# Patient Record
Sex: MALE | Race: WHITE | Employment: OTHER | URBAN - METROPOLITAN AREA
[De-identification: names, ages, dates, MRNs, and addresses within clinical notes are randomized per-mention and may not be internally consistent; named-entity substitution may affect disease eponyms.]

---

## 2017-02-20 DIAGNOSIS — I10 HTN (HYPERTENSION): ICD-10-CM

## 2017-02-20 RX ORDER — METOPROLOL SUCCINATE 25 MG/1
12.5 TABLET, EXTENDED RELEASE ORAL DAILY
Qty: 45 TABLET | Refills: 1 | Status: SHIPPED | OUTPATIENT
Start: 2017-02-20 | End: 2017-08-01

## 2017-02-28 DIAGNOSIS — R07.89 CHEST DISCOMFORT: ICD-10-CM

## 2017-02-28 DIAGNOSIS — E78.5 HYPERLIPIDEMIA LDL GOAL <130: ICD-10-CM

## 2017-02-28 RX ORDER — SIMVASTATIN 20 MG
20 TABLET ORAL DAILY
Qty: 90 TABLET | Refills: 1 | Status: SHIPPED | OUTPATIENT
Start: 2017-02-28 | End: 2017-10-20

## 2017-02-28 RX ORDER — ISOSORBIDE MONONITRATE 60 MG/1
60 TABLET, EXTENDED RELEASE ORAL DAILY
Qty: 90 TABLET | Refills: 1 | Status: SHIPPED | OUTPATIENT
Start: 2017-02-28 | End: 2017-08-15

## 2017-03-20 ENCOUNTER — OFFICE VISIT (OUTPATIENT)
Dept: FAMILY MEDICINE | Facility: CLINIC | Age: 82
End: 2017-03-20
Payer: COMMERCIAL

## 2017-03-20 VITALS
DIASTOLIC BLOOD PRESSURE: 80 MMHG | TEMPERATURE: 96.4 F | HEIGHT: 67 IN | BODY MASS INDEX: 25.91 KG/M2 | WEIGHT: 165.1 LBS | SYSTOLIC BLOOD PRESSURE: 160 MMHG | HEART RATE: 59 BPM

## 2017-03-20 DIAGNOSIS — I10 ESSENTIAL HYPERTENSION WITH GOAL BLOOD PRESSURE LESS THAN 140/90: ICD-10-CM

## 2017-03-20 DIAGNOSIS — E11.21 TYPE 2 DIABETES MELLITUS WITH DIABETIC NEPHROPATHY, WITHOUT LONG-TERM CURRENT USE OF INSULIN (H): Primary | ICD-10-CM

## 2017-03-20 LAB — HBA1C MFR BLD: 8.3 % (ref 4.3–6)

## 2017-03-20 PROCEDURE — 80048 BASIC METABOLIC PNL TOTAL CA: CPT | Performed by: FAMILY MEDICINE

## 2017-03-20 PROCEDURE — 36415 COLL VENOUS BLD VENIPUNCTURE: CPT | Performed by: FAMILY MEDICINE

## 2017-03-20 PROCEDURE — 83036 HEMOGLOBIN GLYCOSYLATED A1C: CPT | Performed by: FAMILY MEDICINE

## 2017-03-20 PROCEDURE — 99214 OFFICE O/P EST MOD 30 MIN: CPT | Performed by: FAMILY MEDICINE

## 2017-03-20 NOTE — PATIENT INSTRUCTIONS
-Please return in 3 months for a recheck and we will complete your physical then.  -I will let you know when I get the results back from lab.

## 2017-03-20 NOTE — MR AVS SNAPSHOT
"              After Visit Summary   3/20/2017    Kiran Carcamo    MRN: [id number]           Patient Information     Date Of Birth          4/13/1921        Visit Information        Provider Department      3/20/2017 10:30 AM Buddy Poe MD [facility] [place]        Today's Diagnoses     Type 2 diabetes mellitus with diabetic nephropathy, without long-term current use of insulin (H)    -  1    Essential hypertension with goal blood pressure less than 140/90          Care Instructions    -Please return in 3 months for a recheck and we will complete your physical then.  -I will let you know when I get the results back from lab.        Follow-ups after your visit        Who to contact     If you have questions or need follow up information about today's clinic visit or your schedule please contact [facility] [place] [facility] directly at [phone].  Normal or non-critical lab and imaging results will be communicated to you by MyChart, letter or phone within 4 business days after the clinic has received the results. If you do not hear from us within 7 days, please contact the clinic through MyChart or phone. If you have a critical or abnormal lab result, we will notify you by phone as soon as possible.  Submit refill requests through [employer] or call your pharmacy and they will forward the refill request to us. Please allow 3 business days for your refill to be completed.          Additional Information About Your Visit        MyChart Information     [employer] lets you send messages to your doctor, view your test results, renew your prescriptions, schedule appointments and more. To sign up, go to [email or web address] . Click on \"Log in\" on the left side of the screen, which will take you to the Welcome page. Then click on \"Sign up Now\" on the right side of the page.     You will be asked to enter the access code listed below, as well as some personal information. Please follow the directions to " "create your username and password.     Your access code is: [email or web address]  Expires: 2017 10:51 AM     Your access code will  in 90 days. If you need help or a new code, please call your [facility] or [phone].        Care EveryWhere ID     This is your Care EveryWhere ID. This could be used by other organizations to access your [place] medical records  [id number]        Your Vitals Were     Pulse Temperature Height BMI (Body Mass Index)          59 96.4  F (35.8  C) (Oral) 5' 7\" (1.702 m) 25.86 kg/m2         Blood Pressure from Last 3 Encounters:   17 160/80   16 164/75   08/10/16 170/84    Weight from Last 3 Encounters:   17 165 lb 1.6 oz (74.9 kg)   16 167 lb 3.2 oz (75.8 kg)   08/10/16 165 lb 1.6 oz (74.9 kg)              We Performed the Following     Basic metabolic panel     Hemoglobin A1c        Primary Care Provider Office Phone # Fax #    Buddy Poe [phone]       [facility] [address] [address]        Thank you!     Thank you for choosing [facility]  for your [place]. Our goal is always to provide you with excellent care. Hearing back from our patients is one way we can continue to improve our services. Please take a few minutes to complete the written survey that you may receive in the mail after your visit with us. Thank you!             Your Updated Medication List - Protect others around you: Learn how to safely use, store and throw away your medicines at www.disposemymeds.org.          This list is accurate as of: 3/20/17 10:51 AM.  Always use your most recent med list.                   Brand Name Dispense Instructions for use    aspirin 81 MG EC tablet      Take 81 mg by mouth At Bedtime.       blood glucose monitoring test strip    no brand specified    3 Box    1 box by strip route two times daily       cephALEXin 500 MG capsule    KEFLEX     Reported on 3/20/2017       " isosorbide mononitrate 60 MG 24 hr tablet    IMDUR    90 tablet    Take 1 tablet (60 mg) by mouth daily       lisinopril 20 MG tablet    PRINIVIL/ZESTRIL    90 tablet    Take 1 tablet (20 mg) by mouth daily       metoprolol 25 MG 24 hr tablet    TOPROL-XL    45 tablet    Take 0.5 tablets (12.5 mg) by mouth daily       MV-ONE PO      Take 1 tablet by mouth.       nitroglycerin 0.4 MG sublingual tablet    NITROSTAT    25 tablet    Place 1 tablet (0.4 mg) under the tongue every 5 minutes as needed for chest pain If symptoms after 3 doses (15 minutes) call 911.       simvastatin 20 MG tablet    ZOCOR    90 tablet    Take 1 tablet (20 mg) by mouth daily

## 2017-03-20 NOTE — PROGRESS NOTES
SUBJECTIVE:                                                    Kiran Carcamo is a 89 year old male who presents to clinic today for the following health issues:    Hypertension Follow-up      Outpatient blood pressures are being checked at home.  Results are 130-160.    Low Salt Diet: low salt       Amount of exercise or physical activity: 6-7 days/week for an average of 15-30 minutes    Problems taking medications regularly: No    Medication side effects: none    Diet: regular (no restrictions)    Patient reports that he has been checking his blood pressures at home and has been getting systolic pressures that range between 130 and 160. He says that he has been eating a low salt diet and has been going for daily walks for 15-30 minutes, and thinks that his blood pressure is well controlled. He has been taking the lisinopril and metoprolol as prescribed with no problems. Blood pressures are approximately 85% at goal.    Diabetes Follow-up    Patient is checking blood sugars: once daily.  Results are as follows: High 100's low 200's    Diabetic concerns: None     Symptoms of hypoglycemia (low blood sugar): none     Paresthesias (numbness or burning in feet) or sores: No     Patient says that he has been checking his blood sugars daily and has been getting good results around the high 100's to low 200's, and he is approximately 80% at goal. He says that his sugars are typically high when he has late night snacks, and that normally his high sugars can be explained by a late snack or no exercising. He feels as though his diabetes is under good control and has no other concerns.    Patient says that sometimes when he lays on his left side, he experiences a slight discomfort in his lower left chest. He says that he often will have to adjust position to avoid discomfort, and that by the next day the discomfort dissipates.     Patient says that his ring finger has begun to swell and is wondering if there are any good  options for getting his wedding ring removed should it ever become a problem.    Problem list and histories reviewed & adjusted, as indicated.  Additional history: as documented    Patient Active Problem List   Diagnosis     Health Care Home     AK (actinic keratosis)     SK (seborrheic keratosis)     Hyperplasia of prostate     Hyperlipidemia LDL goal <70     History of MI (myocardial infarction)     Forgetfulness     Syncope     CKD (chronic kidney disease) stage 2, GFR 60-89 ml/min     Atypical chest pain     Essential hypertension with goal blood pressure less than 140/90     Eczema, unspecified type     Type 2 diabetes mellitus with diabetic nephropathy, without long-term current use of insulin (H)     Past Surgical History   Procedure Laterality Date     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     Mandible surgery       Cholecystectomy         Social History   Substance Use Topics     Smoking status: Never Smoker     Smokeless tobacco: Never Used     Alcohol use No     Family History   Problem Relation Age of Onset     HEART DISEASE Mother      Eye Disorder Father      Respiratory Brother      HEART DISEASE Sister      CANCER Daughter      CANCER Brother          Current Outpatient Prescriptions   Medication Sig Dispense Refill     isosorbide mononitrate (IMDUR) 60 MG 24 hr tablet Take 1 tablet (60 mg) by mouth daily 90 tablet 1     simvastatin (ZOCOR) 20 MG tablet Take 1 tablet (20 mg) by mouth daily 90 tablet 1     metoprolol (TOPROL-XL) 25 MG 24 hr tablet Take 0.5 tablets (12.5 mg) by mouth daily 45 tablet 1     blood glucose monitoring (NO BRAND SPECIFIED) test strip 1 box by strip route two times daily 3 Box 3     lisinopril (PRINIVIL,ZESTRIL) 20 MG tablet Take 1 tablet (20 mg) by mouth daily 90 tablet 3     nitroglycerin (NITROSTAT) 0.4 MG SL tablet Place 1 tablet (0.4 mg) under the tongue every 5 minutes as needed for chest pain If symptoms after 3 doses (15 minutes) call [phone]. [phone] tablet 1     aspirin  81 MG EC tablet Take 81 mg by mouth At Bedtime.       Multiple Vitamin (MV-ONE PO) Take 1 tablet by mouth.       cephALEXin (KEFLEX) 500 MG capsule Reported on 3/20/2017       No Known Allergies  Recent Labs   Lab Test  11/16/16   1047  08/10/16   1050  12/16/15   1111   11/21/14   1441  04/16/14   1030   A1C  7.6*  7.4*  6.9*   < >  6.8*  6.9*   LDL   --   64   --    --   40  55   HDL   --   41   --    --   47  36*   TRIG   --   97   --    --   110  140   ALT   --    --   34   --   34  31   CR   --    --   1.02   --   0.94  1.03   GFRESTIMATED   --    --   68   --   75  67   GFRESTBLACK   --    --   82   --   >90   GFR Calc    82   POTASSIUM   --    --   4.3   --   4.7  4.5   TSH  2.57   --    --    --   1.97   --     < > = values in this interval not displayed.      BP Readings from Last 3 Encounters:   03/20/17 160/80   11/16/16 164/75   08/10/16 170/84    Wt Readings from Last 3 Encounters:   03/20/17 74.9 kg (165 lb 1.6 oz)   11/16/16 75.8 kg (167 lb 3.2 oz)   08/10/16 74.9 kg (165 lb 1.6 oz)        Reviewed and updated as needed this visit by clinical staff  Tobacco  Allergies  Meds  Med Hx  Surg Hx  Fam Hx  Soc Hx      Reviewed and updated as needed this visit by Provider         ROS:  Positive for infrequent lower left chest discomfort.    Denies headache, insomnia, shortness of breath, cough, heartburn, bowel issues, bladder issues, neck pain, back pain, hip pain, knee pain, ankle pain, or foot pain. Remainder of ROS is negative unless otherwise noted above or in HPI.    This document serves as a record of the services and decisions personally performed and made by Buddy Poe MD. It was created on his behalf by Jef Osuna, a trained medical scribe. The creation of this document is based the provider's statements to the medical scribe.  Jef Osuna 10:34 AM March 20, 2017    OBJECTIVE:                                                    /80  Pulse 59  Temp 96.4  " F (35.8  C) (Oral)  Ht 1.702 m (5' 7\")  Wt 74.9 kg (165 lb 1.6 oz)  BMI 25.86 kg/m2  Body mass index is 25.86 kg/(m^2).  GENERAL: healthy, alert and no distress  RESP: lungs clear to auscultation - no rales, rhonchi or wheezes  CV: regular rate and rhythm, normal S1 S2, no S3 or S4, no murmur, click or rub, no peripheral edema and peripheral pulses strong  MS: no gross musculoskeletal defects noted, no edema. Calves nontender.  NEURO: Normal strength and tone, mentation intact and speech normal  PSYCH: mentation appears normal, affect normal/bright    Diagnostic Test Results:  No results found for this or any previous visit (from the past 24 hour(s)).     ASSESSMENT/PLAN:                                                      (E11.21) Type 2 diabetes mellitus with diabetic nephropathy, without long-term current use of insulin (H)  (primary encounter diagnosis)  Comment: Stable. Labs completed today. Not currently on any medication.  Plan: Hemoglobin A1c, Basic metabolic panel        Follow up with results from lab. Follow up in 3 months for recheck and physical.    (I10) Essential hypertension with goal blood pressure less than 140/90  Comment: Not at goal this morning; thought he shouldn't take medications before blood draw and he's fasting. Controlled on metoprolol and lisinopril. Patient takes blood pressure daily and pressures are at goal nearly 80% of the time.  Plan: Continue on current medication. Follow up in 3 months for recheck and physical.    Patient Instructions   -Please return in 3 months for a recheck and we will complete your physical then.  -I will let you know when I get the results back from lab.    The information in this document, created by the medical scribe for me, accurately reflects the services I personally performed and the decisions made by me. I have reviewed and approved this document for accuracy prior to leaving the patient care area.   Buddy Poe MD 10:35 AM March 20, " 2017  [facility] [place]

## 2017-03-21 LAB
ANION GAP SERPL CALCULATED.3IONS-SCNC: 7 MMOL/L (ref 3–14)
BUN SERPL-MCNC: 30 MG/DL (ref 7–30)
CALCIUM SERPL-MCNC: 9.2 MG/DL (ref 8.5–10.1)
CHLORIDE SERPL-SCNC: 105 MMOL/L (ref 94–109)
CO2 SERPL-SCNC: 29 MMOL/L (ref 20–32)
CREAT SERPL-MCNC: 1.08 MG/DL (ref 0.66–1.25)
GFR SERPL CREATININE-BSD FRML MDRD: 63 ML/MIN/1.7M2
GLUCOSE SERPL-MCNC: 154 MG/DL (ref 70–99)
POTASSIUM SERPL-SCNC: 4.4 MMOL/L (ref 3.4–5.3)
SODIUM SERPL-SCNC: 141 MMOL/L (ref 133–144)

## 2017-05-08 ENCOUNTER — HOSPITAL ENCOUNTER (OUTPATIENT)
Facility: CLINIC | Age: 82
Setting detail: OBSERVATION
Discharge: HOME | End: 2017-05-09
Attending: FAMILY MEDICINE | Admitting: FAMILY MEDICINE
Payer: MEDICARE

## 2017-05-08 ENCOUNTER — APPOINTMENT (OUTPATIENT)
Dept: GENERAL RADIOLOGY | Facility: CLINIC | Age: 82
End: 2017-05-08
Attending: FAMILY MEDICINE
Payer: MEDICARE

## 2017-05-08 ENCOUNTER — TELEPHONE (OUTPATIENT)
Dept: FAMILY MEDICINE | Facility: CLINIC | Age: 82
End: 2017-05-08

## 2017-05-08 DIAGNOSIS — N39.0 URINARY TRACT INFECTION WITH HEMATURIA, SITE UNSPECIFIED: ICD-10-CM

## 2017-05-08 DIAGNOSIS — J06.9 UPPER RESPIRATORY TRACT INFECTION, UNSPECIFIED TYPE: ICD-10-CM

## 2017-05-08 DIAGNOSIS — J18.9 PNEUMONIA DUE TO INFECTIOUS ORGANISM, UNSPECIFIED LATERALITY, UNSPECIFIED PART OF LUNG: ICD-10-CM

## 2017-05-08 DIAGNOSIS — J18.9 PNEUMONIA: ICD-10-CM

## 2017-05-08 DIAGNOSIS — K59.00 CONSTIPATION, UNSPECIFIED CONSTIPATION TYPE: ICD-10-CM

## 2017-05-08 DIAGNOSIS — I10 ESSENTIAL HYPERTENSION, MALIGNANT: ICD-10-CM

## 2017-05-08 DIAGNOSIS — R53.1 WEAKNESS: ICD-10-CM

## 2017-05-08 DIAGNOSIS — R31.9 URINARY TRACT INFECTION WITH HEMATURIA, SITE UNSPECIFIED: ICD-10-CM

## 2017-05-08 DIAGNOSIS — J15.69 GRAM-NEGATIVE PNEUMONIA (H): ICD-10-CM
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
## 2017-05-08 DIAGNOSIS — E11.9 TYPE 2 DIABETES MELLITUS WITHOUT COMPLICATION, WITHOUT LONG-TERM CURRENT USE OF INSULIN (H): ICD-10-CM

## 2017-05-08 DIAGNOSIS — J10.1 INFLUENZA A: Primary | ICD-10-CM

## 2017-05-08 DIAGNOSIS — R31.9 HEMATURIA SYNDROME: ICD-10-CM

## 2017-05-08 LAB
ALBUMIN SERPL-MCNC: 2.8 G/DL (ref 3.4–5)
ALBUMIN UR-MCNC: 30 MG/DL
ALP SERPL-CCNC: 94 U/L (ref 40–150)
ALT SERPL W P-5'-P-CCNC: 83 U/L (ref 0–70)
ANION GAP SERPL CALCULATED.3IONS-SCNC: 9 MMOL/L (ref 3–14)
APPEARANCE UR: CLEAR
APTT PPP: 29 SEC (ref 22–37)
AST SERPL W P-5'-P-CCNC: 45 U/L (ref 0–45)
BASOPHILS # BLD AUTO: 0 10E9/L (ref 0–0.2)
BASOPHILS NFR BLD AUTO: 0.1 %
BILIRUB SERPL-MCNC: 0.6 MG/DL (ref 0.2–1.3)
BILIRUB UR QL STRIP: NEGATIVE
BUN SERPL-MCNC: 24 MG/DL (ref 7–30)
CALCIUM SERPL-MCNC: 8.6 MG/DL (ref 8.5–10.1)
CHLORIDE SERPL-SCNC: 102 MMOL/L (ref 94–109)
CO2 SERPL-SCNC: 27 MMOL/L (ref 20–32)
COLOR UR AUTO: YELLOW
CREAT SERPL-MCNC: 1.01 MG/DL (ref 0.66–1.25)
DIFFERENTIAL METHOD BLD: ABNORMAL
EOSINOPHIL # BLD AUTO: 0 10E9/L (ref 0–0.7)
EOSINOPHIL NFR BLD AUTO: 0.1 %
ERYTHROCYTE [DISTWIDTH] IN BLOOD BY AUTOMATED COUNT: 13.1 % (ref 10–15)
FLUAV+FLUBV AG SPEC QL: ABNORMAL
FLUAV+FLUBV AG SPEC QL: ABNORMAL
FLUAV+FLUBV RNA SPEC QL NAA+PROBE: ABNORMAL
FLUAV+FLUBV RNA SPEC QL NAA+PROBE: ABNORMAL
GFR SERPL CREATININE-BSD FRML MDRD: 68 ML/MIN/1.7M2
GLUCOSE SERPL-MCNC: 172 MG/DL (ref 70–99)
GLUCOSE UR STRIP-MCNC: NEGATIVE MG/DL
HCT VFR BLD AUTO: 37.7 % (ref 40–53)
HGB BLD-MCNC: 12.5 G/DL (ref 13.3–17.7)
HGB UR QL STRIP: ABNORMAL
IMM GRANULOCYTES # BLD: 0 10E9/L (ref 0–0.4)
IMM GRANULOCYTES NFR BLD: 0.4 %
INR PPP: 1.13 (ref 0.86–1.14)
KETONES UR STRIP-MCNC: 10 MG/DL
LACTATE BLD-SCNC: 1.2 MMOL/L (ref 0.7–2.1)
LEUKOCYTE ESTERASE UR QL STRIP: NEGATIVE
LYMPHOCYTES # BLD AUTO: 0.4 10E9/L (ref 0.8–5.3)
LYMPHOCYTES NFR BLD AUTO: 5.5 %
MCH RBC QN AUTO: 32.2 PG (ref 26.5–33)
MCHC RBC AUTO-ENTMCNC: 33.2 G/DL (ref 31.5–36.5)
MCV RBC AUTO: 97 FL (ref 78–100)
MONOCYTES # BLD AUTO: 1 10E9/L (ref 0–1.3)
MONOCYTES NFR BLD AUTO: 14 %
MUCOUS THREADS #/AREA URNS LPF: PRESENT /LPF
NEUTROPHILS # BLD AUTO: 5.5 10E9/L (ref 1.6–8.3)
NEUTROPHILS NFR BLD AUTO: 79.9 %
NITRATE UR QL: NEGATIVE
NRBC # BLD AUTO: 0 10*3/UL
NRBC BLD AUTO-RTO: 0 /100
PH UR STRIP: 5.5 PH (ref 5–7)
PLATELET # BLD AUTO: 232 10E9/L (ref 150–450)
POTASSIUM SERPL-SCNC: 4.3 MMOL/L (ref 3.4–5.3)
PROT SERPL-MCNC: 7.4 G/DL (ref 6.8–8.8)
RBC # BLD AUTO: 3.88 10E12/L (ref 4.4–5.9)
RBC #/AREA URNS AUTO: 13 /HPF (ref 0–2)
RSV AG SPEC QL: NORMAL
RSV RNA SPEC NAA+PROBE: ABNORMAL
SODIUM SERPL-SCNC: 138 MMOL/L (ref 133–144)
SP GR UR STRIP: 1.02 (ref 1–1.03)
SPECIMEN SOURCE: ABNORMAL
SPECIMEN SOURCE: ABNORMAL
SPECIMEN SOURCE: NORMAL
TROPONIN I SERPL-MCNC: 0.08 UG/L (ref 0–0.04)
URN SPEC COLLECT METH UR: ABNORMAL
UROBILINOGEN UR STRIP-MCNC: NORMAL MG/DL (ref 0–2)
WBC # BLD AUTO: 6.9 10E9/L (ref 4–11)
WBC #/AREA URNS AUTO: 1 /HPF (ref 0–2)

## 2017-05-08 PROCEDURE — 80053 COMPREHEN METABOLIC PANEL: CPT | Performed by: FAMILY MEDICINE

## 2017-05-08 PROCEDURE — 96361 HYDRATE IV INFUSION ADD-ON: CPT | Performed by: FAMILY MEDICINE

## 2017-05-08 PROCEDURE — 99207 ZZC APP CREDIT; MD BILLING SHARED VISIT: CPT | Mod: 25 | Performed by: FAMILY MEDICINE

## 2017-05-08 PROCEDURE — 25000128 H RX IP 250 OP 636: Performed by: FAMILY MEDICINE

## 2017-05-08 PROCEDURE — 83605 ASSAY OF LACTIC ACID: CPT | Performed by: NURSE PRACTITIONER

## 2017-05-08 PROCEDURE — 36415 COLL VENOUS BLD VENIPUNCTURE: CPT | Performed by: FAMILY MEDICINE

## 2017-05-08 PROCEDURE — 99214 OFFICE O/P EST MOD 30 MIN: CPT | Mod: GC | Performed by: INTERNAL MEDICINE

## 2017-05-08 PROCEDURE — 87804 INFLUENZA ASSAY W/OPTIC: CPT | Performed by: FAMILY MEDICINE

## 2017-05-08 PROCEDURE — 99285 EMERGENCY DEPT VISIT HI MDM: CPT | Mod: 25 | Performed by: FAMILY MEDICINE

## 2017-05-08 PROCEDURE — 85730 THROMBOPLASTIN TIME PARTIAL: CPT | Performed by: FAMILY MEDICINE

## 2017-05-08 PROCEDURE — 93005 ELECTROCARDIOGRAM TRACING: CPT | Performed by: FAMILY MEDICINE

## 2017-05-08 PROCEDURE — 25000132 ZZH RX MED GY IP 250 OP 250 PS 637: Mod: GY | Performed by: NURSE PRACTITIONER

## 2017-05-08 PROCEDURE — 93010 ELECTROCARDIOGRAM REPORT: CPT | Mod: Z6 | Performed by: FAMILY MEDICINE

## 2017-05-08 PROCEDURE — 85025 COMPLETE CBC W/AUTO DIFF WBC: CPT | Performed by: FAMILY MEDICINE

## 2017-05-08 PROCEDURE — 87807 RSV ASSAY W/OPTIC: CPT | Performed by: FAMILY MEDICINE

## 2017-05-08 PROCEDURE — 99220 ZZC INITIAL OBSERVATION CARE,LEVL III: CPT | Mod: Z6 | Performed by: NURSE PRACTITIONER

## 2017-05-08 PROCEDURE — 71010 XR CHEST PORT 1 VW: CPT

## 2017-05-08 PROCEDURE — 85610 PROTHROMBIN TIME: CPT | Performed by: FAMILY MEDICINE

## 2017-05-08 PROCEDURE — 87040 BLOOD CULTURE FOR BACTERIA: CPT | Performed by: FAMILY MEDICINE

## 2017-05-08 PROCEDURE — 93010 ELECTROCARDIOGRAM REPORT: CPT | Performed by: INTERNAL MEDICINE

## 2017-05-08 PROCEDURE — 96361 HYDRATE IV INFUSION ADD-ON: CPT

## 2017-05-08 PROCEDURE — 96365 THER/PROPH/DIAG IV INF INIT: CPT | Performed by: FAMILY MEDICINE

## 2017-05-08 PROCEDURE — 84484 ASSAY OF TROPONIN QUANT: CPT | Performed by: FAMILY MEDICINE

## 2017-05-08 PROCEDURE — G0378 HOSPITAL OBSERVATION PER HR: HCPCS

## 2017-05-08 PROCEDURE — 25000128 H RX IP 250 OP 636: Performed by: NURSE PRACTITIONER

## 2017-05-08 PROCEDURE — A9270 NON-COVERED ITEM OR SERVICE: HCPCS | Mod: GY | Performed by: NURSE PRACTITIONER

## 2017-05-08 PROCEDURE — 81001 URINALYSIS AUTO W/SCOPE: CPT | Performed by: FAMILY MEDICINE

## 2017-05-08 RX ORDER — ACETAMINOPHEN 325 MG/1
650 TABLET ORAL EVERY 4 HOURS PRN
Status: DISCONTINUED | OUTPATIENT
Start: 2017-05-08 | End: 2017-05-08

## 2017-05-08 RX ORDER — LIDOCAINE 40 MG/G
CREAM TOPICAL
Status: DISCONTINUED | OUTPATIENT
Start: 2017-05-08 | End: 2017-05-09 | Stop reason: HOSPADM

## 2017-05-08 RX ORDER — IPRATROPIUM BROMIDE AND ALBUTEROL SULFATE 2.5; .5 MG/3ML; MG/3ML
3 SOLUTION RESPIRATORY (INHALATION) EVERY 4 HOURS PRN
Status: DISCONTINUED | OUTPATIENT
Start: 2017-05-08 | End: 2017-05-09 | Stop reason: HOSPADM

## 2017-05-08 RX ORDER — SODIUM CHLORIDE 9 MG/ML
1000 INJECTION, SOLUTION INTRAVENOUS CONTINUOUS
Status: DISCONTINUED | OUTPATIENT
Start: 2017-05-08 | End: 2017-05-08

## 2017-05-08 RX ORDER — ACETAMINOPHEN 325 MG/1
975 TABLET ORAL EVERY 8 HOURS PRN
Status: DISCONTINUED | OUTPATIENT
Start: 2017-05-08 | End: 2017-05-09 | Stop reason: HOSPADM

## 2017-05-08 RX ORDER — ISOSORBIDE MONONITRATE 60 MG/1
60 TABLET, EXTENDED RELEASE ORAL DAILY
Status: DISCONTINUED | OUTPATIENT
Start: 2017-05-09 | End: 2017-05-09 | Stop reason: HOSPADM

## 2017-05-08 RX ORDER — ASPIRIN 81 MG/1
81 TABLET ORAL AT BEDTIME
Status: DISCONTINUED | OUTPATIENT
Start: 2017-05-08 | End: 2017-05-09 | Stop reason: HOSPADM

## 2017-05-08 RX ORDER — ACETAMINOPHEN 325 MG/1
975 TABLET ORAL EVERY 4 HOURS PRN
Status: DISCONTINUED | OUTPATIENT
Start: 2017-05-08 | End: 2017-05-08

## 2017-05-08 RX ORDER — SIMVASTATIN 20 MG
20 TABLET ORAL DAILY
Status: DISCONTINUED | OUTPATIENT
Start: 2017-05-08 | End: 2017-05-09 | Stop reason: HOSPADM

## 2017-05-08 RX ORDER — OSELTAMIVIR PHOSPHATE 30 MG/1
30 CAPSULE ORAL 2 TIMES DAILY
Status: DISCONTINUED | OUTPATIENT
Start: 2017-05-08 | End: 2017-05-09 | Stop reason: HOSPADM

## 2017-05-08 RX ORDER — SODIUM CHLORIDE 9 MG/ML
INJECTION, SOLUTION INTRAVENOUS CONTINUOUS
Status: DISCONTINUED | OUTPATIENT
Start: 2017-05-08 | End: 2017-05-09 | Stop reason: HOSPADM

## 2017-05-08 RX ORDER — SIMVASTATIN 20 MG
20 TABLET ORAL DAILY
Status: DISCONTINUED | OUTPATIENT
Start: 2017-05-09 | End: 2017-05-08

## 2017-05-08 RX ORDER — MULTIPLE VITAMINS W/ MINERALS TAB 9MG-400MCG
1 TAB ORAL DAILY
Status: DISCONTINUED | OUTPATIENT
Start: 2017-05-09 | End: 2017-05-09 | Stop reason: HOSPADM

## 2017-05-08 RX ORDER — LEVOFLOXACIN 5 MG/ML
500 INJECTION, SOLUTION INTRAVENOUS ONCE
Status: COMPLETED | OUTPATIENT
Start: 2017-05-08 | End: 2017-05-08

## 2017-05-08 RX ORDER — LIDOCAINE 40 MG/G
CREAM TOPICAL
Status: DISCONTINUED | OUTPATIENT
Start: 2017-05-08 | End: 2017-05-09

## 2017-05-08 RX ORDER — LISINOPRIL 20 MG/1
20 TABLET ORAL DAILY
Status: DISCONTINUED | OUTPATIENT
Start: 2017-05-09 | End: 2017-05-09 | Stop reason: HOSPADM

## 2017-05-08 RX ADMIN — LEVOFLOXACIN 500 MG: 5 INJECTION, SOLUTION INTRAVENOUS at 18:05

## 2017-05-08 RX ADMIN — ASPIRIN 81 MG: 81 TABLET, COATED ORAL at 21:48

## 2017-05-08 RX ADMIN — ACETAMINOPHEN 975 MG: 325 TABLET, FILM COATED ORAL at 21:48

## 2017-05-08 RX ADMIN — OSELTAMIVIR PHOSPHATE 30 MG: 30 CAPSULE ORAL at 21:48

## 2017-05-08 RX ADMIN — SIMVASTATIN 20 MG: 20 TABLET, FILM COATED ORAL at 21:48

## 2017-05-08 RX ADMIN — SODIUM CHLORIDE: 9 INJECTION, SOLUTION INTRAVENOUS at 21:01

## 2017-05-08 RX ADMIN — SODIUM CHLORIDE 1000 ML: 900 INJECTION, SOLUTION INTRAVENOUS at 15:34

## 2017-05-08 ASSESSMENT — ENCOUNTER SYMPTOMS
COUGH: 1
NECK PAIN: 0
DYSPHORIC MOOD: 1
APPETITE CHANGE: 1
BRUISES/BLEEDS EASILY: 0
FLANK PAIN: 0
VOICE CHANGE: 1
NAUSEA: 0
ACTIVITY CHANGE: 1
CHILLS: 1
RHINORRHEA: 1
DECREASED CONCENTRATION: 1
VOMITING: 0
ABDOMINAL PAIN: 0
DIARRHEA: 0
FATIGUE: 1
WEAKNESS: 1
SHORTNESS OF BREATH: 0
ARTHRALGIAS: 1
SLEEP DISTURBANCE: 1
TROUBLE SWALLOWING: 0
FEVER: 0

## 2017-05-08 NOTE — ED PROVIDER NOTES
History     Chief Complaint   Patient presents with     Generalized Weakness     HPI  Kiran Carcamo is a 89 year old male with a history of DM type II, CKD stage II, HTN, MI s/p 6-vessel bypass and syncope who presents to the Emergency Department via EMS for evaluation after a fall. This afternoon, the patient let his daughter into his home and went up to get into bed when he fell to the ground. He did not hit his head or lose consciousness. He denies nausea, vomiting diarrhea or any other concerns or complaints at this time. He did receive the influenza vaccination this year.    Per the patient's daughter, the patient has been experiencing a head cold recently including a cough, congestion, chills and has not been sleeping well. She checked on him today and while getting into bed, he slid off the bed onto the ground. She could not get him off the floor, prompting her to call EMS. He did not hit his head or experience seizure like activity. The patient lives alone in a single family home with 2 levels.  As noted the patient had visited a friend in the nursing home this last week.    Past Medical History:   Diagnosis Date     CKD (chronic kidney disease) stage 2, GFR 60-89 ml/min 7/6/2015     Hypertension goal BP (blood pressure) < 140/90      Syncope 12/12/2011     Type 2 diabetes, HbA1C goal < 8% (H) 2/23/2015       Past Surgical History:   Procedure Laterality Date     CHOLECYSTECTOMY       CORONARY ARTERY BYPASS  '05    6-vessel     MANDIBLE SURGERY         Family History   Problem Relation Age of Onset     HEART DISEASE Mother      Eye Disorder Father      Respiratory Brother      HEART DISEASE Sister      CANCER Daughter      CANCER Brother        Social History   Substance Use Topics     Smoking status: Never Smoker     Smokeless tobacco: Never Used     Alcohol use No       Current Facility-Administered Medications   Medication     lidocaine 1 % 1 mL     lidocaine (LMX4) kit     sodium chloride (PF) 0.9%  PF flush 3 mL     sodium chloride (PF) 0.9% PF flush 3 mL     0.9% sodium chloride infusion     levofloxacin (LEVAQUIN) infusion 500 mg     Current Outpatient Prescriptions   Medication     isosorbide mononitrate (IMDUR) 60 MG 24 hr tablet     simvastatin (ZOCOR) 20 MG tablet     metoprolol (TOPROL-XL) 25 MG 24 hr tablet     blood glucose monitoring (NO BRAND SPECIFIED) test strip     lisinopril (PRINIVIL,ZESTRIL) 20 MG tablet     cephALEXin (KEFLEX) 500 MG capsule     nitroglycerin (NITROSTAT) 0.4 MG SL tablet     aspirin 81 MG EC tablet     Multiple Vitamin (MV-ONE PO)      No Known Allergies     I have reviewed the Medications, Allergies, Past Medical and Surgical History, and Social History in the Epic system.    Review of Systems   Constitutional: Positive for activity change (patient with fall unable to get up), appetite change (slightly decreased), chills and fatigue. Negative for fever.   HENT: Positive for congestion, hearing loss (chronic hard of hearing), postnasal drip, rhinorrhea and voice change. Negative for nosebleeds and trouble swallowing.    Eyes: Negative for visual disturbance.   Respiratory: Positive for cough. Negative for shortness of breath.    Cardiovascular: Negative for chest pain and leg swelling.   Gastrointestinal: Negative for abdominal pain, diarrhea, nausea and vomiting.   Genitourinary: Negative for flank pain.   Musculoskeletal: Positive for arthralgias (patient's right shoulder pain a few days ago gone). Negative for gait problem (patient ambulates with stick or independently) and neck pain.   Skin: Negative for rash.   Allergic/Immunologic: Negative for immunocompromised state.   Neurological: Positive for weakness (generalized). Negative for syncope.        Negative for loss of consciousness.   Hematological: Does not bruise/bleed easily.   Psychiatric/Behavioral: Positive for decreased concentration, dysphoric mood and sleep disturbance (patient not sleep well last night).  "  All other systems reviewed and are negative.      Physical Exam   BP: 130/53  Pulse: 56  Temp: 97.7  F (36.5  C)  Resp: 18  Height: 175.3 cm (5' 9\")  SpO2: 95 %  Physical Exam   Constitutional: He is oriented to person, place, and time. He appears well-developed and well-nourished. He appears distressed.   Patient with some upperairway congestion no distress   HENT:   Head: Normocephalic and atraumatic.   Eyes: Conjunctivae and EOM are normal. Pupils are equal, round, and reactive to light. No scleral icterus.   Neck: Normal range of motion. Neck supple.   Cardiovascular: Normal rate and regular rhythm.    Pulmonary/Chest: No stridor. He is in respiratory distress. He has rales (right base crackle). He exhibits no tenderness.   Abdominal: He exhibits no mass. There is no tenderness. There is no rebound and no guarding.   Musculoskeletal: He exhibits no edema, tenderness or deformity.   Negative Homans   Neurological: He is alert and oriented to person, place, and time. He has normal reflexes. No cranial nerve deficit. Coordination normal.   Skin: Skin is warm and dry. No rash noted. He is not diaphoretic. No erythema. No pallor.   Psychiatric:   appropriate   Nursing note and vitals reviewed.      ED Course     2:02 PM  The patient was seen and examined by Dr. Euceda in Room 9.     ED Course     Procedures      patient evaluated in the ER discussed with daughter who is present.  Epic reviewed with echo 2011 showing inf hypokinesia with EF 55%  No chest pain occasional cough chest x-ray  Findings right lower lobe infiltrate   cBC normal  Glucose 172 creatinine 1.01  Troponin 0.082  Influenza RSV pending  Patient given liter IV fluids  Discussed findings with cardiology who discussed with family who all agree no aggressive cardiac eval. Appears more infection with weakness.  Levaquin IV given with IV fluids.  Admit ED obs for hydration and levaquin with recheck PT/OT in am.    Patient resting feeling fine. " VSS  Discussed with [facility] obs unit.           EKG Interpretation:      Interpreted by Buddy Euceda  Time reviewed: 1449  Symptoms at time of EKG: right shoulder pain fall today   Rhythm: normal sinus   Rate: normal  Axis: normal  Ectopy: none  Conduction: normal  ST Segments/ T Waves: No hyperacute ST-T wave changes  Q Waves: none  Comparison to prior: Unchanged    Clinical Impression:sinus ariadna at 57 with nonspecific st changes.          Critical Care time:  none               Labs Ordered and Resulted from Time of ED Arrival Up to the Time of Departure from the ED   CBC WITH PLATELETS DIFFERENTIAL - Abnormal; Notable for the following:        Result Value    RBC Count 3.88 (*)     Hemoglobin 12.5 (*)     Hematocrit 37.7 (*)     Absolute Lymphocytes 0.4 (*)     All other components within normal limits   COMPREHENSIVE METABOLIC PANEL - Abnormal; Notable for the following:     Glucose 172 (*)     Albumin 2.8 (*)     ALT 83 (*)     All other components within normal limits   TROPONIN I - Abnormal; Notable for the following:     Troponin I ES 0.082 (*)     All other components within normal limits   UA MACROSCOPIC WITH REFLEX TO MICRO AND CULTURE - Abnormal; Notable for the following:     Ketones Urine 10 (*)     Blood Urine Moderate (*)     Protein Albumin Urine 30 (*)     RBC Urine 13 (*)     Mucous Urine Present (*)     All other components within normal limits   INFLUENZA A AND B AND RSV PCR - Abnormal; Notable for the following:     Influenza A PCR   (*)     Value: Canceled, Test credited  PER KOKO GARCIA RN @ 1615 5/8/17 CS      Influenza B PCR   (*)     Value: Canceled, Test credited  PER KOKO GARCIA RN @ 1615 5/8/17 CS      Resp Syncytial Virus   (*)     Value: Canceled, Test credited  PER KOKO GARCIA RN @ 1615 5/8/17 CS      All other components within normal limits   INR   PARTIAL THROMBOPLASTIN TIME   ORTHOSTATIC BLOOD PRESSURE AND PULSE   CARDIAC CONTINUOUS MONITORING   PULSE OXIMETRY  NURSING   PERIPHERAL IV CATHETER   BLOOD CULTURE   INFLUENZA A/B ANTIGEN   RSV RAPID ANTIGEN     Results for orders placed or performed during the hospital encounter of 05/08/17   XR Chest Port 1 View    Narrative    Exam: XR CHEST PORT 1 VW, 5/8/2017 2:44 PM    Indication: cough with near syncope    Comparison:     The chest 11/9/2011 and 9/23/2005.    Findings:   Pulmonary opacity in the right lung base blunting the right  cardiophrenic angle. The cardiac silhouette is mildly enlarged.  Trachea is midline and there is no evidence of pneumothorax.  Sternotomy wires are intact. The upper abdomen is unremarkable.      Impression    Impression: Right lower lobe pulmonary opacity, could represent  infection, aspiration, or atelectasis.    I have personally reviewed the examination and initial interpretation  and I agree with the findings.    CASSANDRA TOVAR MD   CBC with platelets differential   Result Value Ref Range    WBC 6.9 4.0 - 11.0 10e9/L    RBC Count 3.88 (L) 4.4 - 5.9 10e12/L    Hemoglobin 12.5 (L) 13.3 - 17.7 g/dL    Hematocrit 37.7 (L) 40.0 - 53.0 %    MCV 97 78 - 100 fl    MCH 32.2 26.5 - 33.0 pg    MCHC 33.2 31.5 - 36.5 g/dL    RDW 13.1 10.0 - 15.0 %    Platelet Count 232 150 - 450 10e9/L    Diff Method Automated Method     % Neutrophils 79.9 %    % Lymphocytes 5.5 %    % Monocytes 14.0 %    % Eosinophils 0.1 %    % Basophils 0.1 %    % Immature Granulocytes 0.4 %    Nucleated RBCs 0 0 /100    Absolute Neutrophil 5.5 1.6 - 8.3 10e9/L    Absolute Lymphocytes 0.4 (L) 0.8 - 5.3 10e9/L    Absolute Monocytes 1.0 0.0 - 1.3 10e9/L    Absolute Eosinophils 0.0 0.0 - 0.7 10e9/L    Absolute Basophils 0.0 0.0 - 0.2 10e9/L    Abs Immature Granulocytes 0.0 0 - 0.4 10e9/L    Absolute Nucleated RBC 0.0    INR   Result Value Ref Range    INR 1.13 0.86 - 1.14   Partial thromboplastin time   Result Value Ref Range    PTT 29 22 - 37 sec   Comprehensive metabolic panel   Result Value Ref Range    Sodium 138 133 - 144 mmol/L     Potassium 4.3 3.4 - 5.3 mmol/L    Chloride 102 94 - 109 mmol/L    Carbon Dioxide 27 20 - 32 mmol/L    Anion Gap 9 3 - 14 mmol/L    Glucose 172 (H) 70 - 99 mg/dL    Urea Nitrogen 24 7 - 30 mg/dL    Creatinine 1.01 0.66 - 1.25 mg/dL    GFR Estimate 68 >60 mL/min/1.7m2    GFR Estimate If Black 83 >60 mL/min/1.7m2    Calcium 8.6 8.5 - 10.1 mg/dL    Bilirubin Total 0.6 0.2 - 1.3 mg/dL    Albumin 2.8 (L) 3.4 - 5.0 g/dL    Protein Total 7.4 6.8 - 8.8 g/dL    Alkaline Phosphatase 94 40 - 150 U/L    ALT 83 (H) 0 - 70 U/L    AST 45 0 - 45 U/L   Troponin I   Result Value Ref Range    Troponin I ES 0.082 (H) 0.000 - 0.045 ug/L   UA reflex to Microscopic and Culture   Result Value Ref Range    Color Urine Yellow     Appearance Urine Clear     Glucose Urine Negative NEG mg/dL    Bilirubin Urine Negative NEG    Ketones Urine 10 (A) NEG mg/dL    Specific Gravity Urine 1.019 1.003 - 1.035    Blood Urine Moderate (A) NEG    pH Urine 5.5 5.0 - 7.0 pH    Protein Albumin Urine 30 (A) NEG mg/dL    Urobilinogen mg/dL Normal 0.0 - 2.0 mg/dL    Nitrite Urine Negative NEG    Leukocyte Esterase Urine Negative NEG    Source Midstream Urine     RBC Urine 13 (H) 0 - 2 /HPF    WBC Urine 1 0 - 2 /HPF    Mucous Urine Present (A) NEG /LPF   EKG 12-lead, tracing only   Result Value Ref Range    Interpretation ECG Click View Image link to view waveform and result    Influenza A and B and RSV PCR   Result Value Ref Range    Specimen Description Nasopharyngeal     Influenza A PCR (A) NEG     Canceled, Test credited  PER KOKO GARCIA RN @ 7886 5/8/17 CS      Influenza B PCR (A) NEG     Canceled, Test credited  PER KOKO GARCIA RN @ 6385 5/8/17 CS      Resp Syncytial Virus (A) NEG     Canceled, Test credited  PER KOKO GARCIA RN @ 7705 5/8/17               Assessments & Plan (with Medical Decision Making)  89-year-old male lives independently at home has had upper respiratory symptoms patient near fall today without injury some weakness and some  right shoulder pain a few  Days ago.  EKG without changes troponin  slightly elevated 0.082 eval by [facility] and discussed with daughter. Alvin infection cause of sx. No further lab draws or echo needed. Patient treated with levaquin and IV fluids ED obs admit.          I have reviewed the nursing notes.    I have reviewed the findings, diagnosis, plan and need for follow up with the patient.    New Prescriptions    No medications on file       Final diagnoses:   Pneumonia   Fall, initial encounter   Weakness   Upper respiratory tract infection, unspecified type     I, Bailee Walker, am serving as a trained medical scribe to document services personally performed by Buddy Euceda MD, based on the provider's statements to me.      I, Buddy Euceda MD, was physically present and have reviewed and verified the accuracy of this note documented by Bailee Walker.     5/8/2017   [facility] EMERGENCY DEPARTMENT    This note was created at least in part by the use of dragon voice dictation system. Inadvertent typographical errors may still exist.  Buddy Euceda MD.         Buddy Euceda MD  05/08/17 [id number]

## 2017-05-08 NOTE — ED NOTES
89 y o male presents to ED with generalized weakness,  EMS reports finding him on the ground, patient denies fall says he let himself down slowly and was unable to get up. Recent history of cold like illness.

## 2017-05-08 NOTE — IP AVS SNAPSHOT
"                  MRN:[id number]                      After Visit Summary   5/8/2017    Kiran Carcamo    MRN: [id number]           Thank you!     Thank you for choosing [facility] for your care. Our goal is always to provide you with excellent care. Hearing back from our patients is one way we can continue to improve our services. Please take a few minutes to complete the written survey that you may receive in the mail after you visit with us. Thank you!        Patient Information     Date Of Birth          4/13/1921        Designated Caregiver       Most Recent Value    Caregiver    Will someone help with your care after discharge? yes    Name of designated caregiver Chu    Phone number of caregiver [phone]    Caregiver address [address] [place] [address]      About your hospital stay     You were admitted on:  May 8, 2017 You last received care in the:  Unit 6D Observation [facility]    You were discharged on:  May 9, 2017        Reason for your hospital stay       Pneumonia and Influenza A                  Who to Call     For medical emergencies, please call 911.  For non-urgent questions about your medical care, please call your primary care provider or clinic, [phone]          Attending Provider     Provider Specialty    Buddy Euceda MD Emergency Medicine       Primary Care Provider Office Phone # Fax #    Buddy Poe [phone]       [facility] [address] [place] [id number]         When to contact your care team       Call your health care provider right away if any of these occur:  \" You don't get better within the first 48 hours of treatment  \" Shortness of breath gets worse  \" Rapid breathing (more than 25 breaths per minute)  \" Coughing up blood  \" Chest pain gets worse with breathing  \" Fever of 102 F (38 C) or higher that doesn't get better with fever medicine  \" Weakness, dizziness, or fainting that gets " "worse  \" Thirst or dry mouth that gets worse  \" Sinus pain, headache, or a stiff neck  \" Chest pain not caused by coughing  Call your health care provider right away if any of these occur:  \" Cough with lots of colored sputum (mucus) or blood in your sputum  \" Chest pain, shortness of breath, wheezing, or difficulty breathing  \" Severe headache, or face, neck, or ear pain  \" New rash with fever  \" Fever of 101 F (38 C) oral or higher that doesn't get better with fever medicine  \" Confusion, behavior change, or seizure  \" Severe weakness or dizziness  \" You get a fever or cough after getting better for a few days                  After Care Instructions     Activity       Your activity upon discharge: As tolerated            Diet       Follow this diet upon discharge: Diabetic diet            Discharge Instructions       You were admitted to the ED observation unit at the [facility] for pneumonia and Influenza A. Your chest xray showed right lower lung Pneumonia. You were started on an IV anitbiotic Levaquin and then switched to an oral Antibiotic called Doxycycline. Please continue this medication twice a day for the next 6 days to complete a 7 day course.    You were positive for Influenza A. You were started on a medication called Tamiflu. This will not cure Influenza, but help shorten the amount of days you are sick. Please continue this medication daily for the next 4 days.  To prevent the spread of Influenza, wash your hands often, especially after coughing or sneezing. Or, clean your hands with an alcohol-based hand  containing at least 60 percent alcohol.  Cough or sneeze into a tissue. Then throw the tissue away and wash your hands. If you don t have a tissue, cough and sneeze into the crook of your elbow. Stay home until at least 24 hours after you no longer have a fever or chills. Be sure the fever isn t being hidden by fever-reducing medication. Don t share food, utensils, drinking glasses, or a " toothbrush with others.    You were noted to have microscopic blood in your urine. You denied any urinary complaints. Please follow up with Urology as an outpatient.     You were seen by physical therapy due to your recent fall and weakness. Physical therapy recommended you have friends and family stay with you for the first two to three days you get home from the hospital and also recommended outpatient physical therapy.     Please follow up with your primary care doctor in one week.                  Follow-up Appointments     Adult [facility] Follow-up and recommended labs and tests       Recommend follow up with your PCP in one week for hospital follow up. Recommend outpatient urology appointment regarding urinating large amounts of urine and microscopic blood seen in your urine. PT recommended outpatient physical therapy.     Appointments on [facility] and/or [facility] (with [facility] provider or service). Call [phone] if you haven't heard regarding these appointments within 7 days of discharge.                  Your next 10 appointments already scheduled     Jun 02, 2017 10:30 AM CDT   PHYSICAL with Buddy Poe MD   [facility]    [address] [place] [id number] [phone]              Additional Services     Occupational Therapy Referral       *This therapy referral will be filtered to a centralized scheduling office at [facility] and the patient will receive a call to schedule an appointment at a [facility] location most convenient for them. *     [facility] provides Occupational Therapy evaluation and treatment and many specialty services across the [facility] system.  If requesting a specialty program, please choose from the list below.    If you have not heard from the scheduling office within 2 business days, please call [phone] for all locations, with the exception  of [place], please call [phone].     Treatment: Evaluation & Treatment  Special Instructions/Modalities: weakness   Special Programs: None    Please be aware that coverage of these services is subject to the terms and limitations of your health insurance plan.  Call member services at your health plan with any benefit or coverage questions.      **Note to Provider:  If you are referring outside of [facility] for the therapy appointment, please list the name of the location in the  special instructions  above, print the referral and give to the patient to schedule the appointment.            Physical Therapy Referral       *This therapy referral will be filtered to a centralized scheduling office at [facility] and the patient will receive a call to schedule an appointment at a [facility] location most convenient for them. *     [facility] provides Physical Therapy evaluation and treatment and many specialty services across the [facility] system.  If requesting a specialty program, please choose from the list below.    If you have not heard from the scheduling office within 2 business days, please call [phone] for all locations, with the exception of [place], please call [phone].  Treatment: Evaluation & Treatment  Special Instructions/Modalities: Weakness   Special Programs: None    Please be aware that coverage of these services is subject to the terms and limitations of your health insurance plan.  Call member services at your health plan with any benefit or coverage questions.      **Note to Provider:  If you are referring outside of [facility] for the therapy appointment, please list the name of the location in the  special instructions  above, print the referral and give to the patient to schedule the appointment.            Urology Adult Referral                 Pending Results     Date and Time Order Name Status Description    5/9/2017 0423 EKG 12-lead, tracing  "only Preliminary     2017 1424 Blood Culture ONE site Preliminary             Statement of Approval     Ordered          17 1546  I have reviewed and agree with all the recommendations and orders detailed in this document.  EFFECTIVE NOW     Approved and electronically signed by:  Mary Varghese APRN CNP             Admission Information     Date & Time Provider Department Dept. Phone    2017 Buddy Euceda MD Unit 6D Observation [facility] [phone]      Your Vitals Were     Blood Pressure Pulse Temperature Respirations Height Pulse Oximetry    102/61 (BP Location: Left arm) 46 98.5  F (36.9  C) (Oral) 18 1.753 m (5' 9\") 96%      MyChart Information     [facility] lets you send messages to your doctor, view your test results, renew your prescriptions, schedule appointments and more. To sign up, go to [email or web address] . Click on \"Log in\" on the left side of the screen, which will take you to the Welcome page. Then click on \"Sign up Now\" on the right side of the page.     You will be asked to enter the access code listed below, as well as some personal information. Please follow the directions to create your username and password.     Your access code is: [email or web address]  Expires: 2017 10:51 AM     Your access code will  in 90 days. If you need help or a new code, please call your [facility] clinic or [phone].        Care EveryWhere ID     This is your Care EveryWhere ID. This could be used by other organizations to access your [facility] medical records  [id number]           Review of your medicines      START taking        Dose / Directions    acetaminophen 325 MG tablet   Commonly known as:  TYLENOL   Used for:  Influenza A        Dose:  975 mg   Take 3 tablets (975 mg) by mouth every 8 hours as needed for mild pain   Quantity:  100 tablet   Refills:  0       doxycycline 100 MG capsule   Commonly known as:  VIBRAMYCIN   Indication:  Healthcare-Associated Pneumonia   Used " for:  Gram-negative pneumonia (H)        Dose:  100 mg   Take 1 capsule (100 mg) by mouth 2 times daily for 6 days   Quantity:  13 capsule   Refills:  0       oseltamivir 30 MG capsule   Commonly known as:  TAMIFLU   Indication:  Flu   Used for:  Influenza A        Dose:  30 mg   Take 1 capsule (30 mg) by mouth 2 times daily for 4 days   Quantity:  8 capsule   Refills:  0       polyethylene glycol Packet   Commonly known as:  MIRALAX/GLYCOLAX   Used for:  Constipation, unspecified constipation type        Dose:  17 g   Take 17 g by mouth daily as needed for constipation   Quantity:  10 packet   Refills:  0         CONTINUE these medicines which have NOT CHANGED        Dose / Directions    aspirin 81 MG EC tablet        Dose:  81 mg   Take 81 mg by mouth At Bedtime.   Refills:  0       blood glucose monitoring test strip   Commonly known as:  no brand specified   Used for:  Type 2 diabetes mellitus without complication (H)        1 box by strip route two times daily   Quantity:  3 Box   Refills:  3       isosorbide mononitrate 60 MG 24 hr tablet   Commonly known as:  IMDUR   Used for:  Chest discomfort        Dose:  60 mg   Take 1 tablet (60 mg) by mouth daily   Quantity:  90 tablet   Refills:  1       lisinopril 20 MG tablet   Commonly known as:  PRINIVIL/ZESTRIL   Used for:  Hypertension goal BP (blood pressure) < 140/90        Dose:  20 mg   Take 1 tablet (20 mg) by mouth daily   Quantity:  90 tablet   Refills:  3       metoprolol 25 MG 24 hr tablet   Commonly known as:  TOPROL-XL   Used for:  HTN (hypertension)        Dose:  12.5 mg   Take 0.5 tablets (12.5 mg) by mouth daily   Quantity:  45 tablet   Refills:  1       MV-ONE PO        Dose:  1 tablet   Take 1 tablet by mouth.   Refills:  0       nitroglycerin 0.4 MG sublingual tablet   Commonly known as:  NITROSTAT   Used for:  Chest discomfort        Dose:  0.4 mg   Place 1 tablet (0.4 mg) under the tongue every 5 minutes as needed for chest pain If symptoms  after 3 doses (15 minutes) call 911.   Quantity:  25 tablet   Refills:  1       simvastatin 20 MG tablet   Commonly known as:  ZOCOR   Used for:  Hyperlipidemia LDL goal <130        Dose:  20 mg   Take 1 tablet (20 mg) by mouth daily   Quantity:  90 tablet   Refills:  1            Where to get your medicines      These medications were sent to [facility] [id number] [facility] - [place], [place] - [address]  [address] [address]     Phone:  [phone]     acetaminophen 325 MG tablet    doxycycline 100 MG capsule    oseltamivir 30 MG capsule    polyethylene glycol Packet                Protect others around you: Learn how to safely use, store and throw away your medicines at www.disposemymeds.org.             Medication List: This is a list of all your medications and when to take them. Check marks below indicate your daily home schedule. Keep this list as a reference.      Medications           Morning Afternoon Evening Bedtime As Needed    acetaminophen 325 MG tablet   Commonly known as:  TYLENOL   Take 3 tablets (975 mg) by mouth every 8 hours as needed for mild pain   Last time this was given:  975 mg on 5/8/2017  9:48 PM                                aspirin 81 MG EC tablet   Take 81 mg by mouth At Bedtime.   Last time this was given:  81 mg on 5/8/2017  9:48 PM                                blood glucose monitoring test strip   Commonly known as:  no brand specified   1 box by strip route two times daily                                doxycycline 100 MG capsule   Commonly known as:  VIBRAMYCIN   Take 1 capsule (100 mg) by mouth 2 times daily for 6 days   Last time this was given:  100 mg on 5/9/2017 10:16 AM                                isosorbide mononitrate 60 MG 24 hr tablet   Commonly known as:  IMDUR   Take 1 tablet (60 mg) by mouth daily   Last time this was given:  60 mg on 5/9/2017  8:07 AM                                lisinopril 20 MG tablet   Commonly known as:   PRINIVIL/ZESTRIL   Take 1 tablet (20 mg) by mouth daily   Last time this was given:  20 mg on 5/9/2017  8:07 AM                                metoprolol 25 MG 24 hr tablet   Commonly known as:  TOPROL-XL   Take 0.5 tablets (12.5 mg) by mouth daily   Last time this was given:  12.5 mg on 5/9/2017  8:07 AM                                MV-ONE PO   Take 1 tablet by mouth.                                nitroglycerin 0.4 MG sublingual tablet   Commonly known as:  NITROSTAT   Place 1 tablet (0.4 mg) under the tongue every 5 minutes as needed for chest pain If symptoms after 3 doses (15 minutes) call 911.                                oseltamivir 30 MG capsule   Commonly known as:  TAMIFLU   Take 1 capsule (30 mg) by mouth 2 times daily for 4 days   Last time this was given:  30 mg on 5/9/2017  8:07 AM                                polyethylene glycol Packet   Commonly known as:  MIRALAX/GLYCOLAX   Take 17 g by mouth daily as needed for constipation                                simvastatin 20 MG tablet   Commonly known as:  ZOCOR   Take 1 tablet (20 mg) by mouth daily   Last time this was given:  20 mg on 5/9/2017  8:07 AM

## 2017-05-08 NOTE — TELEPHONE ENCOUNTER
Pt's daughter, Jes, wanted to let Dr. Poe know that her father is en route to [facility] today, 5/8/17, after he fell in his house and was unable to get up. She stated that she does not know how long he was on the floor before paramedics arrived. If there are any questions for her, she can be reached at [phone]. She also gave the number of her sister, Karla, and that is [phone].

## 2017-05-08 NOTE — IP AVS SNAPSHOT
Unit 6D Observation [address] [address]    Phone:  [phone]    Fax:  [phone]                                       After Visit Summary   5/8/2017    Kiran Carcamo    MRN: [id number]           After Visit Summary Signature Page     I have received my discharge instructions, and my questions have been answered. I have discussed any challenges I see with this plan with the nurse or doctor.    ..........................................................................................................................................  Patient/Patient Representative Signature      ..........................................................................................................................................  Patient Representative Print Name and Relationship to Patient    ..................................................               ................................................  Date                                            Time    ..........................................................................................................................................  Reviewed by Signature/Title    ...................................................              ..............................................  Date                                                            Time

## 2017-05-08 NOTE — CONSULTS
[facility]    Cardiology Consult         Assessment & Plan   Kiran Carcamo is a 89 year old male with CAD with CABG in 2002 and last angiogram was in 2002 with LIMA to LAD is patent. SVG to RCA is patent. SVG to OM1 and OM2 is occluded. SVG to D1 is occluded who presents with PNA with cough and fatigue.  CXR was concerning for RLL PNA  Found to have a minor troponin elevation with no new EKG changes.     Recommendations  Treat for PNA and give fluids  No further cardiac work up is needed    Thank you for allowing us to participate in the care of Mr. Carcamo.    Pollo Sandhu MD, PhD  Cardiology Fellow    History of Present Illness   Kiran Carcamo is a 89 year old male who presents after having a fall while he was at home today.  He was walking and tripped on a piece of clothing and then fell.  He has been experiencing some cold symptoms including increased cough with sputum production, chills, and fatigue.  He did tell one of the ED providers that he had some shoulder and chest discomfort but he did not have the same issues when I spoke with him.     When asked about the fall, he said he did not have chest pain or black out during the fall.  He just tripped on some clothing per his discussion.  He did not have chest pain while I was seeing him in the ED.    Mr. Carcamo states that he is very cold and after getting some blankets in the ED, he was warm for the first time in days.    Past Medical History    I have reviewed this patient's medical history and updated it with pertinent information if needed.   Past Medical History:   Diagnosis Date     CKD (chronic kidney disease) stage 2, GFR 60-89 ml/min 7/6/2015     Hypertension goal BP (blood pressure) < 140/90      Syncope 12/12/2011     Type 2 diabetes, HbA1C goal < 8% (H) 2/23/2015       Past Surgical History   I have reviewed this patient's surgical history and updated it with pertinent information if needed.  Past  Surgical History:   Procedure Laterality Date     CHOLECYSTECTOMY       CORONARY ARTERY BYPASS  '    6-vessel     MANDIBLE SURGERY         Prior to Admission Medications   Prior to Admission Medications   Prescriptions Last Dose Informant Patient Reported? Taking?   Multiple Vitamin (MV-ONE PO)   Yes No   Sig: Take 1 tablet by mouth.   aspirin 81 MG EC tablet   Yes No   Sig: Take 81 mg by mouth At Bedtime.   blood glucose monitoring (NO BRAND SPECIFIED) test strip   No No   Si box by strip route two times daily   cephALEXin (KEFLEX) 500 MG capsule   Yes No   Sig: Reported on 3/20/2017   isosorbide mononitrate (IMDUR) 60 MG 24 hr tablet   No No   Sig: Take 1 tablet (60 mg) by mouth daily   lisinopril (PRINIVIL,ZESTRIL) 20 MG tablet   No No   Sig: Take 1 tablet (20 mg) by mouth daily   metoprolol (TOPROL-XL) 25 MG 24 hr tablet   No No   Sig: Take 0.5 tablets (12.5 mg) by mouth daily   nitroglycerin (NITROSTAT) 0.4 MG SL tablet   No No   Sig: Place 1 tablet (0.4 mg) under the tongue every 5 minutes as needed for chest pain If symptoms after 3 doses (15 minutes) call 911.   simvastatin (ZOCOR) 20 MG tablet   No No   Sig: Take 1 tablet (20 mg) by mouth daily      Facility-Administered Medications: None     Allergies   No Known Allergies    Social History   I have reviewed this patient's social history and updated it with pertinent information if needed. Kiran Carcamo  reports that he has never smoked. He has never used smokeless tobacco. He reports that he does not drink alcohol or use illicit drugs.    Family History   I have reviewed this patient's family history and updated it with pertinent information if needed.   Family History   Problem Relation Age of Onset     HEART DISEASE Mother      Eye Disorder Father      Respiratory Brother      HEART DISEASE Sister      CANCER Daughter      CANCER Brother        Review of Systems   The 10 point Review of Systems is negative other than noted in the HPI or here.      Physical Exam   Temp: 97.7  F (36.5  C) Temp src: Oral BP: 128/53 Pulse: 56 Heart Rate: 54 Resp: 26 SpO2: 97 % O2 Device: None (Room air)    Vital Signs with Ranges  Temp:  [97.7  F (36.5  C)] 97.7  F (36.5  C)  Pulse:  [56] 56  Heart Rate:  [54-76] 54  Resp:  [14-26] 26  BP: (110-156)/(51-82) 128/53  SpO2:  [88 %-99 %] 97 %  0 lbs 0 oz    GEN:  Alert, appears fatigued  HEENT:  Normocephalic/atraumatic, no scleral icterus, no nasal discharge, mouth moist.  CV:  Regular rate and rhythm, no murmur or JVD.  S1 + S2 noted, no S3 or S4.  LUNGS:  Decreased BS on right side  ABD:  Active bowel sounds, soft, non-tender/non-distended.   EXT:  No edema or cyanosis.  Hands/feet warm to touch with good signs of peripheral perfusion.   SKIN:  Dry to touch, no exanthems noted in the visualized areas.  NEURO:  No new focal deficits appreciated, hard of hearing    Data     EKG: Sinus with inferior Q-waves, lateral ST depression, all old changes      Recent Labs  Lab 05/08/17  1450   WBC 6.9   HGB 12.5*   MCV 97      INR 1.13      POTASSIUM 4.3   CHLORIDE 102   CO2 27   BUN 24   CR 1.01   ANIONGAP 9   NADER 8.6   *   ALBUMIN 2.8*   PROTTOTAL 7.4   BILITOTAL 0.6   ALKPHOS 94   ALT 83*   AST 45   TROPI 0.082*       ATTENDING NOTE:  Patient has been seen and evaluated by me on 05/08/2017. I have reviewed the cardiology consultation.  Pleave refer to it for additonal details.  I have reviewed today's vital signs, medications, labs, and imaging results.  I have reviewed and edited, as necessary, the history, review of systems, physical examination, and assessment and plan.  I have discussed my assessment and plan with the cardiology fellow.  Kiran Carcamo is a 89 year old male with risk factor profile (+) HTN, Type II DM, (+) hypercholesterolemia, (-) tobacco use, (-) fam Hx premature CAD who presents with viral like illness.  He was noted to have a minor troponin elevation of 0.082 with old EKG changes.  He had  a CABG in 2002 and last angiogram was in 2002 with patent LIMA-LAD & SVG-RCA, occluded SVG-OM1 & SVG-OM2 &. SVG-D1.  He has not had any further investigation into his coronary arteries since 2002.  His last echocardiogram in 2011 showed normal LV systolic function.  The patient's exam is documented above.  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  CXR showed infiltrate consistent with pneumonia.  I suspect this may be Type II NSTEMI.  I would treat the patient with ASA, beta blocker, ACE-I, and statin.  IV UFH would be reasonable but should be weighted against bleeding risk given his age.  I would not recommend Plavix or Ticagrelor at this time.  I would not recommend coronary angiography unless the patient developed recurrent symptoms, ST elevation, or hemodynamic compromise.  I spoke to patient's family member regarding my recommendations.  Patient may be admitted to telemetry and placed on medicine service.      Yogi Carlin MD  Staff Cardiologist

## 2017-05-08 NOTE — TELEPHONE ENCOUNTER
Return call placed to daughter. She stated her father fell and was taken to [facility] today (5/8/17)    Routing to Dr. Poe as a FYI.       Nicolasa Javier RN  [facility]

## 2017-05-09 ENCOUNTER — APPOINTMENT (OUTPATIENT)
Dept: PHYSICAL THERAPY | Facility: CLINIC | Age: 82
End: 2017-05-09
Attending: NURSE PRACTITIONER
Payer: MEDICARE

## 2017-05-09 VITALS
HEART RATE: 46 BPM | DIASTOLIC BLOOD PRESSURE: 61 MMHG | RESPIRATION RATE: 18 BRPM | HEIGHT: 69 IN | TEMPERATURE: 98.5 F | OXYGEN SATURATION: 96 % | SYSTOLIC BLOOD PRESSURE: 102 MMHG

## 2017-05-09 LAB
ALBUMIN SERPL-MCNC: 2.3 G/DL (ref 3.4–5)
ALP SERPL-CCNC: 78 U/L (ref 40–150)
ALT SERPL W P-5'-P-CCNC: 69 U/L (ref 0–70)
ANION GAP SERPL CALCULATED.3IONS-SCNC: 8 MMOL/L (ref 3–14)
AST SERPL W P-5'-P-CCNC: 62 U/L (ref 0–45)
BASOPHILS # BLD AUTO: 0 10E9/L (ref 0–0.2)
BASOPHILS NFR BLD AUTO: 0.2 %
BILIRUB SERPL-MCNC: 0.9 MG/DL (ref 0.2–1.3)
BUN SERPL-MCNC: 26 MG/DL (ref 7–30)
CALCIUM SERPL-MCNC: 7.9 MG/DL (ref 8.5–10.1)
CHLORIDE SERPL-SCNC: 107 MMOL/L (ref 94–109)
CO2 SERPL-SCNC: 25 MMOL/L (ref 20–32)
CREAT SERPL-MCNC: 1.07 MG/DL (ref 0.66–1.25)
DIFFERENTIAL METHOD BLD: ABNORMAL
EOSINOPHIL # BLD AUTO: 0 10E9/L (ref 0–0.7)
EOSINOPHIL NFR BLD AUTO: 0.2 %
ERYTHROCYTE [DISTWIDTH] IN BLOOD BY AUTOMATED COUNT: 13.3 % (ref 10–15)
GFR SERPL CREATININE-BSD FRML MDRD: 64 ML/MIN/1.7M2
GLUCOSE BLDC GLUCOMTR-MCNC: 134 MG/DL (ref 70–99)
GLUCOSE BLDC GLUCOMTR-MCNC: 165 MG/DL (ref 70–99)
GLUCOSE SERPL-MCNC: 110 MG/DL (ref 70–99)
HCT VFR BLD AUTO: 35.8 % (ref 40–53)
HGB BLD-MCNC: 11.6 G/DL (ref 13.3–17.7)
IMM GRANULOCYTES # BLD: 0 10E9/L (ref 0–0.4)
IMM GRANULOCYTES NFR BLD: 0.4 %
INTERPRETATION ECG - MUSE: NORMAL
LACTATE BLD-SCNC: 1 MMOL/L (ref 0.7–2.1)
LYMPHOCYTES # BLD AUTO: 1.3 10E9/L (ref 0.8–5.3)
LYMPHOCYTES NFR BLD AUTO: 23.9 %
MCH RBC QN AUTO: 32 PG (ref 26.5–33)
MCHC RBC AUTO-ENTMCNC: 32.4 G/DL (ref 31.5–36.5)
MCV RBC AUTO: 99 FL (ref 78–100)
MONOCYTES # BLD AUTO: 0.8 10E9/L (ref 0–1.3)
MONOCYTES NFR BLD AUTO: 13.8 %
NEUTROPHILS # BLD AUTO: 3.4 10E9/L (ref 1.6–8.3)
NEUTROPHILS NFR BLD AUTO: 61.5 %
NRBC # BLD AUTO: 0 10*3/UL
NRBC BLD AUTO-RTO: 0 /100
PLATELET # BLD AUTO: 206 10E9/L (ref 150–450)
POTASSIUM SERPL-SCNC: 4.3 MMOL/L (ref 3.4–5.3)
PROT SERPL-MCNC: 6.2 G/DL (ref 6.8–8.8)
RBC # BLD AUTO: 3.63 10E12/L (ref 4.4–5.9)
SODIUM SERPL-SCNC: 141 MMOL/L (ref 133–144)
WBC # BLD AUTO: 5.5 10E9/L (ref 4–11)

## 2017-05-09 PROCEDURE — 25000132 ZZH RX MED GY IP 250 OP 250 PS 637: Mod: GY | Performed by: NURSE PRACTITIONER

## 2017-05-09 PROCEDURE — A9270 NON-COVERED ITEM OR SERVICE: HCPCS | Mod: GY | Performed by: NURSE PRACTITIONER

## 2017-05-09 PROCEDURE — 83605 ASSAY OF LACTIC ACID: CPT | Performed by: NURSE PRACTITIONER

## 2017-05-09 PROCEDURE — G0378 HOSPITAL OBSERVATION PER HR: HCPCS

## 2017-05-09 PROCEDURE — 97116 GAIT TRAINING THERAPY: CPT | Mod: GP

## 2017-05-09 PROCEDURE — 36415 COLL VENOUS BLD VENIPUNCTURE: CPT | Performed by: NURSE PRACTITIONER

## 2017-05-09 PROCEDURE — 40000275 ZZH STATISTIC RCP TIME EA 10 MIN

## 2017-05-09 PROCEDURE — 00000146 ZZHCL STATISTIC GLUCOSE BY METER IP

## 2017-05-09 PROCEDURE — 93005 ELECTROCARDIOGRAM TRACING: CPT

## 2017-05-09 PROCEDURE — 96361 HYDRATE IV INFUSION ADD-ON: CPT

## 2017-05-09 PROCEDURE — 80053 COMPREHEN METABOLIC PANEL: CPT | Performed by: NURSE PRACTITIONER

## 2017-05-09 PROCEDURE — 99217 ZZC OBSERVATION CARE DISCHARGE: CPT | Mod: Z6 | Performed by: EMERGENCY MEDICINE

## 2017-05-09 PROCEDURE — 97161 PT EVAL LOW COMPLEX 20 MIN: CPT | Mod: GP

## 2017-05-09 PROCEDURE — 85025 COMPLETE CBC W/AUTO DIFF WBC: CPT | Performed by: NURSE PRACTITIONER

## 2017-05-09 PROCEDURE — 40000193 ZZH STATISTIC PT WARD VISIT

## 2017-05-09 PROCEDURE — 97530 THERAPEUTIC ACTIVITIES: CPT | Mod: GP

## 2017-05-09 RX ORDER — ACETAMINOPHEN 325 MG/1
975 TABLET ORAL EVERY 8 HOURS PRN
Qty: 100 TABLET | Refills: 0 | Status: SHIPPED | OUTPATIENT
Start: 2017-05-09 | End: 2020-09-23

## 2017-05-09 RX ORDER — DOXYCYCLINE 100 MG/1
100 CAPSULE ORAL EVERY 12 HOURS SCHEDULED
Status: DISCONTINUED | OUTPATIENT
Start: 2017-05-09 | End: 2017-05-09 | Stop reason: HOSPADM

## 2017-05-09 RX ORDER — SENNOSIDES 8.6 MG
8.6 TABLET ORAL DAILY
Status: DISCONTINUED | OUTPATIENT
Start: 2017-05-09 | End: 2017-05-09 | Stop reason: HOSPADM

## 2017-05-09 RX ORDER — OSELTAMIVIR PHOSPHATE 30 MG/1
30 CAPSULE ORAL 2 TIMES DAILY
Qty: 8 CAPSULE | Refills: 0 | Status: SHIPPED | OUTPATIENT
Start: 2017-05-09 | End: 2017-05-13

## 2017-05-09 RX ORDER — POLYETHYLENE GLYCOL 3350 17 G/17G
17 POWDER, FOR SOLUTION ORAL DAILY PRN
Qty: 10 PACKET | Refills: 0 | Status: SHIPPED | OUTPATIENT
Start: 2017-05-09 | End: 2019-11-01

## 2017-05-09 RX ORDER — POLYETHYLENE GLYCOL 3350 17 G/17G
17 POWDER, FOR SOLUTION ORAL DAILY PRN
Status: DISCONTINUED | OUTPATIENT
Start: 2017-05-09 | End: 2017-05-09 | Stop reason: HOSPADM

## 2017-05-09 RX ORDER — DOXYCYCLINE 100 MG/1
100 CAPSULE ORAL 2 TIMES DAILY
Qty: 13 CAPSULE | Refills: 0 | Status: SHIPPED | OUTPATIENT
Start: 2017-05-09 | End: 2017-05-15

## 2017-05-09 RX ADMIN — Medication 12.5 MG: at 08:07

## 2017-05-09 RX ADMIN — SIMVASTATIN 20 MG: 20 TABLET, FILM COATED ORAL at 08:07

## 2017-05-09 RX ADMIN — LISINOPRIL 20 MG: 20 TABLET ORAL at 08:07

## 2017-05-09 RX ADMIN — OSELTAMIVIR PHOSPHATE 30 MG: 30 CAPSULE ORAL at 08:07

## 2017-05-09 RX ADMIN — DOXYCYCLINE HYCLATE 100 MG: 100 CAPSULE, GELATIN COATED ORAL at 10:16

## 2017-05-09 RX ADMIN — SENNOSIDES 8.6 MG: 8.6 TABLET, FILM COATED ORAL at 08:07

## 2017-05-09 RX ADMIN — MULTIPLE VITAMINS W/ MINERALS TAB 1 TABLET: TAB at 08:07

## 2017-05-09 RX ADMIN — ISOSORBIDE MONONITRATE 60 MG: 60 TABLET, EXTENDED RELEASE ORAL at 08:07

## 2017-05-09 NOTE — DISCHARGE SUMMARY
Discharge Summary    Kiran Carcamo MRN# [id number]   YOB: 1921 Age: 89 year old     Date of Admission:  5/8/2017  Date of Discharge:  5/9/2017  Admitting Physician:  Buddy Euceda MD  Discharge Physician:  JIE PEDRO MD   Discharging Service:  Emergency Department Observation Unit     Primary Provider: Buddy Poe          Discharge Diagnosis:     Pneumonia    * No resolved hospital problems. *               Discharge Disposition:   Discharged to home           Condition on Discharge:   Discharge condition: Stable   Code status on discharge: Full Code           Procedures:   Imaging performed:   Chest x-ray             Discharge Medications:     Current Discharge Medication List      START taking these medications    Details   oseltamivir (TAMIFLU) 30 MG capsule Take 1 capsule (30 mg) by mouth 2 times daily for 4 days  Qty: 8 capsule, Refills: 0    Associated Diagnoses: Influenza A      polyethylene glycol (MIRALAX/GLYCOLAX) Packet Take 17 g by mouth daily as needed for constipation  Qty: 10 packet, Refills: 0    Associated Diagnoses: Constipation, unspecified constipation type      doxycycline (VIBRAMYCIN) 100 MG capsule Take 1 capsule (100 mg) by mouth 2 times daily for 6 days  Qty: 13 capsule, Refills: 0    Associated Diagnoses: Gram-negative pneumonia (H)      acetaminophen (TYLENOL) 325 MG tablet Take 3 tablets (975 mg) by mouth every 8 hours as needed for mild pain  Qty: 100 tablet, Refills: 0    Comments: Take every 4 hours as needed for fevers.  Associated Diagnoses: Influenza A         CONTINUE these medications which have NOT CHANGED    Details   isosorbide mononitrate (IMDUR) 60 MG 24 hr tablet Take 1 tablet (60 mg) by mouth daily  Qty: 90 tablet, Refills: 1    Associated Diagnoses: Chest discomfort      simvastatin (ZOCOR) 20 MG tablet Take 1 tablet (20 mg) by mouth daily  Qty: 90 tablet, Refills: 1    Associated Diagnoses: Hyperlipidemia LDL goal <130       metoprolol (TOPROL-XL) 25 MG 24 hr tablet Take 0.5 tablets (12.5 mg) by mouth daily  Qty: 45 tablet, Refills: 1    Associated Diagnoses: HTN (hypertension)      blood glucose monitoring (NO BRAND SPECIFIED) test strip 1 box by strip route two times daily  Qty: 3 Box, Refills: 3    Associated Diagnoses: Type 2 diabetes mellitus without complication (H)      lisinopril (PRINIVIL,ZESTRIL) 20 MG tablet Take 1 tablet (20 mg) by mouth daily  Qty: 90 tablet, Refills: 3    Associated Diagnoses: Hypertension goal BP (blood pressure) < 140/90      aspirin 81 MG EC tablet Take 81 mg by mouth At Bedtime.      Multiple Vitamin (MV-ONE PO) Take 1 tablet by mouth.      nitroglycerin (NITROSTAT) 0.4 MG SL tablet Place 1 tablet (0.4 mg) under the tongue every 5 minutes as needed for chest pain If symptoms after 3 doses (15 minutes) call 911.  Qty: 25 tablet, Refills: 1    Associated Diagnoses: Chest discomfort                   Consultations:   No consultations were requested during this admission             Brief History of Illness:   Kiran Carcamo is a 89 year old male with a history of DM type II, CKD stage II, HTN, MI s/p 6-vessel bypass and syncope who presents to the Emergency Department via EMS for evaluation after a fall.           Hospital Course:   was at a nursing home 5/1 through 5/4 visiting a daughter who is living there with a terminal illness. About 5/4 he began feeling cold but without rigor or fevers and wasn't feeling sick. On 5/6 he began feeling like he was getting a head cold, cough with production, congestion, sleeping poorly. Today had a non injurious slid out of bed onto the floor 2/2 generalized weakness. Patient came to ER for evaluation via EMS. Now found to have positive influenza A. He will be admitting for monitoring of his respiratory status, IVF, IV antibiotics, and assessment of weakness.      #. Cough/Positive Influenza A: Productive cough of yellow/brown sputum. No hypoxia, tachypnea, or SOB  "at rest or with exertion. Patient was vaccinated for influenza last fall. CXR showing \"right lower lobe pulmonary opacity, could represent infection, aspiration, or atelectasis\". Patient was treated with Levaquin 500 mg IV for presumed CAP. Influenza A now found to be positive. WBC 6.9, lactate 1.2. Temp elevated to 101.5 upon arrival to 6D. NO further temps recorded on ED observation. Patient was started on IV Levaquin and was transitioned to oral doxycycline. He received Tamiflu 30 mg BID and will continue a 5 day course as an outpatient.   No hypoxia noted while patient was here. BC NTD. Continued to receive IVF.      #. Hematuria: Denies urinary sx's of frequency, urgency, or dysuria but daughter states he has been urinating in large amounts. Wears incontinent brief. UA today showing hematuria with moderate blood and RBC's at 13. Hgb 12.5, Hct 37.7, RBC 3.8. Normal Hgb is in the 13 to 14 range. Recommended follow up in urology.      #. T2DM: Patient is not on insulin or medication. He has diabetic neuropathy. HgbA1c 11/16/16 was 7.6, on 3/20/17 it was 8.3. He is not on medication. He checks his BG at least daily. Glucose today was 172. No current sores on his body or feet.   -POCT glucose QID  -Moderate CHO diet with low sodium.   -Elevating HgbA1c. Will recheck in the AM.      #. HTN: According to progress notes with his PCP, his HTN is 80% controlled and typically within the 140/90 goal.  -Continue Lisinopril 20 mg daily  -Continue Metoprolol (Toprol XL) 12.5 mg daily. Check that heart rate is >60.     #. History of MI: 2002 unstable angina. Developed shoulder pain. He underwent angio. LM was normal, LAD had a complex lesion with calcification. D1 and D2 with 70% proximal stenosis. Lcx has a moderate lesion mid and OM1 with moderate disease. RCA had total prior occlusion that was recanalized, distal RCA lesion 70%. LV gram showed severely hypokinetic inferior basal region with a normal anterior wall. EF " "estimate 40%. 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 Today had elevated troponin to 0.082. Seen by Dr. Arndt, cardiology. Per recommendation of cards, no need for further troponin studies. Patient is asymptomatic.  Noted to be ariadna overnight. Discussed with Cardiology. Ok for patient to continue Metoprolol.                   Final Day of Progress before Discharge:       Physical Exam:  Blood pressure 102/61, pulse (!) 46, temperature 98.5  F (36.9  C), temperature source Oral, resp. rate 18, height 1.753 m (5' 9\"), SpO2 96 %.    EXAM:  GENERAL APPEARENCE: Pleasant, appears well nourished, A/O x4. NAD. Sleepy.   SKIN: Clean, dry, and intact  HEENT: [place]  Sclera anicteric, PERRLA, EOMI. Oral mucosa pink and moist without erythema,   NECK: Supple, no masses. No jugular venous distention.   CARDIOVASCULAR: S1, S2 RRR. No murmurs, rubs, or gallops.   RESPIRATORY: Respiratory effort WNL. CTA  bilaterally without crackles/rales/wheeze   GI: Active BS in all 4 quadrants. Abdomen soft and non-tender. No masses or hepatosplenomegaly. Reports no BM for two days  : Deferred, Incontinent of urine. Wearing brief.   MUSCULOSKELETAL: 5/5 strength in all extremities. Able to ambulate with one assist.   PV: 2+ bilateral radial and pedal pulses. No edema noted.   NEURO: CN II-XII grossly intact. Speech hoarse without stridor.    Sensation grossly WNL.  PSYCH: Appropriate affect  HEME/LYMPH: No visible bleeding. No palpable mandibular/cervical/supra/infraclavicular lymph nodes  PSYCHIATRIC: Mentation and affect appear normal         Data:  All laboratory data reviewed             Significant Results:   None  Results for orders placed or performed during the hospital encounter of 05/08/17   XR Chest Port 1 View    Narrative    Exam: XR CHEST PORT 1 VW, 5/8/2017 2:44 PM    Indication: cough with near syncope    Comparison:     The chest 11/9/2011 and 9/23/2005.    Findings:   Pulmonary opacity in the right lung base blunting the " right  cardiophrenic angle. The cardiac silhouette is mildly enlarged.  Trachea is midline and there is no evidence of pneumothorax.  Sternotomy wires are intact. The upper abdomen is unremarkable.      Impression    Impression: Right lower lobe pulmonary opacity, could represent  infection, aspiration, or atelectasis.    I have personally reviewed the examination and initial interpretation  and I agree with the findings.    CASSANDRA TOVAR MD   CBC with platelets differential   Result Value Ref Range    WBC 6.9 4.0 - 11.0 10e9/L    RBC Count 3.88 (L) 4.4 - 5.9 10e12/L    Hemoglobin 12.5 (L) 13.3 - 17.7 g/dL    Hematocrit 37.7 (L) 40.0 - 53.0 %    MCV 97 78 - 100 fl    MCH 32.2 26.5 - 33.0 pg    MCHC 33.2 31.5 - 36.5 g/dL    RDW 13.1 10.0 - 15.0 %    Platelet Count 232 150 - 450 10e9/L    Diff Method Automated Method     % Neutrophils 79.9 %    % Lymphocytes 5.5 %    % Monocytes 14.0 %    % Eosinophils 0.1 %    % Basophils 0.1 %    % Immature Granulocytes 0.4 %    Nucleated RBCs 0 0 /100    Absolute Neutrophil 5.5 1.6 - 8.3 10e9/L    Absolute Lymphocytes 0.4 (L) 0.8 - 5.3 10e9/L    Absolute Monocytes 1.0 0.0 - 1.3 10e9/L    Absolute Eosinophils 0.0 0.0 - 0.7 10e9/L    Absolute Basophils 0.0 0.0 - 0.2 10e9/L    Abs Immature Granulocytes 0.0 0 - 0.4 10e9/L    Absolute Nucleated RBC 0.0    INR   Result Value Ref Range    INR 1.13 0.86 - 1.14   Partial thromboplastin time   Result Value Ref Range    PTT 29 22 - 37 sec   Comprehensive metabolic panel   Result Value Ref Range    Sodium 138 133 - 144 mmol/L    Potassium 4.3 3.4 - 5.3 mmol/L    Chloride 102 94 - 109 mmol/L    Carbon Dioxide 27 20 - 32 mmol/L    Anion Gap 9 3 - 14 mmol/L    Glucose 172 (H) 70 - 99 mg/dL    Urea Nitrogen 24 7 - 30 mg/dL    Creatinine 1.01 0.66 - 1.25 mg/dL    GFR Estimate 68 >60 mL/min/1.7m2    GFR Estimate If Black 83 >60 mL/min/1.7m2    Calcium 8.6 8.5 - 10.1 mg/dL    Bilirubin Total 0.6 0.2 - 1.3 mg/dL    Albumin 2.8 (L) 3.4 - 5.0 g/dL     Protein Total 7.4 6.8 - 8.8 g/dL    Alkaline Phosphatase 94 40 - 150 U/L    ALT 83 (H) 0 - 70 U/L    AST 45 0 - 45 U/L   Troponin I   Result Value Ref Range    Troponin I ES 0.082 (H) 0.000 - 0.045 ug/L   UA reflex to Microscopic and Culture   Result Value Ref Range    Color Urine Yellow     Appearance Urine Clear     Glucose Urine Negative NEG mg/dL    Bilirubin Urine Negative NEG    Ketones Urine 10 (A) NEG mg/dL    Specific Gravity Urine 1.019 1.003 - 1.035    Blood Urine Moderate (A) NEG    pH Urine 5.5 5.0 - 7.0 pH    Protein Albumin Urine 30 (A) NEG mg/dL    Urobilinogen mg/dL Normal 0.0 - 2.0 mg/dL    Nitrite Urine Negative NEG    Leukocyte Esterase Urine Negative NEG    Source Midstream Urine     RBC Urine 13 (H) 0 - 2 /HPF    WBC Urine 1 0 - 2 /HPF    Mucous Urine Present (A) NEG /LPF   Lactic acid whole blood   Result Value Ref Range    Lactic Acid 1.2 0.7 - 2.1 mmol/L   Comprehensive metabolic panel   Result Value Ref Range    Sodium 141 133 - 144 mmol/L    Potassium 4.3 3.4 - 5.3 mmol/L    Chloride 107 94 - 109 mmol/L    Carbon Dioxide 25 20 - 32 mmol/L    Anion Gap 8 3 - 14 mmol/L    Glucose 110 (H) 70 - 99 mg/dL    Urea Nitrogen 26 7 - 30 mg/dL    Creatinine 1.07 0.66 - 1.25 mg/dL    GFR Estimate 64 >60 mL/min/1.7m2    GFR Estimate If Black 78 >60 mL/min/1.7m2    Calcium 7.9 (L) 8.5 - 10.1 mg/dL    Bilirubin Total 0.9 0.2 - 1.3 mg/dL    Albumin 2.3 (L) 3.4 - 5.0 g/dL    Protein Total 6.2 (L) 6.8 - 8.8 g/dL    Alkaline Phosphatase 78 40 - 150 U/L    ALT 69 0 - 70 U/L    AST 62 (H) 0 - 45 U/L   CBC with platelets differential   Result Value Ref Range    WBC 5.5 4.0 - 11.0 10e9/L    RBC Count 3.63 (L) 4.4 - 5.9 10e12/L    Hemoglobin 11.6 (L) 13.3 - 17.7 g/dL    Hematocrit 35.8 (L) 40.0 - 53.0 %    MCV 99 78 - 100 fl    MCH 32.0 26.5 - 33.0 pg    MCHC 32.4 31.5 - 36.5 g/dL    RDW 13.3 10.0 - 15.0 %    Platelet Count 206 150 - 450 10e9/L    Diff Method Automated Method     % Neutrophils 61.5 %    %  Lymphocytes 23.9 %    % Monocytes 13.8 %    % Eosinophils 0.2 %    % Basophils 0.2 %    % Immature Granulocytes 0.4 %    Nucleated RBCs 0 0 /100    Absolute Neutrophil 3.4 1.6 - 8.3 10e9/L    Absolute Lymphocytes 1.3 0.8 - 5.3 10e9/L    Absolute Monocytes 0.8 0.0 - 1.3 10e9/L    Absolute Eosinophils 0.0 0.0 - 0.7 10e9/L    Absolute Basophils 0.0 0.0 - 0.2 10e9/L    Abs Immature Granulocytes 0.0 0 - 0.4 10e9/L    Absolute Nucleated RBC 0.0    Lactic acid whole blood   Result Value Ref Range    Lactic Acid 1.0 0.7 - 2.1 mmol/L   Glucose by meter   Result Value Ref Range    Glucose 134 (H) 70 - 99 mg/dL   Glucose by meter   Result Value Ref Range    Glucose 165 (H) 70 - 99 mg/dL   EKG 12-lead, tracing only   Result Value Ref Range    Interpretation ECG Click View Image link to view waveform and result    EKG 12-lead, tracing only   Result Value Ref Range    Interpretation ECG Click View Image link to view waveform and result    Influenza A and B and RSV PCR   Result Value Ref Range    Specimen Description Nasopharyngeal     Influenza A PCR (A) NEG     Canceled, Test credited  PER KOKO GARCIA RN @ 1615 5/8/17       Influenza B PCR (A) NEG     Canceled, Test credited  PER KOKO GARCIA RN @ 1615 5/8/17       Resp Syncytial Virus (A) NEG     Canceled, Test credited  PER KOKO GARCIA RN @ 1615 5/8/17      Blood Culture ONE site   Result Value Ref Range    Specimen Description Blood Right Arm     Culture Micro No growth after 22 hours     Micro Report Status Pending    Influenza A/B antigen   Result Value Ref Range    Influenza A/B Agn Specimen Nasopharyngeal     Influenza A (A) NEG     Positive   Critical Value/Significant Value called to and read back by  FAY SHIPMAN RN @ 4048 5/8/17       Influenza B  NEG     Negative   Test results must be correlated with clinical data. If necessary, results   should be confirmed by a molecular assay or viral culture.     RSV rapid antigen   Result Value Ref Range    RSV  Rapid Antigen Spec Type Nasopharyngeal     RSV Rapid Antigen Result  NEG     Negative   Test results must be correlated with clinical data. If necessary, results   should be confirmed by a molecular assay or viral culture.        Recent Results (from the past 48 hour(s))   XR Chest Port 1 View    Narrative    Exam: XR CHEST PORT 1 VW, 5/8/2017 2:44 PM    Indication: cough with near syncope    Comparison:     The chest 11/9/2011 and 9/23/2005.    Findings:   Pulmonary opacity in the right lung base blunting the right  cardiophrenic angle. The cardiac silhouette is mildly enlarged.  Trachea is midline and there is no evidence of pneumothorax.  Sternotomy wires are intact. The upper abdomen is unremarkable.      Impression    Impression: Right lower lobe pulmonary opacity, could represent  infection, aspiration, or atelectasis.    I have personally reviewed the examination and initial interpretation  and I agree with the findings.    CASSANDRA TOVAR MD                Pending Results:   Unresulted Labs Ordered in the Past 30 Days of this Admission     Date and Time Order Name Status Description    5/8/2017 1424 Blood Culture ONE site Preliminary                   Discharge Instructions and Follow-Up:     Discharge Procedure Orders  Physical Therapy Referral   Referral Type: Rehab Therapy Physical Therapy     Occupational Therapy Referral   Referral Type: Occupational Therapy     Urology Adult Referral     Reason for your hospital stay   Order Comments: Pneumonia and Influenza A     Adult [facility] Follow-up and recommended labs and tests   Order Comments: Recommend follow up with your PCP in one week for hospital follow up. Recommend outpatient urology appointment regarding urinating large amounts of urine and microscopic blood seen in your urine. PT recommended outpatient physical therapy.     Appointments on [place] and/or [facility] (with [facility] or [facility] provider or service). Call [phone] if you haven't heard  "regarding these appointments within 7 days of discharge.     Activity   Order Comments: Your activity upon discharge: As tolerated   Order Specific Question Answer Comments   Is discharge order? Yes      When to contact your care team   Order Comments: Call your health care provider right away if any of these occur:  \" You don't get better within the first 48 hours of treatment  \" Shortness of breath gets worse  \" Rapid breathing (more than 25 breaths per minute)  \" Coughing up blood  \" Chest pain gets worse with breathing  \" Fever of 102 F (38 C) or higher that doesn't get better with fever medicine  \" Weakness, dizziness, or fainting that gets worse  \" Thirst or dry mouth that gets worse  \" Sinus pain, headache, or a stiff neck  \" Chest pain not caused by coughing  Call your health care provider right away if any of these occur:  \" Cough with lots of colored sputum (mucus) or blood in your sputum  \" Chest pain, shortness of breath, wheezing, or difficulty breathing  \" Severe headache, or face, neck, or ear pain  \" New rash with fever  \" Fever of 101 F (38 C) oral or higher that doesn't get better with fever medicine  \" Confusion, behavior change, or seizure  \" Severe weakness or dizziness  \" You get a fever or cough after getting better for a few days     Discharge Instructions   Order Comments: You were admitted to the ED observation unit at the [facility] for pneumonia and Influenza A. Your chest xray showed right lower lung Pneumonia. You were started on an IV anitbiotic Levaquin and then switched to an oral Antibiotic called Doxycycline. Please continue this medication twice a day for the next 6 days to complete a 7 day course.    You were positive for Influenza A. You were started on a medication called Tamiflu. This will not cure Influenza, but help shorten the amount of days you are sick. Please continue this medication daily for the next 4 days.  To prevent the spread of Influenza, wash your hands often, " especially after coughing or sneezing. Or, clean your hands with an alcohol-based hand  containing at least 60 percent alcohol.  Cough or sneeze into a tissue. Then throw the tissue away and wash your hands. If you don t have a tissue, cough and sneeze into the crook of your elbow. Stay home until at least 24 hours after you no longer have a fever or chills. Be sure the fever isn t being hidden by fever-reducing medication. Don t share food, utensils, drinking glasses, or a toothbrush with others.    You were noted to have microscopic blood in your urine. You denied any urinary complaints. Please follow up with Urology as an outpatient.     You were seen by physical therapy due to your recent fall and weakness. Physical therapy recommended you have friends and family stay with you for the first two to three days you get home from the hospital and also recommended outpatient physical therapy.     Please follow up with your primary care doctor in one week.     Diet   Order Comments: Follow this diet upon discharge: Diabetic diet   Order Specific Question Answer Comments   Is discharge order? Yes             Attestation:  Mary Varghese.

## 2017-05-09 NOTE — PLAN OF CARE
Problem: Discharge Planning  Goal: Discharge Planning (Adult, OB, Behavioral, Peds)  Outcome: No Change  Goals to be met before discharge home:   - No dyspnea and oxygen saturations greater than 94% on room air or prior home oxygen levels: yes  - Tolerates oral antibiotics or antivirals or has plans for home infusion set up: On IV meds   - Vital signs normal or at patient baseline: Yes   - Infection is improving: Unknown   - Return to baseline functional status: No  - Safe disposition plan has been identified: No

## 2017-05-09 NOTE — PROGRESS NOTES
Problem: Discharge Planning  Goal: Discharge Planning (Adult, OB, Behavioral, Peds)  Outcome: No Change  Goals to be met before discharge home:   - No dyspnea and oxygen saturations greater than 94% on room air or prior home oxygen levels: yes. Sats 96% on room air.  - Tolerates oral antibiotics or antivirals or has plans for home infusion set up: On IV meds   - Vital signs normal or at patient baseline: No. HR dropping into mid to upper 40s.    - Infection is improving: Unknown   - Return to baseline functional status: No  - Safe disposition plan has been identified: No      Pt admitted from ED with Influenza A and possibly pneumonia. Pt is hard of hearing. Has hearing aids that were left at home. He has an occasional cough with sputum. Lethargic. Up with heavy assist of 2. Intermittently incontinent. Provider notified of decreasing HR overnight (see previous note). Daughter present at bedside and helpful with cares.

## 2017-05-09 NOTE — PLAN OF CARE
Problem: Goal Outcome Summary  Goal: Goal Outcome Summary  PT 6D: PT orders received.  Eval and treat completed.  Ambulates 100ft and 300ft with FWW and SBAx1.  Attempts to amb without AD with 1 LOB and mod Ax1 to correct.  Recommended FWW in order to increase IND mobility.  Ascends and descends 5 staris with single railing and SBAx1.  STS IND with FWW and IND bed mobility.  PT recommends d/c home with initial 24 hr supervision from family to ensure consistency with mobility.  OP PT recommended in order to increase balance, strength, and decrease use of FWW when appropriate.

## 2017-05-09 NOTE — PLAN OF CARE
Problem: Discharge Planning  Goal: Discharge Planning (Adult, OB, Behavioral, Peds)  Goals to be met before discharge home:   - No dyspnea and oxygen saturations greater than 94% on room air or prior home oxygen levels: yes. Sats 97% on room air.  - Tolerates oral antibiotics or antivirals or has plans for home infusion set up: On IV ABX  - Vital signs normal or at patient baseline: YES   - Infection is improving: Unknown   - Return to baseline functional status: No  - Safe disposition plan has been identified: No

## 2017-05-09 NOTE — PROGRESS NOTES
All goals met for discharge. Discharge instructions given to patient and all questions answered. Patient and his daughter were able to verbalize understanding of instructions. PIV discontinued. All belongings with patient. Patient discharged to home with daughter to provide care.

## 2017-05-09 NOTE — PROGRESS NOTES
Problem: Discharge Planning  Goal: Discharge Planning (Adult, OB, Behavioral, Peds)  Outcome: No Change  Goals to be met before discharge home:   - No dyspnea and oxygen saturations greater than 94% on room air or prior home oxygen levels: yes. Sats 96% on room air.  - Tolerates oral antibiotics or antivirals or has plans for home infusion set up: On IV meds   - Vital signs normal or at patient baseline: Yes   - Infection is improving: Unknown   - Return to baseline functional status: No  - Safe disposition plan has been identified: No      Pt admitted from ED with Influenza A and possibly pneumonia. Pt is hard of hearing. Has hearing aids that were left at home. He has an occasional cough with sputum. Very tired this evening. Family said he did not sleep much last night. Up with assist of 2.

## 2017-05-09 NOTE — PROGRESS NOTES
Care Coordinator Progress Note     Admission Date/Time:  5/8/2017  Attending MD:  Buddy Euceda MD     Data  Chart reviewed, discussed with interdisciplinary team.   Patient was admitted for:    Pneumonia  Fall, initial encounter  Weakness  Upper respiratory tract infection, unspecified type.    Concerns with insurance coverage for discharge needs: None.  Current Living Situation: Patient lives alone.  Support System: Supportive and Involved  Services Involved: none  Transportation: Family or Friend will provide  Barriers to Discharge: lives alone    Coordination of Care and Referrals: Provided patient/family with options for discussion of home living situation.        Assessment   At bedside to talk with pt and pt daughterJes about pt current living situation. Pt very [place]; hearing aids at home. Jes states pt is very independent and active;  just bought a new used car,  he drives to the grocery store, cooks for himself, and is currently replacing windows in a rental property. Daughter states herself and her sister, Karla, check in on pt daily.  The only concern today is that pt has felt weak since he has been sick; he may need a walker. PT consult scheduled for today. Will follow up after consult.      Plan  Anticipated Discharge Date:  05/10/17  Anticipated Discharge Plan:  DC with provider recommendations.     Ana Rees RN CC  [facility]/6D Obs  [phone]

## 2017-05-09 NOTE — PLAN OF CARE
Problem: Goal Outcome Summary  Goal: Goal Outcome Summary  OT/6C:  Defer - OT orders received and acknowledged.  Per discussion with interdisciplinary team, pt only requiring one discipline to evaluate at this time to determine DC recommendations given pt's observation status.  Per PT evaluation, pt is IND with functional mobility using FWW and has no acute OT needs at this time.  Please see PT note for specific DC recommendations, OT to complete orders.

## 2017-05-09 NOTE — PROGRESS NOTES
05/09/17 1400   Quick Adds   Type of Visit Initial PT Evaluation   Living Environment   Lives With alone   Living Arrangements house   Home Accessibility stairs to enter home;stairs within home   Number of Stairs to Enter Home 5   Number of Stairs Within Home 12   Stair Railings at Home inside, present on left side;outside, present on right side   Transportation Available family or friend will provide   Living Environment Comment Pt lives alone in 2 story home.  Very mobile and IND PLOF for age.  Does drive and receives no assistance for mobility or ADL's.  Pt and daugher both report pt is very active.    Self-Care   Usual Activity Tolerance good   Current Activity Tolerance moderate   Regular Exercise no   Equipment Currently Used at Home cane, straight  (occasional SEC)   Activity/Exercise/Self-Care Comment IND with all ADL's and mobility   Functional Level Prior   Ambulation 0-->independent   Transferring 0-->independent   Toileting 0-->independent   Communication 0-->understands/communicates without difficulty   Cognition 0 - no cognition issues reported   Fall history within last six months yes   Number of times patient has fallen within last six months 1   Which of the above functional risks had a recent onset or change? ambulation;transferring;fall history   Prior Functional Level Comment IND with all mobility   General Information   Onset of Illness/Injury or Date of Surgery - Date 05/08/17   Referring Physician Elaine Quigley APRN    Patient/Family Goals Statement Return home at PLOF   Pertinent History of Current Problem (include personal factors and/or comorbidities that impact the POC) 89 year old male with a history of DM type II, CKD stage II, HTN, MI s/p 6-vessel bypass and syncope who presents to the Emergency Department via EMS for evaluation after a fall. This afternoon, the patient let his daughter into his home and went up to get into bed when he fell to the ground. He did not hit  his head or lose consciousness. He denies nausea, vomiting diarrhea or any other concerns or complaints at this time. He did receive the influenza vaccination this year.   Precautions/Limitations fall precautions   Heart Disease Risk Factors Age;Gender;Medical history   General Observations Very pleasant. [facility]   Cognitive Status Examination   Orientation orientation to person, place and time   Level of Consciousness alert   Follows Commands and Answers Questions 100% of the time   Personal Safety and Judgment intact   Memory intact   Posture    Posture Kyphosis;Protracted shoulders;Forward head position   Range of Motion (ROM)   ROM Comment WFL   Strength   Strength Comments WFL; although reports weakness   Bed Mobility   Bed Mobility Comments IND   Transfer Skills   Transfer Comments STS with FWW with SBAx1   Gait   Gait Comments Ambulates 100ft and 300ft with FWW and SBA-supervisionx1   Balance   Balance Comments 1 LOB with mod A to correct without AD.    General Therapy Interventions   Planned Therapy Interventions gait training;transfer training;risk factor education;home program guidelines;progressive activity/exercise   Clinical Impression   Criteria for Skilled Therapeutic Intervention yes, treatment indicated   PT Diagnosis impaired functional mobiltiy   Influenced by the following impairments weakness, prolonged bed rest   Functional limitations due to impairments IND mobility   Clinical Presentation Stable/Uncomplicated   Clinical Presentation Rationale diagnosed with influenza, otherwise medically stable   Clinical Decision Making (Complexity) Low complexity   Therapy Frequency` other (see comments)  (1 time only)   Predicted Duration of Therapy Intervention (days/wks) 5/9/17   Anticipated Equipment Needs at Discharge front wheeled walker   Anticipated Discharge Disposition Home with Assist;Home with Outpatient Therapy   Risk & Benefits of therapy have been explained Yes   Patient, Family & other staff in  "agreement with plan of care Yes   [facility] TM \"6 Clicks\"   2016, Trustees of [facility], under license to [employer].  All rights reserved.   6 Clicks Short Forms Basic Mobility Inpatient Short Form   [facility]  \"6 Clicks\" V.2 Basic Mobility Inpatient Short Form   1. Turning from your back to your side while in a flat bed without using bedrails? 4 - None   2. Moving from lying on your back to sitting on the side of a flat bed without using bedrails? 4 - None   3. Moving to and from a bed to a chair (including a wheelchair)? 3 - A Little   4. Standing up from a chair using your arms (e.g., wheelchair, or bedside chair)? 3 - A Little   5. To walk in hospital room? 3 - A Little   6. Climbing 3-5 steps with a railing? 3 - A Little   Basic Mobility Raw Score (Score out of 24.Lower scores equate to lower levels of function) 20   Total Evaluation Time   Total Evaluation Time (Minutes) 10     "

## 2017-05-09 NOTE — H&P
"  Admission Date: 05/08/17  Attending Physician: Dr. Buddy Euceda  Primary Care Physician: Buddy Poe  NP/PA: MICHAEL Riddle, CNP    REASON FOR ADMISSION:     Chief Complaint   Patient presents with     Generalized Weakness     History of Present Illness, per ER MD:   \"Kiran Carcamo is a 89 year old male with a history of DM type II, CKD stage II, HTN, MI s/p 6-vessel bypass and syncope who presents to the Emergency Department via EMS for evaluation after a fall. This afternoon, the patient let his daughter into his home and went up to get into bed when he fell to the ground. He did not hit his head or lose consciousness. He denies nausea, vomiting diarrhea or any other concerns or complaints at this time. He did receive the influenza vaccination this year.     Per the patient's daughter, the patient has been experiencing a head cold recently including a cough, congestion, chills and has not been sleeping well. She checked on him today and while getting into bed, he slid off the bed onto the ground. She could not get him off the floor, prompting her to call EMS. He did not hit his head or experience seizure like activity. The patient lives alone in a single family home with 2 levels.As noted the patient had visited a friend in the nursing home this last week\".    ED Course: IV: Received one liter of IVF/NS in the ER. LABS: CMP normal except GFR 68, creat 1.01-normal, albumin 2.8. Glucose 172. CBC-WBC 6.9, Hgb 12.5, Hct 37.7, RBC 3.8. Troponin 0.082 (seen by cardiology and no need for further evaluation). UA showing hematuria. IMAGING:  CXR: Right lower lobe pulmonary opacity, could represent infection, aspiration, or atelectasis. MEDS: Levaquin 500 mg IV.   Observation Unit Arrival: Patient arrived to  via cart with two daughters present. One daughter will be staying with patient. We have learned that patient is positive for influenza A. The daughter staying has been vaccinated, but is " provided a mask at her discretion. Patient is placed on droplet precautions.   REVIEW OF SYSTEMS:  CONSTITUTIONAL: Generally felt well until 5/6. He denies fever but has felt cold, denies sweats. Has felt fatigued, weak, no weight loss, or appetite changes.  SKIN: Denies rash, itching, bruising, new lumps or bumps, ecchymosis, hair changes or nail changes.  EYES: Denies visual changes, blurred vision, double vision, or eye pain  EARS/NOSE/THROAT: has hearing loss-very [place], no tinnitus, has head cold and nasal congestion. Denies epistaxis, sore throat/mouth pain, change in taste, ear pain, bleeding gums. Has hoarseness.  RESPIRATORY: Denies dyspnea at rest or with activity. Has productive cough with yellow brown sputum but without hemoptysis.  CARDIOVASCULAR: Denies palpitations, chest pain/pressure, orthopnea, edema or open areas on extremities.  GASTROINTESTINAL: Good appetite and PO intake. Denies dysphagia, heartburn, nausea, vomiting, abdominal pain. recent constipation-last bm two days ago. No diarrhea.  GENITOURINARY: Denies dysuria, frequency, urgency, hesitancy, hematuria. He has urinary incontinence and wears a pad. Urinating in large amounts per daughter report  MUSCULOSKELTAL: Denies muscle/joint pain, but has had generalized weakness  NEUROLOGIC: Denies headaches, dizziness, numbness or tingling of hands and feet, confusion, memory changes, lightheadedness/dizziness. Clinic chart notes difficulties with balance.  PSYCHIATRIC: Denies anxiety, depression, mental status changes, or change in mood.  HEME/LYMPH: Denies active bleeding, swollen nodes  VASCULAR ACCESS: Denies pain, redness, or discharge.  PAST MEDICAL HISTORY:  Past Medical History:   Diagnosis Date     CKD (chronic kidney disease) stage 2, GFR 60-89 ml/min 7/6/2015     Hypertension goal BP (blood pressure) < 140/90      Syncope 12/12/2011     Type 2 diabetes, HbA1C goal < 8% (H) 2/23/2015       PAST SURGICAL HISTORY:  Past Surgical History:    Procedure Laterality Date     CHOLECYSTECTOMY       CORONARY ARTERY BYPASS  '05    6-vessel     MANDIBLE SURGERY       SOCIAL HISTORY:  Social History   Substance Use Topics     Smoking status: Never Smoker     Smokeless tobacco: Never Used     Alcohol use No       FAMILY HISTORY:  HEART DISEASE Mother        Eye Disorder Father       Respiratory Brother       HEART DISEASE Sister       CANCER Daughter       CANCER Brother             ALLERGIES:   No Known Allergies    MEDICATIONS:    No current facility-administered medications on file prior to encounter.   Current Outpatient Prescriptions on File Prior to Encounter:  isosorbide mononitrate (IMDUR) 60 MG 24 hr tablet Take 1 tablet (60 mg) by mouth daily   simvastatin (ZOCOR) 20 MG tablet Take 1 tablet (20 mg) by mouth daily   metoprolol (TOPROL-XL) 25 MG 24 hr tablet Take 0.5 tablets (12.5 mg) by mouth daily   blood glucose monitoring (NO BRAND SPECIFIED) test strip 1 box by strip route two times daily   lisinopril (PRINIVIL,ZESTRIL) 20 MG tablet Take 1 tablet (20 mg) by mouth daily   aspirin 81 MG EC tablet Take 81 mg by mouth At Bedtime.   Multiple Vitamin (MV-ONE PO) Take 1 tablet by mouth.   nitroglycerin (NITROSTAT) 0.4 MG SL tablet Place 1 tablet (0.4 mg) under the tongue every 5 minutes as needed for chest pain If symptoms after 3 doses (15 minutes) call 911.       PHYSICAL EXAM:   115/43, T: 98.3, P: 56, R: 16    All vital signs were reviewed.  GENERAL APPEARENCE: Pleasant, appears well nourished, A/O x4. NAD. Sleepy.   SKIN: Clean, dry, and intact without visible lesions, rash, jaundice, cyanosis, erythema, ecchymoses to exposed areas. Flushed face-febrile to 101.5  HEENT: NCAT w/out masses, lesions, or abnormalities. Sclera anicteric, PERRLA, EOMI.  Oral mucosa pink and moist without erythema, exudate, lesions, ulcerations, or thrush. Teeth and gums normal.    NECK: Supple, no masses. No jugular venous distention.   CARDIOVASCULAR: S1, S2 RRR. No  murmurs, rubs, or gallops.   RESPIRATORY: Respiratory effort WNL. CTA  bilaterally without crackles/rales/wheeze   GI: Active BS in all 4 quadrants. Abdomen soft and non-tender. No masses or hepatosplenomegaly. Reports no BM for two days  : Deferred, Incontinent of urine. Wearing brief.   MUSCULOSKELETAL: Gait is off balance. Ambulated to BR with assist of 2. Strength 5/5 in major muscle groups of bilateral UE and LE.  Extremities normal, no gross deformities noted, non-tender to palpation.   PV: 2+ bilateral radial and pedal pulses. No edema noted.   NEURO: CN II-XII grossly intact. Speech hoarse without stridor. Appropriate throughout interview.   Sensation grossly WNL.  PSYCH: Appropriate affect  HEME/LYMPH: No visible bleeding. No palpable mandibular/cervical/supra/infraclavicular lymph nodes  PSYCHIATRIC: Mentation and affect appear normal  VASCULAR ACCESS: CDI without erythema or discharge. Non-tender.      DATA :  Results for orders placed or performed during the hospital encounter of 05/08/17 (from the past 24 hour(s))   XR Chest Port 1 View    Narrative    Exam: XR CHEST PORT 1 VW, 5/8/2017 2:44 PM    Indication: cough with near syncope    Comparison:     The chest 11/9/2011 and 9/23/2005.    Findings:   Pulmonary opacity in the right lung base blunting the right  cardiophrenic angle. The cardiac silhouette is mildly enlarged.  Trachea is midline and there is no evidence of pneumothorax.  Sternotomy wires are intact. The upper abdomen is unremarkable.      Impression    Impression: Right lower lobe pulmonary opacity, could represent  infection, aspiration, or atelectasis.    I have personally reviewed the examination and initial interpretation  and I agree with the findings.    CASSANDRA TOVAR MD   EKG 12-lead, tracing only   Result Value Ref Range    Interpretation ECG Click View Image link to view waveform and result    CBC with platelets differential   Result Value Ref Range    WBC 6.9 4.0 - 11.0 10e9/L     RBC Count 3.88 (L) 4.4 - 5.9 10e12/L    Hemoglobin 12.5 (L) 13.3 - 17.7 g/dL    Hematocrit 37.7 (L) 40.0 - 53.0 %    MCV 97 78 - 100 fl    MCH 32.2 26.5 - 33.0 pg    MCHC 33.2 31.5 - 36.5 g/dL    RDW 13.1 10.0 - 15.0 %    Platelet Count 232 150 - 450 10e9/L    Diff Method Automated Method     % Neutrophils 79.9 %    % Lymphocytes 5.5 %    % Monocytes 14.0 %    % Eosinophils 0.1 %    % Basophils 0.1 %    % Immature Granulocytes 0.4 %    Nucleated RBCs 0 0 /100    Absolute Neutrophil 5.5 1.6 - 8.3 10e9/L    Absolute Lymphocytes 0.4 (L) 0.8 - 5.3 10e9/L    Absolute Monocytes 1.0 0.0 - 1.3 10e9/L    Absolute Eosinophils 0.0 0.0 - 0.7 10e9/L    Absolute Basophils 0.0 0.0 - 0.2 10e9/L    Abs Immature Granulocytes 0.0 0 - 0.4 10e9/L    Absolute Nucleated RBC 0.0    INR   Result Value Ref Range    INR 1.13 0.86 - 1.14   Partial thromboplastin time   Result Value Ref Range    PTT 29 22 - 37 sec   Comprehensive metabolic panel   Result Value Ref Range    Sodium 138 133 - 144 mmol/L    Potassium 4.3 3.4 - 5.3 mmol/L    Chloride 102 94 - 109 mmol/L    Carbon Dioxide 27 20 - 32 mmol/L    Anion Gap 9 3 - 14 mmol/L    Glucose 172 (H) 70 - 99 mg/dL    Urea Nitrogen 24 7 - 30 mg/dL    Creatinine 1.01 0.66 - 1.25 mg/dL    GFR Estimate 68 >60 mL/min/1.7m2    GFR Estimate If Black 83 >60 mL/min/1.7m2    Calcium 8.6 8.5 - 10.1 mg/dL    Bilirubin Total 0.6 0.2 - 1.3 mg/dL    Albumin 2.8 (L) 3.4 - 5.0 g/dL    Protein Total 7.4 6.8 - 8.8 g/dL    Alkaline Phosphatase 94 40 - 150 U/L    ALT 83 (H) 0 - 70 U/L    AST 45 0 - 45 U/L   Troponin I   Result Value Ref Range    Troponin I ES 0.082 (H) 0.000 - 0.045 ug/L   Blood Culture ONE site   Result Value Ref Range    Specimen Description Blood Right Arm     Culture Micro No growth after 2 hours     Micro Report Status Pending    UA reflex to Microscopic and Culture   Result Value Ref Range    Color Urine Yellow     Appearance Urine Clear     Glucose Urine Negative NEG mg/dL    Bilirubin Urine  Negative NEG    Ketones Urine 10 (A) NEG mg/dL    Specific Gravity Urine 1.019 1.003 - 1.035    Blood Urine Moderate (A) NEG    pH Urine 5.5 5.0 - 7.0 pH    Protein Albumin Urine 30 (A) NEG mg/dL    Urobilinogen mg/dL Normal 0.0 - 2.0 mg/dL    Nitrite Urine Negative NEG    Leukocyte Esterase Urine Negative NEG    Source Midstream Urine     RBC Urine 13 (H) 0 - 2 /HPF    WBC Urine 1 0 - 2 /HPF    Mucous Urine Present (A) NEG /LPF   Influenza A and B and RSV PCR   Result Value Ref Range    Specimen Description Nasopharyngeal     Influenza A PCR (A) NEG     Canceled, Test credited  PER KOKO GARCIA RN @ 1615 5/8/17 CS      Influenza B PCR (A) NEG     Canceled, Test credited  PER KOKO POSS RN @ 1615 5/8/17 CS      Resp Syncytial Virus (A) NEG     Canceled, Test credited  PER KOKO GARCIA RN @ 1615 5/8/17      Influenza A/B antigen   Result Value Ref Range    Influenza A/B Agn Specimen Nasopharyngeal     Influenza A (A) NEG     Positive   Critical Value/Significant Value called to and read back by  FAY SHIPMAN RN @ 6383 5/8/17 CS      Influenza B  NEG     Negative   Test results must be correlated with clinical data. If necessary, results   should be confirmed by a molecular assay or viral culture.     RSV rapid antigen   Result Value Ref Range    RSV Rapid Antigen Spec Type Nasopharyngeal     RSV Rapid Antigen Result  NEG     Negative   Test results must be correlated with clinical data. If necessary, results   should be confirmed by a molecular assay or viral culture.         ASSESSMENT/PLAN: Patient was at a nursing home 5/1 through 5/4 visiting a daughter who is living there with a terminal illness. About 5/4 he began feeling cold but without rigor or fevers and wasn't feeling sick. On 5/6 he began feeling like he was getting a head cold, cough with production, congestion, sleeping poorly. Today had a non injurious slid out of bed onto the floor 2/2 generalized weakness. Patient came to ER for evaluation via  "EMS. Now found to have positive influenza A. He will be admitting for monitoring of his respiratory status, IVF, IV antibiotics, and assessment of weakness.     #. Cough/Positive Influenza A: Productive cough of yellow/brown sputum. No hypoxia, tachypnea, or SOB at rest or with exertion. Patient was vaccinated for influenza last fall.  CXR showing \"right lower lobe pulmonary opacity, could represent infection, aspiration, or atelectasis\". Patient was treated with Levaquin 500 mg IV for presumed CAP. Influenza A now found to be positive. WBC 6.9, lactate 1.2. Temp elevated to 101.5 upon arrival to .   -Plan to continue the Levaquin, can decide on oral or IV for next dose tomorrow  -Add Tamiflu 30 mg BID (reduced dose r/t creat clearance).   -Continuous pulse oximetry to monitor for hypoxia  -Duo-neb PRN for wheezing  -IVF of NS at 100 cc/hr as PO intake not adequate hydration currently  -Blood cultures pending, initial growth negative after two hours.  Follow  -Tylenol 975 mg every 8 hours PRN for fever or pain.     #. Hematuria: Denies urinary sx's of frequency, urgency, or dysuria but daughter states he has been urinating in large amounts. Wears incontinent brief. UA today showing hematuria with moderate blood and RBC's at 13. Hgb 12.5, Hct 37.7, RBC 3.8. Normal Hgb is in the 13 to 14 range.   -Asymptomatic hematuria  -Recheck CBC in the AM  -Observe urine for sergio blood.   -Could consider urology consult as outpatient or follow with PCP    #. T2DM: Patient is not on insulin or medication. He has diabetic neuropathy. HgbA1c 11/16/16 was 7.6, on 3/20/17 it was 8.3. He is not on medication. He checks his BG at least daily. Glucose today was 172. No current sores on his body or feet.   -POCT glucose QID  -Moderate CHO diet with low sodium.   -Elevating HgbA1c. Will recheck in the AM.     #. HTN: According to progress notes with his PCP, his HTN is 80% controlled and typically within the 140/90 goal.   -Continue " Lisinopril 20 mg daily  -Continue Metoprolol (Toprol XL) 12.5 mg daily. Check that heart rate is >60.    #. History of MI: 2002 unstable angina. Developed shoulder pain. He underwent angio. LM was normal, LAD had a complex lesion with calcification. D1 and D2 with 70% proximal stenosis. Lcx has a moderate lesion mid and OM1 with moderate disease. RCA had total prior occlusion that was recanalized, distal RCA lesion 70%. LV gram showed severely hypokinetic inferior basal region with a normal anterior wall. EF estimate 40%. 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 Today had elevated troponin to 0.082. Seen by Dr. Arndt, cardiology. Per recommendation of cards, no need for further troponin studies. Patient is asymptomatic.    -Continue daily 81 mg coated ASA  -Continue Imdur 60 mg 24 hour tablet  -Control of HLD, remain on Simvastatin 20 mg daily  -Telemetry monitoring.     FEN:  -Moderate CHO with low salt diet as tolerated.  -Monitor BMP and replace electrolytes per protocol    Prophy:  -No VTE prophy as patient is up ad joaquina and anticipate short observation stay   -Encourage ambulation as tolerated   -for GI prophy  -PRN Senna and Miralax    Consults: Cardiology in the ER, see note. Troponin was elevated but no need to follow up unless develops cardiac sx's.     CODE STATUS:  FULL CODE  (no orders found addressing this previously).     DISPOSITION: Has been living at home and would like to continue to do so.     2011 temp oral 101.5   2045 Daughters here assisting with admit. Patient up with assist of two, ambulating to the BR, urinated large amount. Called Dr. Euceda and discussed the Tamiflu. Will order 30 mg BID and keep the Levaquin as well. IVF at 100 cc's per hour and treat fever with tylenol.   0130 checked in on patient. He is asleep with stable VS and temp down to 100.9 on last check. Discussed plan of care with daughter. Patient is incontinent of urine, wearing brief. Nurses will provide skin care with  next check. Pad was changed earlier this shift.   0340 notified by nursing that patient had a dip down into the 40's HR. Patient has been on tele and showing NSR to SB in the 50 to 60 range. Will hold metoprolol if HR remains bradycardic in the AM.   0430 patient is bradycardic in the upper 40's with a sinus dysrhythmia. Will obtain 12 lead EKG. He has been asymptomatic.    0600 patient awakened for cares. A&OX4, denies SOB, CP, shoulder pain. Moving well in bed. Re-positioned. Two low erroneous readings followed by two normal BP readings. AM labs have been drawn. Lactate 1.0, Calcium 7.9, albumin 2.3, Protein 6.2, AST 62. Glucose now 110    MICHAEL Riddle, CNP  Emergency Department Observation Unit

## 2017-05-10 ENCOUNTER — TELEPHONE (OUTPATIENT)
Dept: FAMILY MEDICINE | Facility: CLINIC | Age: 82
End: 2017-05-10

## 2017-05-10 LAB — INTERPRETATION ECG - MUSE: NORMAL

## 2017-05-10 NOTE — TELEPHONE ENCOUNTER
Route to Dr. Deepika ARCE  Doing much better, up to bathroom 3x last night, he was dx with pneumonia and flu strain a, he had slid to the floor not a true fall, denies pain, taking antibiotic, homecare advise given adequate fluids, rest, watch for GI issues with antibiotic. Call clinic if any questions/concerns    Marilynlaeksandra Johnston RN

## 2017-05-11 NOTE — TELEPHONE ENCOUNTER
"Hospital/TCU/ED for chronic condition Discharge Protocol    \"Hi, my name is Ashley PADMINI Shine, a registered nurse, and I am calling from [facility].  I am calling to follow up and see how things are going for you after your recent emergency visit/hospital/TCU stay.\"    Tell me how you are doing now that you are home?\"  2 daughters are taking great care of him. Taking his meds, getting a lot of sleep.       Discharge Instructions    \"Let's review your discharge instructions.  What is/are the follow-up recommendations?  Pt. Response: Has been doing everything on the list. Has not made an appt with urologist yet, but will do this.     \"Has an appointment with your primary care provider been scheduled?\"   Yes. (confirm)    \"When you see the provider, I would recommend that you bring your medications with you.\"    Medications    \"Tell me what changed about your medicines when you discharged?\"    Changes to chronic meds?    0-1    \"What questions do you have about your medications?\"    None     New diagnoses of heart failure, COPD, diabetes, or MI?    No              Medication reconciliation completed? Yes  Was MTM referral placed (*Make sure to put transitions as reason for referral)?   No    Call Summary    \"What questions or concerns do you have about your recent visit and your follow-up care?\"     none  Daughters are caring for pt in shifts. Daughter I spoke with has a cold and wondering if she can be around pt. No fever, but has been taking acetaminophen. Advised that she should use good hand washing and cover her cough and sneezing.    \"If you have questions or things don't continue to improve, we encourage you contact us through the main clinic number (give number).  Even if the clinic is not open, triage nurses are available 24/7 to help you.     We would like you to know that our clinic has extended hours (provide information).  We also have urgent care (provide details on closest location and hours/contact " "info)\"      \"Thank you for your time and take care!\"           "

## 2017-05-11 NOTE — TELEPHONE ENCOUNTER
ED / Discharge Outreach Protocol    Patient Contact    Attempt # 1    Was call answered?  No.  Left message on voicemail with information to call me back.    Ashley Shine RN  [facility] [place]

## 2017-05-14 LAB
BACTERIA SPEC CULT: NO GROWTH
MICRO REPORT STATUS: NORMAL
SPECIMEN SOURCE: NORMAL

## 2017-05-17 ENCOUNTER — OFFICE VISIT (OUTPATIENT)
Dept: FAMILY MEDICINE | Facility: CLINIC | Age: 82
End: 2017-05-17
Payer: COMMERCIAL

## 2017-05-17 VITALS
TEMPERATURE: 97.6 F | DIASTOLIC BLOOD PRESSURE: 58 MMHG | HEIGHT: 69 IN | HEART RATE: 61 BPM | OXYGEN SATURATION: 98 % | BODY MASS INDEX: 22.66 KG/M2 | WEIGHT: 153 LBS | SYSTOLIC BLOOD PRESSURE: 120 MMHG

## 2017-05-17 DIAGNOSIS — I10 HYPERTENSION GOAL BP (BLOOD PRESSURE) < 140/90: ICD-10-CM

## 2017-05-17 DIAGNOSIS — J11.00 PNEUMONIA OF RIGHT LOWER LOBE DUE TO INFLUENZA A VIRUS: Primary | ICD-10-CM

## 2017-05-17 LAB
ALBUMIN UR-MCNC: ABNORMAL MG/DL
APPEARANCE UR: CLEAR
BILIRUB UR QL STRIP: NEGATIVE
COLOR UR AUTO: YELLOW
GLUCOSE UR STRIP-MCNC: NEGATIVE MG/DL
HGB UR QL STRIP: NEGATIVE
KETONES UR STRIP-MCNC: NEGATIVE MG/DL
LEUKOCYTE ESTERASE UR QL STRIP: NEGATIVE
MUCOUS THREADS #/AREA URNS LPF: PRESENT /LPF
NITRATE UR QL: NEGATIVE
NON-SQ EPI CELLS #/AREA URNS LPF: ABNORMAL /LPF
PH UR STRIP: 5.5 PH (ref 5–7)
RBC #/AREA URNS AUTO: ABNORMAL /HPF (ref 0–2)
SP GR UR STRIP: 1.02 (ref 1–1.03)
URN SPEC COLLECT METH UR: ABNORMAL
UROBILINOGEN UR STRIP-ACNC: 0.2 EU/DL (ref 0.2–1)
WBC #/AREA URNS AUTO: ABNORMAL /HPF (ref 0–2)

## 2017-05-17 PROCEDURE — 99214 OFFICE O/P EST MOD 30 MIN: CPT | Performed by: FAMILY MEDICINE

## 2017-05-17 PROCEDURE — 81001 URINALYSIS AUTO W/SCOPE: CPT | Performed by: FAMILY MEDICINE

## 2017-05-17 RX ORDER — LISINOPRIL 10 MG/1
10 TABLET ORAL DAILY
Qty: 30 TABLET | Refills: 1 | Status: SHIPPED | OUTPATIENT
Start: 2017-05-17 | End: 2017-06-02

## 2017-05-17 NOTE — NURSING NOTE
"Chief Complaint   Patient presents with     Hospital F/U       Initial /58  Pulse 61  Temp 97.6  F (36.4  C) (Oral)  Ht 5' 9\" (1.753 m)  Wt 153 lb (69.4 kg)  SpO2 98%  BMI 22.59 kg/m2 Estimated body mass index is 22.59 kg/(m^2) as calculated from the following:    Height as of this encounter: 5' 9\" (1.753 m).    Weight as of this encounter: 153 lb (69.4 kg).  Medication Reconciliation: complete   Cynthia Mason MA      "

## 2017-05-17 NOTE — PROGRESS NOTES
SUBJECTIVE:                                                    Kiran Carcamo is a 89 year old male who presents to clinic today for the following health issues:    Hospital Follow-up Visit:    Hospital/Nursing Home/IP Rehab Facility: [facility]  Date of Admission: 5/08/2017  Date of Discharge: 05/09/2017  Reason(s) for Admission: Pneumonia             Problems taking medications regularly:  None       Medication changes since discharge: None       Problems adhering to non-medication therapy:  None    Summary of hospitalization:  [facility] discharge summary reviewed  Diagnostic Tests/Treatments reviewed.  Follow up needed: none  Other Healthcare Providers Involved in Patient s Care:         None  Update since discharge: improved.     Post Discharge Medication Reconciliation: discharge medications reconciled and changed, per note/orders (see AVS).  Plan of care communicated with patient and family     Coding guidelines for this visit:  Type of Medical   Decision Making Face-to-Face Visit       within 7 Days of discharge Face-to-Face Visit        within 14 days of discharge   Moderate Complexity [id number]   High Complexity [id number]          Patient says that on 5/8/17 he walked downstairs to let his daughter into his house and then went back up stairs, and he felt very weak and tired and collapsed. His daughter called an ambulance and he went to the hospital, and was diagnosed with pneumonia. He says that he was discharged 5/9/17 and was feeling much stronger and has been progressively improving since. His daughter says that he had recently been visiting one of his other daughters in the nursing home and think that he may have contracted the pneumonia from someone there. Patient has been monitoring his blood pressures at home and says that they have been well controlled around his goal reading, but his daughter has been worried about how low his pressures have been and how slow  "his pulse has been. His daughter also reports that before he got sick, he was \"profoundly cold\" and needed to wear many layers, hats, and gloves, and he has not had problems since going to the hospital with cold feeling. Daughter says that he has been sleeping frequently since the illness and has been having a productive cough, and he does not have a history of problems with cough. Brief history of smoking. 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    Blood in Urine:  Patient has recently had a urinalysis showing blood in his urine, and his urologist has recently suggested that he go in to see them. He says that he has not seen any gross blood in his urine, and he has not had major urinary changes. His daughter reports that she has recently seen a small amount of blood in the toilet, and she is not sure whether the blood had been from his urine.    Problem list and histories reviewed & adjusted, as indicated.  Additional history: as documented    Patient Active Problem List   Diagnosis     Health Care Home     AK (actinic keratosis)     SK (seborrheic keratosis)     Hyperplasia of prostate     Hyperlipidemia LDL goal <70     History of MI (myocardial infarction)     Forgetfulness     Syncope     CKD (chronic kidney disease) stage 2, GFR 60-89 ml/min     Atypical chest pain     Essential hypertension with goal blood pressure less than 140/90     Eczema, unspecified type     Type 2 diabetes mellitus with diabetic nephropathy, without long-term current use of insulin (H)     Pneumonia     Past Surgical History:   Procedure Laterality Date     CHOLECYSTECTOMY       CORONARY ARTERY BYPASS  '05    6-vessel     MANDIBLE SURGERY         Social History   Substance Use Topics     Smoking status: Never Smoker     Smokeless tobacco: Never Used     Alcohol use No     Family History   Problem Relation Age of Onset     HEART DISEASE Mother      Eye Disorder Father      " Respiratory Brother      HEART DISEASE Sister      CANCER Daughter      CANCER Brother          Current Outpatient Prescriptions   Medication Sig Dispense Refill     polyethylene glycol (MIRALAX/GLYCOLAX) Packet Take 17 g by mouth daily as needed for constipation 10 packet 0     acetaminophen (TYLENOL) 325 MG tablet Take 3 tablets (975 mg) by mouth every 8 hours as needed for mild pain 100 tablet 0     isosorbide mononitrate (IMDUR) 60 MG 24 hr tablet Take 1 tablet (60 mg) by mouth daily 90 tablet 1     simvastatin (ZOCOR) 20 MG tablet Take 1 tablet (20 mg) by mouth daily 90 tablet 1     metoprolol (TOPROL-XL) 25 MG 24 hr tablet Take 0.5 tablets (12.5 mg) by mouth daily 45 tablet 1     blood glucose monitoring (NO BRAND SPECIFIED) test strip 1 box by strip route two times daily 3 Box 3     lisinopril (PRINIVIL,ZESTRIL) 20 MG tablet Take 1 tablet (20 mg) by mouth daily 90 tablet 3     aspirin 81 MG EC tablet Take 81 mg by mouth At Bedtime.       Multiple Vitamin (MV-ONE PO) Take 1 tablet by mouth.       nitroglycerin (NITROSTAT) 0.4 MG SL tablet Place 1 tablet (0.4 mg) under the tongue every 5 minutes as needed for chest pain If symptoms after 3 doses (15 minutes) call 911. (Patient not taking: Reported on 5/17/2017) 25 tablet 1     No Known Allergies  Recent Labs   Lab Test  05/09/17   0502  05/08/17   1450  03/20/17   1110  11/16/16   1047  08/10/16   1050  12/16/15   1111   11/21/14   1441  04/16/14   1030   A1C   --    --   8.3*  7.6*  7.4*  6.9*   < >  6.8*  6.9*   LDL   --    --    --    --   64   --    --   40  55   HDL   --    --    --    --   41   --    --   47  36*   TRIG   --    --    --    --   97   --    --   110  140   ALT  69  83*   --    --    --   34   --   34  31   CR  1.07  1.01  1.08   --    --   1.02   --   0.94  1.03   GFRESTIMATED  64  68  63   --    --   68   --   75  67   GFRESTBLACK  78  83  77   --    --   82   --   >90   GFR Calc    82   POTASSIUM  4.3  4.3  4.4   --    --   " 4.3   --   4.7  4.5   TSH   --    --    --   2.57   --    --    --   1.97   --     < > = values in this interval not displayed.      BP Readings from Last 3 Encounters:   05/17/17 120/58   05/09/17 102/61   03/20/17 160/80    Wt Readings from Last 3 Encounters:   05/17/17 69.4 kg (153 lb)   03/20/17 74.9 kg (165 lb 1.6 oz)   11/16/16 75.8 kg (167 lb 3.2 oz)        Reviewed and updated as needed this visit by clinical staff  Tobacco  Allergies  Med Hx  Surg Hx  Fam Hx  Soc Hx      Reviewed and updated as needed this visit by Provider         ROS:  Positive for productive cough and weakness. Positive for blood in urine.    Denies headache, insomnia, chest pain, shortness of breath, heartburn, bowel issues, neck pain, back pain, hip pain, knee pain, ankle pain, or foot pain. Remainder of ROS is negative unless otherwise noted above or in HPI.    This document serves as a record of the services and decisions personally performed and made by Buddy Poe MD. It was created on his behalf by Jef Osuna, a trained medical scribe. The creation of this document is based the provider's statements to the medical scribe.  Jef Osuna 10:57 AM May 17, 2017    OBJECTIVE:                                                    /58  Pulse 61  Temp 97.6  F (36.4  C) (Oral)  Ht 1.753 m (5' 9\")  Wt 69.4 kg (153 lb)  SpO2 98%  BMI 22.59 kg/m2  Body mass index is 22.59 kg/(m^2).  GENERAL: healthy, alert and no distress  RESP: slightly decreased breath sounds at right base with rhonchi, no rales or wheeze  CV: regular rate and rhythm, normal S1 S2, no S3 or S4, no murmur, click or rub, no peripheral edema and peripheral pulses strong  MS: no gross musculoskeletal defects noted, no edema  SKIN: no suspicious lesions or rashes  NEURO: Normal strength and tone, mentation intact and speech normal  PSYCH: mentation appears normal, affect normal/bright    Diagnostic Test Results:  No results found for this or any " previous visit (from the past 24 hour(s)).     ASSESSMENT/PLAN:                                                      (J11.00) Pneumonia of right lower lobe due to influenza A virus  (primary encounter diagnosis)  Comment: Improved since hospital visit. Labs completed today.  Plan: Urine Microscopic        Follow up with results from lab. Recheck in 1 month at physical.    (I10) Hypertension goal BP (blood pressure) < 140/90  Comment: At goal. Decreased lisinopril dosage to 10 mg daily. Labs completed today.  Plan: UA reflex to Microscopic, lisinopril         (PRINIVIL/ZESTRIL) 10 MG tablet        Start on lisinopril 10 mg daily. Follow up with results from lab.    Patient Instructions   -Try to stay active when possible and avoid prolonged periods of sitting, but avoid overexerting yourself.  -Increase your fluid intake in order to stay well hydrated.  -I have decreased your lisinopril dosage, and please start taking 10 mg daily. Let me know if you have any problems with these changes.  -I will let you know when I get the results back from your labs.    The information in this document, created by the medical scribe for me, accurately reflects the services I personally performed and the decisions made by me. I have reviewed and approved this document for accuracy prior to leaving the patient care area.   Buddy Poe MD 10:57 AM May 17, 2017  [facility] [place]

## 2017-05-17 NOTE — MR AVS SNAPSHOT
After Visit Summary   5/17/2017    Kiran Carcamo    MRN: [id number]           Patient Information     Date Of Birth          4/13/1921        Visit Information        Provider Department      5/17/2017 10:30 AM Buddy Poe MD [facility]        Today's Diagnoses     Pneumonia of right lower lobe due to influenza A virus    -  1    Hypertension goal BP (blood pressure) < 140/90          Care Instructions    -Try to stay active when possible and avoid prolonged periods of sitting, but avoid overexerting yourself.  -Increase your fluid intake in order to stay well hydrated.  -I have decreased your lisinopril dosage, and please start taking 10 mg daily. Let me know if you have any problems with these changes.  -I will let you know when I get the results back from your labs.        Follow-ups after your visit        Your next 10 appointments already scheduled     May 18, 2017  3:00 PM CDT   Evaluation with Elke Ovalles, PT   [facility] [place], Physical Therapy - Outpatient ([facility])    [address] [facility], [email or web address] [address]  [place] [address]   [phone]            May 22, 2017  3:00 PM CDT   Evaluation with Haim Aguilar, OT   [facility], Occupational Therapy - Outpatient ([facility])    [address] [facility], [address]  [place] [address]   [phone]            Jun 02, 2017 10:30 AM CDT   PHYSICAL with Buddy Poe MD   [facility] [place]    [address] [place] [address] [address]  [facility] [id number] [phone]              Who to contact     If you have questions or need follow up information about today's clinic visit or your schedule please contact [facility] directly at [phone].  Normal or non-critical lab and imaging results will be communicated to you by MyChart, letter or phone within 4 business days after  "the clinic has received the results. If you do not hear from us within 7 days, please contact the clinic through [employer] or phone. If you have a critical or abnormal lab result, we will notify you by phone as soon as possible.  Submit refill requests through [employer] or call your pharmacy and they will forward the refill request to us. Please allow 3 business days for your refill to be completed.          Additional Information About Your Visit        [employer] Information     [employer] gives you secure access to your electronic health record. If you see a primary care provider, you can also send messages to your care team and make appointments. If you have questions, please call your primary care clinic.  If you do not have a primary care provider, please call [phone] and they will assist you.        Care EveryWhere ID     This is your Care EveryWhere ID. This could be used by other organizations to access your [facility] medical records  [id number]        Your Vitals Were     Pulse Temperature Height Pulse Oximetry BMI (Body Mass Index)       61 97.6  F (36.4  C) (Oral) 5' 9\" (1.753 m) 98% 22.59 kg/m2        Blood Pressure from Last 3 Encounters:   05/17/17 120/58   05/09/17 102/61   03/20/17 160/80    Weight from Last 3 Encounters:   05/17/17 153 lb (69.4 kg)   03/20/17 165 lb 1.6 oz (74.9 kg)   11/16/16 167 lb 3.2 oz (75.8 kg)              We Performed the Following     UA reflex to Microscopic     Urine Microscopic          Today's Medication Changes          These changes are accurate as of: 5/17/17 11:59 PM.  If you have any questions, ask your nurse or doctor.               These medicines have changed or have updated prescriptions.        Dose/Directions    * lisinopril 20 MG tablet   Commonly known as:  PRINIVIL/ZESTRIL   This may have changed:  Another medication with the same name was added. Make sure you understand how and when to take each.   Used for:  Hypertension goal BP (blood pressure) < 140/90 "   Changed by:  Carmencita Bailey MD        Dose:  20 mg   Take 1 tablet (20 mg) by mouth daily   Quantity:  90 tablet   Refills:  3       * lisinopril 10 MG tablet   Commonly known as:  PRINIVIL/ZESTRIL   This may have changed:  You were already taking a medication with the same name, and this prescription was added. Make sure you understand how and when to take each.   Used for:  Hypertension goal BP (blood pressure) < 140/90   Changed by:  Buddy Poe MD        Dose:  10 mg   Take 1 tablet (10 mg) by mouth daily   Quantity:  30 tablet   Refills:  1       * Notice:  This list has 2 medication(s) that are the same as other medications prescribed for you. Read the directions carefully, and ask your doctor or other care provider to review them with you.         Where to get your medicines      These medications were sent to [id number] - [place], [place] - [address] [facility]  [address] [address]     Phone:  [phone]     lisinopril 10 MG tablet                Primary Care Provider Office Phone # Fax #    Buddy Poe [phone]       [facility] [address]  [facility] [address]        Thank you!     Thank you for [place] [facility] [place]. Our goal is always to provide you with excellent care. Hearing back from our patients is one way we can continue to improve our services. Please take a few minutes to complete the written survey that you may receive in the mail after your visit with us. Thank you!             Your Updated Medication List - Protect others around you: Learn how to safely use, store and throw away your medicines at www.disposemymeds.org.          This list is accurate as of: 5/17/17 11:59 PM.  Always use your most recent med list.                   Brand Name Dispense Instructions for use    acetaminophen 325 MG tablet    TYLENOL    100 tablet    Take 3 tablets (975 mg) by  mouth every 8 hours as needed for mild pain       aspirin 81 MG EC tablet      Take 81 mg by mouth At Bedtime.       blood glucose monitoring test strip    no brand specified    3 Box    1 box by strip route two times daily       isosorbide mononitrate 60 MG 24 hr tablet    IMDUR    90 tablet    Take 1 tablet (60 mg) by mouth daily       * lisinopril 20 MG tablet    PRINIVIL/ZESTRIL    90 tablet    Take 1 tablet (20 mg) by mouth daily       * lisinopril 10 MG tablet    PRINIVIL/ZESTRIL    30 tablet    Take 1 tablet (10 mg) by mouth daily       metoprolol 25 MG 24 hr tablet    TOPROL-XL    45 tablet    Take 0.5 tablets (12.5 mg) by mouth daily       MV-ONE PO      Take 1 tablet by mouth.       nitroglycerin 0.4 MG sublingual tablet    NITROSTAT    25 tablet    Place 1 tablet (0.4 mg) under the tongue every 5 minutes as needed for chest pain If symptoms after 3 doses (15 minutes) call 911.       polyethylene glycol Packet    MIRALAX/GLYCOLAX    10 packet    Take 17 g by mouth daily as needed for constipation       simvastatin 20 MG tablet    ZOCOR    90 tablet    Take 1 tablet (20 mg) by mouth daily       * Notice:  This list has 2 medication(s) that are the same as other medications prescribed for you. Read the directions carefully, and ask your doctor or other care provider to review them with you.

## 2017-05-17 NOTE — PATIENT INSTRUCTIONS
-Try to stay active when possible and avoid prolonged periods of sitting, but avoid overexerting yourself.  -Increase your fluid intake in order to stay well hydrated.  -I have decreased your lisinopril dosage, and please start taking 10 mg daily. Let me know if you have any problems with these changes.  -I will let you know when I get the results back from your labs.

## 2017-05-17 NOTE — PROGRESS NOTES
Praveen Darling, your recent results are back and are all normal. Please contact if any questions. Nice to see you...and daughters! See you for your physical.  Buddy

## 2017-05-18 ENCOUNTER — HOSPITAL ENCOUNTER (OUTPATIENT)
Dept: PHYSICAL THERAPY | Facility: CLINIC | Age: 82
Setting detail: THERAPIES SERIES
End: 2017-05-18
Payer: MEDICARE

## 2017-05-18 PROCEDURE — G8978 MOBILITY CURRENT STATUS: HCPCS | Mod: GP,CJ | Performed by: PHYSICAL THERAPIST

## 2017-05-18 PROCEDURE — G8979 MOBILITY GOAL STATUS: HCPCS | Mod: GP,CH | Performed by: PHYSICAL THERAPIST

## 2017-05-18 PROCEDURE — 97161 PT EVAL LOW COMPLEX 20 MIN: CPT | Mod: GP | Performed by: PHYSICAL THERAPIST

## 2017-05-18 PROCEDURE — 97110 THERAPEUTIC EXERCISES: CPT | Mod: GP | Performed by: PHYSICAL THERAPIST

## 2017-05-18 PROCEDURE — 40000719 ZZHC STATISTIC PT DEPARTMENT NEURO VISIT: Performed by: PHYSICAL THERAPIST

## 2017-05-18 NOTE — PROGRESS NOTES
05/18/17 1600   Quick Adds   Type of Visit Initial OP PT Evaluation   General Information   Start of Care Date 05/18/17   Referring Physician Mary Varghese, APRN CNP   Orders Evaluate and Treat as Indicated   Order Date 05/09/17   Medical Diagnosis Fall, initial encounter. Weakness.   Onset of illness/injury or Date of Surgery 05/08/17   Special Instructions Weakness   Surgical/Medical history reviewed Yes   Pertinent history of current problem (include personal factors and/or comorbidities that impact the POC) Pt was visiting a family member at a nursing home for several days in early May. He felt very cold while he was there. On 5/8/17 he experienced a non-injurious slide out of bed due to weakness. He was taken to the ER and diagnosed with flu. Complexity: possible impact of HTN, diabetic nephropathy, however condition is stable at this time.    Prior level of function comment Independent for all ADLs and mobility. Used to walk 1 mile to Religious but discontinued about a year ago.  Pt and his daughters report that pt has always been very independent and active.   Current Community Support Family/friend caregiver   Patient role/Employment history Retired   Living environment House/[facility]   Home/Community Accessibility Comments 2 story house with 2 RONEY in front and 4 RONEY in back, flight of stairs down to basement and up to 3rd floor. All stairs have rails, except one step up through the back door.   Current Assistive Devices Walker;Standard Cane   Assistive Devices Comments Pt owns 3 walkers and a cane, but reports only rarely using the cane. Walks independently the majority of the time.   Patient/Family Goals Statement To regain strength and maintain my activity level   General Information Comments Pt's daughters Karla and Jes were present for PT eval today.   Fall Risk Screen   Fall screen completed by PT   Per patient - Fall 2 or more times in past year? Yes   Per patient - Fall with injury in past year? No    Timed Up and Go score (seconds) 14.9   Is patient a fall risk? Yes;Department fall risk interventions implemented   Fall screen comments Pt educated on relationship of weakness to balance and benefits of PT to address   System Outcome Measures   Outcome Measures AM-PAC   AM-PAC  Basic Mobility Score Level  (Lower scores equate to lower levels of function) 62.83   AM-PAC  Daily Activity Score Level  (Lower scores equate to lower levels of function) 63.64   AM-PAC  Applied Cognitive Score Level  (Lower scores equate to lower levels of function) 43.48   Pain   Patient currently in pain No   Vital Signs   Vital Signs BP;Pulse   Pulse 56   /79   Cognitive Status Examination   Orientation orientation to person, place and time   Level of Consciousness alert   Follows Commands and Answers Questions 100% of the time   Personal Safety and Judgment intact   Memory intact   Integumentary   Integumentary No deficits were identified   Posture   Posture Forward head position;Protracted shoulders   Range of Motion (ROM)   ROM Comment Restricted hip extension, L>R. Otherwise WFL.   Strength   Strength Comments Hip flex 4/5 bilat, Hip ext 3/5 bilat, Hip ABd 3-/5 on L, 4/5 on R. Quads and DF WNL.   Bed Mobility   Bed Mobility Comments Independent with all, completes slowly.    Transfer Skills   Transfer Comments Able to stand without use of UE's, transfers independently.   Gait   Gait Gait Analysis;Stairs   Gait Analysis   Gait Deviations Noted increased time in double stance;decreased stride length;other (see comments);decreased toe-to-floor clearance  (Some shuffling, forward flexed posture, RLE varus.)   Impairments Contributing to Gait Deviations decreased flexibility;decreased strength;decreased ROM   Stairs   Self Performance Modified independent   Physical/Nonphysical Assist: Stairs No set-up   Rails 1 rail   Indicate number of stairs 12   Gait Special Tests   Gait Special Tests 25 FOOT TIMED WALK;FUNCTIONAL GAIT  ASSESSMENT   Gait Special Tests 25 Foot Timed Walk   Seconds 8.91   Comments No AD   Gait Special Tests Functional Gait Assessment Score out of 30   Score out of 30 24   Comments Greatest difficulty with tandem forward walking, mild impairment with stepping over obstacles, gait with eyes closed, and stairs (needs handrail)   Sensory Examination   Sensory Perception Comments Light touch screened briefly; grossly intact bilat UE/LE. Pt denies having sensory changes.   Planned Therapy Interventions   Planned Therapy Interventions balance training;gait training;motor coordination training;neuromuscular re-education;ROM;strengthening;stretching   Clinical Impression   Criteria for Skilled Therapeutic Interventions Met yes, treatment indicated   PT Diagnosis Weakness, deconditioning, increased risk for falls, impaired balance   Influenced by the following impairments Weakness, deconditioning, decreased ROM   Functional limitations due to impairments Restricted ambulation in community, decreased safety.   Clinical Presentation Stable/Uncomplicated   Clinical Presentation Rationale Pt recovering well since recent hospitalization   Clinical Decision Making (Complexity) Low complexity   Therapy Frequency 1 time/week   Predicted Duration of Therapy Intervention (days/wks) 8 weeks   Risk & Benefits of therapy have been explained Yes   Patient, Family & other staff in agreement with plan of care Yes   Clinical Impression Comments Pt has excellent potential to improve based on his prior level of function, interest and motivation to regain strength and function, social support through his daughters, and cognitive ability to follow instructions and adhere to HEP.   1x Eval Only-Outpatient/Observation Medicare G-Code   G-code Mobility: Walking & Moving Around   Mobility: Walking & Moving Around   Mobility: Current Status , Goal , Discharge -Vmcp Only- Modifier the same for all G-codes CJ: 20-39% impairment   Education  Assessment   Preferred Learning Style Pictures/video;Listening;Demonstration   Barriers to Learning No barriers   GOALS   PT Eval Goals 1;2;3   Goal 1   Goal Identifier TUG   Goal Description Pt will decrease his risk of falling as measured by a TUG time of no more than 13 seconds in order to improve safety at home and in the community.   Target Date 08/15/17   Goal 2   Goal Identifier Gait velocity   Goal Description Pt will increase walking speed as measured by improving to no more than 6.3 seconds on the 25 foot timed walk, to improve his home and community ambulation skills.    Target Date 08/15/17   Goal 3   Goal Identifier HEP   Goal Description Pt will demonstrate independent and correct performance of his comprehensive home exercise program, in order to be able to self-manage his condition and prevent functional decline.   Target Date 08/15/17   Total Evaluation Time   Total Evaluation Time (Minutes) 50   Therapy Certification   Certification date from 05/18/17   Certification date to 08/15/17   Medical Diagnosis Fall, Weakness

## 2017-05-18 NOTE — PROGRESS NOTES
[facility]        OUTPATIENT PHYSICAL THERAPY FUNCTIONAL EVALUATION  PLAN OF TREATMENT FOR OUTPATIENT REHABILITATION  (COMPLETE FOR INITIAL CLAIMS ONLY)  Patient's Last Name, First Name, M.I.  YOB: 1921  Kiran Carcamo     Provider's Name   [facility]   Medical Record No.  [id number]     Start of Care Date:  05/18/17   Onset Date:  05/08/17   Type:     _X__PT   ____OT  ____SLP Medical Diagnosis:  Fall, Weakness     PT Diagnosis:  Weakness, deconditioning, increased risk for falls, impaired balance Visits from SOC:  1                              __________________________________________________________________________________  Plan of Treatment/Functional Goals:  balance training, gait training, motor coordination training, neuromuscular re-education, ROM, strengthening, stretching      GOALS  TUG  Pt will decrease his risk of falling as measured by a TUG time of no more than 13 seconds in order to improve safety at home and in the community.  08/15/17    Gait velocity  Pt will increase walking speed as measured by improving to no more than 6.3 seconds on the 25 foot timed walk, to improve his home and community ambulation skills.   08/15/17    HEP  Pt will demonstrate independent and correct performance of his comprehensive home exercise program, in order to be able to self-manage his condition and prevent functional decline.  08/15/17     Therapy Frequency:  1 time/week   Predicted Duration of Therapy Intervention:  90 days    Elke Ovalles, PT/Fior Rehman, PT, DPT, NCS, ATP                                    I CERTIFY THE NEED FOR THESE SERVICES FURNISHED UNDER        THIS PLAN OF TREATMENT AND WHILE UNDER MY CARE     (Physician co-signature of this document indicates review and certification of the therapy plan).                Certification Date From:   05/18/17   Certification Date To:  08/15/17    Referring Provider:  Mary Varghese APRN CNP. Cosigned by PCP Dr. Buddy Poe    Initial Assessment  See Epic Evaluation- Start of Care Date: 05/18/17

## 2017-05-20 ENCOUNTER — TELEPHONE (OUTPATIENT)
Dept: NURSING | Facility: CLINIC | Age: 82
End: 2017-05-20

## 2017-05-20 NOTE — TELEPHONE ENCOUNTER
"Call Type: Triage Call    Presenting Problem: Daughter Hortencia calling, \"He is confused and tells  me he is confused.  He is very worried.  He has a headache, is  fatigued, and doesn't have the energy to do his exercises.  He was  hospitilized 10 days ago for pneumonia and the flu.\"  Confusion  guideline used, advised Hortencia to call 911 and have her dad assessed  by the EMT's.  She states they are minutes from the hospital and she  may drive him there.  Triage Note:  Guideline Title: Confusion, Disorientation, Agitation  Recommended Disposition: Activate   Original Inclination: Wanted to speak with a nurse  Override Disposition:  Intended Action: Call 911  Physician Contacted: No  New or worsening signs and symptoms that may indicate shock ?  YES  Physician Instructions:  Care Advice:  "

## 2017-05-22 ENCOUNTER — HOSPITAL ENCOUNTER (OUTPATIENT)
Dept: OCCUPATIONAL THERAPY | Facility: CLINIC | Age: 82
Setting detail: THERAPIES SERIES
End: 2017-05-22
Payer: MEDICARE

## 2017-05-22 PROCEDURE — 97165 OT EVAL LOW COMPLEX 30 MIN: CPT | Mod: GO | Performed by: OCCUPATIONAL THERAPIST

## 2017-05-22 PROCEDURE — G8988 SELF CARE GOAL STATUS: HCPCS | Mod: GO,CJ | Performed by: OCCUPATIONAL THERAPIST

## 2017-05-22 PROCEDURE — G8987 SELF CARE CURRENT STATUS: HCPCS | Mod: GO,CK | Performed by: OCCUPATIONAL THERAPIST

## 2017-05-22 PROCEDURE — 40000125 ZZHC STATISTIC OT OUTPT VISIT: Performed by: OCCUPATIONAL THERAPIST

## 2017-05-22 PROCEDURE — 97535 SELF CARE MNGMENT TRAINING: CPT | Mod: GO | Performed by: OCCUPATIONAL THERAPIST

## 2017-05-25 NOTE — ADDENDUM NOTE
Encounter addended by: Haim Aguilar OT on: 5/25/2017  9:01 AM<BR>     Actions taken: Sign clinical note, Flowsheet accepted

## 2017-05-25 NOTE — ADDENDUM NOTE
Encounter addended by: Haim Aguilar OT on: 5/25/2017  9:02 AM<BR>     Actions taken: Charge Capture section accepted

## 2017-05-25 NOTE — PROGRESS NOTES
" 05/22/17 1400   Quick Adds   Type of Visit Initial Outpatient Occupational Therapy Evaluation   General Information   Start Of Care Date 05/22/17   Referring Physician Mary Varghese MD   Orders Evaluate and treat as indicated  (\"weakness\")   Orders Date 05/09/17   Medical Diagnosis Recent PNA, hospitalization for influenza   Onset of Illness/Injury or Date of Surgery 05/09/17   Precautions/Limitations Fall precautions   Surgical/Medical History Reviewed Yes   Additional Occupational Profile Info/Pertinent History of Current Problem Pt attends appointment today with two daughter who are also helping to care for him now by bringing food, driving. Had visited dtr in nursing home and realized he felt sick; had a fall (slid out of bed). 3 days later was hospitalized with influenza A.   Comments/Observations Pt [place] with HAs bilaterally.    Role/Living Environment   Current Community Support Family/friend caregiver;Emergency call system   Patient role/Employment history Retired;Other/comments  (Cares for rental property. )   Community/Avocational Activities [employer], planting, yardwork, mowing, shoveling, repairing house and rental property, kayak and outdoors    Current Living Environment House   Number of Stairs to Enter Home 2 front entrance, 4 in back   Number of Stairs Within Home full flight up to second floor; full flight to basement; handrails present   Primary Bathroom Location/Comments Bathroom on same level as bedroom   Primary Bathroom Set Up/Equipment Tub/Shower combo   Prior Level - Transfers Independent   Prior Level - Ambulation Independent   Prior Level - ADLS Independent   Prior Responsibilities - IADL Driving;Finances;Meal Preparation;Laundry;Housekeeping;Medication management;Shopping   Prior Level Comments Pt IND with all I/ADLS prior to hospitalization.    Current Assistive Devices - Mobility Standard cane;Front wheeled walker  (Reports that he is not using AD, but walks with cane today.) " "  Role/Living Environment Comments Patient lives alone; since hospitalization, daughters Karla and Jes have been more involved and over often.    Patient/family Goals Statement To return to PLOF, increase strength and endurance.   Fall Risk Screen   Fall screen completed by PT;Refer to Documentation;Department Fall Risk Screen Intervention Implemented   Have you fallen 2 or more times in the last year? No   Have you fallen and had an injury in the past year? No   Timed Up and Go Score (seconds) NT   Is the patient a fall risk? Yes;Department Fall Risk Interventions Implemented   Comments Recent \"fall\" was more of a slide out of bed 2/2 weakness.    System Outcome Measures   -PAC  Basic Mobility Score Level  (Lower scores equate to lower levels of function) 62.83  (5/18/2017)   -PAC  Daily Activity Score Level  (Lower scores equate to lower levels of function) 63.64  (5/18/2017)   -PAC  Applied Cognitive Score Level  (Lower scores equate to lower levels of function) 43.48  (5/18/2017)   Cognitive Status Examination   Orientation Person;Place  (Reported correct date, wrong year (1917))   Level of Consciousness Alert   Follows Commands and Answers Questions 75% of the time  (Fails to follow some directions on MoCA)   Memory Impaired   Memory Comments Delayed recall impaired per MoCA   Attention Distractible during evaluation  (Attention deficits per MoCA)   Organization/Problem Solving Problem solving impaired  (Per MoCA)   Executive Function Planning ability impaired;Working memory impaired, decreased storage of information for performing tasks  (Per MoCA)   Cognitive Comment Pt scores 16/30 on MoCA (norm is 26 or above) with deficits in executive function, attention, language, abstraction, delayed recall, and orientation.   Visual Perception   Visual Perception Wears glasses   Visual Perception Comments Eye exam in December and no changes noted.   Sensation   Sensation Comments Pt denies changes or concerns " with sensation.   Posture   Posture Protracted shoulders   Range of Motion (ROM)   ROM Comments UE: WFL   Strength   Strength Comments UE: WFL 4+/5 bilaterally shoulder, elbow,   Hand Strength   Hand Dominance Ambidextrous;Right   Left Hand  (pounds) 62 pounds   Right Hand  (pounds) 68 pounds   Left Lateral Pinch (pounds) 9 pounds   Right Lateral Pinch (pounds) 19 pounds   Left Three Point Pinch (pounds) 11 pounds   Right Three Point Pinch (pounds) 13 pounds   Hand Strength Comments Bilateral  WNL; R key pinch WNL and L key pinch below norms (with L thumb arthritic and difficult to engage key pinch); 3 pt pinch below norms bilaterally but within 2 standard deviations of norm   Coordination   Fine Motor Coordination 54   Left Hand, Nine Hole Peg Test (seconds) 42   Coordination Comments Coordination impaired bilaterally; below norms for 9-hole peg bilaterally. Pt and dtrs reports family hx of benign tremor, but this is not apparent during session.    Functional Mobility   Ambulation Using cane to ambulate today- Ind   Transfer Skill   Transfer Skill Comments STS IND   Bathing   Level of [place] - Bathing independent   Upper Body Dressing   Level of [place]: Dress Upper Body independent   Lower Body Dressing   Level of [place]: Dress Lower Body independent   Toileting   Level of [place]: Toilet independent   Grooming   Level of [place]: Grooming independent   Eating/Self-Feeding   Level of [place]: Eating independent   Activity Tolerance   Activity Tolerance Pt and daughters report that activity tolerance has decreased and that pt is experiencing fatigue daily that is impacting his daily function. Pt notes that he is napping daily now and hadn't prior.   Planned Therapy Interventions   Planned Therapy Interventions Therapeutic activities;Strengthening;Self care/Home management;Cognitive performance testing;Cognitive skills;IADL training;ADL training   Adult OT Eval Goals   OT  Eval Goals (Adult) 2;1   OT Goal 1   Goal Identifier Cognition   Goal Description Patient and family will verbalize understanding of cognitive evaluation results and identify methods to enhance independence and safety in the home setting.   Target Date 08/21/17   OT Goal 2   Goal Identifier HEP   Goal Description Pt will demonstrate independence with U/E strengthening & coordination home exercise program (HEP) for optimal ADL participation & independence.   Target Date 08/21/17   OT Goal 3   Goal Identifier Fall prevention    Goal Description Patient and family will verbalize understanding of 2-3 pieces of AE or environmental modifications to reduce fall risk in the home.    Target Date 08/21/17   Clinical Impression   Criteria for Skilled Therapeutic Interventions Met Yes, treatment indicated   OT Diagnosis Decreased safety and IND with I/ADLs.   Influenced by the following impairments Weakness, fatigue, impaired cognition.   Assessment of Occupational Performance 3-5 Performance Deficits   Identified Performance Deficits Meal prep, driving, leisure activities, home mgmt, functional mobility   Clinical Decision Making (Complexity) Low complexity   Therapy Frequency 1x/week or less   Predicted Duration of Therapy Intervention (days/wks) 8 visits in 90 days   Risks and Benefits of Treatment have been explained. Yes   Patient, Family & other staff in agreement with plan of care Yes   Clinical Impression Comments Pt would benefit from skilled OT services to address current impairments in order to improve occupational performance and return to prior level of function.    Education Assessment   Barriers To Learning Hearing;Cognitive   Therapy Certification   Certification date from 05/23/17   Certification date to 08/21/17   Total Evaluation Time   Total Evaluation Time 50

## 2017-05-25 NOTE — PROGRESS NOTES
[facility]          OUTPATIENT OCCUPATIONAL THERAPY  EVALUATION  PLAN OF TREATMENT FOR OUTPATIENT REHABILITATION  (COMPLETE FOR INITIAL CLAIMS ONLY)  Patient's Last Name, First Name, M.I.  YOB: 1921  Kiran Carcamo                        Provider's Name  [facility] Medical Record No.  [id number]                               Onset Date:     05/09/17   Start of Care Date:     05/22/17   Type:     ___PT   _X_OT   ___SLP Medical Diagnosis:     Recent PNA, hospitalization for influenza                          OT Diagnosis:     Decreased safety and IND with I/ADLs. Visits from SOC:  1   _________________________________________________________________________________  Plan of Treatment/Functional Goals:  Therapeutic activities, Strengthening, Self care/Home management, Cognitive performance testing, Cognitive skills, IADL training, ADL training      Goals  Goal Identifier: Cognition  Goal Description: Patient and family will verbalize understanding of cognitive evaluation results and identify methods to enhance independence and safety in the home setting.  Target Date: 08/21/17     Goal Identifier: HEP  Goal Description: Pt will demonstrate independence with U/E strengthening & coordination home exercise program (HEP) for optimal ADL participation & independence.  Target Date: 08/21/17     Goal Identifier: Fall prevention   Goal Description: Patient and family will verbalize understanding of 2-3 pieces of AE or environmental modifications to reduce fall risk in the home.   Target Date: 08/21/17        Therapy Frequency: 1x/week or less     Predicted Duration of Therapy Intervention (days/wks): 8 visits in 90 days  DELL Padgett/LEONARD, MILLA         I CERTIFY THE NEED FOR THESE SERVICES FURNISHED UNDER        THIS PLAN OF TREATMENT AND WHILE UNDER MY CARE     (Physician  co-signature of this document indicates review and certification of the therapy plan).                 ,    Certification date from: 05/22/17, Certification date to: 08/21/17               Referring Physician: Mary Varghese CNP     Initial Assessment        See Epic Evaluation      Start Of Care Date: 05/22/17

## 2017-05-25 NOTE — ADDENDUM NOTE
Encounter addended by: Chani Painting on: 5/25/2017  9:13 AM<BR>     Actions taken: Flowsheet accepted

## 2017-05-25 NOTE — ADDENDUM NOTE
Encounter addended by: Haim Aguilar OT on: 5/25/2017  9:01 AM<BR>     Actions taken: Flowsheet accepted, Sign clinical note, Document created, Charge Capture section accepted

## 2017-06-02 ENCOUNTER — OFFICE VISIT (OUTPATIENT)
Dept: FAMILY MEDICINE | Facility: CLINIC | Age: 82
End: 2017-06-02
Payer: COMMERCIAL

## 2017-06-02 VITALS
HEART RATE: 86 BPM | WEIGHT: 156.8 LBS | SYSTOLIC BLOOD PRESSURE: 111 MMHG | TEMPERATURE: 97.6 F | DIASTOLIC BLOOD PRESSURE: 69 MMHG | BODY MASS INDEX: 23.22 KG/M2 | OXYGEN SATURATION: 99 % | HEIGHT: 69 IN

## 2017-06-02 DIAGNOSIS — I25.2 HISTORY OF MI (MYOCARDIAL INFARCTION): ICD-10-CM

## 2017-06-02 DIAGNOSIS — Z00.00 ENCOUNTER FOR ROUTINE ADULT HEALTH EXAMINATION WITHOUT ABNORMAL FINDINGS: Primary | ICD-10-CM

## 2017-06-02 DIAGNOSIS — I10 HYPERTENSION GOAL BP (BLOOD PRESSURE) < 140/90: ICD-10-CM

## 2017-06-02 DIAGNOSIS — E11.21 TYPE 2 DIABETES MELLITUS WITH DIABETIC NEPHROPATHY, WITHOUT LONG-TERM CURRENT USE OF INSULIN (H): ICD-10-CM

## 2017-06-02 PROCEDURE — 99397 PER PM REEVAL EST PAT 65+ YR: CPT | Performed by: FAMILY MEDICINE

## 2017-06-02 NOTE — MR AVS SNAPSHOT
After Visit Summary   6/2/2017    Kiran Carcamo    MRN: [id number]           Patient Information     Date Of Birth          4/13/1921        Visit Information        Provider Department      6/2/2017 10:30 AM Buddy Poe MD [facility] [place]        Today's Diagnoses     Encounter for routine adult health examination without abnormal findings    -  1    Type 2 diabetes mellitus with diabetic nephropathy, without long-term current use of insulin (H)        Hypertension goal BP (blood pressure) < 140/90        History of MI (myocardial infarction)          Care Instructions    -Please come back in 3 months for a recheck.    Preventive Health Recommendations:       Male Ages 65 and over    Yearly exam:             See your health care provider every year in order to  o   Review health changes.   o   Discuss preventive care.    o   Review your medicines if your doctor has prescribed any.    Talk with your health care provider about whether you should have a test to screen for prostate cancer (PSA).    Every 3 years, have a diabetes test (fasting glucose). If you are at risk for diabetes, you should have this test more often.    Every 5 years, have a cholesterol test. Have this test more often if you are at risk for high cholesterol or heart disease.     Every 10 years, have a colonoscopy. Or, have a yearly FIT test (stool test). These exams will check for colon cancer.    Talk to with your health care provider about screening for Abdominal Aortic Aneurysm if you have a family history of AAA or have a history of smoking.  Shots:     Get a flu shot each year.     Get a tetanus shot every 10 years.     Talk to your doctor about your pneumonia vaccines. There are now two you should receive - Pneumovax (PPSV 23) and Prevnar (PCV 13).    Talk to your doctor about a shingles vaccine.     Talk to your doctor about the hepatitis B vaccine.  Nutrition:     Eat at least 5 servings of fruits and  vegetables each day.     Eat whole-grain bread, whole-wheat pasta and brown rice instead of white grains and rice.     Talk to your doctor about Calcium and Vitamin D.   Lifestyle    Exercise for at least 150 minutes a week (30 minutes a day, 5 days a week). This will help you control your weight and prevent disease.     Limit alcohol to one drink per day.     No smoking.     Wear sunscreen to prevent skin cancer.     See your dentist every six months for an exam and cleaning.     See your eye doctor every 1 to 2 years to screen for conditions such as glaucoma, macular degeneration and cataracts.          Follow-ups after your visit        Your next 10 appointments already scheduled     Jun 06, 2017  1:00 PM CDT   Neuro Treatment with Yandy Barahona OT   [facility] ([facility])    [address]  [place] [address]   [phone]            Jun 06, 2017  1:45 PM CDT   Neuro Treatment with Fior Rehman, MIKA   [facility], Physical Therapy - Outpatient ([facility])    [address]  [place] [address]   [phone]            Jun 13, 2017  1:00 PM CDT   Neuro Treatment with Yandy Barahona OT   [facility] ([facility])    [address]  [place] [address]   [phone]            Jun 13, 2017  1:45 PM CDT   Neuro Treatment with Fior Rehman, MIKA   [facility] ([facility])    [address]  [place] [address]   [phone]            Jun 20, 2017  1:00 PM CDT   Neuro Treatment with Yandy Barahona, OT   [facility] ([facility])    [address]  [place]  [address]   [phone]            Jun 20, 2017  1:45 PM CDT   Neuro Treatment with Fior Rehman, PT   [facility] [place], Physical Therapy - Outpatient ([facility])    [address]  [place] [address]   [phone]            Jun 27, 2017  1:00 PM CDT   Neuro Treatment with Yandy Barahona, OT   [facility] [place], Occupational Therapy - Outpatient ([facility])    [address]  [place] [address]   [phone]            Jun 27, 2017  1:45 PM CDT   Neuro Treatment with Fior Rehman, PT   [facility] [place], Physical Therapy - Outpatient ([facility])    [address]  [place] [address]   [phone]            Jul 11, 2017  1:45 PM CDT   Neuro Treatment with Fior Rehman, PT   [facility] [place], Physical Therapy - Outpatient ([facility])    [address]  [place] [address]   [phone]            Jul 18, 2017  1:45 PM CDT   Neuro Treatment with Fior Rehman, PT   [facility] [place], Physical Therapy - Outpatient ([facility])    [address]  [place] [address]   [phone]              Who to contact     If you have questions or need follow up information about today's clinic visit or your schedule please contact [facility] directly at [phone].  Normal or non-critical lab and imaging results will be communicated to you by MyChart, letter or phone within 4 business days after the clinic has received the results. If you do not hear from us within 7 days, please contact the clinic through MyChart or phone. If you have a critical or abnormal lab result, we will notify you by phone as soon as possible.  Submit refill requests through [employer] or call your pharmacy and they will forward the refill  "request to us. Please allow 3 business days for your refill to be completed.          Additional Information About Your Visit        [employer] Information     [employer] gives you secure access to your electronic health record. If you see a primary care provider, you can also send messages to your care team and make appointments. If you have questions, please call your primary care clinic.  If you do not have a primary care provider, please call [phone] and they will assist you.        Care EveryWhere ID     This is your Care EveryWhere ID. This could be used by other organizations to access your [facility] medical records  [id number]        Your Vitals Were     Pulse Temperature Height Pulse Oximetry BMI (Body Mass Index)       86 97.6  F (36.4  C) (Oral) 5' 9\" (1.753 m) 99% 23.16 kg/m2        Blood Pressure from Last 3 Encounters:   06/02/17 111/69   05/17/17 120/58   05/09/17 102/61    Weight from Last 3 Encounters:   06/02/17 156 lb 12.8 oz (71.1 kg)   05/17/17 153 lb (69.4 kg)   03/20/17 165 lb 1.6 oz (74.9 kg)              Today, you had the following     No orders found for display         Where to get your medicines      These medications were sent to [facility] [id number] [facility] - [place], [place] - [address]  [address] [address]     Phone:  [phone]     lisinopril 10 MG tablet    nitroglycerin 0.4 MG sublingual tablet          Primary Care Provider Office Phone # Fax #    Buddy Poe [phone]       [facility] [place] [address]  [facility] [address]        Thank you!     Thank you for choosing [facility]  [place] your [place]. Our goal is always to provide you with excellent care. Hearing back from our patients is one way we can continue to improve our services. Please take a few minutes to complete the written survey that you may receive in the mail after your visit with us. Thank you!             Your Updated " Medication List - Protect others around you: Learn how to safely use, store and throw away your medicines at www.disposemymeds.org.          This list is accurate as of: 6/2/17 11:59 PM.  Always use your most recent med list.                   Brand Name Dispense Instructions for use    acetaminophen 325 MG tablet    TYLENOL    100 tablet    Take 3 tablets (975 mg) by mouth every 8 hours as needed for mild pain       aspirin 81 MG EC tablet      Take 81 mg by mouth At Bedtime.       blood glucose monitoring test strip    no brand specified    3 Box    1 box by strip route two times daily       isosorbide mononitrate 60 MG 24 hr tablet    IMDUR    90 tablet    Take 1 tablet (60 mg) by mouth daily       lisinopril 10 MG tablet    PRINIVIL/ZESTRIL    90 tablet    Take 1 tablet (10 mg) by mouth daily       metoprolol 25 MG 24 hr tablet    TOPROL-XL    45 tablet    Take 0.5 tablets (12.5 mg) by mouth daily       MV-ONE PO      Take 1 tablet by mouth.       nitroglycerin 0.4 MG sublingual tablet    NITROSTAT    25 tablet    Place 1 tablet (0.4 mg) under the tongue every 5 minutes as needed for chest pain If symptoms after 3 doses (15 minutes) call 911.       polyethylene glycol Packet    MIRALAX/GLYCOLAX    10 packet    Take 17 g by mouth daily as needed for constipation       simvastatin 20 MG tablet    ZOCOR    90 tablet    Take 1 tablet (20 mg) by mouth daily

## 2017-06-02 NOTE — PROGRESS NOTES
SUBJECTIVE:                                                            Kiran Carcamo is a 89 year old male who presents for Preventive Visit.    Weakness in both legs       Duration: 1 month    Description (location/character/radiation): weakness in legs by calves     Intensity:  moderate    Accompanying signs and symptoms:     History (similar episodes/previous evaluation): None    Precipitating or alleviating factors: None    Therapies tried and outcome: None     Patient reports that he has been having problems with weakness in both of his legs for the past month. He says that this morning he has been having problems with pains on the sides of his calves. His daughter reports that he has been very active lately, and yesterday he took a walk and did some household chores and they think he has weakness from being active. Patient went to the emergency department on 5/8/17 for a fall, and his daughters report that he has been doing great since the visit and think that the patient is doing very well. His daughters have been encouraging him to leave the house every day to get some activity, and say that he has been doing well. Patient has been doing physical therapy, and says that the exercises have been very easy for him to do. He has been using a cane to help with his balance and walking, and he says he still feels somewhat unsteady without it.    Are you in the first 12 months of your Medicare Part B coverage?  No    Healthy Habits:    Do you get at least three servings of calcium containing foods daily (dairy, green leafy vegetables, etc.)? yes    Amount of exercise or daily activities, outside of work: 7 day(s) per week    Problems taking medications regularly No    Medication side effects: No    Have you had an eye exam in the past two years? yes    Do you see a dentist twice per year? yes    Do you have sleep apnea, excessive snoring or daytime drowsiness?no    Reviewed and updated as needed this visit by  clinical staff  Tobacco  Allergies  Med Hx  Surg Hx  Fam Hx  Soc Hx        Reviewed and updated as needed this visit by Provider        Social History   Substance Use Topics     Smoking status: Never Smoker     Smokeless tobacco: Never Used     Alcohol use No       The patient does not drink >3 drinks per day nor >7 drinks per week.    Today's PHQ-2 Score:   PHQ-2 ( 1999 [employer]) 6/2/2017 3/20/2017   Q1: Little interest or pleasure in doing things 0 0   Q2: Feeling down, depressed or hopeless 0 0   PHQ-2 Score 0 0       Do you feel safe in your environment - Yes    Do you have a Health Care Directive?: Yes: Patient states has Advance Directive and will bring in a copy to clinic.    Current providers sharing in care for this patient include:   Patient Care Team:  Buddy Poe MD as PCP - General (Family Practice)  Carmencita Bailey MD as MD (Internal Medicine)      Hearing impairment: Yes, having hearing  aids     Ability to successfully perform activities of daily living: Yes, no assistance needed     Fall risk:  Fallen 2 or more times in the past year?: No  Any fall with injury in the past year?: No    Home safety:  none identified      The following health maintenance items are reviewed in Epic and correct as of today:  Health Maintenance   Topic Date Due     FOOT EXAM Q1 YEAR  04/13/1922     EYE EXAM Q1 YEAR  04/13/1922     ADVANCE DIRECTIVE PLANNING Q5 YRS  12/29/2016     LIPID MONITORING Q1 YEAR  08/10/2017     MICROALBUMIN Q1 YEAR  08/10/2017     INFLUENZA VACCINE (SYSTEM ASSIGNED)  09/01/2017     A1C Q6 MO  09/20/2017     FALL RISK ASSESSMENT  03/20/2018     CREATININE Q1 YEAR  05/09/2018     TSH W/ FREE T4 REFLEX Q2 YEAR  11/16/2018     TETANUS IMMUNIZATION (SYSTEM ASSIGNED)  11/09/2021     PNEUMOCOCCAL  Completed     ROS:  Positive for leg weakness.    Denies headache, insomnia, chest pain, shortness of breath, cough, heartburn, bowel issues, bladder issues, neck pain, back pain, hip pain,  knee pain, ankle pain, or foot pain. Remainder of ROS is negative unless otherwise noted above or in HPI.    This document serves as a record of the services and decisions personally performed and made by Buddy Poe MD. It was created on his behalf by Jef Osuna, a trained medical scribe. The creation of this document is based the provider's statements to the medical scribe.  Jef Osuna 10:51 AM June 2, 2017    BP Readings from Last 3 Encounters:   06/02/17 111/69   05/17/17 120/58   05/09/17 102/61    Wt Readings from Last 3 Encounters:   06/02/17 71.1 kg (156 lb 12.8 oz)   05/17/17 69.4 kg (153 lb)   03/20/17 74.9 kg (165 lb 1.6 oz)        Patient Active Problem List   Diagnosis     Health Care Home     AK (actinic keratosis)     SK (seborrheic keratosis)     Hyperplasia of prostate     Hyperlipidemia LDL goal <70     History of MI (myocardial infarction)     Forgetfulness     Syncope     CKD (chronic kidney disease) stage 2, GFR 60-89 ml/min     Atypical chest pain     Essential hypertension with goal blood pressure less than 140/90     Eczema, unspecified type     Type 2 diabetes mellitus with diabetic nephropathy, without long-term current use of insulin (H)     Pneumonia     Past Surgical History:   Procedure Laterality Date     CHOLECYSTECTOMY       CORONARY ARTERY BYPASS  '05    6-vessel     MANDIBLE SURGERY         Social History   Substance Use Topics     Smoking status: Never Smoker     Smokeless tobacco: Never Used     Alcohol use No     Family History   Problem Relation Age of Onset     HEART DISEASE Mother      Eye Disorder Father      Respiratory Brother      HEART DISEASE Sister      CANCER Daughter      CANCER Brother          Current Outpatient Prescriptions   Medication Sig Dispense Refill     lisinopril (PRINIVIL/ZESTRIL) 10 MG tablet Take 1 tablet (10 mg) by mouth daily 30 tablet 1     polyethylene glycol (MIRALAX/GLYCOLAX) Packet Take 17 g by mouth daily as needed for  "constipation 10 packet 0     acetaminophen (TYLENOL) 325 MG tablet Take 3 tablets (975 mg) by mouth every 8 hours as needed for mild pain 100 tablet 0     isosorbide mononitrate (IMDUR) 60 MG 24 hr tablet Take 1 tablet (60 mg) by mouth daily 90 tablet 1     simvastatin (ZOCOR) 20 MG tablet Take 1 tablet (20 mg) by mouth daily 90 tablet 1     metoprolol (TOPROL-XL) 25 MG 24 hr tablet Take 0.5 tablets (12.5 mg) by mouth daily 45 tablet 1     blood glucose monitoring (NO BRAND SPECIFIED) test strip 1 box by strip route two times daily 3 Box 3     nitroglycerin (NITROSTAT) 0.4 MG SL tablet Place 1 tablet (0.4 mg) under the tongue every 5 minutes as needed for chest pain If symptoms after 3 doses (15 minutes) call 911. 25 tablet 1     aspirin 81 MG EC tablet Take 81 mg by mouth At Bedtime.       Multiple Vitamin (MV-ONE PO) Take 1 tablet by mouth.       [DISCONTINUED] lisinopril (PRINIVIL,ZESTRIL) 20 MG tablet Take 1 tablet (20 mg) by mouth daily 90 tablet 3     No Known Allergies  Recent Labs   Lab Test  05/09/17   0502  05/08/17   1450  03/20/17   1110  11/16/16   1047  08/10/16   1050  12/16/15   1111   11/21/14   1441  04/16/14   1030   A1C   --    --   8.3*  7.6*  7.4*  6.9*   < >  6.8*  6.9*   LDL   --    --    --    --   64   --    --   40  55   HDL   --    --    --    --   41   --    --   47  36*   TRIG   --    --    --    --   97   --    --   110  140   ALT  69  83*   --    --    --   34   --   34  31   CR  1.07  1.01  1.08   --    --   1.02   --   0.94  1.03   GFRESTIMATED  64  68  63   --    --   68   --   75  67   GFRESTBLACK  78  83  77   --    --   82   --   >90   GFR Calc    82   POTASSIUM  4.3  4.3  4.4   --    --   4.3   --   4.7  4.5   TSH   --    --    --   2.57   --    --    --   1.97   --     < > = values in this interval not displayed.      OBJECTIVE:                                                            /69  Pulse 86  Temp 97.6  F (36.4  C) (Oral)  Ht 1.753 m (5' 9\")  " "Wt 71.1 kg (156 lb 12.8 oz)  SpO2 99%  BMI 23.16 kg/m2 Estimated body mass index is 23.16 kg/(m^2) as calculated from the following:    Height as of this encounter: 1.753 m (5' 9\").    Weight as of this encounter: 71.1 kg (156 lb 12.8 oz).  EXAM:   GENERAL: healthy, alert and no distress  EYES: Eyes grossly normal to inspection, PERRL and conjunctivae and sclerae normal  HENT: ear canals and TM's normal, nose and mouth without ulcers or lesions  NECK: no adenopathy, no asymmetry, masses, or scars and thyroid normal to palpation  RESP: lungs clear to auscultation - no rales, rhonchi or wheezes  CV: irregular rate and rhythm, normal S1 S2, no S3 or S4, no murmur, click or rub, no peripheral edema and peripheral pulses strong  ABDOMEN: soft, nontender, no hepatosplenomegaly, no masses and bowel sounds normal  MS: no gross musculoskeletal defects noted, no edema. Calves nontender.  SKIN: no suspicious lesions or rashes  NEURO: Normal strength and tone, mentation intact and speech normal. Knee reflexes hypoactive but symmetric. No tremors.  PSYCH: mentation appears normal, affect normal/bright  LYMPH: no cervical, supraclavicular, axillary, or inguinal adenopathy    ASSESSMENT / PLAN:                                                            (Z00.00) Encounter for routine adult health examination without abnormal findings  (primary encounter diagnosis)  Comment: Routine physical and labs completed today.  Plan: Follow up in 3 months for recheck.    (E11.21) Type 2 diabetes mellitus with diabetic nephropathy, without long-term current use of insulin (H)  Comment: Stable. Controlled on current lifestyle.  Plan: Continue on current lifestyle. Follow up in 3 months for recheck.    (I10) Hypertension goal BP (blood pressure) < 140/90  Comment: At goal. Controlled on metoprolol and lisinopril.  Plan: lisinopril (PRINIVIL/ZESTRIL) 10 MG tablet        Continue on current medication. Follow up as needed.    Patient " Instructions   -Please come back in 3 months for a recheck.    Preventive Health Recommendations:       Male Ages 65 and over    Yearly exam:             See your health care provider every year in order to  o   Review health changes.   o   Discuss preventive care.    o   Review your medicines if your doctor has prescribed any.    Talk with your health care provider about whether you should have a test to screen for prostate cancer (PSA).    Every 3 years, have a diabetes test (fasting glucose). If you are at risk for diabetes, you should have this test more often.    Every 5 years, have a cholesterol test. Have this test more often if you are at risk for high cholesterol or heart disease.     Every 10 years, have a colonoscopy. Or, have a yearly FIT test (stool test). These exams will check for colon cancer.    Talk to with your health care provider about screening for Abdominal Aortic Aneurysm if you have a family history of AAA or have a history of smoking.  Shots:     Get a flu shot each year.     Get a tetanus shot every 10 years.     Talk to your doctor about your pneumonia vaccines. There are now two you should receive - Pneumovax (PPSV 23) and Prevnar (PCV 13).    Talk to your doctor about a shingles vaccine.     Talk to your doctor about the hepatitis B vaccine.  Nutrition:     Eat at least 5 servings of fruits and vegetables each day.     Eat whole-grain bread, whole-wheat pasta and brown rice instead of white grains and rice.     Talk to your doctor about Calcium and Vitamin D.   Lifestyle    Exercise for at least 150 minutes a week (30 minutes a day, 5 days a week). This will help you control your weight and prevent disease.     Limit alcohol to one drink per day.     No smoking.     Wear sunscreen to prevent skin cancer.     See your dentist every six months for an exam and cleaning.     See your eye doctor every 1 to 2 years to screen for conditions such as glaucoma, macular degeneration and  "cataracts.      End of Life Planning:  Patient currently has an advanced directive: Yes.  Practitioner is supportive of decision.    COUNSELING:  Reviewed preventive health counseling, as reflected in patient instructions    Estimated body mass index is 23.16 kg/(m^2) as calculated from the following:    Height as of this encounter: 1.753 m (5' 9\").    Weight as of this encounter: 71.1 kg (156 lb 12.8 oz).   reports that he has never smoked. He has never used smokeless tobacco.      Appropriate preventive services were discussed with this patient, including applicable screening as appropriate for cardiovascular disease, diabetes, osteopenia/osteoporosis, and glaucoma.  As appropriate for age/gender, discussed screening for colorectal cancer, prostate cancer, breast cancer, and cervical cancer. Checklist reviewing preventive services available has been given to the patient.    Reviewed patients plan of care and provided an AVS. The Basic Care Plan (routine screening as documented in Health Maintenance) for Kiran meets the Care Plan requirement. This Care Plan has been established and reviewed with the Patient and daughter.    The information in this document, created by the medical scribe for me, accurately reflects the services I personally performed and the decisions made by me. I have reviewed and approved this document for accuracy prior to leaving the patient care area.   Buddy Poe MD 10:51 AM June 2, 2017  [facility]  "

## 2017-06-02 NOTE — PATIENT INSTRUCTIONS
-Please come back in 3 months for a recheck.    Preventive Health Recommendations:       Male Ages 65 and over    Yearly exam:             See your health care provider every year in order to  o   Review health changes.   o   Discuss preventive care.    o   Review your medicines if your doctor has prescribed any.    Talk with your health care provider about whether you should have a test to screen for prostate cancer (PSA).    Every 3 years, have a diabetes test (fasting glucose). If you are at risk for diabetes, you should have this test more often.    Every 5 years, have a cholesterol test. Have this test more often if you are at risk for high cholesterol or heart disease.     Every 10 years, have a colonoscopy. Or, have a yearly FIT test (stool test). These exams will check for colon cancer.    Talk to with your health care provider about screening for Abdominal Aortic Aneurysm if you have a family history of AAA or have a history of smoking.  Shots:     Get a flu shot each year.     Get a tetanus shot every 10 years.     Talk to your doctor about your pneumonia vaccines. There are now two you should receive - Pneumovax (PPSV 23) and Prevnar (PCV 13).    Talk to your doctor about a shingles vaccine.     Talk to your doctor about the hepatitis B vaccine.  Nutrition:     Eat at least 5 servings of fruits and vegetables each day.     Eat whole-grain bread, whole-wheat pasta and brown rice instead of white grains and rice.     Talk to your doctor about Calcium and Vitamin D.   Lifestyle    Exercise for at least 150 minutes a week (30 minutes a day, 5 days a week). This will help you control your weight and prevent disease.     Limit alcohol to one drink per day.     No smoking.     Wear sunscreen to prevent skin cancer.     See your dentist every six months for an exam and cleaning.     See your eye doctor every 1 to 2 years to screen for conditions such as glaucoma, macular degeneration and cataracts.

## 2017-06-02 NOTE — NURSING NOTE
"Chief Complaint   Patient presents with     Physical     Diabetes       Initial /69  Pulse 86  Temp 97.6  F (36.4  C) (Oral)  Ht 5' 9\" (1.753 m)  Wt 156 lb 12.8 oz (71.1 kg)  SpO2 99%  BMI 23.16 kg/m2 Estimated body mass index is 23.16 kg/(m^2) as calculated from the following:    Height as of this encounter: 5' 9\" (1.753 m).    Weight as of this encounter: 156 lb 12.8 oz (71.1 kg).  Medication Reconciliation: complete   Cynthia Mason MA          "

## 2017-06-03 RX ORDER — NITROGLYCERIN 0.4 MG/1
0.4 TABLET SUBLINGUAL EVERY 5 MIN PRN
Qty: 25 TABLET | Refills: 1 | Status: SHIPPED | OUTPATIENT
Start: 2017-06-03 | End: 2019-01-15

## 2017-06-03 RX ORDER — LISINOPRIL 10 MG/1
10 TABLET ORAL DAILY
Qty: 90 TABLET | Refills: 3 | Status: SHIPPED | OUTPATIENT
Start: 2017-06-03 | End: 2018-07-10

## 2017-06-05 DIAGNOSIS — I10 HYPERTENSION GOAL BP (BLOOD PRESSURE) < 140/90: ICD-10-CM

## 2017-06-06 ENCOUNTER — HOSPITAL ENCOUNTER (OUTPATIENT)
Dept: OCCUPATIONAL THERAPY | Facility: CLINIC | Age: 82
Setting detail: THERAPIES SERIES
End: 2017-06-06
Attending: FAMILY MEDICINE
Payer: MEDICARE

## 2017-06-06 ENCOUNTER — HOSPITAL ENCOUNTER (OUTPATIENT)
Dept: PHYSICAL THERAPY | Facility: CLINIC | Age: 82
Setting detail: THERAPIES SERIES
End: 2017-06-06
Attending: FAMILY MEDICINE
Payer: MEDICARE

## 2017-06-06 PROCEDURE — 40000719 ZZHC STATISTIC PT DEPARTMENT NEURO VISIT: Performed by: PHYSICAL THERAPIST

## 2017-06-06 PROCEDURE — 40000125 ZZHC STATISTIC OT OUTPT VISIT: Performed by: OCCUPATIONAL THERAPIST

## 2017-06-06 PROCEDURE — 97530 THERAPEUTIC ACTIVITIES: CPT | Mod: GO | Performed by: OCCUPATIONAL THERAPIST

## 2017-06-06 PROCEDURE — 97110 THERAPEUTIC EXERCISES: CPT | Mod: GP | Performed by: PHYSICAL THERAPIST

## 2017-06-06 PROCEDURE — 97110 THERAPEUTIC EXERCISES: CPT | Mod: GO | Performed by: OCCUPATIONAL THERAPIST

## 2017-06-06 RX ORDER — LISINOPRIL 10 MG/1
10 TABLET ORAL DAILY
Qty: 90 TABLET | Refills: 3 | OUTPATIENT
Start: 2017-06-06

## 2017-06-13 ENCOUNTER — HOSPITAL ENCOUNTER (OUTPATIENT)
Dept: PHYSICAL THERAPY | Facility: CLINIC | Age: 82
Setting detail: THERAPIES SERIES
End: 2017-06-13
Attending: FAMILY MEDICINE
Payer: MEDICARE

## 2017-06-13 PROCEDURE — 97110 THERAPEUTIC EXERCISES: CPT | Mod: GP | Performed by: PHYSICAL THERAPIST

## 2017-06-13 PROCEDURE — 97112 NEUROMUSCULAR REEDUCATION: CPT | Mod: GP | Performed by: PHYSICAL THERAPIST

## 2017-06-13 PROCEDURE — 40000719 ZZHC STATISTIC PT DEPARTMENT NEURO VISIT: Performed by: PHYSICAL THERAPIST

## 2017-06-20 ENCOUNTER — HOSPITAL ENCOUNTER (OUTPATIENT)
Dept: OCCUPATIONAL THERAPY | Facility: CLINIC | Age: 82
Setting detail: THERAPIES SERIES
End: 2017-06-20
Attending: FAMILY MEDICINE
Payer: MEDICARE

## 2017-06-20 ENCOUNTER — HOSPITAL ENCOUNTER (OUTPATIENT)
Dept: PHYSICAL THERAPY | Facility: CLINIC | Age: 82
Setting detail: THERAPIES SERIES
End: 2017-06-20
Attending: FAMILY MEDICINE
Payer: MEDICARE

## 2017-06-20 PROCEDURE — 40000125 ZZHC STATISTIC OT OUTPT VISIT: Performed by: OCCUPATIONAL THERAPIST

## 2017-06-20 PROCEDURE — 97535 SELF CARE MNGMENT TRAINING: CPT | Mod: GO | Performed by: OCCUPATIONAL THERAPIST

## 2017-06-20 PROCEDURE — 96125 COGNITIVE TEST BY HC PRO: CPT | Mod: GO | Performed by: OCCUPATIONAL THERAPIST

## 2017-06-20 PROCEDURE — G9168 MEMORY CURRENT STATUS: HCPCS | Mod: GO,CJ | Performed by: OCCUPATIONAL THERAPIST

## 2017-06-20 PROCEDURE — 97110 THERAPEUTIC EXERCISES: CPT | Mod: GP | Performed by: PHYSICAL THERAPIST

## 2017-06-20 PROCEDURE — 40000719 ZZHC STATISTIC PT DEPARTMENT NEURO VISIT: Performed by: PHYSICAL THERAPIST

## 2017-06-20 PROCEDURE — G9169 MEMORY GOAL STATUS: HCPCS | Mod: GO,CJ | Performed by: OCCUPATIONAL THERAPIST

## 2017-06-27 ENCOUNTER — HOSPITAL ENCOUNTER (OUTPATIENT)
Dept: OCCUPATIONAL THERAPY | Facility: CLINIC | Age: 82
Setting detail: THERAPIES SERIES
End: 2017-06-27
Attending: FAMILY MEDICINE
Payer: MEDICARE

## 2017-06-27 ENCOUNTER — HOSPITAL ENCOUNTER (OUTPATIENT)
Dept: PHYSICAL THERAPY | Facility: CLINIC | Age: 82
Setting detail: THERAPIES SERIES
End: 2017-06-27
Attending: FAMILY MEDICINE
Payer: MEDICARE

## 2017-06-27 PROCEDURE — G8989 SELF CARE D/C STATUS: HCPCS | Mod: GO,CI | Performed by: OCCUPATIONAL THERAPIST

## 2017-06-27 PROCEDURE — 40000125 ZZHC STATISTIC OT OUTPT VISIT: Performed by: OCCUPATIONAL THERAPIST

## 2017-06-27 PROCEDURE — 97530 THERAPEUTIC ACTIVITIES: CPT | Mod: GO | Performed by: OCCUPATIONAL THERAPIST

## 2017-06-27 PROCEDURE — G9170 MEMORY D/C STATUS: HCPCS | Mod: GO,CJ | Performed by: OCCUPATIONAL THERAPIST

## 2017-06-27 PROCEDURE — 97110 THERAPEUTIC EXERCISES: CPT | Mod: GP | Performed by: PHYSICAL THERAPIST

## 2017-06-27 PROCEDURE — 40000719 ZZHC STATISTIC PT DEPARTMENT NEURO VISIT: Performed by: PHYSICAL THERAPIST

## 2017-06-27 PROCEDURE — G9169 MEMORY GOAL STATUS: HCPCS | Mod: GO,CJ | Performed by: OCCUPATIONAL THERAPIST

## 2017-06-27 PROCEDURE — G8988 SELF CARE GOAL STATUS: HCPCS | Mod: GO,CJ | Performed by: OCCUPATIONAL THERAPIST

## 2017-06-27 PROCEDURE — 97110 THERAPEUTIC EXERCISES: CPT | Mod: GO | Performed by: OCCUPATIONAL THERAPIST

## 2017-06-28 NOTE — PROGRESS NOTES
Cognitive Performance Test    SUMMARY OF TEST:    The Cognitive Performance Test (CPT) is a standardized performance-based assessment to measure working memory/executive function processing capacities that underlie functional performance. Subtasks include common basic and instrumental activities of daily living (ADL/IADL) which are rated based on the manner in which patients respond to task demands of varying complexity. The total CPT score describes a level of functioning that indicates how information is processed, implications for functional activities, potential safety risks and a recommended level of supervision or assist based on cognitive function. The highest total score on this test is in the range of 5.6 to 5.8.    DATE OF TESTIN17, 17    RESULTS OF TESTING:                                                                                         CPT Subtest Results    MEDBOX: 4.5/6 SHOP/GLOVES:  PHONE: 4.5/6   WASH:  NT5 TRAVEL:  TOAST:    DRESS: NT/   TOTAL CPT SCORE:       Average CPT Score  4.75/6    INTERPRETATION OF TEST RESULTS:    Based on the Cognitive Performance Test, this patient scored at CPT Level 5.  See CPT Levels reference below.    Summary of functional cognitive status:   Patient had difficulty understanding the purpose of the task and thus why he would be completing them.  Difficulty with seeing numbers in phone book, ended up dialing the price he thought paint was.  Patient also needing significant cues for medbox task as this is not something he normally does.  Very apparent that patient has his own routines and way of doing this.  Problem solving and abstraction limited.  Suggest family continued to be support in regards to assistance and supervising with all higher level tasks.      Factors affecting performance:  Impaired hearing  Balance impairment    Recommendations:    Supervision for ADL/IADL:  Shopping, Finances, Driving and Medication management     "                                                   TIME ADMINISTERING TEST: 75    TIME FOR INTERPRETATION AND PREPARATION OF REPORT: 15    TOTAL TIME: 90      CPT Levels Reference:    Patient's Average CPT Score:  4.75                                                                                                                                                  Individual scores range along a continuum as outlined below.  In addition to cognitive status, other factors may affect safety in a home environment.  Please refer to specific recommendations for this patient.    ___5.6-5.8  Normal functioning (absence of cognitive-functional disability).  Independent in managing personal affairs, monitors and directs own behavior.  Uses complex information to carry out daily activities with safety and accuracy.    Proficient with instrumental activities of daily living (IADL) and learning new activity.  Problems are anticipated, errors are avoided, and consequences of actions are considered.      _x__5.0   Mild cognitive-functional disability; deficits in working memory and executive thought processes. Difficulty using complex information. Problems may be observed with recent memory, judgment, reasoning and planning ahead. May be impulsive or have difficulty anticipating consequences.  Safety:  May require assistance to plan ahead; or to manage complex medication schedules, appointments or finances.  Hazardous activities may need to be monitored or limited.  ADL:  Mild functional decline.  Able to complete basic self-care and routine household tasks.  May have difficulty with complex daily tasks such as reading, writing, meal preparation, shopping or driving.   Learns through hands on teaching. Self-centered behavior or difficulty considering the needs of others may be seen related to trouble seeing the  whole picture\". Can appear disorganized or uninhibited.    _x__4.5  Mild to moderate cognitive-functional disability. " Significant deficits in working memory and executive thought processes. Judgment, reasoning and planning show obvious impairment.  Distractible with inability to shift attention/actions given competing stimuli.  Difficulty with problem solving and managing details. Complex daily tasks performed with inconsistency, difficulty, or error.     Safety:  Medications should be monitored, stove use may require supervision, and driving ability may be affected.  Impaired safety awareness with inability to anticipate potential problems.  May not recognize or respond to emergent situations. Requires frequent check-in support.   ADL:  Mild difficulty with simple everyday self-care tasks. Benefits from structured, routine activity.  Will likely need reminders to complete tasks outside of the routine. Requires assistance with planning and IADL tasks like shopping and finances. Learns concrete tasks through repetition, but performance may not generalize. Tends to be impulsive with poor insight. Self centered behavior or inability to consider the needs of others is common.    ___4.0  Moderate cognitive-functional disability; abstract to concrete thought processes. Working memory and executive function impairments are obvious. Difficulty with planning and problem solving.  Behavior is goal-directed, but unable to follow multi-step directions, is easily distracted, and may not recognize mistakes.  Inability to anticipate hazards or understand precautions.  Safety:  Recommend 24-hour supervision for safety. Supervision needed for medication management and for hazardous activities. May not be able to follow a restricted diet. Can get lost in unfamiliar surroundings. Generally, persons functioning at level 4 should not be driving.   ADL:  Some decline in quality or frequency of ADL.  [place] enhanced by use of a routine, simple concrete directions, and caregiver set-up of needed items. Complex tasks such as money or home management  typically requires assistance.  Relies heavily on vision to guide behavior; will ignore objects/hazards not in plain sight and can be distracted by irrelevant objects. Often has poor insight.  Able to carry out social conversation and may verbally  cover  for deficits leading caregivers to believe they are capable of functioning independently.       ___3.5  Moderate cognitive-functional disability; increased cues needed for task completion. Aware of concrete task steps but needs prompting or cues to initiate and complete simple tasks. Attention span is limited, simple directions may need to be repeated, and re-focus to a topic or task may be required.  Safety:  24-hour supervision required for safety and for assistance with daily tasks. Assistance required with medications, and access to medication should be limited. Meals, nutrition and dietary restrictions need to be monitored.  All hazardous activities should be restricted or supervised. Should not drive. Prone to wandering and can become lost.  ADL:  Moderate functional decline. Familiar tasks usually requires set-up of supplies and directions to complete steps. May need objects handed to them for task initiation. Function best with a set schedule in familiar surroundings with familiar people. All complex tasks must be done by others. Vocabulary is diminished and speech often unfocused.     Electronically signed by:  Yanyd SHARPE/L, ATP       Occupational Therapist, Assistive   [phone]      fax: [phone]      [email or web address], [address]  [address]  [place]   [address]

## 2017-06-28 NOTE — PROGRESS NOTES
"   06/27/17 1600   Signing Clinician's Name / Credentials   Signing clinician's name / credentials Yandy Barahona OTR/L,ATP   Session Number   Session Number 4/8   Progress/Recertification   Recertification Due 08/21/17   OT Medicare Only G-code   G-code Self Care   Self Care   Self Care Goal,  (eval/re-eval & every progress note) CJ: 20-39% impairment   Self Care Discharge Status,  (discharge) CH: 0% impairment   Discharge Self Care Modifier Rationale Patiet report   Memory   Memory Current Status  (eval/re-eval & every progress note) CJ: 20-39% impairment   Current Memory Modifier Rationale CPT   Memory Goal,  (eval/re-eval, every progress note & discharge) CJ: 20-39% impairment   Memory Discharge Status,  (discharge) CJ: 20-39% impairment   Discharge Memory Modifier Rationale CPT   OT Goal 1   Goal Identifier Cognition   Goal Description Patient and family will verbalize understanding of cognitive evaluation results and identify methods to enhance independence and safety in the home setting.   Target Date 08/21/17   OT Goal 2   Goal Identifier HEP   Goal Description Pt will demonstrate independence with U/E strengthening & coordination home exercise program (HEP) for optimal ADL participation & independence.   Target Date 08/21/17   OT Goal 3   Goal Identifier Fall prevention    Goal Description Patient and family will verbalize understanding of 2-3 pieces of AE or environmental modifications to reduce fall risk in the home.    Target Date 08/21/17   Objective Measure 1   Objective Measure Dynavision   Details Dynavision. Simple level: Mode A (self paced) x1 minute: 27, 36, 39, 41 hits. Norms =52+    Objective Measure 2   Objective Measure CPT   Details 4.7   Therapeutic Procedure/Exercise   Minutes 15 Minutes   Skilled Intervention UE HEP   Patient Response Patient reports he needs to do these more, but he is just so busy.  \"they are sitting on the table.\"   Treatment Detail Review of UE " band exercises.  Cues for techniuqes and hand placement as well as how to grade the exercises.   Progress goals met   Therapeutic Activity   Minutes 15 Minutes   Skilled Intervention Dynavision   Patient Response Patient demo'd good tolerance and continued improvement with task   Treatment Detail Dynavision mode a 27, 36, 39, and 41.  No fatigue noted, still BNL.  AGe needed to be taked into consideration.   Progress goals paritally met   Education   Learner Patient;Family   Readiness Acceptance   Method Explanation   Response Verbalizes understanding;Needs reinforcement   Comments   Comments Discharge Summary- Patient seen for 4 OT sessions.  In that time he has cleared significantly, patient reports feeling back to about 100%.  Score on dynavision mode a improved but still BNL, his age should be taken into consideration there though.  Educated in UE theraband HEP and review techniuqes for home completion.  Reviewed ways to modify environment to assist with fall prevention such as using a stool to get ready and take brakes.  4.7 on CPT, indicating continued supevision need for safety and follow through with IADLs.  Safe consideration to be made with driving.  No more skilled Ot services needed at this time.   Total Session Time   Total Session Time 60   Electronically signed by:  Yandy Barahona OTR/L, ATP       Occupational Therapist, Assistive   [phone]      fax: [phone]      [email or web address], [address]  [address]  [place]   [address]

## 2017-07-11 ENCOUNTER — HOSPITAL ENCOUNTER (OUTPATIENT)
Dept: PHYSICAL THERAPY | Facility: CLINIC | Age: 82
Setting detail: THERAPIES SERIES
End: 2017-07-11
Attending: FAMILY MEDICINE
Payer: MEDICARE

## 2017-07-11 PROCEDURE — 97112 NEUROMUSCULAR REEDUCATION: CPT | Mod: GP | Performed by: PHYSICAL THERAPIST

## 2017-07-11 PROCEDURE — 40000719 ZZHC STATISTIC PT DEPARTMENT NEURO VISIT: Performed by: PHYSICAL THERAPIST

## 2017-07-11 PROCEDURE — 97110 THERAPEUTIC EXERCISES: CPT | Mod: GP | Performed by: PHYSICAL THERAPIST

## 2017-07-18 ENCOUNTER — HOSPITAL ENCOUNTER (OUTPATIENT)
Dept: PHYSICAL THERAPY | Facility: CLINIC | Age: 82
Setting detail: THERAPIES SERIES
End: 2017-07-18
Attending: FAMILY MEDICINE
Payer: MEDICARE

## 2017-07-18 PROCEDURE — 40000719 ZZHC STATISTIC PT DEPARTMENT NEURO VISIT: Performed by: PHYSICAL THERAPIST

## 2017-07-18 PROCEDURE — 97112 NEUROMUSCULAR REEDUCATION: CPT | Mod: GP | Performed by: PHYSICAL THERAPIST

## 2017-07-18 PROCEDURE — 97530 THERAPEUTIC ACTIVITIES: CPT | Mod: GP | Performed by: PHYSICAL THERAPIST

## 2017-07-25 ENCOUNTER — HOSPITAL ENCOUNTER (OUTPATIENT)
Dept: PHYSICAL THERAPY | Facility: CLINIC | Age: 82
Setting detail: THERAPIES SERIES
End: 2017-07-25
Payer: MEDICARE

## 2017-07-25 PROCEDURE — 97112 NEUROMUSCULAR REEDUCATION: CPT | Mod: GP | Performed by: PHYSICAL THERAPIST

## 2017-07-25 PROCEDURE — 97110 THERAPEUTIC EXERCISES: CPT | Mod: GP | Performed by: PHYSICAL THERAPIST

## 2017-07-25 PROCEDURE — 40000719 ZZHC STATISTIC PT DEPARTMENT NEURO VISIT: Performed by: PHYSICAL THERAPIST

## 2017-08-01 ENCOUNTER — HOSPITAL ENCOUNTER (OUTPATIENT)
Dept: PHYSICAL THERAPY | Facility: CLINIC | Age: 82
Setting detail: THERAPIES SERIES
End: 2017-08-01
Payer: MEDICARE

## 2017-08-01 DIAGNOSIS — I10 HTN (HYPERTENSION): ICD-10-CM

## 2017-08-01 PROCEDURE — 97112 NEUROMUSCULAR REEDUCATION: CPT | Mod: GP | Performed by: PHYSICAL THERAPIST

## 2017-08-01 PROCEDURE — 97530 THERAPEUTIC ACTIVITIES: CPT | Mod: GP | Performed by: PHYSICAL THERAPIST

## 2017-08-01 PROCEDURE — 97110 THERAPEUTIC EXERCISES: CPT | Mod: GP | Performed by: PHYSICAL THERAPIST

## 2017-08-01 PROCEDURE — 40000719 ZZHC STATISTIC PT DEPARTMENT NEURO VISIT: Performed by: PHYSICAL THERAPIST

## 2017-08-02 RX ORDER — METOPROLOL SUCCINATE 25 MG/1
12.5 TABLET, EXTENDED RELEASE ORAL DAILY
Qty: 45 TABLET | Refills: 1 | Status: SHIPPED | OUTPATIENT
Start: 2017-08-02 | End: 2018-02-02

## 2017-08-03 NOTE — PROGRESS NOTES
Outpatient Physical Therapy Discharge Note     Patient: Kiran Carcamo  : 1921    Beginning/End Dates of Reporting Period:  17 to 2017    Referring Provider: Buddy Poe MD    Therapy Diagnosis: Weakness, deconditioning, increased risk for falls, impaired balance     Client Self Report: Pt reports that he is doing well, but continues to be busy and was only able to do his exercises 1x in the past week. Denies pain or falls.     Objective Measurements:  Objective Measure: 6MWT  Details: 1074 feet, carrying SPC with occasional use     Objective Measure: 25-ft walk  Details: 5.8 seconds, no assistive device     Objective Measure: Vitals  Details: Beginning of session, /62, HR 50. After 6MWT, /82, HR 70. After seated rest break/HSS, /79, HR 59. No adverse symptoms reported.     Outcome Measures (most recent score):  AM-PAC  Basic Mobility Score Level  (Lower scores equate to lower levels of function): 57.28  AM-PAC  Daily Activity Score Level  (Lower scores equate to lower levels of function): 48.84  AM-PAC  Applied Cognitive Score Level  (Lower scores equate to lower levels of function): 40.45    Goals:  Goal Identifier TUG   Goal Description Pt will decrease his risk of falling as measured by a TUG time of no more than 13 seconds in order to improve safety at home and in the community.   Target Date 08/15/17   Date Met  17   Progress: Goal MET     Goal Identifier Gait velocity   Goal Description Pt will increase walking speed as measured by improving to no more than 6.3 seconds on the 25 foot timed walk, to improve his home and community ambulation skills.    Target Date 08/15/17   Date Met  17   Progress: Goal MET     Goal Identifier HEP   Goal Description Pt will demonstrate independent and correct performance of his comprehensive home exercise program, in order to be able to self-manage his condition and prevent functional decline.   Target Date 08/15/17   Date Met   08/01/17   Progress: Goal MET     Progress Toward Goals:   Progress this reporting period: All goals met at this time.    Plan:  Discharge from therapy.    Discharge:    Reason for Discharge: Patient has met all goals.    Equipment Issued: None - pt has his own SPC which remains appropriate.    Discharge Plan: Patient to continue home program.

## 2017-08-15 DIAGNOSIS — R07.89 CHEST DISCOMFORT: ICD-10-CM

## 2017-08-16 RX ORDER — ISOSORBIDE MONONITRATE 60 MG/1
60 TABLET, EXTENDED RELEASE ORAL DAILY
Qty: 90 TABLET | Refills: 1 | Status: SHIPPED | OUTPATIENT
Start: 2017-08-16 | End: 2018-04-04

## 2017-09-04 DIAGNOSIS — E11.9 TYPE 2 DIABETES MELLITUS WITHOUT COMPLICATION (H): ICD-10-CM

## 2017-09-05 NOTE — TELEPHONE ENCOUNTER
ONE TOUCH ULTRA test strip  USE TO TEST BLOOD SUGAR TWICE DAILY  Prescription approved per [facility] Refill Protocol.      Last Written Prescription Date: 8/10/16  Last Fill Quantity: 3, # refills: 3  Last Office Visit with [facility], P or [facility] prescribing provider:  6/2/17   Next 5 appointments (look out 90 days)     Sep 08, 2017 10:00 AM CDT   [place] Visit with Buddy Poe [place]   [facility]    [address] [place] [id number] [phone]                 BP Readings from Last 3 Encounters:   06/02/17 111/69   05/17/17 120/58   05/09/17 102/61     Lab Results   Component Value Date    MICROL 44 08/10/2016     Lab Results   Component Value Date    UMALCR 43.50 08/10/2016     Creatinine   Date Value Ref Range Status   05/09/2017 1.07 0.66 - 1.25 mg/dL Final   ]  GFR Estimate   Date Value Ref Range Status   05/09/2017 64 >60 mL/min/1.7m2 Final     Comment:     Non  GFR Calc   05/08/2017 68 >60 mL/min/1.7m2 Final     Comment:     Non  GFR Calc   03/20/2017 63 >60 mL/min/1.7m2 Final     Comment:     Non  GFR Calc     GFR Estimate If Black   Date Value Ref Range Status   05/09/2017 78 >60 mL/min/1.7m2 Final     Comment:      GFR Calc   05/08/2017 83 >60 mL/min/1.7m2 Final     Comment:      GFR Calc   03/20/2017 77 >60 mL/min/1.7m2 Final     Comment:      GFR Calc     Lab Results   Component Value Date    CHOL 124 08/10/2016     Lab Results   Component Value Date    HDL 41 08/10/2016     Lab Results   Component Value Date    LDL 64 08/10/2016     Lab Results   Component Value Date    TRIG 97 08/10/2016     Lab Results   Component Value Date    CHOLHDLRATIO 2.3 11/21/2014     Lab Results   Component Value Date    AST 62 05/09/2017     Lab Results   Component Value Date    ALT 69 05/09/2017     Lab Results   Component Value Date    A1C 8.3 03/20/2017     A1C 7.6 11/16/2016    A1C 7.4 08/10/2016    A1C 6.9 12/16/2015    A1C 7.6 07/06/2015     Potassium   Date Value Ref Range Status   05/09/2017 4.3 3.4 - 5.3 mmol/L Final     Marilyn Johnston RN   [facility]

## 2017-09-08 ENCOUNTER — OFFICE VISIT (OUTPATIENT)
Dept: FAMILY MEDICINE | Facility: CLINIC | Age: 82
End: 2017-09-08
Payer: COMMERCIAL

## 2017-09-08 VITALS
OXYGEN SATURATION: 100 % | HEIGHT: 67 IN | WEIGHT: 164.3 LBS | TEMPERATURE: 97.6 F | BODY MASS INDEX: 25.79 KG/M2 | SYSTOLIC BLOOD PRESSURE: 152 MMHG | DIASTOLIC BLOOD PRESSURE: 71 MMHG | HEART RATE: 52 BPM

## 2017-09-08 DIAGNOSIS — I25.2 HISTORY OF MI (MYOCARDIAL INFARCTION): ICD-10-CM

## 2017-09-08 DIAGNOSIS — N18.2 CKD (CHRONIC KIDNEY DISEASE) STAGE 2, GFR 60-89 ML/MIN: ICD-10-CM

## 2017-09-08 DIAGNOSIS — I10 ESSENTIAL HYPERTENSION WITH GOAL BLOOD PRESSURE LESS THAN 140/90: ICD-10-CM

## 2017-09-08 DIAGNOSIS — E78.5 HYPERLIPIDEMIA LDL GOAL <70: ICD-10-CM

## 2017-09-08 DIAGNOSIS — E11.21 TYPE 2 DIABETES MELLITUS WITH DIABETIC NEPHROPATHY, WITHOUT LONG-TERM CURRENT USE OF INSULIN (H): Primary | ICD-10-CM

## 2017-09-08 PROBLEM — J18.9 PNEUMONIA: Status: RESOLVED | Noted: 2017-05-08 | Resolved: 2017-09-08

## 2017-09-08 LAB
CHOLEST SERPL-MCNC: 123 MG/DL
CREAT UR-MCNC: 37 MG/DL
HBA1C MFR BLD: 8.4 % (ref 4.3–6)
HDLC SERPL-MCNC: 44 MG/DL
LDLC SERPL CALC-MCNC: 54 MG/DL
MICROALBUMIN UR-MCNC: 24 MG/L
MICROALBUMIN/CREAT UR: 65.67 MG/G CR (ref 0–17)
NONHDLC SERPL-MCNC: 79 MG/DL
TRIGL SERPL-MCNC: 127 MG/DL

## 2017-09-08 PROCEDURE — 99214 OFFICE O/P EST MOD 30 MIN: CPT | Performed by: FAMILY MEDICINE

## 2017-09-08 PROCEDURE — 80061 LIPID PANEL: CPT | Performed by: FAMILY MEDICINE

## 2017-09-08 PROCEDURE — 83036 HEMOGLOBIN GLYCOSYLATED A1C: CPT | Performed by: FAMILY MEDICINE

## 2017-09-08 PROCEDURE — 36415 COLL VENOUS BLD VENIPUNCTURE: CPT | Performed by: FAMILY MEDICINE

## 2017-09-08 PROCEDURE — 82043 UR ALBUMIN QUANTITATIVE: CPT | Performed by: FAMILY MEDICINE

## 2017-09-08 PROCEDURE — 99207 C FOOT EXAM  NO CHARGE: CPT | Performed by: FAMILY MEDICINE

## 2017-09-08 NOTE — NURSING NOTE
"Chief Complaint   Patient presents with     Diabetes       Initial /71  Pulse 52  Temp 97.6  F (36.4  C) (Oral)  Ht 5' 6.53\" (1.69 m)  Wt 164 lb 4.8 oz (74.5 kg)  SpO2 100%  BMI 26.09 kg/m2 Estimated body mass index is 26.09 kg/(m^2) as calculated from the following:    Height as of this encounter: 5' 6.53\" (1.69 m).    Weight as of this encounter: 164 lb 4.8 oz (74.5 kg).  Medication Reconciliation: complete     Cynthia JIMENEZ    "

## 2017-09-08 NOTE — PATIENT INSTRUCTIONS
-Please start on metformin twice daily to help get your diabetes back in control.  -You may reach the [facility] at [phone] to have a driving evaluation.  -I will let you know when I get the results back from lab.

## 2017-09-08 NOTE — MR AVS SNAPSHOT
After Visit Summary   9/8/2017    Kiran Carcamo    MRN: [id number]           Patient Information     Date Of Birth          4/13/1921        Visit Information        Provider Department      9/8/2017 10:00 AM Buddy Poe MD [facility] [place]        Today's Diagnoses     Type 2 diabetes mellitus with diabetic nephropathy, without long-term current use of insulin (H)    -  1    CKD (chronic kidney disease) stage 2, GFR 60-89 ml/min        Essential hypertension with goal blood pressure less than 140/90        Hyperlipidemia LDL goal <70        History of MI (myocardial infarction)          Care Instructions    -Please start on metformin twice daily to help get your diabetes back in control.  -You may reach the [facility] at [phone] to have a driving evaluation.  -I will let you know when I get the results back from lab.          Follow-ups after your visit        Who to contact     If you have questions or need follow up information about today's clinic visit or your schedule please contact [facility] directly at [phone].  Normal or non-critical lab and imaging results will be communicated to you by [employer], letter or phone within 4 business days after the clinic has received the results. If you do not hear from us within 7 days, please contact the clinic through [employer] or phone. If you have a critical or abnormal lab result, we will notify you by phone as soon as possible.  Submit refill requests through [employer] or call your pharmacy and they will forward the refill request to us. Please allow 3 business days for your refill to be completed.          Additional Information About Your Visit        [employer] Information     [employer] gives you secure access to your electronic health record. If you see a primary care provider, you can also send messages to your care team and make appointments. If you have questions, please call your primary care clinic.   "If you do not have a primary care provider, please call [phone] and they will assist you.        Care EveryWhere ID     This is your Care EveryWhere ID. This could be used by other organizations to access your [place] medical records  [id number]        Your Vitals Were     Pulse Temperature Height Pulse Oximetry BMI (Body Mass Index)       52 97.6  F (36.4  C) (Oral) 5' 6.53\" (1.69 m) 100% 26.09 kg/m2        Blood Pressure from Last 3 Encounters:   09/08/17 152/71   06/02/17 111/69   05/17/17 120/58    Weight from Last 3 Encounters:   09/08/17 164 lb 4.8 oz (74.5 kg)   06/02/17 156 lb 12.8 oz (71.1 kg)   05/17/17 153 lb (69.4 kg)              We Performed the Following     Albumin Random Urine Quantitative with Creat Ratio     FOOT EXAM     Hemoglobin A1c     Lipid panel reflex to direct LDL          Today's Medication Changes          These changes are accurate as of: 9/8/17 10:33 AM.  If you have any questions, ask your nurse or doctor.               Start taking these medicines.        Dose/Directions    metFORMIN 500 MG tablet   Commonly known as:  GLUCOPHAGE   Used for:  Type 2 diabetes mellitus with diabetic nephropathy, without long-term current use of insulin (H)   Started by:  Buddy Poe MD        Dose:  500 mg   Take 1 tablet (500 mg) by mouth 2 times daily (with meals)   Quantity:  180 tablet   Refills:  3            Where to get your medicines      These medications were sent to [id number] - [place], [place] - [address]  [address] [address]     Phone:  [phone]     metFORMIN 500 MG tablet                Primary Care Provider Office Phone # Fax #    Buddy Poe [phone]       [address]  [facility] [address]        Equal Access to Services     BETO QUINONEZ AH: Calixto chackoo Somarie, waaxda luqadaha, qaybta kaalmada adeegyada, dyan benitez. So wa " [phone].    ATENCIÓN: Si raegan dia, tiene a arriola disposición servicios gratuitos de asistencia lingüística. Kiersten hill [phone].    We comply with applicable federal civil rights laws and [place] laws. We do not discriminate on the basis of race, color, national origin, age, disability sex, sexual orientation or gender identity.            Thank you!     Thank you for choosing [facility]  [place] your [place]. Our goal is always to provide you with excellent care. Hearing back from our patients is one way we can continue to improve our services. Please take a few minutes to complete the written survey that you may receive in the mail after your visit with us. Thank you!             Your Updated Medication List - Protect others around you: Learn how to safely use, store and throw away your medicines at www.disposemymeds.org.          This list is accurate as of: 9/8/17 10:33 AM.  Always use your most recent med list.                   Brand Name Dispense Instructions for use Diagnosis    acetaminophen 325 MG tablet    TYLENOL    100 tablet    Take 3 tablets (975 mg) by mouth every 8 hours as needed for mild pain    Influenza A       aspirin 81 MG EC tablet      Take 81 mg by mouth At Bedtime.        isosorbide mononitrate 60 MG 24 hr tablet    IMDUR    90 tablet    Take 1 tablet (60 mg) by mouth daily    Chest discomfort       lisinopril 10 MG tablet    PRINIVIL/ZESTRIL    90 tablet    Take 1 tablet (10 mg) by mouth daily    Hypertension goal BP (blood pressure) < 140/90       metFORMIN 500 MG tablet    GLUCOPHAGE    180 tablet    Take 1 tablet (500 mg) by mouth 2 times daily (with meals)    Type 2 diabetes mellitus with diabetic nephropathy, without long-term current use of insulin (H)       metoprolol 25 MG 24 hr tablet    TOPROL-XL    45 tablet    Take 0.5 tablets (12.5 mg) by mouth daily    HTN (hypertension)       MV-ONE PO      Take 1 tablet by mouth.        nitroGLYcerin 0.4 MG sublingual  tablet    NITROSTAT    25 tablet    Place 1 tablet (0.4 mg) under the tongue every 5 minutes as needed for chest pain If symptoms after 3 doses (15 minutes) call 911.    History of MI (myocardial infarction)       ONE TOUCH ULTRA test strip   Generic drug:  blood glucose monitoring     300 strip    USE TO TEST BLOOD SUGAR TWICE DAILY    Type 2 diabetes mellitus without complication (H)       polyethylene glycol Packet    MIRALAX/GLYCOLAX    10 packet    Take 17 g by mouth daily as needed for constipation    Constipation, unspecified constipation type       simvastatin 20 MG tablet    ZOCOR    90 tablet    Take 1 tablet (20 mg) by mouth daily    Hyperlipidemia LDL goal <130

## 2017-09-08 NOTE — PROGRESS NOTES
SUBJECTIVE:   Kiran Carcamo is a 89 year old male who presents to clinic today for the following health issues:    Diabetes Follow-up    Patient is checking blood sugars: once daily.  Results are as follows:       am - 160,190, 173, 211        Diabetic concerns: None     Symptoms of hypoglycemia (low blood sugar): none     Paresthesias (numbness or burning in feet) or sores: No   Date of last diabetic eye exam: last December       Amount of exercise or physical activity: 1 day/week for an average of 30-45 minutes    Problems taking medications regularly: No    Medication side effects: none  Diet: regular (no restrictions)    Patient says that he has been checking his blood sugars once daily in the morning, and has been getting readings between 160 and 211. He does not have any concerns on the medication, and says that he has had no symptoms of hypoglycemia or paresthesias. He tries to stay active and exercise once a week.    Hospital Follow Up 5/8/17:  Patient was seen in the hospital on 5/8/17 due to a fall. He says that he was feeling weak after waking up from a nap, and he lost his strength while he was transferring himself and fell. He had been feeling weak and had difficulties leaving his bed. Patient has been using a cane to help with his balance, but says he occasionally forgets to bring and use his cane, and he left his cane at home today. He has intermittent brief pains in his calves, and says that they quickly pass.    Patient is still driving, though has been avoiding highways and busy roads. He has not had any accidents or problems. He has recently gotten a new car, and his daughter was concerned he may have problems learning and driving a new car.    Problem list and histories reviewed & adjusted, as indicated.  Additional history: as documented    Patient Active Problem List   Diagnosis     Health Care Home     AK (actinic keratosis)     SK (seborrheic keratosis)     Hyperplasia of prostate      Hyperlipidemia LDL goal <70     History of MI (myocardial infarction)     Forgetfulness     Syncope     CKD (chronic kidney disease) stage 2, GFR 60-89 ml/min     Atypical chest pain     Essential hypertension with goal blood pressure less than 140/90     Eczema, unspecified type     Type 2 diabetes mellitus with diabetic nephropathy, without long-term current use of insulin (H)     Past Surgical History:   Procedure Laterality Date     CHOLECYSTECTOMY       CORONARY ARTERY BYPASS  '05    6-vessel     MANDIBLE SURGERY         Social History   Substance Use Topics     Smoking status: Never Smoker     Smokeless tobacco: Never Used     Alcohol use No     Family History   Problem Relation Age of Onset     HEART DISEASE Mother      Eye Disorder Father      Respiratory Brother      HEART DISEASE Sister      CANCER Daughter      CANCER Brother          Current Outpatient Prescriptions   Medication Sig Dispense Refill     ONE TOUCH ULTRA test strip USE TO TEST BLOOD SUGAR TWICE DAILY 300 strip 2     isosorbide mononitrate (IMDUR) 60 MG 24 hr tablet Take 1 tablet (60 mg) by mouth daily 90 tablet 1     metoprolol (TOPROL-XL) 25 MG 24 hr tablet Take 0.5 tablets (12.5 mg) by mouth daily 45 tablet 1     nitroglycerin (NITROSTAT) 0.4 MG sublingual tablet Place 1 tablet (0.4 mg) under the tongue every 5 minutes as needed for chest pain If symptoms after 3 doses (15 minutes) call 911. 25 tablet 1     lisinopril (PRINIVIL/ZESTRIL) 10 MG tablet Take 1 tablet (10 mg) by mouth daily 90 tablet 3     polyethylene glycol (MIRALAX/GLYCOLAX) Packet Take 17 g by mouth daily as needed for constipation 10 packet 0     acetaminophen (TYLENOL) 325 MG tablet Take 3 tablets (975 mg) by mouth every 8 hours as needed for mild pain 100 tablet 0     simvastatin (ZOCOR) 20 MG tablet Take 1 tablet (20 mg) by mouth daily 90 tablet 1     aspirin 81 MG EC tablet Take 81 mg by mouth At Bedtime.       Multiple Vitamin (MV-ONE PO) Take 1 tablet by mouth.        No Known Allergies  Recent Labs   Lab Test  09/08/17   0919  05/09/17   0502  05/08/17   1450  03/20/17   1110  11/16/16   1047  08/10/16   1050  12/16/15   1111   11/21/14   1441  04/16/14   1030   A1C  8.4*   --    --   8.3*  7.6*  7.4*  6.9*   < >  6.8*  6.9*   LDL   --    --    --    --    --   64   --    --   40  55   HDL   --    --    --    --    --   41   --    --   47  36*   TRIG   --    --    --    --    --   97   --    --   110  140   ALT   --   69  83*   --    --    --   34   --   34  31   CR   --   1.07  1.01  1.08   --    --   1.02   --   0.94  1.03   GFRESTIMATED   --   64  68  63   --    --   68   --   75  67   GFRESTBLACK   --   78  83  77   --    --   82   --   >90   GFR Calc    82   POTASSIUM   --   4.3  4.3  4.4   --    --   4.3   --   4.7  4.5   TSH   --    --    --    --   2.57   --    --    --   1.97   --     < > = values in this interval not displayed.      BP Readings from Last 3 Encounters:   09/08/17 152/71   06/02/17 111/69   05/17/17 120/58    Wt Readings from Last 3 Encounters:   09/08/17 74.5 kg (164 lb 4.8 oz)   06/02/17 71.1 kg (156 lb 12.8 oz)   05/17/17 69.4 kg (153 lb)        Reviewed and updated as needed this visit by clinical staffTobacco  Allergies  Med Hx  Surg Hx  Fam Hx  Soc Hx      Reviewed and updated as needed this visit by Provider         ROS:  Denies headache, insomnia, chest pain, shortness of breath, cough, heartburn, bowel issues, bladder issues, neck pain, back pain, hip pain, knee pain, ankle pain, or foot pain. Remainder of ROS is negative unless otherwise noted above or in HPI.    This document serves as a record of the services and decisions personally performed and made by Buddy Poe MD. It was created on his behalf by Jef Osuna, a trained medical scribe. The creation of this document is based the provider's statements to the medical scribe.  Jef Osuna 10:06 AM September 8, 2017    OBJECTIVE:     /71   "Pulse 52  Temp 97.6  F (36.4  C) (Oral)  Ht 1.69 m (5' 6.53\")  Wt 74.5 kg (164 lb 4.8 oz)  SpO2 100%  BMI 26.09 kg/m2  Body mass index is 26.09 kg/(m^2).  GENERAL: healthy, alert and no distress  RESP: lungs clear to auscultation - no rales, rhonchi or wheezes  CV: irregular rate and rhythm, normal S1 S2, no S3 or S4, no murmur, click or rub, no peripheral edema and peripheral pulses strong  FOOT: normal DP and PT pulses, no trophic changes or ulcerative lesions, diminished sensation to microfilament  SKIN: fungal nails of both feet  NEURO: Normal strength and tone, mentation intact and speech normal  PSYCH: mentation appears normal, affect normal/bright    Diagnostic Test Results:  Results for orders placed or performed in visit on 09/08/17 (from the past 24 hour(s))   Hemoglobin A1c   Result Value Ref Range    Hemoglobin A1C 8.4 (H) 4.3 - 6.0 %       ASSESSMENT/PLAN:     (E11.21) Type 2 diabetes mellitus with diabetic nephropathy, without long-term current use of insulin (H)  (primary encounter diagnosis)  Comment: Not at goal. Prescription given for metformin. Labs completed today.  Plan: Albumin Random Urine Quantitative with Creat         Ratio, Lipid panel reflex to direct LDL,         Hemoglobin A1c, FOOT EXAM, metFORMIN         (GLUCOPHAGE) 500 MG tablet        Start on metformin twice daily. Follow up with results from lab. Return in 3 months for recheck.    (N18.2) CKD (chronic kidney disease) stage 2, GFR 60-89 ml/min  Comment: Stable and unchanged since last visit.  Plan: Will continue to monitor. Follow up as needed.    (I10) Essential hypertension with goal blood pressure less than 140/90  Comment: Not at goal today. Controlled on lisinopril and metoprolol.  Plan: Continue on current medication. Follow up as needed.    (E78.5) Hyperlipidemia LDL goal <70  Comment: Controlled on simvastatin.  Plan: Continue on current medication. Follow up as needed.    (I25.2) History of MI (myocardial " infarction)  Comment: Stable and unchanged since last visit. Controlled on isosorbide mononitrate and nitroglycerine as needed.  Plan: Continue on current medication. Follow up as needed.    Patient Instructions   -Please start on metformin twice daily to help get your diabetes back in control.  -You may reach the [facility] at [phone] to have a driving evaluation.  -I will let you know when I get the results back from lab.    The information in this document, created by the medical scribe for me, accurately reflects the services I personally performed and the decisions made by me. I have reviewed and approved this document for accuracy prior to leaving the patient care area.   Buddy Poe MD 10:07 AM September 8, 2017  [facility]

## 2017-09-11 NOTE — PROGRESS NOTES
Praveen Darling: Your recent results indicate kidney function slightly worse. Recommend recheck diabetes in 3 months, kidney test in 6 months.      Buddy

## 2017-10-10 NOTE — PROGRESS NOTES
SUBJECTIVE:   Kiran Carcamo is a 89 year old male who presents to clinic today for the following health issues:    Joint Pain    Onset: 1 MONTH AGO     Description:   Location: left hip  Character: PINCH NERVE PAIN    Intensity: moderate    Progression of Symptoms: better    Accompanying Signs & Symptoms:  Other symptoms: radiation of pain to THE KNEE    History:   Previous similar pain: no       Precipitating factors:   Trauma or overuse: no     Alleviating factors:  Improved by: rest/inactivity and heat    Therapies Tried and outcome: heating pad does not help    Patient has been having problems with left hip pain for the last 3 weeks to a month. He believes the injury occurred when he was bending over to pick something up from a sitting position when he toppled forward. He says that the pain is intermittent and sharp, and that it occurs when he is doing activities such as changing clothes or walking. The pain radiates down his left leg through his upper thigh. Pain improves with resting, and he has tried using heating pads without relief. Patient walks with a limp, and uses a cane for balance.    Problem list and histories reviewed & adjusted, as indicated.  Additional history: as documented    Patient Active Problem List   Diagnosis     Health Care Home     AK (actinic keratosis)     SK (seborrheic keratosis)     Hyperplasia of prostate     Hyperlipidemia LDL goal <70     History of MI (myocardial infarction)     Forgetfulness     Syncope     CKD (chronic kidney disease) stage 2, GFR 60-89 ml/min     Atypical chest pain     Essential hypertension with goal blood pressure less than 140/90     Eczema, unspecified type     Type 2 diabetes mellitus with diabetic nephropathy, without long-term current use of insulin (H)     Past Surgical History:   Procedure Laterality Date     CHOLECYSTECTOMY       CORONARY ARTERY BYPASS  '05    6-vessel     MANDIBLE SURGERY         Social History   Substance Use Topics      Smoking status: Never Smoker     Smokeless tobacco: Never Used     Alcohol use No     Family History   Problem Relation Age of Onset     HEART DISEASE Mother      Eye Disorder Father      Respiratory Brother      HEART DISEASE Sister      CANCER Daughter      CANCER Brother          Current Outpatient Prescriptions   Medication Sig Dispense Refill     metFORMIN (GLUCOPHAGE) 500 MG tablet Take 1 tablet (500 mg) by mouth 2 times daily (with meals) 180 tablet 3     ONE TOUCH ULTRA test strip USE TO TEST BLOOD SUGAR TWICE DAILY 300 strip 2     isosorbide mononitrate (IMDUR) 60 MG 24 hr tablet Take 1 tablet (60 mg) by mouth daily 90 tablet 1     metoprolol (TOPROL-XL) 25 MG 24 hr tablet Take 0.5 tablets (12.5 mg) by mouth daily 45 tablet 1     nitroglycerin (NITROSTAT) 0.4 MG sublingual tablet Place 1 tablet (0.4 mg) under the tongue every 5 minutes as needed for chest pain If symptoms after 3 doses (15 minutes) call 911. 25 tablet 1     lisinopril (PRINIVIL/ZESTRIL) 10 MG tablet Take 1 tablet (10 mg) by mouth daily 90 tablet 3     polyethylene glycol (MIRALAX/GLYCOLAX) Packet Take 17 g by mouth daily as needed for constipation 10 packet 0     acetaminophen (TYLENOL) 325 MG tablet Take 3 tablets (975 mg) by mouth every 8 hours as needed for mild pain 100 tablet 0     simvastatin (ZOCOR) 20 MG tablet Take 1 tablet (20 mg) by mouth daily 90 tablet 1     aspirin 81 MG EC tablet Take 81 mg by mouth At Bedtime.       Multiple Vitamin (MV-ONE PO) Take 1 tablet by mouth.       No Known Allergies  Recent Labs   Lab Test  09/08/17   0919  05/09/17   0502  05/08/17   1450  03/20/17   1110  11/16/16   1047  08/10/16   1050  12/16/15   1111   11/21/14   1441   A1C  8.4*   --    --   8.3*  7.6*  7.4*  6.9*   < >  6.8*   LDL  54   --    --    --    --   64   --    --   40   HDL  44   --    --    --    --   41   --    --   47   TRIG  127   --    --    --    --   97   --    --   110   ALT   --   69  83*   --    --    --   34   --   34  "  CR   --   1.07  1.01  1.08   --    --   1.02   --   0.94   GFRESTIMATED   --   64  68  63   --    --   68   --   75   GFRESTBLACK   --   78  83  77   --    --   82   --   >90   GFR Calc     POTASSIUM   --   4.3  4.3  4.4   --    --   4.3   --   4.7   TSH   --    --    --    --   2.57   --    --    --   1.97    < > = values in this interval not displayed.      BP Readings from Last 3 Encounters:   10/13/17 159/74   09/08/17 152/71   06/02/17 111/69    Wt Readings from Last 3 Encounters:   10/13/17 73.9 kg (163 lb)   09/08/17 74.5 kg (164 lb 4.8 oz)   06/02/17 71.1 kg (156 lb 12.8 oz)        Reviewed and updated as needed this visit by clinical staff     Reviewed and updated as needed this visit by Provider         ROS:  Positive for left hip pain.    Denies headache, insomnia, chest pain, shortness of breath, cough, heartburn, bowel issues, bladder issues, neck pain, back pain, knee pain, ankle pain, or foot pain. Remainder of ROS is negative unless otherwise noted above or in HPI.    This document serves as a record of the services and decisions personally performed and made by Buddy Poe MD. It was created on his behalf by Jef Osuna, a trained medical scribe. The creation of this document is based the provider's statements to the medical scribe.  Jef Osuna 9:34 AM October 13, 2017    OBJECTIVE:     /74  Pulse 58  Temp 96.7  F (35.9  C) (Oral)  Ht 1.753 m (5' 9\")  Wt 73.9 kg (163 lb)  SpO2 99%  BMI 24.07 kg/m2  Body mass index is 24.07 kg/(m^2).  GENERAL: healthy, alert and no distress  MS: internal and external rotation normal at left hip and also normal with hip flexed 90 degrees, left leg raise normal, no tenderness over left greater trochanter, slightly broad based gait with slight limp on left side  NEURO: Normal strength and tone, mentation intact and speech normal  PSYCH: mentation appears normal, affect normal/bright    Diagnostic Test Results:  No " results found for this or any previous visit (from the past 24 hour(s)).    ASSESSMENT/PLAN:     (M25.559) Arthralgia of hip, unspecified laterality  (primary encounter diagnosis)  Comment: Order placed for xray of hip and lumbar spine. Referral given for physical therapy.  Plan: XR Pelvis and Hip Left 2 Views, XR Lumbar Spine        2/3 Views, [facility]         REFERRAL        Follow through with xray of hip and lumbar spine. Follow up with physical therapy.    (W19.XXXA) Fall, initial encounter  Comment: Referral given for physical therapy.  Plan: [facility] REFERRAL        Follow up with physical therapy.    Patient Instructions   -You may reach radiology at [phone] to schedule the xray of your hip and spine.  -I will let you know when I get the results back from imaging.    The information in this document, created by the medical scribe for me, accurately reflects the services I personally performed and the decisions made by me. I have reviewed and approved this document for accuracy prior to leaving the patient care area.   Buddy Poe MD 9:34 AM October 13, 2017  [facility]

## 2017-10-13 ENCOUNTER — OFFICE VISIT (OUTPATIENT)
Dept: FAMILY MEDICINE | Facility: CLINIC | Age: 82
End: 2017-10-13
Payer: COMMERCIAL

## 2017-10-13 ENCOUNTER — HOSPITAL ENCOUNTER (OUTPATIENT)
Dept: GENERAL RADIOLOGY | Facility: CLINIC | Age: 82
End: 2017-10-13
Attending: FAMILY MEDICINE
Payer: MEDICARE

## 2017-10-13 ENCOUNTER — HOSPITAL ENCOUNTER (OUTPATIENT)
Dept: GENERAL RADIOLOGY | Facility: CLINIC | Age: 82
Discharge: HOME | End: 2017-10-13
Attending: FAMILY MEDICINE | Admitting: FAMILY MEDICINE
Payer: MEDICARE

## 2017-10-13 VITALS
OXYGEN SATURATION: 99 % | DIASTOLIC BLOOD PRESSURE: 74 MMHG | SYSTOLIC BLOOD PRESSURE: 159 MMHG | HEIGHT: 69 IN | HEART RATE: 58 BPM | WEIGHT: 163 LBS | BODY MASS INDEX: 24.14 KG/M2 | TEMPERATURE: 96.7 F

## 2017-10-13 DIAGNOSIS — M25.559 ARTHRALGIA OF HIP, UNSPECIFIED LATERALITY: ICD-10-CM

## 2017-10-13 DIAGNOSIS — M25.559 ARTHRALGIA OF HIP, UNSPECIFIED LATERALITY: Primary | ICD-10-CM
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
## 2017-10-13 PROCEDURE — 73502 X-RAY EXAM HIP UNI 2-3 VIEWS: CPT

## 2017-10-13 PROCEDURE — 72100 X-RAY EXAM L-S SPINE 2/3 VWS: CPT

## 2017-10-13 PROCEDURE — 99214 OFFICE O/P EST MOD 30 MIN: CPT | Performed by: FAMILY MEDICINE

## 2017-10-13 NOTE — MR AVS SNAPSHOT
After Visit Summary   10/13/2017    Kiran Carcamo    MRN: [id number]           Patient Information     Date Of Birth          4/13/1921        Visit Information        Provider Department      10/13/2017 9:15 AM Buddy Poe MD [facility]        Today's Diagnoses     Arthralgia of hip, unspecified laterality    -  1    Fall, initial encounter          Care Instructions    -You may reach radiology at [phone] to schedule the xray of your hip and spine.  -I will let you know when I get the results back from imaging.          Follow-ups after your visit        Additional Services     [facility] PT, HAND, AND CHIROPRACTIC REFERRAL       **This order will print in the [facility] Scheduling Office**    Physical Therapy, Hand Therapy and Chiropractic Care are available through:    *[facility]  *[facility]  *[facility]    Call one number to schedule at any of the above locations: [phone].    Your provider has referred you to: Physical Therapy at [facility] or [facility]    Indication/Reason for Referral: Hip Pain  Onset of Illness: several weeks    Therapy Orders: Evaluate and Treat  Special Programs: None  Special Request: Adali Davila      Additional Comments for the Therapist or Chiropractor:     Please be aware that coverage of these services is subject to the terms and limitations of your health insurance plan.  Call member services at your health plan with any benefit or coverage questions.      Please bring the following to your appointment:    *Your personal calendar for scheduling future appointments  *Comfortable clothing                  Your next 10 appointments already scheduled     Dec 06, 2017  9:30 AM CST   LAB with RD LAB   [facility] ([facility])    [address] [place] [address] [phone]           Patient must bring picture ID. Patient should be  prepared to give a urine specimen  Please do not eat 10-12 hours before your appointment if you are coming in fasting for labs on lipids, cholesterol, or glucose (sugar). Pregnant women should follow their Care Team instructions. Water with medications is okay. Do not drink coffee or other fluids. If you have concerns about taking  your medications, please ask at office or if scheduling via [employer], send a message by clicking on Secure Messaging, Message Your Care Team.            Dec 06, 2017 10:00 AM CST   Office Visit with Buddy Poe MD   [facility] ([facility])    [address] [place] [address] [phone]           Bring a current list of meds and any records pertaining to this visit. For Physicals, please bring immunization records and any forms needing to be filled out. Please arrive 10 minutes early to complete paperwork.              Future tests that were ordered for you today     Open Future Orders        Priority Expected Expires Ordered    Alkaline phosphatase Routine  10/16/2018 10/16/2017    Vitamin D Deficiency Routine  10/16/2018 10/16/2017    Calcium Routine  10/16/2018 10/16/2017    Parathyroid Hormone Intact Routine  10/16/2018 10/16/2017            Who to contact     If you have questions or need follow up information about today's clinic visit or your schedule please contact [facility] directly at [phone].  Normal or non-critical lab and imaging results will be communicated to you by MyChart, letter or phone within 4 business days after the clinic has received the results. If you do not hear from us within 7 days, please contact the clinic through MyChart or phone. If you have a critical or abnormal lab result, we will notify you by phone as soon as possible.  Submit refill requests through [employer] or call your pharmacy and they will forward the refill request to us. Please allow 3 business days for  "your refill to be completed.          Additional Information About Your Visit        MyChart Information     [employer] gives you secure access to your electronic health record. If you see a primary care provider, you can also send messages to your care team and make appointments. If you have questions, please call your primary care clinic.  If you do not have a primary care provider, please call [phone] and they will assist you.        Care EveryWhere ID     This is your Care EveryWhere ID. This could be used by other organizations to access your [place] medical records  [id number]        Your Vitals Were     Pulse Temperature Height Pulse Oximetry BMI (Body Mass Index)       58 96.7  F (35.9  C) (Oral) 5' 9\" (1.753 m) 99% 24.07 kg/m2        Blood Pressure from Last 3 Encounters:   10/13/17 159/74   09/08/17 152/71   06/02/17 111/69    Weight from Last 3 Encounters:   10/13/17 163 lb (73.9 kg)   09/08/17 164 lb 4.8 oz (74.5 kg)   06/02/17 156 lb 12.8 oz (71.1 kg)              We Performed the Following     [facility] PT, HAND, AND CHIROPRACTIC REFERRAL        Primary Care Provider Office Phone # Fax #    Buddy Poe [phone]       [address] [address]        Equal Access to Services     [facility]: Hadii sharri ku hadasho Somarie, waaxda luqadaha, qaybta kaalmada madisyn, dyan quintero . So [facility] [phone].    ATENCIÓN: Si habla español, tiene a arriola disposición servicios gratuitos de asistencia lingüística. Kiersten al [phone].    We comply with applicable federal civil rights laws and [place] laws. We do not discriminate on the basis of race, color, national origin, age, disability, sex, sexual orientation, or gender identity.            Thank you!     Thank you for choosing [facility]  for your care. Our goal is always to provide you with excellent care. Hearing back from our patients is one way we can " continue to improve our services. Please take a few minutes to complete the written survey that you may receive in the mail after your visit with us. Thank you!             Your Updated Medication List - Protect others around you: Learn how to safely use, store and throw away your medicines at www.disposemymeds.org.          This list is accurate as of: 10/13/17 11:59 PM.  Always use your most recent med list.                   Brand Name Dispense Instructions for use Diagnosis    acetaminophen 325 MG tablet    TYLENOL    100 tablet    Take 3 tablets (975 mg) by mouth every 8 hours as needed for mild pain    Influenza A       aspirin 81 MG EC tablet      Take 81 mg by mouth At Bedtime.        isosorbide mononitrate 60 MG 24 hr tablet    IMDUR    90 tablet    Take 1 tablet (60 mg) by mouth daily    Chest discomfort       lisinopril 10 MG tablet    PRINIVIL/ZESTRIL    90 tablet    Take 1 tablet (10 mg) by mouth daily    Hypertension goal BP (blood pressure) < 140/90       metFORMIN 500 MG tablet    GLUCOPHAGE    180 tablet    Take 1 tablet (500 mg) by mouth 2 times daily (with meals)    Type 2 diabetes mellitus with diabetic nephropathy, without long-term current use of insulin (H)       metoprolol 25 MG 24 hr tablet    TOPROL-XL    45 tablet    Take 0.5 tablets (12.5 mg) by mouth daily    HTN (hypertension)       MV-ONE PO      Take 1 tablet by mouth.        nitroGLYcerin 0.4 MG sublingual tablet    NITROSTAT    25 tablet    Place 1 tablet (0.4 mg) under the tongue every 5 minutes as needed for chest pain If symptoms after 3 doses (15 minutes) call 911.    History of MI (myocardial infarction)       ONE TOUCH ULTRA test strip   Generic drug:  blood glucose monitoring     300 strip    USE TO TEST BLOOD SUGAR TWICE DAILY    Type 2 diabetes mellitus without complication (H)       polyethylene glycol Packet    MIRALAX/GLYCOLAX    10 packet    Take 17 g by mouth daily as needed for constipation    Constipation,  unspecified constipation type       simvastatin 20 MG tablet    ZOCOR    90 tablet    Take 1 tablet (20 mg) by mouth daily    Hyperlipidemia LDL goal <130

## 2017-10-13 NOTE — PATIENT INSTRUCTIONS
-You may reach radiology at [phone] to schedule the xray of your hip and spine.  -I will let you know when I get the results back from imaging.

## 2017-10-13 NOTE — NURSING NOTE
"Chief Complaint   Patient presents with     Musculoskeletal Problem     Hip pain        Initial /74  Pulse 58  Temp 96.7  F (35.9  C) (Oral)  Ht 5' 9\" (1.753 m)  Wt 163 lb (73.9 kg)  SpO2 99%  BMI 24.07 kg/m2 Estimated body mass index is 24.07 kg/(m^2) as calculated from the following:    Height as of this encounter: 5' 9\" (1.753 m).    Weight as of this encounter: 163 lb (73.9 kg).  Medication Reconciliation: complete   Yuliya Simeon MA      "

## 2017-10-16 DIAGNOSIS — E83.51 HYPOCALCEMIA: ICD-10-CM

## 2017-10-16 DIAGNOSIS — M88.89 PAGET'S DISEASE OF BONE AT MULTIPLE SITES: Primary | ICD-10-CM

## 2017-10-16 NOTE — PROGRESS NOTES
Called patient and daughter; nonfasting lab only for Pagets, followup provider appointment 1 week later. Buddy Poe MD

## 2017-10-17 DIAGNOSIS — M88.89 PAGET'S DISEASE OF BONE AT MULTIPLE SITES: ICD-10-CM

## 2017-10-17 DIAGNOSIS — E83.51 HYPOCALCEMIA: ICD-10-CM

## 2017-10-17 LAB — PTH-INTACT SERPL-MCNC: 44 PG/ML (ref 12–72)

## 2017-10-17 PROCEDURE — 83970 ASSAY OF PARATHORMONE: CPT | Performed by: FAMILY MEDICINE

## 2017-10-17 PROCEDURE — 82306 VITAMIN D 25 HYDROXY: CPT | Performed by: FAMILY MEDICINE

## 2017-10-17 PROCEDURE — 84075 ASSAY ALKALINE PHOSPHATASE: CPT | Performed by: FAMILY MEDICINE

## 2017-10-17 PROCEDURE — 82310 ASSAY OF CALCIUM: CPT | Performed by: FAMILY MEDICINE

## 2017-10-17 PROCEDURE — 36415 COLL VENOUS BLD VENIPUNCTURE: CPT | Performed by: FAMILY MEDICINE

## 2017-10-18 LAB
ALP SERPL-CCNC: 111 U/L (ref 40–150)
CALCIUM SERPL-MCNC: 9.4 MG/DL (ref 8.5–10.1)
DEPRECATED CALCIDIOL+CALCIFEROL SERPL-MC: 28 UG/L (ref 20–75)

## 2017-10-20 DIAGNOSIS — E78.5 HYPERLIPIDEMIA LDL GOAL <130: ICD-10-CM

## 2017-10-20 RX ORDER — SIMVASTATIN 20 MG
20 TABLET ORAL DAILY
Qty: 90 TABLET | Refills: 3 | Status: SHIPPED | OUTPATIENT
Start: 2017-10-20 | End: 2018-12-27

## 2017-10-26 NOTE — PROGRESS NOTES
Please notify patient: labs within normal limits, continue vit D supplementation with multivit. Will look forward to followup appointment in Dec.    Thanks Buddy Poe MD

## 2017-12-06 ENCOUNTER — OFFICE VISIT (OUTPATIENT)
Dept: FAMILY MEDICINE | Facility: CLINIC | Age: 82
End: 2017-12-06
Payer: COMMERCIAL

## 2017-12-06 VITALS
DIASTOLIC BLOOD PRESSURE: 72 MMHG | HEART RATE: 62 BPM | OXYGEN SATURATION: 100 % | TEMPERATURE: 97.6 F | WEIGHT: 163.9 LBS | BODY MASS INDEX: 24.2 KG/M2 | SYSTOLIC BLOOD PRESSURE: 130 MMHG

## 2017-12-06 DIAGNOSIS — M88.9 PAGET'S BONE DISEASE: ICD-10-CM

## 2017-12-06 DIAGNOSIS — R68.89 FORGETFULNESS: ICD-10-CM

## 2017-12-06 DIAGNOSIS — E11.21 TYPE 2 DIABETES MELLITUS WITH DIABETIC NEPHROPATHY, WITHOUT LONG-TERM CURRENT USE OF INSULIN (H): Primary | ICD-10-CM

## 2017-12-06 LAB — HBA1C MFR BLD: 7.1 % (ref 4.3–6)

## 2017-12-06 PROCEDURE — 99214 OFFICE O/P EST MOD 30 MIN: CPT | Performed by: FAMILY MEDICINE

## 2017-12-06 PROCEDURE — 83036 HEMOGLOBIN GLYCOSYLATED A1C: CPT | Performed by: FAMILY MEDICINE

## 2017-12-06 PROCEDURE — 36415 COLL VENOUS BLD VENIPUNCTURE: CPT | Performed by: FAMILY MEDICINE

## 2017-12-06 NOTE — PATIENT INSTRUCTIONS
-Continue on your current lifestyle and diet, and return in 3-4 months for a recheck of your diabetes.  -Try using over the counter flonase for your runny nose as needed.

## 2017-12-06 NOTE — PROGRESS NOTES
SUBJECTIVE:   Kiran Carcamo is a 89 year old male who presents to clinic today for the following health issues:    Patient is accompanied today by his daughter.    Diabetes Follow-up    Patient is checking blood sugars: once daily.  Results are as follows:       am - 160s-170s-0 a few 200s    Diabetic concerns: None.      Symptoms of hypoglycemia (low blood sugar): none     Paresthesias (numbness or burning in feet) or sores: No     Date of last diabetic eye exam: less than 1 year ago  BP Readings from Last 2 Encounters:   12/06/17 130/72   10/13/17 159/74     Hemoglobin A1C (%)   Date Value   12/06/2017 7.1 (H)   09/08/2017 8.4 (H)     LDL Cholesterol Calculated (mg/dL)   Date Value   09/08/2017 54   08/10/2016 64       Amount of exercise or physical activity: No formal exercise but is always on the go    Problems taking medications regularly: No    Medication side effects: none    Diet: regular (no restrictions)    Patient has been checking his blood sugars once daily, and has been getting readings typically around 160-170 with a few readings around 200. He says that he has had no problems with his diabetes or the medications. He has continued to be active and has been eating a healthy diet.    History of Paget's Disease:  Patient says that the pain in his pelvis has been improving, and that he is doing well with his Paget's Disease. He has continued to be active, and has been doing strengthening exercises regularly, which has been helpful. There is some soreness and pain in his thigh and pelvis, but reports that it has been much better.    Problem list and histories reviewed & adjusted, as indicated.  Additional history: as documented    Patient Active Problem List   Diagnosis     Health Care Home     AK (actinic keratosis)     SK (seborrheic keratosis)     Hyperplasia of prostate     Hyperlipidemia LDL goal <70     History of MI (myocardial infarction)     Forgetfulness     CKD (chronic kidney disease)  stage 2, GFR 60-89 ml/min     Atypical chest pain     Essential hypertension with goal blood pressure less than 140/90     Eczema, unspecified type     Type 2 diabetes mellitus with diabetic nephropathy, without long-term current use of insulin (H)     Hypocalcemia      Past Surgical History:   Procedure Laterality Date     CHOLECYSTECTOMY       CORONARY ARTERY BYPASS  '05    6-vessel     MANDIBLE SURGERY         Social History   Substance Use Topics     Smoking status: Never Smoker     Smokeless tobacco: Never Used     Alcohol use No     Family History   Problem Relation Age of Onset     HEART DISEASE Mother      Eye Disorder Father      Respiratory Brother      HEART DISEASE Sister      CANCER Daughter      CANCER Brother          Current Outpatient Prescriptions   Medication Sig Dispense Refill     simvastatin (ZOCOR) 20 MG tablet Take 1 tablet (20 mg) by mouth daily 90 tablet 3     metFORMIN (GLUCOPHAGE) 500 MG tablet Take 1 tablet (500 mg) by mouth 2 times daily (with meals) 180 tablet 3     ONE TOUCH ULTRA test strip USE TO TEST BLOOD SUGAR TWICE DAILY 300 strip 2     isosorbide mononitrate (IMDUR) 60 MG 24 hr tablet Take 1 tablet (60 mg) by mouth daily 90 tablet 1     metoprolol (TOPROL-XL) 25 MG 24 hr tablet Take 0.5 tablets (12.5 mg) by mouth daily 45 tablet 1     nitroglycerin (NITROSTAT) 0.4 MG sublingual tablet Place 1 tablet (0.4 mg) under the tongue every 5 minutes as needed for chest pain If symptoms after 3 doses (15 minutes) call 911. 25 tablet 1     lisinopril (PRINIVIL/ZESTRIL) 10 MG tablet Take 1 tablet (10 mg) by mouth daily 90 tablet 3     polyethylene glycol (MIRALAX/GLYCOLAX) Packet Take 17 g by mouth daily as needed for constipation 10 packet 0     acetaminophen (TYLENOL) 325 MG tablet Take 3 tablets (975 mg) by mouth every 8 hours as needed for mild pain 100 tablet 0     aspirin 81 MG EC tablet Take 81 mg by mouth At Bedtime.       Multiple Vitamin (MV-ONE PO) Take 1 tablet by mouth.        No Known Allergies  Recent Labs   Lab Test  12/06/17   1001  09/08/17   0919  05/09/17   0502  05/08/17   1450  03/20/17   1110  11/16/16   1047  08/10/16   1050  12/16/15   1111   11/21/14   1441   A1C  7.1*  8.4*   --    --   8.3*  7.6*  7.4*  6.9*   < >  6.8*   LDL   --   54   --    --    --    --   64   --    --   40   HDL   --   44   --    --    --    --   41   --    --   47   TRIG   --   127   --    --    --    --   97   --    --   110   ALT   --    --   69  83*   --    --    --   34   --   34   CR   --    --   1.07  1.01  1.08   --    --   1.02   --   0.94   GFRESTIMATED   --    --   64  68  63   --    --   68   --   75   GFRESTBLACK   --    --   78  83  77   --    --   82   --   >90   GFR Calc     POTASSIUM   --    --   4.3  4.3  4.4   --    --   4.3   --   4.7   TSH   --    --    --    --    --   2.57   --    --    --   1.97    < > = values in this interval not displayed.      BP Readings from Last 3 Encounters:   12/06/17 130/72   10/13/17 159/74   09/08/17 152/71    Wt Readings from Last 3 Encounters:   12/06/17 74.3 kg (163 lb 14.4 oz)   10/13/17 73.9 kg (163 lb)   09/08/17 74.5 kg (164 lb 4.8 oz)        Reviewed and updated as needed this visit by clinical staffTobacco  Allergies  Meds       Reviewed and updated as needed this visit by Provider         ROS:  Denies headache, insomnia, chest pain, shortness of breath, cough, heartburn, bowel issues, bladder issues, neck pain, back pain, hip pain, knee pain, ankle pain, or foot pain. Remainder of ROS is negative unless otherwise noted above or in HPI.    This document serves as a record of the services and decisions personally performed and made by Buddy Poe MD. It was created on his behalf by Jef Osuna, a trained medical scribe. The creation of this document is based the provider's statements to the medical scribe.  Jef Osuna 10:36 AM December 6, 2017    OBJECTIVE:     /72  Pulse 62  Temp 97.6  F  "(36.4  C) (Oral)  Wt 74.3 kg (163 lb 14.4 oz)  SpO2 100%  BMI 24.2 kg/m2  Body mass index is 24.2 kg/(m^2).  GENERAL: healthy, alert and no distress  RESP: lungs clear to auscultation - no rales, rhonchi or wheezes  CV: regular rate and rhythm, normal S1 S2, no S3 or S4, no murmur, click or rub and peripheral pulses strong  MS: no gross musculoskeletal defects noted, 1+ edema. Calves nontender.  NEURO: Normal strength and tone, mentation intact and speech normal  PSYCH: mentation appears normal, affect normal/bright    Diagnostic Test Results:  Results for orders placed or performed in visit on 12/06/17 (from the past 24 hour(s))   Hemoglobin A1c   Result Value Ref Range    Hemoglobin A1C 7.1 (H) 4.3 - 6.0 %       ASSESSMENT/PLAN:     (E11.21) Type 2 diabetes mellitus with diabetic nephropathy, without long-term current use of insulin (H)  (primary encounter diagnosis)  Comment: At goal. Controlled on metformin. Labs completed today.  Plan: Hemoglobin A1c        Continue on current medication. Follow up as needed.    (M88.9) Paget's bone disease  Comment: Stable and improved since last visit. Patient has been doing physical therapy exercises to relief.  Plan: Will continue to monitor. Follow up as needed.    (R68.89) Forgetfulness  Comment: Unchanged since last visit. Daughter reports that he still has his memory \"mostly intact\".  Plan: Will continue to monitor. Follow up as needed.    Patient Instructions   -Continue on your current lifestyle and diet, and return in 3-4 months for a recheck of your diabetes.  -Try using over the counter flonase for your runny nose as needed.    The information in this document, created by the medical scribe for me, accurately reflects the services I personally performed and the decisions made by me. I have reviewed and approved this document for accuracy prior to leaving the patient care area.   Buddy Poe MD 10:36 AM December 6, 2017  [facility]    "

## 2017-12-06 NOTE — MR AVS SNAPSHOT
After Visit Summary   12/6/2017    Kiran Carcamo    MRN: [id number]           Patient Information     Date Of Birth          4/13/1921        Visit Information        Provider Department      12/6/2017 10:00 AM Buddy Poe MD [facility]        Today's Diagnoses     Type 2 diabetes mellitus with diabetic nephropathy, without long-term current use of insulin (H)    -  1    Paget's bone disease        Forgetfulness          Care Instructions    -Continue on your current lifestyle and diet, and return in 3-4 months for a recheck of your diabetes.  -Try using over the counter flonase for your runny nose as needed.          Follow-ups after your visit        Your next 10 appointments already scheduled     Mar 05, 2018  9:30 AM CST   LAB with RD LAB   [facility] ([facility]    [address] [place] [address]  [facility] [address] [phone]           Please do not eat 10-12 hours before your appointment if you are coming in fasting for labs on lipids, cholesterol, or glucose (sugar). This does not apply to pregnant women. Water, hot tea and black coffee (with nothing added) are okay. Do not drink other fluids, diet soda or chew gum.            Mar 05, 2018 10:00 AM CST   Office Visit with Buddy Poe MD   [facility] ([facility]    [address] [place] [address]  [facility] [address] [phone]           Bring a current list of meds and any records pertaining to this visit. For Physicals, please bring immunization records and any forms needing to be filled out. Please arrive 10 minutes early to complete paperwork.              Who to contact     If you have questions or need follow up information about today's clinic visit or your schedule please contact [facility] directly at [phone].  Normal or non-critical lab and imaging results will be communicated to you by  MyChart, letter or phone within 4 business days after the clinic has received the results. If you do not hear from us within 7 days, please contact the clinic through [employer] or phone. If you have a critical or abnormal lab result, we will notify you by phone as soon as possible.  Submit refill requests through [employer] or call your pharmacy and they will forward the refill request to us. Please allow 3 business days for your refill to be completed.          Additional Information About Your Visit        [employer] Information     [employer] gives you secure access to your electronic health record. If you see a primary care provider, you can also send messages to your care team and make appointments. If you have questions, please call your primary care clinic.  If you do not have a primary care provider, please call [phone] and they will assist you.        Care EveryWhere ID     This is your Care EveryWhere ID. This could be used by other organizations to access your [place] medical records  [id number]        Your Vitals Were     Pulse Temperature Pulse Oximetry BMI (Body Mass Index)          62 97.6  F (36.4  C) (Oral) 100% 24.2 kg/m2         Blood Pressure from Last 3 Encounters:   12/06/17 130/72   10/13/17 159/74   09/08/17 152/71    Weight from Last 3 Encounters:   12/06/17 163 lb 14.4 oz (74.3 kg)   10/13/17 163 lb (73.9 kg)   09/08/17 164 lb 4.8 oz (74.5 kg)              We Performed the Following     Hemoglobin A1c        Primary Care Provider Office Phone # Fax #    Buddy Poe [phone]       [address] [address]        Equal Access to Services     BETO QUINONEZ : Hadii sharri Patel, waaxda luqadaha, qaybta kaaldyan mary. So [facility] [phone].    ATENCIÓN: Si habla español, tiene a arriola disposición servicios gratuitos de asistencia lingüística. Llame al [phone].    We comply with  applicable federal civil rights laws and [place] laws. We do not discriminate on the basis of race, color, national origin, age, disability, sex, sexual orientation, or gender identity.            Thank you!     Thank you for choosing [facility]  for your care. Our goal is always to provide you with excellent care. Hearing back from our patients is one way we can continue to improve our services. Please take a few minutes to complete the written survey that you may receive in the mail after your visit with us. Thank you!             Your Updated Medication List - Protect others around you: Learn how to safely use, store and throw away your medicines at www.disposemymeds.org.          This list is accurate as of: 12/6/17  1:15 PM.  Always use your most recent med list.                   Brand Name Dispense Instructions for use Diagnosis    acetaminophen 325 MG tablet    TYLENOL    100 tablet    Take 3 tablets (975 mg) by mouth every 8 hours as needed for mild pain    Influenza A       aspirin 81 MG EC tablet      Take 81 mg by mouth At Bedtime.        isosorbide mononitrate 60 MG 24 hr tablet    IMDUR    90 tablet    Take 1 tablet (60 mg) by mouth daily    Chest discomfort       lisinopril 10 MG tablet    PRINIVIL/ZESTRIL    90 tablet    Take 1 tablet (10 mg) by mouth daily    Hypertension goal BP (blood pressure) < 140/90       metFORMIN 500 MG tablet    GLUCOPHAGE    180 tablet    Take 1 tablet (500 mg) by mouth 2 times daily (with meals)    Type 2 diabetes mellitus with diabetic nephropathy, without long-term current use of insulin (H)       metoprolol 25 MG 24 hr tablet    TOPROL-XL    45 tablet    Take 0.5 tablets (12.5 mg) by mouth daily    HTN (hypertension)       MV-ONE PO      Take 1 tablet by mouth.        nitroGLYcerin 0.4 MG sublingual tablet    NITROSTAT    25 tablet    Place 1 tablet (0.4 mg) under the tongue every 5 minutes as needed for chest pain If symptoms after 3 doses (15  minutes) call 911.    History of MI (myocardial infarction)       ONETOUCH ULTRA test strip   Generic drug:  blood glucose monitoring     300 strip    USE TO TEST BLOOD SUGAR TWICE DAILY    Type 2 diabetes mellitus without complication (H)       polyethylene glycol Packet    MIRALAX/GLYCOLAX    10 packet    Take 17 g by mouth daily as needed for constipation    Constipation, unspecified constipation type       simvastatin 20 MG tablet    ZOCOR    90 tablet    Take 1 tablet (20 mg) by mouth daily    Hyperlipidemia LDL goal <130

## 2017-12-07 NOTE — PROGRESS NOTES
Praveen Darling, your recent results are back and are all normal. Please contact if any questions. Nice to see you!   Buddy Quality 134: Screening For Clinical Depression And Follow-Up Plan: The patient was screened for depression and the screen was negative and no follow up required Quality 317: Preventative Care And Screening: Screening For High Blood Pressure And Follow-Up Documented: Pre-hypertensive or hypertensive blood pressure reading documented, and the indicated follow-up is documented Quality 154 Part B: Falls: Risk Screening (Should Be Reported With Measure 155.): Patient screened for future fall risk; documentation of no falls in the past year or only one fall without injury in the past year Quality 111:Pneumonia Vaccination Status For Older Adults: Pneumococcal Vaccination not Administered or Previously Received, Reason not Otherwise Specified Quality 131: Pain Assessment And Follow-Up: Pain assessment using a standardized tool is documented as negative, no follow-up plan required Quality 154 Part A: Falls: Risk Assessment (Should Be Reported With Measure 155.): Falls risk assessment completed and documented in the past 12 months. Quality 47: Advance Care Plan: Advance Care Planning discussed and documented; advance care plan or surrogate decision maker documented in the medical record. Detail Level: Detailed Quality 110: Preventive Care And Screening: Influenza Immunization: Influenza immunization was not ordered or administered, reason not given Quality 128: Preventive Care And Screening: Body Mass Index (Bmi) Screening And Follow-Up Plan: BMI is documented above normal parameters and a follow-up plan is documented Quality 431: Preventive Care And Screening: Unhealthy Alcohol Use - Screening: Patient screened for unhealthy alcohol use using a single question and scores less than 2 times per year Quality 226: Preventive Care And Screening: Tobacco Use: Screening And Cessation Intervention: Patient screened for tobacco and never smoked

## 2018-02-01 ENCOUNTER — TRANSFERRED RECORDS (OUTPATIENT)
Dept: HEALTH INFORMATION MANAGEMENT | Facility: CLINIC | Age: 83
End: 2018-02-01

## 2018-02-02 DIAGNOSIS — I10 HTN (HYPERTENSION): ICD-10-CM

## 2018-02-05 RX ORDER — METOPROLOL SUCCINATE 25 MG/1
TABLET, EXTENDED RELEASE ORAL
Qty: 45 TABLET | Refills: 1 | Status: SHIPPED | OUTPATIENT
Start: 2018-02-05 | End: 2018-08-07

## 2018-03-05 ENCOUNTER — OFFICE VISIT (OUTPATIENT)
Dept: FAMILY MEDICINE | Facility: CLINIC | Age: 83
End: 2018-03-05
Payer: COMMERCIAL

## 2018-03-05 VITALS
OXYGEN SATURATION: 99 % | RESPIRATION RATE: 18 BRPM | BODY MASS INDEX: 23.78 KG/M2 | WEIGHT: 161 LBS | TEMPERATURE: 97.5 F | DIASTOLIC BLOOD PRESSURE: 68 MMHG | HEART RATE: 56 BPM | SYSTOLIC BLOOD PRESSURE: 130 MMHG

## 2018-03-05 DIAGNOSIS — I10 ESSENTIAL HYPERTENSION WITH GOAL BLOOD PRESSURE LESS THAN 140/90: ICD-10-CM

## 2018-03-05 DIAGNOSIS — E11.21 TYPE 2 DIABETES MELLITUS WITH DIABETIC NEPHROPATHY, WITHOUT LONG-TERM CURRENT USE OF INSULIN (H): Primary | ICD-10-CM

## 2018-03-05 DIAGNOSIS — M88.9 PAGET'S BONE DISEASE: ICD-10-CM

## 2018-03-05 LAB — HBA1C MFR BLD: 7.4 % (ref 4.3–6)

## 2018-03-05 PROCEDURE — 83036 HEMOGLOBIN GLYCOSYLATED A1C: CPT | Performed by: FAMILY MEDICINE

## 2018-03-05 PROCEDURE — 36415 COLL VENOUS BLD VENIPUNCTURE: CPT | Performed by: FAMILY MEDICINE

## 2018-03-05 PROCEDURE — 99214 OFFICE O/P EST MOD 30 MIN: CPT | Performed by: FAMILY MEDICINE

## 2018-03-05 NOTE — PROGRESS NOTES
"  SUBJECTIVE:   Kiran Carcamo is a 89 year old male who presents to clinic today for the following health issues:    Diabetes Follow-up    Patient is checking blood sugars: once daily.  Results are as follows:         am - 190    Diabetic concerns: None     Symptoms of hypoglycemia (low blood sugar): none     Paresthesias (numbness or burning in feet) or sores: No. Does have some pain in knees/legs.     Date of last diabetic eye exam: 11/2017    BP Readings from Last 2 Encounters:   12/06/17 130/72   10/13/17 159/74     Hemoglobin A1C (%)   Date Value   12/06/2017 7.1 (H)   09/08/2017 8.4 (H)     LDL Cholesterol Calculated (mg/dL)   Date Value   09/08/2017 54   08/10/2016 64       Amount of exercise or physical activity: None    Problems taking medications regularly: No    Medication side effects: none    Diet: regular (no restrictions)    Patient says that he has been checking his blood sugars once daily, and has been having higher readings over the last couple readings. This morning his reading was 190, and he has been getting some readings around 180, and he thinks that is because he has been eating girl  cookies. He has not had any problems with hypoglycemia or paresthesias.    Knee Pain:  Patient has a history of Troy's Disease, and says that it \"suddenly went away\". He has been having some \"muscular pain\" in his legs and knees, and he says that the pain is intermittent and improving over the last week. He has been able to walk with a cane, and has had no problems with falls. Patient was able to go to [employer] yesterday without using the cane, and reports no pain in his knees. He has been having some problems with pain in his upper thigh that he thinks is \"a nerve and muscle interaction\".    Problem list and histories reviewed & adjusted, as indicated.  Additional history: as documented    Patient Active Problem List   Diagnosis     Health Care Home     AK (actinic keratosis)     SK (seborrheic " keratosis)     Hyperplasia of prostate     Hyperlipidemia LDL goal <70     History of MI (myocardial infarction)     Forgetfulness     CKD (chronic kidney disease) stage 2, GFR 60-89 ml/min     Essential hypertension with goal blood pressure less than 140/90     Eczema, unspecified type     Type 2 diabetes mellitus with diabetic nephropathy, without long-term current use of insulin (H)     Hypocalcemia      Past Surgical History:   Procedure Laterality Date     CHOLECYSTECTOMY       CORONARY ARTERY BYPASS  '05    6-vessel     MANDIBLE SURGERY         Social History   Substance Use Topics     Smoking status: Never Smoker     Smokeless tobacco: Never Used     Alcohol use No     Family History   Problem Relation Age of Onset     HEART DISEASE Mother      Eye Disorder Father      Respiratory Brother      HEART DISEASE Sister      CANCER Daughter      CANCER Brother          Current Outpatient Prescriptions   Medication Sig Dispense Refill     metoprolol succinate (TOPROL-XL) 25 MG 24 hr tablet TAKE ONE-HALF TABLET BY MOUTH DAILY 45 tablet 1     simvastatin (ZOCOR) 20 MG tablet Take 1 tablet (20 mg) by mouth daily 90 tablet 3     metFORMIN (GLUCOPHAGE) 500 MG tablet Take 1 tablet (500 mg) by mouth 2 times daily (with meals) 180 tablet 3     ONE TOUCH ULTRA test strip USE TO TEST BLOOD SUGAR TWICE DAILY 300 strip 2     isosorbide mononitrate (IMDUR) 60 MG 24 hr tablet Take 1 tablet (60 mg) by mouth daily 90 tablet 1     nitroglycerin (NITROSTAT) 0.4 MG sublingual tablet Place 1 tablet (0.4 mg) under the tongue every 5 minutes as needed for chest pain If symptoms after 3 doses (15 minutes) call 911. 25 tablet 1     lisinopril (PRINIVIL/ZESTRIL) 10 MG tablet Take 1 tablet (10 mg) by mouth daily 90 tablet 3     polyethylene glycol (MIRALAX/GLYCOLAX) Packet Take 17 g by mouth daily as needed for constipation 10 packet 0     acetaminophen (TYLENOL) 325 MG tablet Take 3 tablets (975 mg) by mouth every 8 hours as needed for mild  pain 100 tablet 0     aspirin 81 MG EC tablet Take 81 mg by mouth At Bedtime.       Multiple Vitamin (MV-ONE PO) Take 1 tablet by mouth.       No Known Allergies  Recent Labs   Lab Test  03/05/18   0928  12/06/17   1001  09/08/17   0919  05/09/17   0502  05/08/17   1450   11/16/16   1047  08/10/16   1050  12/16/15   1111   11/21/14   1441   A1C  7.4*  7.1*  8.4*   --    --    < >  7.6*  7.4*  6.9*   < >  6.8*   LDL   --    --   54   --    --    --    --   64   --    --   40   HDL   --    --   44   --    --    --    --   41   --    --   47   TRIG   --    --   127   --    --    --    --   97   --    --   110   ALT   --    --    --   69  83*   --    --    --   34   --   34   CR   --    --    --   1.07  1.01   < >   --    --   1.02   --   0.94   GFRESTIMATED   --    --    --   64  68   < >   --    --   68   --   75   GFRESTBLACK   --    --    --   78  83   < >   --    --   82   --   >90   GFR Calc     POTASSIUM   --    --    --   4.3  4.3   < >   --    --   4.3   --   4.7   TSH   --    --    --    --    --    --   2.57   --    --    --   1.97    < > = values in this interval not displayed.      BP Readings from Last 3 Encounters:   03/05/18 130/68   12/06/17 130/72   10/13/17 159/74    Wt Readings from Last 3 Encounters:   03/05/18 73 kg (161 lb)   12/06/17 74.3 kg (163 lb 14.4 oz)   10/13/17 73.9 kg (163 lb)        Reviewed and updated as needed this visit by clinical staff       Reviewed and updated as needed this visit by Provider         ROS:  Positive for knee pain.    Denies headache, insomnia, chest pain, shortness of breath, cough, heartburn, bowel issues, bladder issues, neck pain, back pain, hip pain, ankle pain, or foot pain. Remainder of ROS is negative unless otherwise noted above or in HPI.    This document serves as a record of the services and decisions personally performed and made by Buddy Poe MD. It was created on his behalf by Jef Osuna, a trained medical scribe. The  creation of this document is based on the provider's statements to the medical scribe.  Jef Osuna 10:26 AM March 5, 2018    OBJECTIVE:     /68  Pulse 56  Temp 97.5  F (36.4  C) (Oral)  Resp 18  Wt 73 kg (161 lb)  SpO2 99%  BMI 23.78 kg/m2  Body mass index is 23.78 kg/(m^2).  GENERAL: healthy, alert and no distress  RESP: lungs clear to auscultation - no rales, rhonchi or wheezes  CV: frequent extra beats but otherwise regular rate, no murmur, click or rub, peripheral pulses strong  MS: calves nontender, trace edema  NEURO: Normal strength and tone, mentation intact and speech normal  PSYCH: mentation appears normal, affect normal/bright    Diagnostic Test Results:  Results for orders placed or performed in visit on 03/05/18 (from the past 24 hour(s))   Hemoglobin A1c   Result Value Ref Range    Hemoglobin A1C 7.4 (H) 4.3 - 6.0 %       ASSESSMENT/PLAN:     Encounter Diagnoses   Name Primary?     Type 2 diabetes mellitus with diabetic nephropathy, without long-term current use of insulin (H) Yes     Essential hypertension with goal blood pressure less than 140/90      Paget's bone disease      (E11.21) Type 2 diabetes mellitus with diabetic nephropathy, without long-term current use of insulin (H)  (primary encounter diagnosis)  Comment: At goal. Controlled on metformin.  Plan: Hemoglobin A1c        Continue on current medication. Follow up in 3 months for recheck.    (I10) Essential hypertension with goal blood pressure less than 140/90  Comment: At goal. Controlled on metoprolol, isosorbide mononitrate and lisinopril.  Plan: Continue on current medications. Follow up in 3 months for recheck.    Patient Instructions   -Please consider having your driving evaluated in late April to early May.  -Return in 3 months for your physical and a recheck of your sugars.      The information in this document, created by the medical scribe for me, accurately reflects the services I personally performed and  the decisions made by me. I have reviewed and approved this document for accuracy prior to leaving the patient care area.   Buddy Poe MD 10:26 AM March 5, 2018  [facility] [place]

## 2018-03-05 NOTE — PATIENT INSTRUCTIONS
-Please consider having your driving evaluated in late April to early May.  -Return in 3 months for your physical and a recheck of your sugars.

## 2018-03-05 NOTE — MR AVS SNAPSHOT
After Visit Summary   3/5/2018    Kiran Carcamo    MRN: [id number]           Patient Information     Date Of Birth          4/13/1921        Visit Information        Provider Department      3/5/2018 10:00 AM Buddy Poe MD [facility] [place]        Today's Diagnoses     Type 2 diabetes mellitus with diabetic nephropathy, without long-term current use of insulin (H)    -  1    Essential hypertension with goal blood pressure less than 140/90          Care Instructions    -Please consider having your driving evaluated in late April to early May.  -Return in 3 months for your physical and a recheck of your sugars.          Follow-ups after your visit        Who to contact     If you have questions or need follow up information about today's clinic visit or your schedule please contact [facility] [place] directly at [phone].  Normal or non-critical lab and imaging results will be communicated to you by MyChart, letter or phone within 4 business days after the clinic has received the results. If you do not hear from us within 7 days, please contact the clinic through MyChart or phone. If you have a critical or abnormal lab result, we will notify you by phone as soon as possible.  Submit refill requests through [employer] or call your pharmacy and they will forward the refill request to us. Please allow 3 business days for your refill to be completed.          Additional Information About Your Visit        MyChart Information     [employer] gives you secure access to your electronic health record. If you see a primary care provider, you can also send messages to your care team and make appointments. If you have questions, please call your primary care clinic.  If you do not have a primary care provider, please call [phone] and they will assist you.        Care EveryWhere ID     This is your Care EveryWhere ID. This could be used by other organizations to access your [place]  medical records  [id number]        Your Vitals Were     Pulse Temperature Respirations Pulse Oximetry BMI (Body Mass Index)       56 97.5  F (36.4  C) (Oral) 18 99% 23.78 kg/m2        Blood Pressure from Last 3 Encounters:   03/05/18 130/68   12/06/17 130/72   10/13/17 159/74    Weight from Last 3 Encounters:   03/05/18 161 lb (73 kg)   12/06/17 163 lb 14.4 oz (74.3 kg)   10/13/17 163 lb (73.9 kg)              We Performed the Following     Hemoglobin A1c        Primary Care Provider Office Phone # Fax #    Buddy Poe [phone]       [address] [address]        Equal Access to Services     BETO QUINONEZ : Hadii aad ku hadasho Somarie, waaxda luqadaha, qaybta kaalmada adeegyada, dyan quintero . So [facility] [phone].    ATENCIÓN: Si habla español, tiene a arriola disposición servicios gratuitos de asistencia lingüística. Gordoname al [phone].    We comply with applicable federal civil rights laws and [place] laws. We do not discriminate on the basis of race, color, national origin, age, disability, sex, sexual orientation, or gender identity.            Thank you!     Thank you for choosing [facility]  for your care. Our goal is always to provide you with excellent care. Hearing back from our patients is one way we can continue to improve our services. Please take a few minutes to complete the written survey that you may receive in the mail after your visit with us. Thank you!             Your Updated Medication List - Protect others around you: Learn how to safely use, store and throw away your medicines at www.disposemymeds.org.          This list is accurate as of 3/5/18 10:39 AM.  Always use your most recent med list.                   Brand Name Dispense Instructions for use Diagnosis    acetaminophen 325 MG tablet    TYLENOL    100 tablet    Take 3 tablets (975 mg) by mouth every 8 hours as needed for mild pain     Influenza A       aspirin 81 MG EC tablet      Take 81 mg by mouth At Bedtime.        isosorbide mononitrate 60 MG 24 hr tablet    IMDUR    90 tablet    Take 1 tablet (60 mg) by mouth daily    Chest discomfort       lisinopril 10 MG tablet    PRINIVIL/ZESTRIL    90 tablet    Take 1 tablet (10 mg) by mouth daily    Hypertension goal BP (blood pressure) < 140/90       metFORMIN 500 MG tablet    GLUCOPHAGE    180 tablet    Take 1 tablet (500 mg) by mouth 2 times daily (with meals)    Type 2 diabetes mellitus with diabetic nephropathy, without long-term current use of insulin (H)       metoprolol succinate 25 MG 24 hr tablet    TOPROL-XL    45 tablet    TAKE ONE-HALF TABLET BY MOUTH DAILY    HTN (hypertension)       MV-ONE PO      Take 1 tablet by mouth.        nitroGLYcerin 0.4 MG sublingual tablet    NITROSTAT    25 tablet    Place 1 tablet (0.4 mg) under the tongue every 5 minutes as needed for chest pain If symptoms after 3 doses (15 minutes) call 911.    History of MI (myocardial infarction)       ONETOUCH ULTRA test strip   Generic drug:  blood glucose monitoring     300 strip    USE TO TEST BLOOD SUGAR TWICE DAILY    Type 2 diabetes mellitus without complication (H)       polyethylene glycol Packet    MIRALAX/GLYCOLAX    10 packet    Take 17 g by mouth daily as needed for constipation    Constipation, unspecified constipation type       simvastatin 20 MG tablet    ZOCOR    90 tablet    Take 1 tablet (20 mg) by mouth daily    Hyperlipidemia LDL goal <130

## 2018-04-04 DIAGNOSIS — R07.89 CHEST DISCOMFORT: ICD-10-CM

## 2018-04-05 RX ORDER — ISOSORBIDE MONONITRATE 60 MG/1
60 TABLET, EXTENDED RELEASE ORAL DAILY
Qty: 90 TABLET | Refills: 0 | Status: SHIPPED | OUTPATIENT
Start: 2018-04-05 | End: 2018-07-19

## 2018-04-12 NOTE — ADDENDUM NOTE
Encounter addended by: Fior Rehman PT on: 4/12/2018  2:58 PM<BR>     Actions taken: Delete clinical note

## 2018-06-04 NOTE — PROGRESS NOTES
"  SUBJECTIVE:   Kiran Carcamo is a 89 year old male who presents to clinic today with his daughter for the following health issues:    Diabetes Follow-up  High of 308 when he had half a glazed donut prior to bedtime.   Patient is checking blood sugars: once daily.  Results are as follows:         am - 120s-140s    Diabetic concerns: None     Symptoms of hypoglycemia (low blood sugar): none     Paresthesias (numbness or burning in feet) or sores: No     Date of last diabetic eye exam: February 2018, at [facility] [address]    BP Readings from Last 2 Encounters:   06/06/18 128/62   03/05/18 130/68     Hemoglobin A1C (%)   Date Value   06/06/2018 6.5 (H)   03/05/2018 7.4 (H)     LDL Cholesterol Calculated (mg/dL)   Date Value   09/08/2017 54   08/10/2016 64       Amount of exercise or physical activity:     Problems taking medications regularly: No but states there was a while he was only taking one Metformin, now is taking normal again.    Medication side effects: none    Diet: regular (no restrictions)      Thigh pain - Until about 5 days ago, he had no thigh pain but had bilateral leg pain. About 4-5 days ago, he started having left lower back discomfort. A year ago, when he had Paget's, he had pain for about 3 months but it resolved. Comments he was doing yard work yesterday. He uses his cane as needed. His daughter tells her to take Tylenol for leg pain but he does not take it.    Balance - Noticed his balance is not as good. He tripped on his fan getting up one night. Denies passing out. Daughter says he does not wear his Life Alert at night. Patient says he has his Life Alert at his bed at night. His glasses do not prevent him from falling.    Chest pain - One day last week, he had mild chest \"discomfort\" for 3 minutes. A day or two before that, he felt \"off.\"     Skin - He had a skin cancer many years ago. Does not follow up with dermatology.     Additional notes:  Commented about a spot on his right " hand--may have been a sliver--that is resolved after the nurse squeezed it.      Problem list and histories reviewed & adjusted, as indicated.  Additional history: as documented    Patient Active Problem List   Diagnosis     Health Care Home     AK (actinic keratosis)     SK (seborrheic keratosis)     Hyperplasia of prostate     Hyperlipidemia LDL goal <70     History of MI (myocardial infarction)     Forgetfulness     CKD (chronic kidney disease) stage 2, GFR 60-89 ml/min     Essential hypertension with goal blood pressure less than 140/90     Eczema, unspecified type     Type 2 diabetes mellitus with diabetic nephropathy, without long-term current use of insulin (H)     Hypocalcemia      Paget's bone disease     Past Surgical History:   Procedure Laterality Date     CHOLECYSTECTOMY       CORONARY ARTERY BYPASS  '05    6-vessel     MANDIBLE SURGERY         Social History   Substance Use Topics     Smoking status: Never Smoker     Smokeless tobacco: Never Used     Alcohol use No     Family History   Problem Relation Age of Onset     HEART DISEASE Mother      Eye Disorder Father      Respiratory Brother      HEART DISEASE Sister      CANCER Daughter      CANCER Brother          Current Outpatient Prescriptions   Medication Sig Dispense Refill     acetaminophen (TYLENOL) 325 MG tablet Take 3 tablets (975 mg) by mouth every 8 hours as needed for mild pain 100 tablet 0     aspirin 81 MG EC tablet Take 81 mg by mouth At Bedtime.       isosorbide mononitrate (IMDUR) 60 MG 24 hr tablet Take 1 tablet (60 mg) by mouth daily Please get refills from primary care or schedule with new cardiologist. 90 tablet 0     lisinopril (PRINIVIL/ZESTRIL) 10 MG tablet Take 1 tablet (10 mg) by mouth daily 90 tablet 3     metFORMIN (GLUCOPHAGE) 500 MG tablet Take 1 tablet (500 mg) by mouth 2 times daily (with meals) 180 tablet 3     metoprolol succinate (TOPROL-XL) 25 MG 24 hr tablet TAKE ONE-HALF TABLET BY MOUTH DAILY 45 tablet 1      Multiple Vitamin (MV-ONE PO) Take 1 tablet by mouth.       nitroglycerin (NITROSTAT) 0.4 MG sublingual tablet Place 1 tablet (0.4 mg) under the tongue every 5 minutes as needed for chest pain If symptoms after 3 doses (15 minutes) call 911. 25 tablet 1     ONE TOUCH ULTRA test strip USE TO TEST BLOOD SUGAR TWICE DAILY 300 strip 2     polyethylene glycol (MIRALAX/GLYCOLAX) Packet Take 17 g by mouth daily as needed for constipation 10 packet 0     simvastatin (ZOCOR) 20 MG tablet Take 1 tablet (20 mg) by mouth daily 90 tablet 3     No Known Allergies  Recent Labs   Lab Test  06/06/18   1041  03/05/18   0928  12/06/17   1001  09/08/17   0919  05/09/17   0502  05/08/17   1450   11/16/16   1047  08/10/16   1050  12/16/15   1111   11/21/14   1441   A1C  6.5*  7.4*  7.1*  8.4*   --    --    < >  7.6*  7.4*  6.9*   < >  6.8*   LDL   --    --    --   54   --    --    --    --   64   --    --   40   HDL   --    --    --   44   --    --    --    --   41   --    --   47   TRIG   --    --    --   127   --    --    --    --   97   --    --   110   ALT   --    --    --    --   69  83*   --    --    --   34   --   34   CR   --    --    --    --   1.07  1.01   < >   --    --   1.02   --   0.94   GFRESTIMATED   --    --    --    --   64  68   < >   --    --   68   --   75   GFRESTBLACK   --    --    --    --   78  83   < >   --    --   82   --   >90   GFR Calc     POTASSIUM   --    --    --    --   4.3  4.3   < >   --    --   4.3   --   4.7   TSH   --    --    --    --    --    --    --   2.57   --    --    --   1.97    < > = values in this interval not displayed.      BP Readings from Last 3 Encounters:   06/06/18 128/62   03/05/18 130/68   12/06/17 130/72    Wt Readings from Last 3 Encounters:   06/06/18 70.6 kg (155 lb 9.6 oz)   03/05/18 73 kg (161 lb)   12/06/17 74.3 kg (163 lb 14.4 oz)                  Labs reviewed in EPIC    Reviewed and updated as needed this visit by clinical staff  Tobacco  Allergies  Meds   Med Hx  Surg Hx  Fam Hx  Soc Hx      Reviewed and updated as needed this visit by Provider         ROS:  Denies headache, insomnia, shortness of breath, cough, heartburn, bowel issues, bladder issues, neck pain, back pain, hip pain, knee pain, ankle pain, or foot pain. Remainder of ROS is negative unless otherwise noted above or in HPI.    This document serves as a record of the services and decisions personally performed and made by Buddy Poe MD. It was created on his/her behalf by Yaakov Chaidez, trained medical scribe. The creation of this document is based the provider's statements to the medical scribes.    Selenaibaleksandra Chaidez 11:14 AM, June 6, 2018  OBJECTIVE:   /62  Pulse 50  Temp 97.6  F (36.4  C) (Oral)  Resp 12  Wt 70.6 kg (155 lb 9.6 oz)  SpO2 100%  BMI 22.98 kg/m2  Body mass index is 22.98 kg/(m^2).  GENERAL: healthy, alert and no distress  RESP: lungs clear to auscultation - no rales, rhonchi or wheezes  CV: regular rate and rhythm, normal S1 S2, no S3 or S4, no murmur, click or rub, 1+ edema bilaterally and peripheral pulses strong  SKIN: Small linear abrasion on upper right leg.   NEURO: Normal strength and tone, mentation intact and speech normal  PSYCH: mentation appears normal, affect normal/bright    Diagnostic Test Results:  Results for orders placed or performed in visit on 06/06/18 (from the past 24 hour(s))   Hemoglobin A1c   Result Value Ref Range    Hemoglobin A1C 6.5 (H) 0 - 5.6 %       ASSESSMENT/PLAN:   (E11.21) Type 2 diabetes mellitus with diabetic nephropathy, without long-term current use of insulin (H)  (primary encounter diagnosis)  Comment: At goal  Plan: Hemoglobin A1c, CREATININE        Follow up physical in 4 months     (L57.0) AK (actinic keratosis)  Comment: history of skin cancer   Plan: dermatology referral    (Z85.828) History of skin cancer  Comment:   Plan:     (M88.9) Paget's bone disease  Comment: Probable source of pain in right upper thigh   Plan:  Continue activities ad joaquina. Contact if symptoms interfere with activities.         The information in this document, created by the medical scribe, Yaakov Chaidez, for me, accurately reflects the services I personally performed and the decisions made by me. I have reviewed and approved this document for accuracy prior to leaving the patient care area.    Buddy Poe MD  [facility] [place]

## 2018-06-06 ENCOUNTER — OFFICE VISIT (OUTPATIENT)
Dept: FAMILY MEDICINE | Facility: CLINIC | Age: 83
End: 2018-06-06
Payer: COMMERCIAL

## 2018-06-06 VITALS
BODY MASS INDEX: 22.98 KG/M2 | HEART RATE: 50 BPM | WEIGHT: 155.6 LBS | SYSTOLIC BLOOD PRESSURE: 128 MMHG | RESPIRATION RATE: 12 BRPM | TEMPERATURE: 97.6 F | OXYGEN SATURATION: 100 % | DIASTOLIC BLOOD PRESSURE: 62 MMHG

## 2018-06-06 DIAGNOSIS — Z85.828 HISTORY OF SKIN CANCER: ICD-10-CM

## 2018-06-06 DIAGNOSIS — M88.9 PAGET'S BONE DISEASE: ICD-10-CM

## 2018-06-06 DIAGNOSIS — E11.21 TYPE 2 DIABETES MELLITUS WITH DIABETIC NEPHROPATHY, WITHOUT LONG-TERM CURRENT USE OF INSULIN (H): Primary | ICD-10-CM

## 2018-06-06 DIAGNOSIS — L57.0 AK (ACTINIC KERATOSIS): ICD-10-CM

## 2018-06-06 LAB
CREAT SERPL-MCNC: 1.1 MG/DL (ref 0.66–1.25)
GFR SERPL CREATININE-BSD FRML MDRD: 62 ML/MIN/1.7M2
HBA1C MFR BLD: 6.5 % (ref 0–5.6)

## 2018-06-06 PROCEDURE — 82565 ASSAY OF CREATININE: CPT | Performed by: FAMILY MEDICINE

## 2018-06-06 PROCEDURE — 36415 COLL VENOUS BLD VENIPUNCTURE: CPT | Performed by: FAMILY MEDICINE

## 2018-06-06 PROCEDURE — 83036 HEMOGLOBIN GLYCOSYLATED A1C: CPT | Performed by: FAMILY MEDICINE

## 2018-06-06 PROCEDURE — 99214 OFFICE O/P EST MOD 30 MIN: CPT | Performed by: FAMILY MEDICINE

## 2018-06-06 NOTE — PATIENT INSTRUCTIONS
Wear your Life Alert device all the time.    Call Dr. Bailey's nurse at this number:  Bradley Elizondo RN  Cardiology Care Coordinator  [phone]        Take metformin just once in the morning.    Get your physical in October. Get your A1C drawn at that time.

## 2018-06-06 NOTE — PROGRESS NOTES
Patient needs Healthcare Directive. Printed and put on door. Will be sure patient gets before leaving

## 2018-06-06 NOTE — Clinical Note
Please abstract the following data from this visit with this patient into the appropriate field in Epic:  Eye exam with ophthalmology on this date: 2/1/18 by [facility], Dr. Kendrick

## 2018-06-06 NOTE — MR AVS SNAPSHOT
After Visit Summary   6/6/2018    Kiran Carcamo    MRN: [id number]           Patient Information     Date Of Birth          4/13/1921        Visit Information        Provider Department      6/6/2018 11:00 AM Buddy Poe MD [facility]        Today's Diagnoses     Type 2 diabetes mellitus with diabetic nephropathy, without long-term current use of insulin (H)    -  1    AK (actinic keratosis)        History of skin cancer        Paget's bone disease          Care Instructions    Wear your Life Alert device all the time.    Call Dr. Bailey's nurse at this number:  Bradley Elizondo RN  Cardiology Care Coordinator  [phone]        Take metformin just once in the morning.    Get your physical in October. Get your A1C drawn at that time.           Follow-ups after your visit        Who to contact     If you have questions or need follow up information about today's clinic visit or your schedule please contact [facility] directly at [phone].  Normal or non-critical lab and imaging results will be communicated to you by Adeptencehart, letter or phone within 4 business days after the clinic has received the results. If you do not hear from us within 7 days, please contact the clinic through [address] or phone. If you have a critical or abnormal lab result, we will notify you by phone as soon as possible.  Submit refill requests through [employer] or call your pharmacy and they will forward the refill request to us. Please allow 3 business days for your refill to be completed.          Additional Information About Your Visit        Adeptencehart Information     [employer] gives you secure access to your electronic health record. If you see a primary care provider, you can also send messages to your care team and make appointments. If you have questions, please call your primary care clinic.  If you do not have a primary care provider, please call [phone] and they will assist  you.        Care EveryWhere ID     This is your Care EveryWhere ID. This could be used by other organizations to access your [place] medical records  [id number]        Your Vitals Were     Pulse Temperature Respirations Pulse Oximetry BMI (Body Mass Index)       50 97.6  F (36.4  C) (Oral) 12 100% 22.98 kg/m2        Blood Pressure from Last 3 Encounters:   06/06/18 128/62   03/05/18 130/68   12/06/17 130/72    Weight from Last 3 Encounters:   06/06/18 155 lb 9.6 oz (70.6 kg)   03/05/18 161 lb (73 kg)   12/06/17 163 lb 14.4 oz (74.3 kg)              We Performed the Following     CREATININE     Hemoglobin A1c        Primary Care Provider Office Phone # Fax #    Buddy Poe [phone]       [address]  [facility] [address]        Equal Access to Services     [facility] : Hadii aad ku hadasho Soomaali, waaxda luqadaha, qaybta kaalmada adeegyada, waxay manuel haysuzin christina quintero . So [facility] [phone].    ATENCIÓN: Si habla español, tiene a arriola disposición servicios gratuitos de asistencia lingüística. Kiersten al [phone].    We comply with applicable federal civil rights laws and [place] laws. We do not discriminate on the basis of race, color, national origin, age, disability, sex, sexual orientation, or gender identity.            Thank you!     Thank you for choosing [facility]  for your care. Our goal is always to provide you with excellent care. Hearing back from our patients is one way we can continue to improve our services. Please take a few minutes to complete the written survey that you may receive in the mail after your visit with us. Thank you!             Your Updated Medication List - Protect others around you: Learn how to safely use, store and throw away your medicines at www.disposemymeds.org.          This list is accurate as of 6/6/18 11:44 AM.  Always use your most recent med list.                   Brand Name Dispense  Instructions for use Diagnosis    acetaminophen 325 MG tablet    TYLENOL    100 tablet    Take 3 tablets (975 mg) by mouth every 8 hours as needed for mild pain    Influenza A       aspirin 81 MG EC tablet      Take 81 mg by mouth At Bedtime.        isosorbide mononitrate 60 MG 24 hr tablet    IMDUR    90 tablet    Take 1 tablet (60 mg) by mouth daily Please get refills from primary care or schedule with new cardiologist.    Chest discomfort       lisinopril 10 MG tablet    PRINIVIL/ZESTRIL    90 tablet    Take 1 tablet (10 mg) by mouth daily    Hypertension goal BP (blood pressure) < 140/90       metFORMIN 500 MG tablet    GLUCOPHAGE    180 tablet    Take 1 tablet (500 mg) by mouth 2 times daily (with meals)    Type 2 diabetes mellitus with diabetic nephropathy, without long-term current use of insulin (H)       metoprolol succinate 25 MG 24 hr tablet    TOPROL-XL    45 tablet    TAKE ONE-HALF TABLET BY MOUTH DAILY    HTN (hypertension)       MV-ONE PO      Take 1 tablet by mouth.        nitroGLYcerin 0.4 MG sublingual tablet    NITROSTAT    25 tablet    Place 1 tablet (0.4 mg) under the tongue every 5 minutes as needed for chest pain If symptoms after 3 doses (15 minutes) call 911.    History of MI (myocardial infarction)       ONETOUCH ULTRA test strip   Generic drug:  blood glucose monitoring     300 strip    USE TO TEST BLOOD SUGAR TWICE DAILY    Type 2 diabetes mellitus without complication (H)       polyethylene glycol Packet    MIRALAX/GLYCOLAX    10 packet    Take 17 g by mouth daily as needed for constipation    Constipation, unspecified constipation type       simvastatin 20 MG tablet    ZOCOR    90 tablet    Take 1 tablet (20 mg) by mouth daily    Hyperlipidemia LDL goal <130

## 2018-06-10 NOTE — PROGRESS NOTES
Praveen Beck, your recent results are back and are all normal. Please contact if any questions. Please followup in Oct; have a great summer!  Buddy Poe MD

## 2018-06-11 ENCOUNTER — TELEPHONE (OUTPATIENT)
Dept: SURGERY | Facility: CLINIC | Age: 83
End: 2018-06-11

## 2018-06-11 NOTE — TELEPHONE ENCOUNTER
Health Call Center    Phone Message    May a detailed message be left on voicemail: no    Reason for Call: Other: Pt called in to speak with Bradley Elizondo regarding which provider he should see after Dr. Bailey retired. Please contact Pt to discuss.     Action Taken: Message routed to:  [facility] ([facility]OLOG[facility]

## 2018-07-10 DIAGNOSIS — I10 HYPERTENSION GOAL BP (BLOOD PRESSURE) < 140/90: ICD-10-CM

## 2018-07-10 RX ORDER — LISINOPRIL 10 MG/1
TABLET ORAL
Qty: 90 TABLET | Refills: 0 | Status: SHIPPED | OUTPATIENT
Start: 2018-07-10 | End: 2018-07-19

## 2018-07-10 NOTE — TELEPHONE ENCOUNTER
Medication is being filled for 1 time refill only due to:  Future labs ordered patient due for potassium level.     Nicolasa Javier, ROBERTN RN  [facility]

## 2018-07-10 NOTE — TELEPHONE ENCOUNTER
"Requested Prescriptions   Pending Prescriptions Disp Refills     lisinopril (PRINIVIL/ZESTRIL) 10 MG tablet [Pharmacy Med Name: LISINOPRIL 10 MG TABLET] 90 tablet 3    Last Written Prescription Date:  06/03/2017  Last Fill Quantity: 90,  # refills: 3   Last office visit: 6/6/2018 with prescribing provider:  06/06/2018   Future Office Visit:     Sig: TAKE 1 TABLET (10 MG) BY MOUTH DAILY    ACE Inhibitors (Including Combos) Protocol Failed    7/10/2018 10:26 AM       Failed - Normal serum potassium on file in past 12 months    Recent Labs   Lab Test  05/09/17   0502   POTASSIUM  4.3            Passed - Blood pressure under 140/90 in past 12 months    BP Readings from Last 3 Encounters:   06/06/18 128/62   03/05/18 130/68   12/06/17 130/72                Passed - Recent (12 mo) or future (30 days) visit within the authorizing provider's specialty    Patient had office visit in the last 12 months or has a visit in the next 30 days with authorizing provider or within the authorizing provider's specialty.  See \"Patient Info\" tab in inbasket, or \"Choose Columns\" in Meds & Orders section of the refill encounter.           Passed - Patient is age 18 or older       Passed - Normal serum creatinine on file in past 12 months    Recent Labs   Lab Test  06/06/18   1041   CR  1.10               "

## 2018-07-13 ENCOUNTER — OFFICE VISIT (OUTPATIENT)
Dept: CARDIOLOGY | Facility: CLINIC | Age: 83
End: 2018-07-13
Attending: NURSE PRACTITIONER
Payer: MEDICARE

## 2018-07-13 VITALS
DIASTOLIC BLOOD PRESSURE: 68 MMHG | HEART RATE: 61 BPM | OXYGEN SATURATION: 99 % | WEIGHT: 156.2 LBS | HEIGHT: 67 IN | BODY MASS INDEX: 24.52 KG/M2 | SYSTOLIC BLOOD PRESSURE: 164 MMHG

## 2018-07-13 DIAGNOSIS — I25.118 CORONARY ARTERY DISEASE OF NATIVE ARTERY OF NATIVE HEART WITH STABLE ANGINA PECTORIS (H): Primary | ICD-10-CM

## 2018-07-13 DIAGNOSIS — E78.5 HYPERLIPIDEMIA LDL GOAL <130: ICD-10-CM

## 2018-07-13 DIAGNOSIS — I10 ESSENTIAL HYPERTENSION: ICD-10-CM

## 2018-07-13 PROCEDURE — 99214 OFFICE O/P EST MOD 30 MIN: CPT | Mod: ZP | Performed by: NURSE PRACTITIONER

## 2018-07-13 PROCEDURE — G0463 HOSPITAL OUTPT CLINIC VISIT: HCPCS | Mod: 25,ZF

## 2018-07-13 PROCEDURE — 93005 ELECTROCARDIOGRAM TRACING: CPT | Mod: ZF

## 2018-07-13 PROCEDURE — 93010 ELECTROCARDIOGRAM REPORT: CPT | Mod: ZP | Performed by: INTERNAL MEDICINE

## 2018-07-13 ASSESSMENT — ENCOUNTER SYMPTOMS
ARTHRALGIAS: 1
SINUS CONGESTION: 0
STIFFNESS: 1
FLANK PAIN: 1
DISTURBANCES IN COORDINATION: 1
POOR WOUND HEALING: 1
SMELL DISTURBANCE: 1
TROUBLE SWALLOWING: 0
SPEECH CHANGE: 0
PALPITATIONS: 0
DIFFICULTY URINATING: 0
PARALYSIS: 0
TASTE DISTURBANCE: 1
ORTHOPNEA: 0
SLEEP DISTURBANCES DUE TO BREATHING: 0
HYPOTENSION: 0
LOSS OF CONSCIOUSNESS: 0
HEADACHES: 0
MYALGIAS: 0
HEMATURIA: 0
DIZZINESS: 1
LEG PAIN: 1
SYNCOPE: 0
NECK PAIN: 0
TREMORS: 0
EXERCISE INTOLERANCE: 0
NECK MASS: 0
SINUS PAIN: 0
HYPERTENSION: 0
HOARSE VOICE: 0
NAIL CHANGES: 0
NUMBNESS: 0
MUSCLE CRAMPS: 0
MUSCLE WEAKNESS: 1
SEIZURES: 0
MEMORY LOSS: 0
BACK PAIN: 0
TINGLING: 0
DYSURIA: 0
SKIN CHANGES: 1
WEAKNESS: 1
JOINT SWELLING: 0
LIGHT-HEADEDNESS: 0
SORE THROAT: 0

## 2018-07-13 ASSESSMENT — PAIN SCALES - GENERAL: PAINLEVEL: NO PAIN (0)

## 2018-07-13 NOTE — MR AVS SNAPSHOT
After Visit Summary   2018    Kiran Carcamo    MRN: [id number]           Patient Information     Date Of Birth          1921        Visit Information        Provider Department      2018 9:00 AM Tasia Zayas NP [facility]        Today's Diagnoses     CAD (coronary artery disease)    -  1    Essential hypertension        Hyperlipidemia LDL goal <130          Care Instructions    You were seen today in the Cardiovascular Clinic at the [facility].    Cardiology provider you saw during your visit Tasia Zayas NP.     1. Your EKG is stable.   2. Continue current medications.  3. Please make a follow-up appointment with Dr. Fernando in 1 year.     Questions and [phone].   First press #1 for the University and then press #3 for Medical Questions to reach the Cardiology triage nurse.     On Call Cardiologist for after hours or on weekends: [phone], press option #4 and ask to speak to the on-call Cardiologist.             Follow-ups after your visit        Additional Services     Follow-Up with Cardiologist                 Follow-up notes from your care team     Return in about 1 year (around 2019).      Your next 10 appointments already scheduled     Aug 06, 2018 10:15 AM CDT   (Arrive by 10:00 AM)   New Patient Visit with Michael Arevalo MD   [facility] ([facility])    [address] [place] [id number] [phone]              Future tests that were ordered for you today     Open Future Orders        Priority Expected Expires Ordered    Follow-Up with Cardiologist Routine 2019            Who to contact     If you have questions or need follow up information about today's clinic visit or your schedule please contact [facility] directly at [phone].  Normal or non-critical lab and imaging results will be communicated to you by  "MyChart, letter or phone within 4 business days after the clinic has received the results. If you do not hear from us within 7 days, please contact the clinic through Netzoptikerhart or phone. If you have a critical or abnormal lab result, we will notify you by phone as soon as possible.  Submit refill requests through [employer] or call your pharmacy and they will forward the refill request to us. Please allow 3 business days for your refill to be completed.          Additional Information About Your Visit        NetzoptikerharQuant the News Information     [employer] gives you secure access to your electronic health record. If you see a primary care provider, you can also send messages to your care team and make appointments. If you have questions, please call your primary care clinic.  If you do not have a primary care provider, please call [phone] and they will assist you.        Care EveryWhere ID     This is your Care EveryWhere ID. This could be used by other organizations to access your [place] medical records  [id number]        Your Vitals Were     Pulse Height Pulse Oximetry BMI (Body Mass Index)          61 1.702 m (5' 7\") 99% 24.46 kg/m2         Blood Pressure from Last 3 Encounters:   07/13/18 164/68   06/06/18 128/62   03/05/18 130/68    Weight from Last 3 Encounters:   07/13/18 70.9 kg (156 lb 3.2 oz)   06/06/18 70.6 kg (155 lb 9.6 oz)   03/05/18 73 kg (161 lb)              We Performed the Following     EKG 12-lead, tracing only (Same Day)        Primary Care Provider Office Phone # Fax #    Buddy Poe [phone]       [address] [facility] [address]  [facility] [address]        Equal Access to Services     [facility] : Hadii sharri Patel, wazohrehda luqadaha, qaybta kaalmadyan loera. So [facility] [phone].    ATENCIÓN: Si habla español, tiene a arriola disposición servicios gratuitos de asistencia lingüística. Llame al [phone].    We comply with " applicable federal civil rights laws and [place] laws. We do not discriminate on the basis of race, color, national origin, age, disability, sex, sexual orientation, or gender identity.            Thank you!     Thank you for choosing [facility]  for your care. Our goal is always to provide you with excellent care. Hearing back from our patients is one way we can continue to improve our services. Please take a few minutes to complete the written survey that you may receive in the mail after your visit with us. Thank you!             Your Updated Medication List - Protect others around you: Learn how to safely use, store and throw away your medicines at www.disposemymeds.org.          This list is accurate as of 7/13/18  9:53 AM.  Always use your most recent med list.                   Brand Name Dispense Instructions for use Diagnosis    acetaminophen 325 MG tablet    TYLENOL    100 tablet    Take 3 tablets (975 mg) by mouth every 8 hours as needed for mild pain    Influenza A       aspirin 81 MG EC tablet      Take 81 mg by mouth At Bedtime.        isosorbide mononitrate 60 MG 24 hr tablet    IMDUR    90 tablet    Take 1 tablet (60 mg) by mouth daily Please get refills from primary care or schedule with new cardiologist.    Chest discomfort       lisinopril 10 MG tablet    PRINIVIL/ZESTRIL    90 tablet    TAKE 1 TABLET (10 MG) BY MOUTH DAILY    Hypertension goal BP (blood pressure) < 140/90       metFORMIN 500 MG tablet    GLUCOPHAGE    180 tablet    Take 1 tablet (500 mg) by mouth 2 times daily (with meals)    Type 2 diabetes mellitus with diabetic nephropathy, without long-term current use of insulin (H)       metoprolol succinate 25 MG 24 hr tablet    TOPROL-XL    45 tablet    TAKE ONE-HALF TABLET BY MOUTH DAILY    HTN (hypertension)       MV-ONE PO      Take 1 tablet by mouth.        nitroGLYcerin 0.4 MG sublingual tablet    NITROSTAT    25 tablet    Place 1 tablet (0.4 mg) under the tongue every 5  minutes as needed for chest pain If symptoms after 3 doses (15 minutes) call 911.    History of MI (myocardial infarction)       ONETOUCH ULTRA test strip   Generic drug:  blood glucose monitoring     300 strip    USE TO TEST BLOOD SUGAR TWICE DAILY    Type 2 diabetes mellitus without complication (H)       polyethylene glycol Packet    MIRALAX/GLYCOLAX    10 packet    Take 17 g by mouth daily as needed for constipation    Constipation, unspecified constipation type       simvastatin 20 MG tablet    ZOCOR    90 tablet    Take 1 tablet (20 mg) by mouth daily    Hyperlipidemia LDL goal <130

## 2018-07-13 NOTE — PATIENT INSTRUCTIONS
You were seen today in the Cardiovascular Clinic at the [facility].    Cardiology provider you saw during your visit Tasia Zayas NP.     1. Your EKG is stable.   2. Continue current medications.  3. Please make a follow-up appointment with Dr. Fernando in 1 year.     Questions and [phone].   First press #1 for the University and then press #3 for Medical Questions to reach the Cardiology triage nurse.     On Call Cardiologist for after hours or on weekends: [phone], press option #4 and ask to speak to the on-call Cardiologist.

## 2018-07-13 NOTE — PROGRESS NOTES
Cardiology Clinic Note  July 13, 2018      HPI:  Kiran Carcamo is a 89 year old with a history HTN, HLD and CAD s/p CABG in 2002 with last angiogram in 2002 demonstrating patent LIMA to LAD and SVG to RCA, and occluded SVG to OM1, OM2 and D1. He is a former patient of Dr. Bailey and was last seen in 2/2016, he was having occasional chest pain and his Imdur was increased to 60 mg. He presents today for routine follow-up.   Mr. Carcamo is accompanied by his daughter today. He states that he is doing very well. He lives independently in a 2 story home with the support of his daughters who live near by. He remains active working around the yard and mows the lawn with a push mower. 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 He denies exertional chest pain, shortness of breath, orthopnea, PND, lower extremity swelling, lightheadedness or passing out. He reports rare episodes of chest discomfort, maybe 1-2 times a month. The pain is nonexertional and goes away within a few minutes. His symptoms are unchanged. He is compliant with his medications and is currently taking ASA, Zocor 20, Lisinopril 20 mg, metoprolol 12.5 mg daily, Imdur 60 mg.   Past medical history:  Past Medical History:   Diagnosis Date     CKD (chronic kidney disease) stage 2, GFR 60-89 ml/min 7/6/2015     Hypertension goal BP (blood pressure) < 140/90      Syncope 12/12/2011     Type 2 diabetes, HbA1C goal < 8% (H) 2/23/2015     Medications:    Current Outpatient Prescriptions on File Prior to Visit:  acetaminophen (TYLENOL) 325 MG tablet Take 3 tablets (975 mg) by mouth every 8 hours as needed for mild pain   aspirin 81 MG EC tablet Take 81 mg by mouth At Bedtime.   isosorbide mononitrate (IMDUR) 60 MG 24 hr tablet Take 1 tablet (60 mg) by mouth daily Please get refills from primary care or schedule with new cardiologist.   lisinopril (PRINIVIL/ZESTRIL) 10 MG tablet TAKE 1 TABLET (10 MG) BY MOUTH DAILY    metFORMIN (GLUCOPHAGE) 500 MG tablet Take 1 tablet (500 mg) by mouth 2 times daily (with meals)   metoprolol succinate (TOPROL-XL) 25 MG 24 hr tablet TAKE ONE-HALF TABLET BY MOUTH DAILY   Multiple Vitamin (MV-ONE PO) Take 1 tablet by mouth.   ONE TOUCH ULTRA test strip USE TO TEST BLOOD SUGAR TWICE DAILY   polyethylene glycol (MIRALAX/GLYCOLAX) Packet Take 17 g by mouth daily as needed for constipation   simvastatin (ZOCOR) 20 MG tablet Take 1 tablet (20 mg) by mouth daily   nitroglycerin (NITROSTAT) 0.4 MG sublingual tablet Place 1 tablet (0.4 mg) under the tongue every 5 minutes as needed for chest pain If symptoms after 3 doses (15 minutes) call 911.     No current facility-administered medications on file prior to visit.     Allergies:    No Known Allergies    Family and social history:    Family History   Problem Relation Age of Onset     HEART DISEASE Mother      Eye Disorder Father      Respiratory Brother      HEART DISEASE Sister      Cancer Daughter      Cancer Brother        Social History     Social History     Marital status:      Spouse name: N/A     Number of children: N/A     Years of education: N/A     Occupational History     Not on file.     Social History Main Topics     Smoking status: Never Smoker     Smokeless tobacco: Never Used     Alcohol use No     Drug use: No     Sexual activity: Not Currently     Partners: Female     Birth control/ protection: Abstinence     Other Topics Concern     Parent/Sibling W/ Cabg, Mi Or Angioplasty Before 65f 55m? No     Social History Narrative     Review of Systems:  Skin: No skin rash or ulcers.  Eyes: No red eye.  Ears/Nose/Throat: No ear discharge, nasal congestion, sore throat or dysphagia.  Respiratory: No cough or hemoptysis.  Cardiovascular: See HPI.    Gastrointestinal: No abdominal pain, nausea, vomiting, hematemesis or melena.  Genitourinary: No increased frequency or urgency of urine. No dysuria or hematuria.  Musculoskeletal: No  "polyarthralgia or myalgias.  Neurologic: No headaches, seizure or focal weakness.  Psychiatric: No hallucinations.  Hematologic/Lymphatic/Immunologic: No bleeding tendency.  Endocrine: No heat or cold intolerance, abnormal facial hair or alopecia.    Vital signs:  /68  Pulse 61  Ht 1.702 m (5' 7\")  Wt 70.9 kg (156 lb 3.2 oz)  SpO2 99%  BMI 24.46 kg/m2   Wt Readings from Last 2 Encounters:   07/13/18 70.9 kg (156 lb 3.2 oz)   06/06/18 70.6 kg (155 lb 9.6 oz)       Physical Exam:  Gen: NAD.    HEENT: No conjunctival pallor or scleral icterus, MMM. Clear oropharynx.    Neck: No JVD. No thyroid enlargement or cervical adenopathy.    Chest: Clear to auscultation bilaterally.    CV: Normal first and second heart sounds. No murmurs or gallop appreciated.    Abdomen: Soft, non-tender, non-distended, BS+.  Ext: No edema. Warm and well perfused with normal capillary refill.    Skin: No skin rash or ulcers.  Neuro: alert, oriented and appropriately conversant.    Psych: Normal affect and speech.    Labs:  Recent Labs   Lab Test  09/08/17   0919  08/10/16   1050  11/21/14   1441  04/16/14   1030   CHOL  123  124  109  118   HDL  44  41  47  36*   LDL  54  64  40  55   TRIG  127  97  110  140   CHOLHDLRATIO   --    --   2.3  3.3     Diagnostics:  STRESS TEST -04-   1. Subjective response: No anginal symptoms.   2. ECG Interpretation:   The stress ECG was positive for ischemia at a diagnostic level of myocardial O2 demand. (previous history of false positve stress ECG)   3. Final radiology results contained in separate report: Preliminary impression- There is marked patient motion at rest and stress. Mild incease lung uptake on the stress images. There is decreased wall thickening in the inferior and inferior-septal region with preserved wall motion throughout. Otherwise, there is normal wall thickening. There is large severe inferior wall defect identified at stress which extends through the inferior septal " wall. This is partially reversible. When at rest, the reversible is moderate in size. The post exercise left ventricular ejection fraction is 52%, which is at the lower limits of normal. There is minimal heterogenous uptake identified at the anterior wall which is likely due to motion.   CCL: 4/14/2000   79 year old white male with no significant Past Medical History developed exertional chest pain since Jan 2000. 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 LV gram showed inferobasel dyskinesis with EF 50 %.The RCA supplies a dyskinetic area and is well collateralised. In view of his age, and minimal symptoms despite no treatment, we recommed pt to be on medical herapy (cardizem,ASA, and imdur), and follow up in the clinic in next two weeks.If he is asymptomatic on medical therapy, he would need no intervention. However if he has persistent Sx we would rotablate the LAD and possibility stent it.   CCL: 7/2/2002   82 y/o gentleman with known CAD, h/o angina HTN, Hyperlipidemia doing well on medical therapy until recently with shoulder pain.   CORS ANGIO: Right Dominant system, LM is normal, LAD mid complex lesion with calcification , D1 and D2 with 70% proximal stenosis, LCx had a moderate lesion mid and OM1 with Moderate disease. RCA with prior total occlusion is now recanalized and patent with distal RCA lesion of 70% with PDA lesion of 70%.   LV gram: severely hypokinetic inferior basal region with a normal anterior wall. Visual estimate of EF 40%.   Recommendations:   1) CABG   2) BP control,   3) Statin : LDL goal of <85   4) HDL, > 45   5) ACE-I.,   6) Continue with beta blockers and ASA.   7) 2D echo.   ANGIOGRAM: 10-23-02   Cors Angio: LM is normal, LAD has a 80% stenosis proximal to the LIMA insertion. D1 is a small vessel and has a >95% stenosis proximally. D2 has  moderate disease. LCx has mild to moderate (25%-50%)disease and OM1 and OM2 have mild to moderate disease (25%-50%) without competitive flow. RCA is occluded at the mid segment.   LIMA to LAD is patent. SVG top RCA is patent. SVG to OM1 and OM2 is occluded. SVG to D1 is occluded.   PCI: Unsuccessful PCI because of inability to cross the lesion secondary to a dissection flap in the small D1. Several attempts to cross the lesion were unsuccessful.   Recommendations:   1) Medical therapy  Echo: 11/10/2011   Interpretation Summary  The LVEF is 55%. Inferior wall hypokinesis is present. Global right   ventricular function is normal. Mild to moderate mitral insufficiency is   present. The IVC is fully collapsed, c/w reduced intravascular volume. No   pericardial effusion is present.  PatientHeight: 68 in  PatientWeight: 160 lbs  SystolicPressure: 135 mmHg  DiastolicPressure: 56 mmHg  BSA 1.9 m^2   ECG today: Sinus bradycardia with sinus arrhythmia and PVCs, prior inferior infarction  Assessment and Plan:  This is a 89 year old with CAD s/p CABG 2002, HTN and HLD who presents for routine follow-up.   CAD s/p 5-vessel CABG in 2002: Stable. He is able to performs > 4 METS without cardiovascular symptoms. He reports rare episodes of chest discomfort which have not increased in frequency or severity. Continue medical management of CAD with ASA 81 mg, metoprolol XL 12.5 mg daily and Imdur 60 mg daily.    HTN, goal < 140/90: BP is elevated today, though the patient has not taken his medication yet this morning. Will continue current regimen with lisinopril 10 mg daily.    HLD, goal LDL < 70: Last LDL 54. Continue Zocor 20 mg daily.     Follow-up: RTC in year.        Answers for HPI/ROS submitted by the patient on 7/13/2018   General Symptoms: No  Skin Symptoms: Yes  HENT Symptoms: Yes  EYE SYMPTOMS: No  HEART SYMPTOMS: Yes  LUNG SYMPTOMS: No  INTESTINAL SYMPTOMS: No  URINARY SYMPTOMS: Yes  REPRODUCTIVE SYMPTOMS: No  SKELETAL  SYMPTOMS: Yes  BLOOD SYMPTOMS: No  NERVOUS SYSTEM SYMPTOMS: Yes  MENTAL HEALTH SYMPTOMS: No  Changes in hair: No  Changes in moles/birth marks: Yes  Itching: No  Rashes: No  Changes in nails: No  Acne: No  Change in facial hair: No  Warts: No  Non-healing sores: Yes  Scarring: No  Flaking of skin: No  Color changes of hands/feet in cold : No  Sun sensitivity: No  Ear pain: No  Ear discharge: No  Hearing loss: Yes  Tinnitus: No  Nosebleeds: No  Congestion: No  Sinus pain: No  Trouble swallowing: No   Voice hoarseness: No  Mouth sores: No  Sore throat: No  Tooth pain: No  Gum tenderness: No  Bleeding gums: No  Change in taste: Yes  Change in sense of smell: Yes  Dry mouth: No  Hearing aid used: Yes  Neck lump: No  Chest pain or pressure: Yes  Fast or irregular heartbeat: No  Pain in legs with walking: Yes  Trouble breathing while lying down: No  Fingers or toes appear blue: No  High blood pressure: No  Low blood pressure: No  Fainting: No  Murmurs: No  Pacemaker: No  Varicose veins: No  Edema or swelling: No  Wake up at night with shortness of breath: No  Light-headedness: No  Exercise intolerance: No  Trouble holding urine or incontinence: Yes  Pain or burning: No  Trouble starting or stopping: No  Increased frequency of urination: No  Blood in urine: No  Decreased frequency of urination: No  Frequent nighttime urination: Yes  Flank pain: Yes  Difficulty emptying bladder: No  Back pain: No  Muscle aches: No  Neck pain: No  Swollen joints: No  Joint pain: Yes  Bone pain: No  Muscle cramps: No  Muscle weakness: Yes  Joint stiffness: Yes  Bone fracture: No  Trouble with coordination: Yes  Dizziness or trouble with balance: Yes  Fainting or black-out spells: No  Memory loss: No  Headache: No  Seizures: No  Speech problems: No  Tingling: No  Tremor: No  Weakness: Yes  Difficulty walking: Yes  Paralysis: No  Numbness: No

## 2018-07-13 NOTE — PROGRESS NOTES
Results discussed directly with patient while patient was present. Any further details documented in the note.   Tasia Zayas NP

## 2018-07-13 NOTE — LETTER
7/13/2018      RE: Kiran Carcamo  [address]  [facility] [address]       Dear Colleague,    Thank you for the opportunity to participate in the care of your patient, Kiran Carcamo, at the [facility]. Please see a copy of my visit note below.    Cardiology Clinic Note  July 13, 2018      HPI:  Kiran Carcamo is a 89 year old with a history HTN, HLD and CAD s/p CABG in 2002 with last angiogram in 2002 demonstrating patent LIMA to LAD and SVG to RCA, and occluded SVG to OM1, OM2 and D1. He is a former patient of Dr. Bailey and was last seen in 2/2016, he was having occasional chest pain and his Imdur was increased to 60 mg. He presents today for routine follow-up.   Mr. Carcamo is accompanied by his daughter today. He states that he is doing very well. He lives independently in a 2 story home with the support of his daughters who live near by. He remains active working around the yard and mows the lawn with a push mower. 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 He denies exertional chest pain, shortness of breath, orthopnea, PND, lower extremity swelling, lightheadedness or passing out. He reports rare episodes of chest discomfort, maybe 1-2 times a month. The pain is nonexertional and goes away within a few minutes. His symptoms are unchanged. He is compliant with his medications and is currently taking ASA, Zocor 20, Lisinopril 20 mg, metoprolol 12.5 mg daily, Imdur 60 mg.   Past medical history:  Past Medical History:   Diagnosis Date     CKD (chronic kidney disease) stage 2, GFR 60-89 ml/min 7/6/2015     Hypertension goal BP (blood pressure) < 140/90      Syncope 12/12/2011     Type 2 diabetes, HbA1C goal < 8% (H) 2/23/2015     Medications:    Current Outpatient Prescriptions on File Prior to Visit:  acetaminophen (TYLENOL) 325 MG tablet Take 3 tablets (975 mg) by mouth every 8 hours as needed  for mild pain   aspirin 81 MG EC tablet Take 81 mg by mouth At Bedtime.   isosorbide mononitrate (IMDUR) 60 MG 24 hr tablet Take 1 tablet (60 mg) by mouth daily Please get refills from primary care or schedule with new cardiologist.   lisinopril (PRINIVIL/ZESTRIL) 10 MG tablet TAKE 1 TABLET (10 MG) BY MOUTH DAILY   metFORMIN (GLUCOPHAGE) 500 MG tablet Take 1 tablet (500 mg) by mouth 2 times daily (with meals)   metoprolol succinate (TOPROL-XL) 25 MG 24 hr tablet TAKE ONE-HALF TABLET BY MOUTH DAILY   Multiple Vitamin (MV-ONE PO) Take 1 tablet by mouth.   ONE TOUCH ULTRA test strip USE TO TEST BLOOD SUGAR TWICE DAILY   polyethylene glycol (MIRALAX/GLYCOLAX) Packet Take 17 g by mouth daily as needed for constipation   simvastatin (ZOCOR) 20 MG tablet Take 1 tablet (20 mg) by mouth daily   nitroglycerin (NITROSTAT) 0.4 MG sublingual tablet Place 1 tablet (0.4 mg) under the tongue every 5 minutes as needed for chest pain If symptoms after 3 doses (15 minutes) call 911.     No current facility-administered medications on file prior to visit.     Allergies:    No Known Allergies    Family and social history:    Family History   Problem Relation Age of Onset     HEART DISEASE Mother      Eye Disorder Father      Respiratory Brother      HEART DISEASE Sister      Cancer Daughter      Cancer Brother        Social History     Social History     Marital status:      Spouse name: N/A     Number of children: N/A     Years of education: N/A     Occupational History     Not on file.     Social History Main Topics     Smoking status: Never Smoker     Smokeless tobacco: Never Used     Alcohol use No     Drug use: No     Sexual activity: Not Currently     Partners: Female     Birth control/ protection: Abstinence     Other Topics Concern     Parent/Sibling W/ Cabg, Mi Or Angioplasty Before 65f 55m? No     Social History Narrative     Review of Systems:  Skin: No skin rash or ulcers.  Eyes: No red eye.  Ears/Nose/Throat: No ear  "discharge, nasal congestion, sore throat or dysphagia.  Respiratory: No cough or hemoptysis.  Cardiovascular: See HPI.    Gastrointestinal: No abdominal pain, nausea, vomiting, hematemesis or melena.  Genitourinary: No increased frequency or urgency of urine. No dysuria or hematuria.  Musculoskeletal: No polyarthralgia or myalgias.  Neurologic: No headaches, seizure or focal weakness.  Psychiatric: No hallucinations.  Hematologic/Lymphatic/Immunologic: No bleeding tendency.  Endocrine: No heat or cold intolerance, abnormal facial hair or alopecia.    Vital signs:  /68  Pulse 61  Ht 1.702 m (5' 7\")  Wt 70.9 kg (156 lb 3.2 oz)  SpO2 99%  BMI 24.46 kg/m2   Wt Readings from Last 2 Encounters:   07/13/18 70.9 kg (156 lb 3.2 oz)   06/06/18 70.6 kg (155 lb 9.6 oz)       Physical Exam:  Gen: NAD.    HEENT: No conjunctival pallor or scleral icterus, MMM. Clear oropharynx.    Neck: No JVD. No thyroid enlargement or cervical adenopathy.    Chest: Clear to auscultation bilaterally.    CV: Normal first and second heart sounds. No murmurs or gallop appreciated.    Abdomen: Soft, non-tender, non-distended, BS+.  Ext: No edema. Warm and well perfused with normal capillary refill.    Skin: No skin rash or ulcers.  Neuro: alert, oriented and appropriately conversant.    Psych: Normal affect and speech.    Labs:  Recent Labs   Lab Test  09/08/17   0919  08/10/16   1050  11/21/14   1441  04/16/14   1030   CHOL  123  124  109  118   HDL  44  41  47  36*   LDL  54  64  40  55   TRIG  127  97  110  140   CHOLHDLRATIO   --    --   2.3  3.3     Diagnostics:  STRESS TEST -04-   1. Subjective response: No anginal symptoms.   2. ECG Interpretation:   The stress ECG was positive for ischemia at a diagnostic level of myocardial O2 demand. (previous history of false positve stress ECG)   3. Final radiology results contained in separate report: Preliminary impression- There is marked patient motion at rest and stress. Mild " incease lung uptake on the stress images. There is decreased wall thickening in the inferior and inferior-septal region with preserved wall motion throughout. Otherwise, there is normal wall thickening. There is large severe inferior wall defect identified at stress which extends through the inferior septal wall. This is partially reversible. When at rest, the reversible is moderate in size. The post exercise left ventricular ejection fraction is 52%, which is at the lower limits of normal. There is minimal heterogenous uptake identified at the anterior wall which is likely due to motion.   CCL: 4/14/2000   79 year old white male with no significant Past Medical History developed exertional chest pain since Jan 2000. 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 LV gram showed inferobasel dyskinesis with EF 50 %.The RCA supplies a dyskinetic area and is well collateralised. In view of his age, and minimal symptoms despite no treatment, we recommed pt to be on medical herapy (cardizem,ASA, and imdur), and follow up in the clinic in next two weeks.If he is asymptomatic on medical therapy, he would need no intervention. However if he has persistent Sx we would rotablate the LAD and possibility stent it.   CCL: 7/2/2002   80 y/o gentleman with known CAD, h/o angina HTN, Hyperlipidemia doing well on medical therapy until recently with shoulder pain.   CORS ANGIO: Right Dominant system, LM is normal, LAD mid complex lesion with calcification , D1 and D2 with 70% proximal stenosis, LCx had a moderate lesion mid and OM1 with Moderate disease. RCA with prior total occlusion is now recanalized and patent with distal RCA lesion of 70% with PDA lesion of 70%.   LV gram: severely hypokinetic inferior basal region with a normal anterior wall. Visual estimate of EF 40%.   Recommendations:   1)  CABG   2) BP control,   3) Statin : LDL goal of <85   4) HDL, > 45   5) ACE-I.,   6) Continue with beta blockers and ASA.   7) 2D echo.   ANGIOGRAM: 10-23-02   Cors Angio: LM is normal, LAD has a 80% stenosis proximal to the LIMA insertion. D1 is a small vessel and has a >95% stenosis proximally. D2 has moderate disease. LCx has mild to moderate (25%-50%)disease and OM1 and OM2 have mild to moderate disease (25%-50%) without competitive flow. RCA is occluded at the mid segment.   LIMA to LAD is patent. SVG top RCA is patent. SVG to OM1 and OM2 is occluded. SVG to D1 is occluded.   PCI: Unsuccessful PCI because of inability to cross the lesion secondary to a dissection flap in the small D1. Several attempts to cross the lesion were unsuccessful.   Recommendations:   1) Medical therapy  Echo: 11/10/2011   Interpretation Summary  The LVEF is 55%. Inferior wall hypokinesis is present. Global right   ventricular function is normal. Mild to moderate mitral insufficiency is   present. The IVC is fully collapsed, c/w reduced intravascular volume. No   pericardial effusion is present.  PatientHeight: 68 in  PatientWeight: 160 lbs  SystolicPressure: 135 mmHg  DiastolicPressure: 56 mmHg  BSA 1.9 m^2   ECG today: Sinus bradycardia with sinus arrhythmia and PVCs, prior inferior infarction  Assessment and Plan:  This is a 89 year old with CAD s/p CABG 2002, HTN and HLD who presents for routine follow-up.   CAD s/p 5-vessel CABG in 2002: Stable. He is able to performs > 4 METS without cardiovascular symptoms. He reports rare episodes of chest discomfort which have not increased in frequency or severity. Continue medical management of CAD with ASA 81 mg, metoprolol XL 12.5 mg daily and Imdur 60 mg daily.    HTN, goal < 140/90: BP is elevated today, though the patient has not taken his medication yet this morning. Will continue current regimen with lisinopril 10 mg daily.    HLD, goal LDL < 70: Last LDL 54. Continue Zocor 20 mg  daily.     Follow-up: RTC in year.      Results discussed directly with patient while patient was present. Any further details documented in the note.      Please do not hesitate to contact me if you have any questions/concerns.     Sincerely,     Tasia Zayas NP

## 2018-07-13 NOTE — NURSING NOTE
Chief Complaint   Patient presents with     Follow Up For     89 y/o male for annual f/u of Coronary Artery Disease.     Vitals were taken and medications were reconciled. EKG was performed    LISETTE Ross  9:15 AM

## 2018-07-16 LAB — INTERPRETATION ECG - MUSE: NORMAL

## 2018-07-19 DIAGNOSIS — R07.89 CHEST DISCOMFORT: ICD-10-CM

## 2018-07-19 DIAGNOSIS — I10 HYPERTENSION GOAL BP (BLOOD PRESSURE) < 140/90: ICD-10-CM

## 2018-07-19 NOTE — TELEPHONE ENCOUNTER
"Requested Prescriptions   Pending Prescriptions Disp Refills     lisinopril (PRINIVIL/ZESTRIL) 10 MG tablet [Pharmacy Med Name: LISINOPRIL 10 MG TABLET]    Last Written Prescription Date:  7-10-18  Last Fill Quantity: 90,  # refills: 0   Last office visit: 6/6/2018 with prescribing provider:  6-6-18   Future Office Visit:     90 tablet 0     Sig: TAKE 1 TABLET BY MOUTH EVERY DAY    ACE Inhibitors (Including Combos) Protocol Failed    7/19/2018 11:02 AM       Failed - Blood pressure under 140/90 in past 12 months    BP Readings from Last 3 Encounters:   07/13/18 164/68   06/06/18 128/62   03/05/18 130/68                Failed - Normal serum potassium on file in past 12 months    Recent Labs   Lab Test  05/09/17   0502   POTASSIUM  4.3            Passed - Recent (12 mo) or future (30 days) visit within the authorizing provider's specialty    Patient had office visit in the last 12 months or has a visit in the next 30 days with authorizing provider or within the authorizing provider's specialty.  See \"Patient Info\" tab in inbasket, or \"Choose Columns\" in Meds & Orders section of the refill encounter.           Passed - Patient is age 18 or older       Passed - Normal serum creatinine on file in past 12 months    Recent Labs   Lab Test  06/06/18   1041   CR  1.10             isosorbide mononitrate (IMDUR) 60 MG 24 hr tablet [Pharmacy Med Name: ISOSORBIDE MONONIT ER 60 MG TB]    Last Written Prescription Date:  4-5-18  Last Fill Quantity: 90,  # refills: 0   Last office visit: 6/6/2018 with prescribing provider:  6-6-18   Future Office Visit:     90 tablet 0     Sig: TAKE 1 TABLET BY MOUTH EVERY DAY DR GARCIA HAS RETIRED, PLEASE HAVE PRIMARY MD ORDER IN THE FUTURE    Nitrates Failed    7/19/2018 11:02 AM       Failed - Blood pressure under 140/90 in past 12 months    BP Readings from Last 3 Encounters:   07/13/18 164/68   06/06/18 128/62   03/05/18 130/68                Passed - Pt is not on erectile dysfunction medications " "      Passed - Recent (12 mo) or future (30 days) visit within the authorizing provider's specialty    Patient had office visit in the last 12 months or has a visit in the next 30 days with authorizing provider or within the authorizing provider's specialty.  See \"Patient Info\" tab in inbasket, or \"Choose Columns\" in Meds & Orders section of the refill encounter.           Passed - Patient is age 18 or older          "

## 2018-07-23 DIAGNOSIS — R07.89 CHEST DISCOMFORT: ICD-10-CM

## 2018-07-23 RX ORDER — ISOSORBIDE MONONITRATE 60 MG/1
TABLET, EXTENDED RELEASE ORAL
Qty: 90 TABLET | Refills: 0 | Status: SHIPPED | OUTPATIENT
Start: 2018-07-23 | End: 2018-09-14

## 2018-07-23 RX ORDER — LISINOPRIL 10 MG/1
TABLET ORAL
Qty: 90 TABLET | Refills: 0 | Status: SHIPPED | OUTPATIENT
Start: 2018-07-23 | End: 2019-01-28

## 2018-07-24 RX ORDER — ISOSORBIDE MONONITRATE 60 MG/1
60 TABLET, EXTENDED RELEASE ORAL DAILY
Qty: 90 TABLET | Refills: 3 | Status: SHIPPED | OUTPATIENT
Start: 2018-07-24 | End: 2019-08-08

## 2018-07-30 ENCOUNTER — TELEPHONE (OUTPATIENT)
Dept: DERMATOLOGY | Facility: CLINIC | Age: 83
End: 2018-07-30

## 2018-07-30 NOTE — TELEPHONE ENCOUNTER
I called and attempted to remind Kiran of his appointment but was unable to reach him at his home phone number and was unable to leave a message. I also attempted his Mobile phone and was unable to reach him there.     Frida Christian, EMT

## 2018-08-06 ENCOUNTER — OFFICE VISIT (OUTPATIENT)
Dept: DERMATOLOGY | Facility: CLINIC | Age: 83
End: 2018-08-06
Payer: COMMERCIAL

## 2018-08-06 DIAGNOSIS — L57.0 ACTINIC KERATOSIS: Primary | ICD-10-CM

## 2018-08-06 DIAGNOSIS — B35.1 ONYCHOMYCOSIS: ICD-10-CM

## 2018-08-06 DIAGNOSIS — B35.3 TINEA PEDIS OF BOTH FEET: ICD-10-CM

## 2018-08-06 DIAGNOSIS — L21.9 SEBORRHEIC DERMATITIS: ICD-10-CM

## 2018-08-06 DIAGNOSIS — L82.0 INFLAMED SEBORRHEIC KERATOSIS: ICD-10-CM

## 2018-08-06 RX ORDER — FLUOROURACIL 50 MG/G
CREAM TOPICAL DAILY
Qty: 40 G | Refills: 2 | Status: SHIPPED | OUTPATIENT
Start: 2018-08-06 | End: 2019-11-01

## 2018-08-06 RX ORDER — KETOCONAZOLE 20 MG/G
CREAM TOPICAL 2 TIMES DAILY
Qty: 60 G | Refills: 0 | Status: SHIPPED | OUTPATIENT
Start: 2018-08-06 | End: 2019-07-30 | Stop reason: ALTCHOICE

## 2018-08-06 ASSESSMENT — PAIN SCALES - GENERAL: PAINLEVEL: NO PAIN (0)

## 2018-08-06 NOTE — MR AVS SNAPSHOT
After Visit Summary   8/6/2018    Kiran Carcamo    MRN: [id number]           Patient Information     Date Of Birth          4/13/1921        Visit Information        Provider Department      8/6/2018 10:15 AM Michael Arevalo MD [facility]        Today's Diagnoses     Actinic keratosis    -  1    Onychomycosis        Tinea pedis of both feet        Seborrheic dermatitis          Care Instructions    It was a pleasure seeing you in clinic today. We will plan on seeing you back in 1 year.    Apply the fluorouracil cream once per night for 4 weeks to one section of the scalp, then take a 2 week break, then apply for 4 weeks to another section of the scalp, etc. 3 sections total. If it becomes too irritated then can stop for 2-3 days or drop down to every other day. The goal is to get some redness and crusting in order to get rid of the pre cancer (actinic keratosis).    Eyebrows and by the nose crusting is called seborrheic dermatitis. Use the ketoconazole cream twice daily to the eyebrows and by the nose for 2 months.    For the feet, can try Vaseline or urea 10% cream.          Follow-ups after your visit        Who to contact     Please call your clinic at [phone] to:    Ask questions about your health    Make or cancel appointments    Discuss your medicines    Learn about your test results    Speak to your doctor            Additional Information About Your Visit        [employer] Information     [employer] gives you secure access to your electronic health record. If you see a primary care provider, you can also send messages to your care team and make appointments. If you have questions, please call your primary care clinic.  If you do not have a primary care provider, please call [phone] and they will assist you.      [employer] is an electronic gateway that provides easy, online access to your medical records. With [employer], you can request a clinic appointment, read your test  results, renew a prescription or communicate with your care team.     To access your existing account, please contact your [facility] or call [phone] for assistance.        Care EveryWhere ID     This is your Care EveryWhere ID. This could be used by other organizations to access your [place] medical records  [id number]         Blood Pressure from Last 3 Encounters:   07/13/18 164/68   06/06/18 128/62   03/05/18 130/68    Weight from Last 3 Encounters:   07/13/18 70.9 kg (156 lb 3.2 oz)   06/06/18 70.6 kg (155 lb 9.6 oz)   03/05/18 73 kg (161 lb)              Today, you had the following     No orders found for display       Primary Care Provider Office Phone # Fax #    Buddy Poe [phone]       [address]  [facility] [address]        Equal Access to Services     [facility] Center[facility]LES : Hadii aad ku hadasho Somarie, waaxda luqadaha, qaybta kaalmada adeegyada, dyan quintero . So [facility] [phone].    ATENCIÓN: Si habla español, tiene a arriola disposición servicios gratuitos de asistencia lingüística. Llame al [phone].    We comply with applicable federal civil rights laws and [place] laws. We do not discriminate on the basis of race, color, national origin, age, disability, sex, sexual orientation, or gender identity.            Thank you!     Thank you for choosing [facility]  for your care. Our goal is always to provide you with excellent care. Hearing back from our patients is one way we can continue to improve our services. Please take a few minutes to complete the written survey that you may receive in the mail after your visit with us. Thank you!             Your Updated Medication List - Protect others around you: Learn how to safely use, store and throw away your medicines at www.disposemymeds.org.          This list is accurate as of 8/6/18 10:51 AM.  Always use your most recent med list.                    Brand Name Dispense Instructions for use Diagnosis    acetaminophen 325 MG tablet    TYLENOL    100 tablet    Take 3 tablets (975 mg) by mouth every 8 hours as needed for mild pain    Influenza A       aspirin 81 MG EC tablet      Take 81 mg by mouth At Bedtime.        * isosorbide mononitrate 60 MG 24 hr tablet    IMDUR    90 tablet    TAKE 1 TABLET BY MOUTH EVERY DAY DR GARCIA HAS RETIRED, PLEASE HAVE PRIMARY MD ORDER IN THE FUTURE    Chest discomfort       * isosorbide mononitrate 60 MG 24 hr tablet    IMDUR    90 tablet    Take 1 tablet (60 mg) by mouth daily Please get refills from primary care or schedule with new cardiologist.    Chest discomfort       lisinopril 10 MG tablet    PRINIVIL/ZESTRIL    90 tablet    TAKE 1 TABLET BY MOUTH EVERY DAY    Hypertension goal BP (blood pressure) < 140/90       metFORMIN 500 MG tablet    GLUCOPHAGE    180 tablet    Take 1 tablet (500 mg) by mouth 2 times daily (with meals)    Type 2 diabetes mellitus with diabetic nephropathy, without long-term current use of insulin (H)       metoprolol succinate 25 MG 24 hr tablet    TOPROL-XL    45 tablet    TAKE ONE-HALF TABLET BY MOUTH DAILY    HTN (hypertension)       MV-ONE PO      Take 1 tablet by mouth.        nitroGLYcerin 0.4 MG sublingual tablet    NITROSTAT    25 tablet    Place 1 tablet (0.4 mg) under the tongue every 5 minutes as needed for chest pain If symptoms after 3 doses (15 minutes) call 911.    History of MI (myocardial infarction)       ONETOUCH ULTRA test strip   Generic drug:  blood glucose monitoring     300 strip    USE TO TEST BLOOD SUGAR TWICE DAILY    Type 2 diabetes mellitus without complication (H)       polyethylene glycol Packet    MIRALAX/GLYCOLAX    10 packet    Take 17 g by mouth daily as needed for constipation    Constipation, unspecified constipation type       simvastatin 20 MG tablet    ZOCOR    90 tablet    Take 1 tablet (20 mg) by mouth daily    Hyperlipidemia LDL goal <130       *  Notice:  This list has 2 medication(s) that are the same as other medications prescribed for you. Read the directions carefully, and ask your doctor or other care provider to review them with you.

## 2018-08-06 NOTE — NURSING NOTE
Dermatology Rooming Note    Kiran Carcamo's goals for this visit include:   Chief Complaint   Patient presents with     Derm Problem     Kiran is here for a referral from his PCP for actinic keratosis     Frida Christian, EMT

## 2018-08-06 NOTE — PATIENT INSTRUCTIONS
It was a pleasure seeing you in clinic today. We will plan on seeing you back in 1 year.    Apply the fluorouracil cream once per night for 4 weeks to one section of the scalp, then take a 2 week break, then apply for 4 weeks to another section of the scalp, etc. 3 sections total. If it becomes too irritated then can stop for 2-3 days or drop down to every other day. The goal is to get some redness and crusting in order to get rid of the pre cancer (actinic keratosis).    Eyebrows and by the nose crusting is called seborrheic dermatitis. Use the ketoconazole cream twice daily to the eyebrows and by the nose for 2 months.    For the feet, can try Vaseline or urea 10% cream.

## 2018-08-06 NOTE — PROGRESS NOTES
[facility] Dermatology Note      Dermatology Problem List:  1. Actinic keratosis  - 3 lesions on left cheek treated with cryotherapy 8/6/2018  - Fluorouracil 5% cream to scalp initiated 8/6/2018  - Return for annual full body skin exam    2. Seborrheic dermatitis  - 2 month course of ketoconazole 2% cream initiated 8/6/2018    3. Onychomycosis and tinea pedis  - Treatment deferred, treat dryness with Vaseline and/or urea 10% cream    Encounter Date: Aug 6, 2018    CC:  Chief Complaint   Patient presents with     Derm Problem     Kiran is here for a referral from his PCP for actinic keratosis         History of Present Illness:  Mr. Kiran Carcamo is a 89 year old male who presents as a referral from Dr. Buddy Poe for actinic keratoses.    Kiran has had actinic keratoses frozen in the distant past (2012). He does not wear sunscreen regularly. Does use a hat sometimes. No personal or family history of skin cancer. Was very tan in the summers growing up, and did have a few sunburns.    Has an asymptomatic small red bump above the red knee present for the past day.    Past Medical History:   Patient Active Problem List   Diagnosis     Health Care Home     AK (actinic keratosis)     SK (seborrheic keratosis)     Hyperplasia of prostate     Hyperlipidemia LDL goal <70     History of MI (myocardial infarction)     Forgetfulness     CKD (chronic kidney disease) stage 2, GFR 60-89 ml/min     Essential hypertension with goal blood pressure less than 140/90     Eczema, unspecified type     Type 2 diabetes mellitus with diabetic nephropathy, without long-term current use of insulin (H)     Hypocalcemia      Paget's bone disease     History of skin cancer     Past Medical History:   Diagnosis Date     CKD (chronic kidney disease) stage 2, GFR 60-89 ml/min 7/6/2015     Hypertension goal BP (blood pressure) < 140/90      Syncope 12/12/2011     Type 2 diabetes, HbA1C goal < 8% (H) 2/23/2015     Past  Surgical History:   Procedure Laterality Date     CHOLECYSTECTOMY       CORONARY ARTERY BYPASS  '05    6-vessel     MANDIBLE SURGERY         Social History:  The patient is retired.    Family History:  There is no family history of skin cancer.    Medications:  Current Outpatient Prescriptions   Medication Sig Dispense Refill     acetaminophen (TYLENOL) 325 MG tablet Take 3 tablets (975 mg) by mouth every 8 hours as needed for mild pain 100 tablet 0     aspirin 81 MG EC tablet Take 81 mg by mouth At Bedtime.       isosorbide mononitrate (IMDUR) 60 MG 24 hr tablet Take 1 tablet (60 mg) by mouth daily Please get refills from primary care or schedule with new cardiologist. 90 tablet 3     isosorbide mononitrate (IMDUR) 60 MG 24 hr tablet TAKE 1 TABLET BY MOUTH EVERY DAY DR GARCIA HAS RETIRED, PLEASE HAVE PRIMARY MD ORDER IN THE FUTURE 90 tablet 0     lisinopril (PRINIVIL/ZESTRIL) 10 MG tablet TAKE 1 TABLET BY MOUTH EVERY DAY 90 tablet 0     metFORMIN (GLUCOPHAGE) 500 MG tablet Take 1 tablet (500 mg) by mouth 2 times daily (with meals) 180 tablet 3     metoprolol succinate (TOPROL-XL) 25 MG 24 hr tablet TAKE ONE-HALF TABLET BY MOUTH DAILY 45 tablet 1     Multiple Vitamin (MV-ONE PO) Take 1 tablet by mouth.       nitroglycerin (NITROSTAT) 0.4 MG sublingual tablet Place 1 tablet (0.4 mg) under the tongue every 5 minutes as needed for chest pain If symptoms after 3 doses (15 minutes) call 911. 25 tablet 1     ONE TOUCH ULTRA test strip USE TO TEST BLOOD SUGAR TWICE DAILY 300 strip 2     polyethylene glycol (MIRALAX/GLYCOLAX) Packet Take 17 g by mouth daily as needed for constipation 10 packet 0     simvastatin (ZOCOR) 20 MG tablet Take 1 tablet (20 mg) by mouth daily 90 tablet 3     No Known Allergies      Review of Systems:  -As per HPI  -Constitutional: The patient denies fatigue, fevers, chills, unintended weight loss, and night sweats.  -HEENT: Patient denies nonhealing oral sores.  -Skin: As above in HPI. No additional  skin concerns.    Physical exam:  Vitals: There were no vitals taken for this visit.  GEN: This is a well developed, well-nourished male in no acute distress, in a pleasant mood.    SKIN: Total skin excluding the undergarment areas was performed. The exam included the head/face, neck, both arms, chest, back, abdomen, both legs, digits and/or nails.   - Pink rough scaly papules on face  - Diffuse rough scale on scalp  - Numerous scattered, tan-brown, waxy stuck on papules on face, trunk, and extremities  - Ecchymosis above left knee  - Thickened yellow toenails with subungual debris  - Powdery white scale on feet in a moccasin distribution  - No other lesions of concern on areas examined    Impression/Plan:  1. Actinic keratosis, scalp and left cheek  - Counseled patient on diagnosis, etiology, disease course, and treatment options   - 3 lesions on left cheek treated with cryotherapy today  - Cryotherapy procedure note (performed by faculty): After verbal consent and discussion of risks and benefits including but no limited to dyspigmentation/scar, blister, infection, recurrence, 3 on the left cheek treated with 1-2mm freeze border for 2 cycles with liquid nitrogen. Post cryotherapy instructions were provided.   - Start Fluorouracil 5% cream to scalp: recommended treating scalp in 3 sections. Use 4 weeks nightly for first section, then 2 week break, then 4 weeks nightly for second section, etc. Can decrease to every other day if the medication is too irritating  - Sun protection discussed; patient will initiate a daily facial moisturizer with SPF  - Return for annual full body skin exam    2. Seborrheic keratosis  - Patient reassured of benign nature   - 1 lesion on right cheek treated with cryotherapy today  - Cryotherapy procedure note (performed by faculty): After verbal consent and discussion of risks and benefits including but no limited to dyspigmentation/scar, blister, infection, recurrence, 1 on the right  cheek cheek treated with 1-2mm freeze border for 2 cycles with liquid nitrogen. Post cryotherapy instructions were provided.     3. Seborrheic dermatitis, face  - Patient reassured of benign nature   - Start 2 month course of ketoconazole 2% cream to eyebrows, nasolabial folds, and cheeks    4. Onychomycosis and tinea pedis  - Patient reassured of benign nature   - Antifungal treatment deferred, patient has had this for years and it doesn't bother him  - Treat dryness with Vaseline and/or urea 10% cream      Follow-up in 1 year, earlier for new or changing lesions.     Staff Involved:  Scribed by Shana Thomason, MS4 for Dr. Arevalo.    Staff Physician Comments:  I was present with the medical student who participated in the service and in the documentation of the note. I have verified the history and personally performed the physical exam and medical decision making. I agree with the assessment and plan as documented in the note. I have reviewed and if necessary amended the note.  I personally performed the cryotherapy.      Michael Arevalo MD  Professor  Head of Dermato-Allergy Division  Department of Dermatology  [facility]

## 2018-08-06 NOTE — LETTER
8/6/2018       RE: Kiran Carcamo  [address]  [facility] [address]     Dear Colleague,    Thank you for referring your patient, Kiran Carcamo, to the [facility] DERMATOLOGY at [facility]. Please see a copy of my visit note below.    [facility] Dermatology Note    Dermatology Problem List:  1. Actinic keratosis  - 3 lesions on left cheek treated with cryotherapy 8/6/2018  - Fluorouracil 5% cream to scalp initiated 8/6/2018  - Return for annual full body skin exam    2. Seborrheic dermatitis  - 2 month course of ketoconazole 2% cream initiated 8/6/2018    3. Onychomycosis and tinea pedis  - Treatment deferred, treat dryness with Vaseline and/or urea 10% cream    Encounter Date: Aug 6, 2018    CC:  Chief Complaint   Patient presents with     Derm Problem     Kiran is here for a referral from his PCP for actinic keratosis     History of Present Illness:  Mr. Kiran Carcamo is a 89 year old male who presents as a referral from Dr. Buddy Poe for actinic keratoses.    Kiran has had actinic keratoses frozen in the distant past (2012). He does not wear sunscreen regularly. Does use a hat sometimes. No personal or family history of skin cancer. Was very tan in the summers growing up, and did have a few sunburns.    Has an asymptomatic small red bump above the red knee present for the past day.    Past Medical History:   Patient Active Problem List   Diagnosis     Health Care Home     AK (actinic keratosis)     SK (seborrheic keratosis)     Hyperplasia of prostate     Hyperlipidemia LDL goal <70     History of MI (myocardial infarction)     Forgetfulness     CKD (chronic kidney disease) stage 2, GFR 60-89 ml/min     Essential hypertension with goal blood pressure less than 140/90     Eczema, unspecified type     Type 2 diabetes mellitus with diabetic nephropathy, without long-term current use of insulin (H)     Hypocalcemia      Paget's bone disease      History of skin cancer     Past Medical History:   Diagnosis Date     CKD (chronic kidney disease) stage 2, GFR 60-89 ml/min 7/6/2015     Hypertension goal BP (blood pressure) < 140/90      Syncope 12/12/2011     Type 2 diabetes, HbA1C goal < 8% (H) 2/23/2015     Past Surgical History:   Procedure Laterality Date     CHOLECYSTECTOMY       CORONARY ARTERY BYPASS  '05    6-vessel     MANDIBLE SURGERY       Social History:  The patient is retired.    Family History:  There is no family history of skin cancer.    Medications:  Current Outpatient Prescriptions   Medication Sig Dispense Refill     acetaminophen (TYLENOL) 325 MG tablet Take 3 tablets (975 mg) by mouth every 8 hours as needed for mild pain 100 tablet 0     aspirin 81 MG EC tablet Take 81 mg by mouth At Bedtime.       isosorbide mononitrate (IMDUR) 60 MG 24 hr tablet Take 1 tablet (60 mg) by mouth daily Please get refills from primary care or schedule with new cardiologist. 90 tablet 3     isosorbide mononitrate (IMDUR) 60 MG 24 hr tablet TAKE 1 TABLET BY MOUTH EVERY DAY DR GARCIA HAS RETIRED, PLEASE HAVE PRIMARY MD ORDER IN THE FUTURE 90 tablet 0     lisinopril (PRINIVIL/ZESTRIL) 10 MG tablet TAKE 1 TABLET BY MOUTH EVERY DAY 90 tablet 0     metFORMIN (GLUCOPHAGE) 500 MG tablet Take 1 tablet (500 mg) by mouth 2 times daily (with meals) 180 tablet 3     metoprolol succinate (TOPROL-XL) 25 MG 24 hr tablet TAKE ONE-HALF TABLET BY MOUTH DAILY 45 tablet 1     Multiple Vitamin (MV-ONE PO) Take 1 tablet by mouth.       nitroglycerin (NITROSTAT) 0.4 MG sublingual tablet Place 1 tablet (0.4 mg) under the tongue every 5 minutes as needed for chest pain If symptoms after 3 doses (15 minutes) call 911. 25 tablet 1     ONE TOUCH ULTRA test strip USE TO TEST BLOOD SUGAR TWICE DAILY 300 strip 2     polyethylene glycol (MIRALAX/GLYCOLAX) Packet Take 17 g by mouth daily as needed for constipation 10 packet 0     simvastatin (ZOCOR) 20 MG tablet Take 1 tablet (20 mg) by  mouth daily 90 tablet 3     No Known Allergies    Review of Systems:  -As per HPI  -Constitutional: The patient denies fatigue, fevers, chills, unintended weight loss, and night sweats.  -HEENT: Patient denies nonhealing oral sores.  -Skin: As above in HPI. No additional skin concerns.    Physical exam:  Vitals: There were no vitals taken for this visit.  GEN: This is a well developed, well-nourished male in no acute distress, in a pleasant mood.    SKIN: Total skin excluding the undergarment areas was performed. The exam included the head/face, neck, both arms, chest, back, abdomen, both legs, digits and/or nails.   - Pink rough scaly papules on face  - Diffuse rough scale on scalp  - Numerous scattered, tan-brown, waxy stuck on papules on face, trunk, and extremities  - Ecchymosis above left knee  - Thickened yellow toenails with subungual debris  - Powdery white scale on feet in a moccasin distribution  - No other lesions of concern on areas examined    Impression/Plan:  1. Actinic keratosis, scalp and left cheek  - Counseled patient on diagnosis, etiology, disease course, and treatment options   - 3 lesions on left cheek treated with cryotherapy today  - Cryotherapy procedure note (performed by faculty): After verbal consent and discussion of risks and benefits including but no limited to dyspigmentation/scar, blister, infection, recurrence, 3 on the left cheek treated with 1-2mm freeze border for 2 cycles with liquid nitrogen. Post cryotherapy instructions were provided.   - Start Fluorouracil 5% cream to scalp: recommended treating scalp in 3 sections. Use 4 weeks nightly for first section, then 2 week break, then 4 weeks nightly for second section, etc. Can decrease to every other day if the medication is too irritating  - Sun protection discussed; patient will initiate a daily facial moisturizer with SPF  - Return for annual full body skin exam    2. Seborrheic keratosis  - Patient reassured of benign nature    - 1 lesion on right cheek treated with cryotherapy today  - Cryotherapy procedure note (performed by faculty): After verbal consent and discussion of risks and benefits including but no limited to dyspigmentation/scar, blister, infection, recurrence, 1 on the right cheek cheek treated with 1-2mm freeze border for 2 cycles with liquid nitrogen. Post cryotherapy instructions were provided.     3. Seborrheic dermatitis, face  - Patient reassured of benign nature   - Start 2 month course of ketoconazole 2% cream to eyebrows, nasolabial folds, and cheeks    4. Onychomycosis and tinea pedis  - Patient reassured of benign nature   - Antifungal treatment deferred, patient has had this for years and it doesn't bother him  - Treat dryness with Vaseline and/or urea 10% cream      Follow-up in 1 year, earlier for new or changing lesions.     Staff Involved:  Scribed by Shana Thomason, MS4 for Dr. Arevalo.    Staff Physician Comments:  I was present with the medical student who participated in the service and in the documentation of the note. I have verified the history and personally performed the physical exam and medical decision making. I agree with the assessment and plan as documented in the note. I have reviewed and if necessary amended the note.  I personally performed the cryotherapy.      Michael Arevalo MD  Professor  Head of Dermato-Allergy Division  Department of Dermatology  [facility]

## 2018-08-07 DIAGNOSIS — I10 HTN (HYPERTENSION): ICD-10-CM

## 2018-08-08 RX ORDER — METOPROLOL SUCCINATE 25 MG/1
TABLET, EXTENDED RELEASE ORAL
Qty: 45 TABLET | Refills: 1 | Status: SHIPPED | OUTPATIENT
Start: 2018-08-08 | End: 2019-01-04

## 2018-09-14 ENCOUNTER — OFFICE VISIT (OUTPATIENT)
Dept: FAMILY MEDICINE | Facility: CLINIC | Age: 83
End: 2018-09-14
Payer: COMMERCIAL

## 2018-09-14 VITALS
DIASTOLIC BLOOD PRESSURE: 73 MMHG | WEIGHT: 151.8 LBS | HEIGHT: 67 IN | RESPIRATION RATE: 12 BRPM | BODY MASS INDEX: 23.83 KG/M2 | OXYGEN SATURATION: 100 % | TEMPERATURE: 97.6 F | SYSTOLIC BLOOD PRESSURE: 129 MMHG | HEART RATE: 46 BPM

## 2018-09-14 DIAGNOSIS — E78.5 HYPERLIPIDEMIA LDL GOAL <70: ICD-10-CM

## 2018-09-14 DIAGNOSIS — E11.21 TYPE 2 DIABETES MELLITUS WITH DIABETIC NEPHROPATHY, WITHOUT LONG-TERM CURRENT USE OF INSULIN (H): ICD-10-CM

## 2018-09-14 DIAGNOSIS — Z13.89 SCREENING FOR DIABETIC PERIPHERAL NEUROPATHY: Primary | ICD-10-CM

## 2018-09-14 LAB
CHOLEST SERPL-MCNC: 97 MG/DL
CREAT UR-MCNC: 110 MG/DL
HBA1C MFR BLD: 6.5 % (ref 0–5.6)
HDLC SERPL-MCNC: 44 MG/DL
LDLC SERPL CALC-MCNC: 38 MG/DL
MICROALBUMIN UR-MCNC: 30 MG/L
MICROALBUMIN/CREAT UR: 27.55 MG/G CR (ref 0–17)
NONHDLC SERPL-MCNC: 53 MG/DL
TRIGL SERPL-MCNC: 74 MG/DL

## 2018-09-14 PROCEDURE — 83036 HEMOGLOBIN GLYCOSYLATED A1C: CPT | Performed by: FAMILY MEDICINE

## 2018-09-14 PROCEDURE — 99207 C FOOT EXAM  NO CHARGE: CPT | Performed by: FAMILY MEDICINE

## 2018-09-14 PROCEDURE — 82043 UR ALBUMIN QUANTITATIVE: CPT | Performed by: FAMILY MEDICINE

## 2018-09-14 PROCEDURE — 80061 LIPID PANEL: CPT | Performed by: FAMILY MEDICINE

## 2018-09-14 PROCEDURE — 36415 COLL VENOUS BLD VENIPUNCTURE: CPT | Performed by: FAMILY MEDICINE

## 2018-09-14 PROCEDURE — 99214 OFFICE O/P EST MOD 30 MIN: CPT | Performed by: FAMILY MEDICINE

## 2018-09-14 NOTE — MR AVS SNAPSHOT
After Visit Summary   9/14/2018    Kiran Carcamo    MRN: [id number]           Patient Information     Date Of Birth          4/13/1921        Visit Information        Provider Department      9/14/2018 10:00 AM Buddy Poe MD [facility]        Today's Diagnoses     Screening for diabetic peripheral neuropathy    -  1    Type 2 diabetes mellitus with diabetic nephropathy, without long-term current use of insulin (H)        Hyperlipidemia LDL goal <70          Care Instructions    Follow up in 6 months.           Follow-ups after your visit        Follow-up notes from your care team     Return in about 6 months (around 3/14/2019) for Routine Visit.      Who to contact     If you have questions or need follow up information about today's clinic visit or your schedule please contact [facility] directly at [phone].  Normal or non-critical lab and imaging results will be communicated to you by MyChart, letter or phone within 4 business days after the clinic has received the results. If you do not hear from us within 7 days, please contact the clinic through MyChart or phone. If you have a critical or abnormal lab result, we will notify you by phone as soon as possible.  Submit refill requests through ezeep or call your pharmacy and they will forward the refill request to us. Please allow 3 business days for your refill to be completed.          Additional Information About Your Visit        MyChart Information     ezeep gives you secure access to your electronic health record. If you see a primary care provider, you can also send messages to your care team and make appointments. If you have questions, please call your primary care clinic.  If you do not have a primary care provider, please call [phone] and they will assist you.        Care EveryWhere ID     This is your Care EveryWhere ID. This could be used by other organizations to access your  "[facility] medical records  [id number]        Your Vitals Were     Pulse Temperature Respirations Height Pulse Oximetry BMI (Body Mass Index)    46 97.6  F (36.4  C) (Oral) 12 5' 7\" (1.702 m) 100% 23.78 kg/m2       Blood Pressure from Last 3 Encounters:   09/14/18 129/73   07/13/18 164/68   06/06/18 128/62    Weight from Last 3 Encounters:   09/14/18 151 lb 12.8 oz (68.9 kg)   07/13/18 156 lb 3.2 oz (70.9 kg)   06/06/18 155 lb 9.6 oz (70.6 kg)              We Performed the Following     Albumin Random Urine Quantitative with Creat Ratio     FOOT EXAM  NO CHARGE [79723.114]     Hemoglobin A1c     Lipid panel reflex to direct LDL Fasting        Primary Care Provider Office Phone # Fax #    Buddy Poe [phone]       [address] [address]        Equal Access to Services     [facility] AH: Hadii aad ku hadasho Soomaali, waaxda luqadaha, qaybta kaalmada adeegyada, waxay manuel hayconcepcion quintero . So [facility] [phone].    ATENCIÓN: Si habla español, tiene a arriola disposición servicios gratuitos de asistencia lingüística. [facility] [phone].    We comply with applicable federal civil rights laws and [place] laws. We do not discriminate on the basis of race, color, national origin, age, disability, sex, sexual orientation, or gender identity.            Thank you!     Thank you for choosing [facility]  for your care. Our goal is always to provide you with excellent care. Hearing back from our patients is one way we can continue to improve our services. Please take a few minutes to complete the written survey that you may receive in the mail after your visit with us. Thank you!             Your Updated Medication List - Protect others around you: Learn how to safely use, store and throw away your medicines at www.disposemymeds.org.          This list is accurate as of 9/14/18 10:30 AM.  Always use your most recent med list.                   " Brand Name Dispense Instructions for use Diagnosis    acetaminophen 325 MG tablet    TYLENOL    100 tablet    Take 3 tablets (975 mg) by mouth every 8 hours as needed for mild pain    Influenza A       aspirin 81 MG EC tablet      Take 81 mg by mouth At Bedtime.        fluorouracil 5 % cream    EFUDEX    40 g    Apply topically daily Every evening for 4 weeks on the frontal scalp, then 2 week break, then another section of the scalp for 4 weeks, etc    Actinic keratosis       isosorbide mononitrate 60 MG 24 hr tablet    IMDUR    90 tablet    Take 1 tablet (60 mg) by mouth daily Please get refills from primary care or schedule with new cardiologist.    Chest discomfort       ketoconazole 2 % cream    NIZORAL    60 g    Apply topically 2 times daily To the eyebrows and around the nose/cheek area    Seborrheic dermatitis       lisinopril 10 MG tablet    PRINIVIL/ZESTRIL    90 tablet    TAKE 1 TABLET BY MOUTH EVERY DAY    Hypertension goal BP (blood pressure) < 140/90       metFORMIN 500 MG tablet    GLUCOPHAGE    180 tablet    Take 1 tablet (500 mg) by mouth 2 times daily (with meals)    Type 2 diabetes mellitus with diabetic nephropathy, without long-term current use of insulin (H)       metoprolol succinate 25 MG 24 hr tablet    TOPROL-XL    45 tablet    TAKE ONE-HALF TABLET BY MOUTH DAILY    HTN (hypertension)       MV-ONE PO      Take 1 tablet by mouth.        nitroGLYcerin 0.4 MG sublingual tablet    NITROSTAT    25 tablet    Place 1 tablet (0.4 mg) under the tongue every 5 minutes as needed for chest pain If symptoms after 3 doses (15 minutes) call 911.    History of MI (myocardial infarction)       ONETOUCH ULTRA test strip   Generic drug:  blood glucose monitoring     300 strip    USE TO TEST BLOOD SUGAR TWICE DAILY    Type 2 diabetes mellitus without complication (H)       polyethylene glycol Packet    MIRALAX/GLYCOLAX    10 packet    Take 17 g by mouth daily as needed for constipation    Constipation,  unspecified constipation type       simvastatin 20 MG tablet    ZOCOR    90 tablet    Take 1 tablet (20 mg) by mouth daily    Hyperlipidemia LDL goal <130

## 2018-09-14 NOTE — PROGRESS NOTES
SUBJECTIVE:   Kiran Carcamo is a 89 year old male who presents to clinic today for the following health issues with relative:      Diabetes Follow-up    Patient is checking blood sugars: once daily.  Results are as follows:         am - Before Breakfast     Diabetic concerns: None     Symptoms of hypoglycemia (low blood sugar): none     Paresthesias (numbness or burning in feet) or sores: No     Date of last diabetic eye exam: 11 or 12/2017    BP Readings from Last 2 Encounters:   07/13/18 164/68   06/06/18 128/62     Hemoglobin A1C (%)   Date Value   06/06/2018 6.5 (H)   03/05/2018 7.4 (H)     LDL Cholesterol Calculated (mg/dL)   Date Value   09/08/2017 54   08/10/2016 64       Diabetes Management Resources    Amount of exercise or physical activity: 6-7 days/week for an average of 45-60 minutes    Problems taking medications regularly: No    Medication side effects: none    Diet: carbohydrate counting    Patient reports for a Diabetes follow up.  He has not been using the strap that he got from his Physical Therapist. He feels a slight strain in the back of his left leg. He denies numbness or tingling in his feet.     Balance  His balance is not as good as it used to be.     Cardiovascular  Patient experienced a sharp chest pain 2 weeks ago.     Problem list and histories reviewed & adjusted, as indicated.  Additional history: as documented    Patient Active Problem List   Diagnosis     Health Care Home     AK (actinic keratosis)     SK (seborrheic keratosis)     Hyperplasia of prostate     Hyperlipidemia LDL goal <70     History of MI (myocardial infarction)     Forgetfulness     CKD (chronic kidney disease) stage 2, GFR 60-89 ml/min     Essential hypertension with goal blood pressure less than 140/90     Eczema, unspecified type     Type 2 diabetes mellitus with diabetic nephropathy, without long-term current use of insulin (H)     Hypocalcemia      Paget's bone disease     History of skin cancer     Past  Surgical History:   Procedure Laterality Date     CHOLECYSTECTOMY       CORONARY ARTERY BYPASS  '05    6-vessel     MANDIBLE SURGERY         Social History   Substance Use Topics     Smoking status: Never Smoker     Smokeless tobacco: Never Used     Alcohol use No     Family History   Problem Relation Age of Onset     HEART DISEASE Mother      Eye Disorder Father      Respiratory Brother      HEART DISEASE Sister      Cancer Daughter      Cancer Brother      Skin Cancer No family hx of      Melanoma No family hx of          Current Outpatient Prescriptions   Medication Sig Dispense Refill     aspirin 81 MG EC tablet Take 81 mg by mouth At Bedtime.       fluorouracil (EFUDEX) 5 % cream Apply topically daily Every evening for 4 weeks on the frontal scalp, then 2 week break, then another section of the scalp for 4 weeks, etc 40 g 2     isosorbide mononitrate (IMDUR) 60 MG 24 hr tablet Take 1 tablet (60 mg) by mouth daily Please get refills from primary care or schedule with new cardiologist. 90 tablet 3     ketoconazole (NIZORAL) 2 % cream Apply topically 2 times daily To the eyebrows and around the nose/cheek area 60 g 0     lisinopril (PRINIVIL/ZESTRIL) 10 MG tablet TAKE 1 TABLET BY MOUTH EVERY DAY 90 tablet 0     metFORMIN (GLUCOPHAGE) 500 MG tablet Take 1 tablet (500 mg) by mouth 2 times daily (with meals) 180 tablet 3     metoprolol succinate (TOPROL-XL) 25 MG 24 hr tablet TAKE ONE-HALF TABLET BY MOUTH DAILY 45 tablet 1     Multiple Vitamin (MV-ONE PO) Take 1 tablet by mouth.       nitroglycerin (NITROSTAT) 0.4 MG sublingual tablet Place 1 tablet (0.4 mg) under the tongue every 5 minutes as needed for chest pain If symptoms after 3 doses (15 minutes) call 911. 25 tablet 1     ONE TOUCH ULTRA test strip USE TO TEST BLOOD SUGAR TWICE DAILY 300 strip 2     simvastatin (ZOCOR) 20 MG tablet Take 1 tablet (20 mg) by mouth daily 90 tablet 3     acetaminophen (TYLENOL) 325 MG tablet Take 3 tablets (975 mg) by mouth every  8 hours as needed for mild pain (Patient not taking: Reported on 9/14/2018) 100 tablet 0     polyethylene glycol (MIRALAX/GLYCOLAX) Packet Take 17 g by mouth daily as needed for constipation (Patient not taking: Reported on 9/14/2018) 10 packet 0     [DISCONTINUED] isosorbide mononitrate (IMDUR) 60 MG 24 hr tablet TAKE 1 TABLET BY MOUTH EVERY DAY DR GARCIA HAS RETIRED, PLEASE HAVE PRIMARY MD ORDER IN THE FUTURE 90 tablet 0     No Known Allergies  Recent Labs   Lab Test  06/06/18   1041  03/05/18   0928  12/06/17   1001  09/08/17   0919  05/09/17   0502  05/08/17   1450   11/16/16   1047  08/10/16   1050  12/16/15   1111   11/21/14   1441   A1C  6.5*  7.4*  7.1*  8.4*   --    --    < >  7.6*  7.4*  6.9*   < >  6.8*   LDL   --    --    --   54   --    --    --    --   64   --    --   40   HDL   --    --    --   44   --    --    --    --   41   --    --   47   TRIG   --    --    --   127   --    --    --    --   97   --    --   110   ALT   --    --    --    --   69  83*   --    --    --   34   --   34   CR  1.10   --    --    --   1.07  1.01   < >   --    --   1.02   --   0.94   GFRESTIMATED  62   --    --    --   64  68   < >   --    --   68   --   75   GFRESTBLACK  75   --    --    --   78  83   < >   --    --   82   --   >90   GFR Calc     POTASSIUM   --    --    --    --   4.3  4.3   < >   --    --   4.3   --   4.7   TSH   --    --    --    --    --    --    --   2.57   --    --    --   1.97    < > = values in this interval not displayed.      BP Readings from Last 3 Encounters:   09/14/18 129/73   07/13/18 164/68   06/06/18 128/62    Wt Readings from Last 3 Encounters:   09/14/18 68.9 kg (151 lb 12.8 oz)   07/13/18 70.9 kg (156 lb 3.2 oz)   06/06/18 70.6 kg (155 lb 9.6 oz)                  Labs reviewed in EPIC    Reviewed and updated as needed this visit by clinical staff       Reviewed and updated as needed this visit by Provider         ROS:  Remainder of ROS is negative unless otherwise noted  "above or in HPI.    This document serves as a record of the services and decisions personally performed and made by Buddy Poe MD. It was created on his behalf by Sania Tamez, a trained medical scribe. The creation of this document is based on the provider's statements to the medical scribe.  Sania Tamez 10:06 AM September 14, 2018    OBJECTIVE:     /73 (BP Location: Left arm, Patient Position: Sitting, Cuff Size: Adult Regular)  Pulse (!) 46  Temp 97.6  F (36.4  C) (Oral)  Resp 12  Ht 1.702 m (5' 7\")  Wt 68.9 kg (151 lb 12.8 oz)  SpO2 100%  BMI 23.78 kg/m2  Body mass index is 23.78 kg/(m^2).  GENERAL APPEARANCE: healthy, alert and no distress  RESP: lungs clear to auscultation - no rales, rhonchi or wheezes  CV: regular rates and rhythm, normal S1 S2, no S3 or S4 and no murmur, click or rub  MS: extremities normal- no gross deformities noted  SKIN: no suspicious lesions or rashes  DIABETIC FOOT EXAM: normal DP and PT pulses, no trophic changes or ulcerative lesions and 4/5 diminished sensation on right, 5/5 but diminished on left sensory exam  PSYCH: mentation appears normal and affect normal/bright    Diagnostic Test Results:  Results for orders placed or performed in visit on 09/14/18 (from the past 24 hour(s))   Hemoglobin A1c   Result Value Ref Range    Hemoglobin A1C 6.5 (H) 0 - 5.6 %       ASSESSMENT/PLAN:     (Z13.89) Screening for diabetic peripheral neuropathy  (primary encounter diagnosis)  Comment: Patient agrees to screening.   Plan: FOOT EXAM  NO CHARGE [85256.114], Albumin         Random Urine Quantitative with Creat Ratio    (E11.21) Type 2 diabetes mellitus with diabetic nephropathy, without long-term current use of insulin (H)  Comment: <7, At goal.   Plan: Hemoglobin A1c    (E78.5) Hyperlipidemia LDL goal <70  Comment: No change.   Plan: Lipid panel reflex to direct LDL Fasting    Patient Instructions   Follow up in 6 months.     The information in this document, created by " the medical scribe for me, accurately reflects the services I personally performed and the decisions made by me. I have reviewed and approved this document for accuracy prior to leaving the patient care area.  September 14, 2018 10:44 AM    Buddy Poe MD  [facility]

## 2018-09-19 NOTE — PROGRESS NOTES
Praveen Darling: Your recent results are back: diabetes and bad cholesterol tests are at goal; chronic kidney disease urine protein loss is stable. Continue same medication and followup 4-6 months. Nice to see you!     Buddy

## 2018-10-31 ENCOUNTER — TELEPHONE (OUTPATIENT)
Dept: FAMILY MEDICINE | Facility: CLINIC | Age: 83
End: 2018-10-31

## 2018-10-31 ENCOUNTER — APPOINTMENT (OUTPATIENT)
Dept: GENERAL RADIOLOGY | Facility: CLINIC | Age: 83
DRG: 193 | End: 2018-10-31
Attending: EMERGENCY MEDICINE
Payer: MEDICARE

## 2018-10-31 ENCOUNTER — HOSPITAL ENCOUNTER (INPATIENT)
Facility: CLINIC | Age: 83
LOS: 3 days | Discharge: HOME | DRG: 193 | End: 2018-11-03
Attending: EMERGENCY MEDICINE | Admitting: INTERNAL MEDICINE
Payer: MEDICARE

## 2018-10-31 DIAGNOSIS — J18.9 PNEUMONIA OF RIGHT LOWER LOBE DUE TO INFECTIOUS ORGANISM: ICD-10-CM

## 2018-10-31 DIAGNOSIS — E86.0 DEHYDRATION: ICD-10-CM

## 2018-10-31 DIAGNOSIS — K59.00 CONSTIPATION, UNSPECIFIED CONSTIPATION TYPE: Primary | ICD-10-CM

## 2018-10-31 DIAGNOSIS — R53.1 WEAKNESS: ICD-10-CM
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
## 2018-10-31 LAB
ANION GAP SERPL CALCULATED.3IONS-SCNC: 8 MMOL/L (ref 3–14)
BASOPHILS # BLD AUTO: 0 10E9/L (ref 0–0.2)
BASOPHILS NFR BLD AUTO: 0.2 %
BUN SERPL-MCNC: 32 MG/DL (ref 7–30)
CALCIUM SERPL-MCNC: 8.6 MG/DL (ref 8.5–10.1)
CHLORIDE SERPL-SCNC: 104 MMOL/L (ref 94–109)
CO2 SERPL-SCNC: 29 MMOL/L (ref 20–32)
CREAT SERPL-MCNC: 1.29 MG/DL (ref 0.66–1.25)
DIFFERENTIAL METHOD BLD: ABNORMAL
EOSINOPHIL # BLD AUTO: 0 10E9/L (ref 0–0.7)
EOSINOPHIL NFR BLD AUTO: 0.2 %
ERYTHROCYTE [DISTWIDTH] IN BLOOD BY AUTOMATED COUNT: 13.5 % (ref 10–15)
GFR SERPL CREATININE-BSD FRML MDRD: 51 ML/MIN/1.7M2
GLUCOSE SERPL-MCNC: 98 MG/DL (ref 70–99)
HCT VFR BLD AUTO: 36.3 % (ref 40–53)
HGB BLD-MCNC: 11.8 G/DL (ref 13.3–17.7)
IMM GRANULOCYTES # BLD: 0.1 10E9/L (ref 0–0.4)
IMM GRANULOCYTES NFR BLD: 0.4 %
LYMPHOCYTES # BLD AUTO: 1.8 10E9/L (ref 0.8–5.3)
LYMPHOCYTES NFR BLD AUTO: 14.3 %
MCH RBC QN AUTO: 32.4 PG (ref 26.5–33)
MCHC RBC AUTO-ENTMCNC: 32.5 G/DL (ref 31.5–36.5)
MCV RBC AUTO: 100 FL (ref 78–100)
MONOCYTES # BLD AUTO: 1.1 10E9/L (ref 0–1.3)
MONOCYTES NFR BLD AUTO: 8.9 %
NEUTROPHILS # BLD AUTO: 9.4 10E9/L (ref 1.6–8.3)
NEUTROPHILS NFR BLD AUTO: 76 %
NRBC # BLD AUTO: 0 10*3/UL
NRBC BLD AUTO-RTO: 0 /100
PLATELET # BLD AUTO: 144 10E9/L (ref 150–450)
POTASSIUM SERPL-SCNC: 3.8 MMOL/L (ref 3.4–5.3)
RBC # BLD AUTO: 3.64 10E12/L (ref 4.4–5.9)
SODIUM SERPL-SCNC: 141 MMOL/L (ref 133–144)
WBC # BLD AUTO: 12.4 10E9/L (ref 4–11)

## 2018-10-31 PROCEDURE — 12000001 ZZH R&B MED SURG/OB UMMC

## 2018-10-31 PROCEDURE — 80048 BASIC METABOLIC PNL TOTAL CA: CPT | Performed by: EMERGENCY MEDICINE

## 2018-10-31 PROCEDURE — 25000132 ZZH RX MED GY IP 250 OP 250 PS 637: Mod: GY | Performed by: INTERNAL MEDICINE

## 2018-10-31 PROCEDURE — 25000128 H RX IP 250 OP 636: Performed by: EMERGENCY MEDICINE

## 2018-10-31 PROCEDURE — 93005 ELECTROCARDIOGRAM TRACING: CPT | Performed by: EMERGENCY MEDICINE

## 2018-10-31 PROCEDURE — 99285 EMERGENCY DEPT VISIT HI MDM: CPT | Mod: 25 | Performed by: EMERGENCY MEDICINE

## 2018-10-31 PROCEDURE — A9270 NON-COVERED ITEM OR SERVICE: HCPCS | Mod: GY | Performed by: INTERNAL MEDICINE

## 2018-10-31 PROCEDURE — 85025 COMPLETE CBC W/AUTO DIFF WBC: CPT | Performed by: EMERGENCY MEDICINE

## 2018-10-31 PROCEDURE — 93010 ELECTROCARDIOGRAM REPORT: CPT | Mod: Z6 | Performed by: EMERGENCY MEDICINE

## 2018-10-31 PROCEDURE — 96365 THER/PROPH/DIAG IV INF INIT: CPT | Performed by: EMERGENCY MEDICINE

## 2018-10-31 PROCEDURE — 96361 HYDRATE IV INFUSION ADD-ON: CPT | Performed by: EMERGENCY MEDICINE

## 2018-10-31 PROCEDURE — 71046 X-RAY EXAM CHEST 2 VIEWS: CPT

## 2018-10-31 RX ORDER — METOPROLOL SUCCINATE 25 MG/1
25 TABLET, EXTENDED RELEASE ORAL DAILY
Status: DISCONTINUED | OUTPATIENT
Start: 2018-11-01 | End: 2018-11-01

## 2018-10-31 RX ORDER — NALOXONE HYDROCHLORIDE 0.4 MG/ML
.1-.4 INJECTION, SOLUTION INTRAMUSCULAR; INTRAVENOUS; SUBCUTANEOUS
Status: DISCONTINUED | OUTPATIENT
Start: 2018-10-31 | End: 2018-11-03 | Stop reason: HOSPADM

## 2018-10-31 RX ORDER — ASPIRIN 81 MG/1
81 TABLET ORAL AT BEDTIME
Status: DISCONTINUED | OUTPATIENT
Start: 2018-10-31 | End: 2018-11-03 | Stop reason: HOSPADM

## 2018-10-31 RX ORDER — ONDANSETRON 2 MG/ML
4 INJECTION INTRAMUSCULAR; INTRAVENOUS EVERY 6 HOURS PRN
Status: DISCONTINUED | OUTPATIENT
Start: 2018-10-31 | End: 2018-11-03 | Stop reason: HOSPADM

## 2018-10-31 RX ORDER — ACETAMINOPHEN 325 MG/1
650 TABLET ORAL EVERY 4 HOURS PRN
Status: DISCONTINUED | OUTPATIENT
Start: 2018-10-31 | End: 2018-11-03 | Stop reason: HOSPADM

## 2018-10-31 RX ORDER — CEFTRIAXONE 2 G/1
2 INJECTION, POWDER, FOR SOLUTION INTRAMUSCULAR; INTRAVENOUS ONCE
Status: COMPLETED | OUTPATIENT
Start: 2018-10-31 | End: 2018-10-31

## 2018-10-31 RX ORDER — SODIUM CHLORIDE 9 MG/ML
INJECTION, SOLUTION INTRAVENOUS CONTINUOUS
Status: DISCONTINUED | OUTPATIENT
Start: 2018-10-31 | End: 2018-11-03

## 2018-10-31 RX ORDER — CEFTRIAXONE 2 G/1
2 INJECTION, POWDER, FOR SOLUTION INTRAMUSCULAR; INTRAVENOUS EVERY 24 HOURS
Status: DISCONTINUED | OUTPATIENT
Start: 2018-11-01 | End: 2018-11-03 | Stop reason: HOSPADM

## 2018-10-31 RX ORDER — ONDANSETRON 4 MG/1
4 TABLET, ORALLY DISINTEGRATING ORAL EVERY 6 HOURS PRN
Status: DISCONTINUED | OUTPATIENT
Start: 2018-10-31 | End: 2018-11-03 | Stop reason: HOSPADM

## 2018-10-31 RX ORDER — MULTIVITAMIN,THERAPEUTIC
1 TABLET ORAL DAILY
Status: DISCONTINUED | OUTPATIENT
Start: 2018-11-01 | End: 2018-11-03 | Stop reason: HOSPADM

## 2018-10-31 RX ORDER — SIMVASTATIN 20 MG
20 TABLET ORAL DAILY
Status: DISCONTINUED | OUTPATIENT
Start: 2018-11-01 | End: 2018-11-03 | Stop reason: HOSPADM

## 2018-10-31 RX ORDER — DOCUSATE SODIUM 100 MG/1
100 CAPSULE, LIQUID FILLED ORAL 2 TIMES DAILY
Status: DISCONTINUED | OUTPATIENT
Start: 2018-10-31 | End: 2018-11-03 | Stop reason: HOSPADM

## 2018-10-31 RX ORDER — AZITHROMYCIN 250 MG/1
250 TABLET, FILM COATED ORAL DAILY
Status: DISCONTINUED | OUTPATIENT
Start: 2018-11-01 | End: 2018-11-03 | Stop reason: HOSPADM

## 2018-10-31 RX ORDER — NITROGLYCERIN 0.4 MG/1
0.4 TABLET SUBLINGUAL EVERY 5 MIN PRN
Status: DISCONTINUED | OUTPATIENT
Start: 2018-10-31 | End: 2018-11-03 | Stop reason: HOSPADM

## 2018-10-31 RX ORDER — ISOSORBIDE MONONITRATE 60 MG/1
60 TABLET, EXTENDED RELEASE ORAL DAILY
Status: DISCONTINUED | OUTPATIENT
Start: 2018-11-01 | End: 2018-11-03 | Stop reason: HOSPADM

## 2018-10-31 RX ORDER — GUAIFENESIN/DEXTROMETHORPHAN 100-10MG/5
10 SYRUP ORAL EVERY 4 HOURS PRN
Status: DISCONTINUED | OUTPATIENT
Start: 2018-10-31 | End: 2018-11-03 | Stop reason: HOSPADM

## 2018-10-31 RX ORDER — POLYETHYLENE GLYCOL 3350 17 G/17G
17 POWDER, FOR SOLUTION ORAL DAILY PRN
Status: DISCONTINUED | OUTPATIENT
Start: 2018-10-31 | End: 2018-11-03 | Stop reason: HOSPADM

## 2018-10-31 RX ADMIN — DOCUSATE SODIUM 100 MG: 100 CAPSULE, LIQUID FILLED ORAL at 21:53

## 2018-10-31 RX ADMIN — SODIUM CHLORIDE 500 ML: 9 INJECTION, SOLUTION INTRAVENOUS at 18:03

## 2018-10-31 RX ADMIN — AZITHROMYCIN MONOHYDRATE 500 MG: 500 INJECTION, POWDER, LYOPHILIZED, FOR SOLUTION INTRAVENOUS at 18:35

## 2018-10-31 RX ADMIN — SODIUM CHLORIDE: 9 INJECTION, SOLUTION INTRAVENOUS at 17:10

## 2018-10-31 RX ADMIN — ASPIRIN 81 MG: 81 TABLET, COATED ORAL at 21:53

## 2018-10-31 RX ADMIN — CEFTRIAXONE SODIUM 2 G: 2 INJECTION, POWDER, FOR SOLUTION INTRAMUSCULAR; INTRAVENOUS at 19:52

## 2018-10-31 ASSESSMENT — ENCOUNTER SYMPTOMS
APPETITE CHANGE: 0
FEVER: 0
COUGH: 1
RHINORRHEA: 1
WEAKNESS: 1

## 2018-10-31 NOTE — TELEPHONE ENCOUNTER
Dr. Poe,     Daughter, Jes, is requesting to be advised. She thinks that the pt may have pneumonia. Pt blowing nose, coughing up phlegm. Has been eating well. Had eggs/sausage. Also drinking fluids.  Symptoms:  Temp: 99.4 this morning.  Phlegm-- last night was coughing up a lot.   Pain in left chest (however also rolled out of bed last night)  Cough: yes coughing up phelgm  Last night he seemed confused, better this morning  Has had chills  No nausea/vomiting/diarrhea  Pt is really weak (would need ambulance per daughter if ER advised)    Pt was hospitalized for pneumonia a year and half ago (05/08/17) given Levaquin 500 mg IV for suspected CAP.     Cued pharmacy if needed.     Called daughter, Jes who was with patient. Received permission (verbal consent) from patient over the phone for her to speak with caller. Per daughter, pt rolled out of bed and was on the floor this morning. He got wound in a radio cord. This happened last night, and he was especially weak from fighting to get untangled. He had congestion already from a cold. He is really weak. Pt blowing nose, coughing up phlegm. Eating/drinking fluids, seems comfortable. Daughter states that she thinks she would need to call an ambulance for transportation, because pt is too weak.  Hit head? Daughter doesn't know. No bruises, cuts, or scrapes, no bleeding.  Discussed s/s to watch for for head injury. Daughter verbalized understanding.    Stephanie Galeas RN  [facility]

## 2018-10-31 NOTE — ED TRIAGE NOTES
"Pt c/o productive cough x 5 days with \"slight\" chest discomfort. Pt had an unwitnessed fall out of bed last night and was unable to get up for an hour until someone came to help. Pt does not think he hit his head or lost consciousness.   "

## 2018-10-31 NOTE — IP AVS SNAPSHOT
UR 8A    [address]    [facility] [place] [address]    Phone:  [phone]                                       After Visit Summary   10/31/2018    Kiran Carcamo    MRN: [id number]           After Visit Summary Signature Page     I have received my discharge instructions, and my questions have been answered. I have discussed any challenges I see with this plan with the nurse or doctor.    ..........................................................................................................................................  Patient/Patient Representative Signature      ..........................................................................................................................................  Patient Representative Print Name and Relationship to Patient    ..................................................               ................................................  Date                                   Time    ..........................................................................................................................................  Reviewed by Signature/Title    ...................................................              ..............................................  Date                                               Time          22EPIC Rev 08/18

## 2018-10-31 NOTE — TELEPHONE ENCOUNTER
Reason for call:  Other   Patient called regarding (reason for call): call back  Additional comments: Jes (Kiran's daughter) is requesting a call back concerning Kiran's fall on 10/30/18 and possible pneumonia.     Phone number to reach patient:    [phone]  Or   [phone] Jes's cell phone    Best Time:  any    Can we leave a detailed message on this number?  YES

## 2018-10-31 NOTE — IP AVS SNAPSHOT
MRN:[id number]                      After Visit Summary   10/31/2018    Kiran Carcamo    MRN: [id number]           Thank you!     Thank you for choosing [facility] for your care. Our goal is always to provide you with excellent care. Hearing back from our patients is one way we can continue to improve our services. Please take a few minutes to complete the written survey that you may receive in the mail after you visit with us. Thank you!        Patient Information     Date Of Birth          4/13/1921        Designated Caregiver       Most Recent Value    Caregiver    Will someone help with your care after discharge? yes    Name of designated caregiver Jes    Phone number of caregiver [phone]    Caregiver address [address] [facility] [address]      About your hospital stay     You were admitted on:  October 31, 2018 You last received care in the:  [facility] 8[facility]    You were discharged on:  November 3, 2018        Reason for your hospital stay       Admitted for pneumonia, gotten better. Going home with 24 hr supervision.                  Who to Call     For medical emergencies, please call 911.  For non-urgent questions about your medical care, please call your primary care provider or clinic, [phone]          Attending Provider     Provider Specialty    Damon Enrique MD Emergency Medicine    ot, Sunshine Carrion MD Internal Medicine       Primary Care Provider Office Phone # Fax #    Buddy Poe [phone]      After Care Instructions     Activity       Your activity upon discharge: activity as tolerated with family assistance  Needs 24 hr supervision.            Diet       Follow this diet upon discharge: Orders Placed This Encounter      Combination Diet Regular Diet Adult                  Follow-up Appointments     Adult [facility] Follow-up and recommended labs and tests       Follow up with primary care provider, eufemia Dias  "7 days for hospital follow- up.  CXR in 2 months      Appointments on [place] and/or [facility] (with [facility] or [facility] provider or service). Call [phone] if you haven't heard regarding these appointments within 7 days of discharge.                  Pending Results     No orders found from 10/29/2018 to 11/1/2018.            Statement of Approval     Ordered          11/03/18 1422  I have reviewed and agree with all the recommendations and orders detailed in this document.  EFFECTIVE NOW     Approved and electronically signed by:  Sunshine Brody MD           11/03/18 5187  I have reviewed and agree with all the recommendations and orders detailed in this document.  EFFECTIVE NOW     Approved and electronically signed by:  Sunshine Brody MD             Admission Information     Date & Time Provider Department Dept. Phone    10/31/2018 Sunshine Brody MD [facility] 8[facility] [phone]      Your Vitals Were     Blood Pressure Pulse Temperature Respirations Height Weight    164/64 61 96.6  F (35.9  C) (Oral) 19 1.753 m (5' 9\") 72.6 kg (160 lb)    Pulse Oximetry BMI (Body Mass Index)                93% 23.63 kg/m2          [employer] Information     [employer] gives you secure access to your electronic health record. If you see a primary care provider, you can also send messages to your care team and make appointments. If you have questions, please call your primary care clinic.  If you do not have a primary care provider, please call [phone] and they will assist you.        Care EveryWhere ID     This is your Care EveryWhere ID. This could be used by other organizations to access your [place] medical records  [id number]        Equal Access to Services     BETO QUINONEZ : melody Limon, dyan denny. So [facility] [phone].    ATENCIÓN: Si habla español, tiene a arriola disposición servicios gratuitos de " andres lingüísticmaxine. Kiersten al [phone].    We comply with applicable federal civil rights laws and [place] laws. We do not discriminate on the basis of race, color, national origin, age, disability, sex, sexual orientation, or gender identity.               Review of your medicines      START taking        Dose / Directions    azithromycin 250 MG tablet   Commonly known as:  ZITHROMAX   Indication:  Community Acquired Pneumonia   Used for:  Pneumonia of right lower lobe due to infectious organism (H)        Dose:  250 mg   Take 1 tablet (250 mg) by mouth daily   Quantity:  3 tablet   Refills:  0       cefpodoxime 200 MG tablet   Commonly known as:  VANTIN   Used for:  Pneumonia of right lower lobe due to infectious organism (H)        Dose:  200 mg   Take 1 tablet (200 mg) by mouth 2 times daily for 7 days   Quantity:  14 tablet   Refills:  0       senna-docusate 8.6-50 MG per tablet   Commonly known as:  SENOKOT-S;PERICOLACE   Used for:  Constipation, unspecified constipation type        Dose:  1 tablet   Take 1 tablet by mouth 2 times daily   Quantity:  60 tablet   Refills:  1         CONTINUE these medicines which have NOT CHANGED        Dose / Directions    acetaminophen 325 MG tablet   Commonly known as:  TYLENOL   Used for:  Influenza A        Dose:  975 mg   Take 3 tablets (975 mg) by mouth every 8 hours as needed for mild pain   Quantity:  100 tablet   Refills:  0       aspirin 81 MG EC tablet        Dose:  81 mg   Take 81 mg by mouth At Bedtime.   Refills:  0       fluorouracil 5 % cream   Commonly known as:  EFUDEX   Used for:  Actinic keratosis        Apply topically daily Every evening for 4 weeks on the frontal scalp, then 2 week break, then another section of the scalp for 4 weeks, etc   Quantity:  40 g   Refills:  2       isosorbide mononitrate 60 MG 24 hr tablet   Commonly known as:  IMDUR   Used for:  Chest discomfort        Dose:  60 mg   Take 1 tablet (60 mg) by mouth daily Please get  refills from primary care or schedule with new cardiologist.   Quantity:  90 tablet   Refills:  3       ketoconazole 2 % cream   Commonly known as:  NIZORAL   Used for:  Seborrheic dermatitis        Apply topically 2 times daily To the eyebrows and around the nose/cheek area   Quantity:  60 g   Refills:  0       lisinopril 10 MG tablet   Commonly known as:  PRINIVIL/ZESTRIL   Used for:  Hypertension goal BP (blood pressure) < 140/90        TAKE 1 TABLET BY MOUTH EVERY DAY   Quantity:  90 tablet   Refills:  0       metFORMIN 500 MG tablet   Commonly known as:  GLUCOPHAGE   Used for:  Type 2 diabetes mellitus with diabetic nephropathy, without long-term current use of insulin (H)        Dose:  500 mg   Take 1 tablet (500 mg) by mouth 2 times daily (with meals)   Quantity:  180 tablet   Refills:  3       metoprolol succinate 25 MG 24 hr tablet   Commonly known as:  TOPROL-XL   Used for:  HTN (hypertension)        TAKE ONE-HALF TABLET BY MOUTH DAILY   Quantity:  45 tablet   Refills:  1       MV-ONE PO        Dose:  1 tablet   Take 1 tablet by mouth daily   Refills:  0       nitroGLYcerin 0.4 MG sublingual tablet   Commonly known as:  NITROSTAT   Used for:  History of MI (myocardial infarction)        Dose:  0.4 mg   Place 1 tablet (0.4 mg) under the tongue every 5 minutes as needed for chest pain If symptoms after 3 doses (15 minutes) call 911.   Quantity:  25 tablet   Refills:  1       ONETOUCH ULTRA test strip   Used for:  Type 2 diabetes mellitus without complication (H)   Generic drug:  blood glucose monitoring        USE TO TEST BLOOD SUGAR TWICE DAILY   Quantity:  300 strip   Refills:  2       polyethylene glycol Packet   Commonly known as:  MIRALAX/GLYCOLAX   Used for:  Constipation, unspecified constipation type        Dose:  17 g   Take 17 g by mouth daily as needed for constipation   Quantity:  10 packet   Refills:  0       simvastatin 20 MG tablet   Commonly known as:  ZOCOR   Used for:  Hyperlipidemia LDL  goal <130        Dose:  20 mg   Take 1 tablet (20 mg) by mouth daily   Quantity:  90 tablet   Refills:  3            Where to get your medicines      These medications were sent to [id number] - [place], [place] - [address]  [address] [facility] [address]     Phone:  [phone]     azithromycin 250 MG tablet    cefpodoxime 200 MG tablet    senna-docusate 8.6-50 MG per tablet                Protect others around you: Learn how to safely use, store and throw away your medicines at www.disposemymeds.org.        ANTIBIOTIC INSTRUCTION     You've Been Prescribed an Antibiotic - Now What?  Your healthcare team thinks that you or your loved one might have an infection. Some infections can be treated with antibiotics, which are powerful, life-saving drugs. Like all medications, antibiotics have side effects and should only be used when necessary. There are some important things you should know about your antibiotic treatment.      Your healthcare team may run tests before you start taking an antibiotic.    Your team may take samples (e.g., from your blood, urine or other areas) to run tests to look for bacteria. These test can be important to determine if you need an antibiotic at all and, if you do, which antibiotic will work best.      Within a few days, your healthcare team might change or even stop your antibiotic.    Your team may start you on an antibiotic while they are working to find out what is making you sick.    Your team might change your antibiotic because test results show that a different antibiotic would be better to treat your infection.    In some cases, once your team has more information, they learn that you do not need an antibiotic at all. They may find out that you don't have an infection, or that the antibiotic you're taking won't work against your infection. For example, an infection caused by a virus can't be treated with antibiotics. Staying on an antibiotic  when you don't need it is more likely to be harmful than helpful.      You may experience side effects from your antibiotic.    Like all medications, antibiotics have side effects. Some of these can be serious.    Let you healthcare team know if you have any known allergies when you are admitted to the hospital.    One significant side effect of nearly all antibiotics is the risk of severe and sometimes deadly diarrhea caused by Clostridium difficile (C. Difficile). This occurs when a person takes antibiotics because some good germs are destroyed. Antibiotic use allows C. diificile to take over, putting patients at high risk for this serious infection.    As a patient or caregiver, it is important to understand your or your loved one's antibiotic treatment. It is especially important for caregivers to speak up when patients can't speak for themselves. Here are some important questions to ask your healthcare team.    What infection is this antibiotic treating and how do you know I have that infection?    What side effects might occur from this antibiotic?    How long will I need to take this antibiotic?    Is it safe to take this antibiotic with other medications or supplements (e.g., vitamins) that I am taking?     Are there any special directions I need to know about taking this antibiotic? For example, should I take it with food?    How will I be monitored to know whether my infection is responding to the antibiotic?    What tests may help to make sure the right antibiotic is prescribed for me?      Information provided by:  www.cdc.gov/getsmart  U.S. Department of Health and Human Services  Centers for disease Control and Prevention  National Center for Emerging and Zoonotic Infectious Diseases  Division of Healthcare Quality Promotion             Medication List: This is a list of all your medications and when to take them. Check marks below indicate your daily home schedule. Keep this list as a reference.       Medications           Morning Afternoon Evening Bedtime As Needed    acetaminophen 325 MG tablet   Commonly known as:  TYLENOL   Take 3 tablets (975 mg) by mouth every 8 hours as needed for mild pain   Last time this was given:  650 mg on 11/1/2018  9:30 AM                                aspirin 81 MG EC tablet   Take 81 mg by mouth At Bedtime.   Last time this was given:  81 mg on 11/2/2018  8:38 PM                                azithromycin 250 MG tablet   Commonly known as:  ZITHROMAX   Take 1 tablet (250 mg) by mouth daily   Last time this was given:  250 mg on 11/2/2018  7:26 PM                                cefpodoxime 200 MG tablet   Commonly known as:  VANTIN   Take 1 tablet (200 mg) by mouth 2 times daily for 7 days                                fluorouracil 5 % cream   Commonly known as:  EFUDEX   Apply topically daily Every evening for 4 weeks on the frontal scalp, then 2 week break, then another section of the scalp for 4 weeks, etc                                isosorbide mononitrate 60 MG 24 hr tablet   Commonly known as:  IMDUR   Take 1 tablet (60 mg) by mouth daily Please get refills from primary care or schedule with new cardiologist.   Last time this was given:  60 mg on 11/3/2018 12:55 PM                                ketoconazole 2 % cream   Commonly known as:  NIZORAL   Apply topically 2 times daily To the eyebrows and around the nose/cheek area                                lisinopril 10 MG tablet   Commonly known as:  PRINIVIL/ZESTRIL   TAKE 1 TABLET BY MOUTH EVERY DAY   Last time this was given:  10 mg on 11/3/2018  9:47 AM                                metFORMIN 500 MG tablet   Commonly known as:  GLUCOPHAGE   Take 1 tablet (500 mg) by mouth 2 times daily (with meals)                                metoprolol succinate 25 MG 24 hr tablet   Commonly known as:  TOPROL-XL   TAKE ONE-HALF TABLET BY MOUTH DAILY   Last time this was given:  12.5 mg on 11/3/2018  9:46 AM                                 MV-ONE PO   Take 1 tablet by mouth daily                                nitroGLYcerin 0.4 MG sublingual tablet   Commonly known as:  NITROSTAT   Place 1 tablet (0.4 mg) under the tongue every 5 minutes as needed for chest pain If symptoms after 3 doses (15 minutes) call 911.                                ONETOUCH ULTRA test strip   USE TO TEST BLOOD SUGAR TWICE DAILY   Generic drug:  blood glucose monitoring                                polyethylene glycol Packet   Commonly known as:  MIRALAX/GLYCOLAX   Take 17 g by mouth daily as needed for constipation                                senna-docusate 8.6-50 MG per tablet   Commonly known as:  SENOKOT-S;PERICOLACE   Take 1 tablet by mouth 2 times daily                                simvastatin 20 MG tablet   Commonly known as:  ZOCOR   Take 1 tablet (20 mg) by mouth daily   Last time this was given:  20 mg on 11/3/2018  9:47 AM

## 2018-10-31 NOTE — TELEPHONE ENCOUNTER
Called patient's daughter, Jes. Notified of provider message. Jes verbalized understanding.    Stephanie Galeas RN  [facility]

## 2018-10-31 NOTE — ED PROVIDER NOTES
[facility] EMERGENCY DEPARTMENT ([facility])    10/31/18      History     Chief Complaint   Patient presents with     Cough     Fall     The history is provided by the spouse, a relative and the patient.     Kiran Carcamo is a 89 year old male with a history of CAD, MI s/p CABG, CKD, and type 2 diabetes mellitus with diabetic nephropathy, pneumonia, and Paget's bone disease, who presents to the Emergency Department for evaluation of a productive cough and rhinorrhea, as well as general weakness post mechanical fall from bed. The patient reports the fall occurred yesterday and that he was tangled in a radio cord. The family reports he was on the floor for approximately one hour and then was returned to bed; patient lives alone.  The family reports patient took his medications this morning and that he is eating and drinking normally.  The patient reports his nasal symptoms began on 10/27/2018. Patient denies any fevers.    I have reviewed the Medications, Allergies, Past Medical and Surgical History, and Social History in the [employer] system.    Past Medical History:   Diagnosis Date     CKD (chronic kidney disease) stage 2, GFR 60-89 ml/min 7/6/2015     Hypertension goal BP (blood pressure) < 140/90      Syncope 12/12/2011     Type 2 diabetes, HbA1C goal < 8% (H) 2/23/2015       Past Surgical History:   Procedure Laterality Date     CHOLECYSTECTOMY       CORONARY ARTERY BYPASS  '05    6-vessel     MANDIBLE SURGERY         Family History   Problem Relation Age of Onset     HEART DISEASE Mother      Eye Disorder Father      Respiratory Brother      HEART DISEASE Sister      Cancer Daughter      Cancer Brother      Skin Cancer No family hx of      Melanoma No family hx of        Social History   Substance Use Topics     Smoking status: Never Smoker     Smokeless tobacco: Never Used     Alcohol use No       Current Facility-Administered Medications   Medication     cefTRIAXone (ROCEPHIN) 2 g vial to attach to NS  "100 ml bag for ADULTS or NS 50 ml bag for PEDS     sodium chloride 0.9% infusion     Current Outpatient Prescriptions   Medication     acetaminophen (TYLENOL) 325 MG tablet     aspirin 81 MG EC tablet     fluorouracil (EFUDEX) 5 % cream     isosorbide mononitrate (IMDUR) 60 MG 24 hr tablet     ketoconazole (NIZORAL) 2 % cream     lisinopril (PRINIVIL/ZESTRIL) 10 MG tablet     metFORMIN (GLUCOPHAGE) 500 MG tablet     metoprolol succinate (TOPROL-XL) 25 MG 24 hr tablet     Multiple Vitamin (MV-ONE PO)     simvastatin (ZOCOR) 20 MG tablet     nitroglycerin (NITROSTAT) 0.4 MG sublingual tablet     ONE TOUCH ULTRA test strip     polyethylene glycol (MIRALAX/GLYCOLAX) Packet      No Known Allergies     Review of Systems   Constitutional: Negative for appetite change and fever.   HENT: Positive for congestion and rhinorrhea.    Respiratory: Positive for cough.    Neurological: Positive for weakness (generalized).   All other systems reviewed and are negative.      Physical Exam   BP: (!) 88/45  Pulse: 57  Heart Rate: 59  Temp: 97  F (36.1  C)  Resp: 20  Height: 175.3 cm (5' 9\")  Weight: 72.6 kg (160 lb)  SpO2: 96 %      Physical Exam   Constitutional: He is oriented to person, place, and time. He appears well-developed and well-nourished. No distress.   HENT:   Head: Atraumatic.   Eyes: Pupils are equal, round, and reactive to light.   Cardiovascular: Normal rate, regular rhythm and normal heart sounds.    Pulmonary/Chest: Effort normal. He has rales.   Abdominal: Soft. There is no tenderness.   Musculoskeletal: Normal range of motion.   Neurological: He is alert and oriented to person, place, and time. No cranial nerve deficit.   Skin: Skin is warm. He is not diaphoretic.   Psychiatric: He has a normal mood and affect. His behavior is normal.   Nursing note and vitals reviewed.      ED Course     ED Course   4:39 PM  The patient was seen and examined by Dr. Enrique in Room ED01.     Procedures             EKG " Interpretation:      Interpreted by Dr. Enrique  Time reviewed: 16:39  Symptoms at time of EKG: None   Rhythm: normal sinus   Rate: normal  Axis: normal  Ectopy: none  Conduction: normal  ST Segments/ T Waves: No ST-T wave changes  Q Waves: none  Comparison to prior: Unchanged from 07/13/2018    Clinical Impression: normal EKG          Critical Care time:  none  Results for orders placed or performed during the hospital encounter of 10/31/18   XR Chest 2 Views    Narrative    CHEST TWO VIEWS  10/31/2018 5:54 PM     HISTORY: Cough.     COMPARISON: 5/8/2017      Impression    IMPRESSION: Infiltrate in the medial right lung base is similar to on  the previous exam, but slightly more prominent, and neither was  present in 2011. Cardiomegaly is unchanged. Sternal wires. No pleural  effusion.     Medications   sodium chloride 0.9% infusion ( Intravenous New Bag 10/31/18 1710)   cefTRIAXone (ROCEPHIN) 2 g vial to attach to  ml bag for ADULTS or NS 50 ml bag for PEDS (not administered)   0.9% sodium chloride BOLUS (0 mLs Intravenous Stopped 10/31/18 1857)   azithromycin (ZITHROMAX) 500 mg in sodium chloride 0.9 % 250 mL intermittent infusion (500 mg Intravenous New Bag 10/31/18 1835)            Labs Ordered and Resulted from Time of ED Arrival Up to the Time of Departure from the ED   CBC WITH PLATELETS DIFFERENTIAL - Abnormal; Notable for the following:        Result Value    WBC 12.4 (*)     RBC Count 3.64 (*)     Hemoglobin 11.8 (*)     Hematocrit 36.3 (*)     Platelet Count 144 (*)     Absolute Neutrophil 9.4 (*)     All other components within normal limits   BASIC METABOLIC PANEL - Abnormal; Notable for the following:     Urea Nitrogen 32 (*)     Creatinine 1.29 (*)     GFR Estimate 51 (*)     All other components within normal limits   ROUTINE UA WITH MICROSCOPIC            Assessments & Plan (with Medical Decision Making)   A 89 year old male who is very healthy and lives alone. He has been ill with viral  symptoms for the last few days causing weakness and inability to get up off the floor. He is normally highly functional, alert, and has no cognitive impairment. Here, he is dehydrated with a white count and likely pneumonia. His BUN and creatinine are elevated. He is receiving fluids and antibiotics for community acquired pneumonia protocol. He does not have any traumatic injuries from his fall. I think he needs IV fluids and antibiosis for a day or two to regain his strength and discharged back to his independent living. He is not septic-appearing.     I have reviewed the nursing notes.    I have reviewed the findings, diagnosis, plan and need for follow up with the patient.    Current Discharge Medication List          Final diagnoses:   Dehydration   Fall, initial encounter   Weakness   Pneumonia of right lower lobe due to infectious organism (H)   I, Mert Huynh, am serving as a trained medical scribe to document services personally performed by Damon Enrique MD, based on the provider's statements to me.      IDamon MD, was physically present and have reviewed and verified the accuracy of this note documented by Mert Huynh.     10/31/2018   [facility], [place], EMERGENCY DEPARTMENT     Damon Enrique MD  10/31/18 1950

## 2018-10-31 NOTE — LETTER
"Transition Communication Hand-off for Care Transitions to Next Level of Care Provider    Name: Kiran Carcamo  : 1921  MRN #: [id number]  Primary Care Provider: Buddy Poe     Primary Clinic: [address] [facility] [address]  [facility] [address]     Reason for Hospitalization:  Dehydration [E86.0]  Weakness [R53.1]  Fall, initial encounter [W19.XXXA]  Pneumonia of right lower lobe due to infectious organism (H) [J18.1]  Admit Date/Time: 10/31/2018  4:25 PM  Discharge Date: 11/3/2018  Payor Source: Payor: MEDICA / Plan: MEDICA PRIME SOLUTION / Product Type: Indemnity /     Readmission Assessment Measure (ERASMO) Risk Score/category: Elevated  Plan of Care Goals/Milestone Events:   Patient Concerns: Disorientation, physical deconditioning   Patient Goals:   Short-term Return home   Long-term Continue to live independently   Medical Goals   Short-term PT/OT through Home Care     Long-term Return to previous baseline         Reason for Communication Hand-off Referral: Fragility  Other Family worries increased supports possibly needed, lives alone with family close but \"fiercely independent\"    Discharge Plan:       Concern for non-adherence with plan of care:   Y/N: Y  Discharge Needs Assessment:      Already enrolled in Tele-monitoring program and name of program:  Unknown  Follow-up specialty is recommended: Unknown    Follow-up plan:  Future Appointments  Date Time Provider Department Center   11/3/2018 11:00 AM Dorota Molina, PT [facility]       Any outstanding tests or procedures:              Key Recommendations:  Follow up with PCP Care Coordination or Home Care SW team recommended--speak with family about increased services    Ravi Aguilera    AVS/Discharge Summary is the source of truth; this is a helpful guide for improved communication of patient story          "

## 2018-10-31 NOTE — PHARMACY-ADMISSION MEDICATION HISTORY
Admission medication history for the October 31, 2018 admission is complete.     Interview sources:  Patient's family, Prescription bottles brought in from home.    Reliability of source: Good    Medication compliance: Good    Preferred Outpatient Pharmacy: [facility]    Changes made to PTA medication list (reason)  Added: None  Deleted: None  Changed: None    Additional medication history information:   Patient's family are pretty sure that patient is taking the fluorouracil 5% cream and ketoconazole 2% cream, but were not positive.    Prior to Admission medications    Medication Sig Last Dose Taking? Auth Provider   acetaminophen (TYLENOL) 325 MG tablet Take 3 tablets (975 mg) by mouth every 8 hours as needed for mild pain Unknown Yes Mary Varghese APRN CNP   aspirin 81 MG EC tablet Take 81 mg by mouth At Bedtime. 10/30/2018 at PM Yes Reported, Patient   fluorouracil (EFUDEX) 5 % cream Apply topically daily Every evening for 4 weeks on the frontal scalp, then 2 week break, then another section of the scalp for 4 weeks, etc Unknown Yes Michael Arevalo MD   isosorbide mononitrate (IMDUR) 60 MG 24 hr tablet Take 1 tablet (60 mg) by mouth daily Please get refills from primary care or schedule with new cardiologist. 10/31/2018 at AM Yes Tasia Zayas NP   ketoconazole (NIZORAL) 2 % cream Apply topically 2 times daily To the eyebrows and around the nose/cheek area Unknown Yes Michael Arevalo MD   lisinopril (PRINIVIL/ZESTRIL) 10 MG tablet TAKE 1 TABLET BY MOUTH EVERY DAY 10/31/2018 at AM Yes Bety Hargrove APRN CNP   metFORMIN (GLUCOPHAGE) 500 MG tablet Take 1 tablet (500 mg) by mouth 2 times daily (with meals) 10/31/2018 at AM Yes Buddy Poe MD   metoprolol succinate (TOPROL-XL) 25 MG 24 hr tablet TAKE ONE-HALF TABLET BY MOUTH DAILY 10/31/2018 at AM Yes Tasia Zayas NP   Multiple Vitamin (MV-ONE PO) Take 1 tablet by mouth daily  10/31/2018 at AM Yes Reported, Patient    simvastatin (ZOCOR) 20 MG tablet Take 1 tablet (20 mg) by mouth daily 10/31/2018 at AM Yes Carmencita Bailey MD   nitroglycerin (NITROSTAT) 0.4 MG sublingual tablet Place 1 tablet (0.4 mg) under the tongue every 5 minutes as needed for chest pain If symptoms after 3 doses (15 minutes) call 911. Unknown at Unknown time  Buddy Poe MD   ONE TOUCH ULTRA test strip USE TO TEST BLOOD SUGAR TWICE DAILY Unknown at Unknown time  Buddy Poe MD   polyethylene glycol (MIRALAX/GLYCOLAX) Packet Take 17 g by mouth daily as needed for constipation Unknown at Unknown time  Mary Varghese, APRN CNP       Time spent: 20 minutes    Medication history completed by:   Alin Ching, Pharmacy Intern

## 2018-11-01 LAB
ALBUMIN SERPL-MCNC: 2.7 G/DL (ref 3.4–5)
ALBUMIN UR-MCNC: 30 MG/DL
ALP SERPL-CCNC: 77 U/L (ref 40–150)
ALT SERPL W P-5'-P-CCNC: 26 U/L (ref 0–70)
ANION GAP SERPL CALCULATED.3IONS-SCNC: 7 MMOL/L (ref 3–14)
APPEARANCE UR: CLEAR
AST SERPL W P-5'-P-CCNC: 27 U/L (ref 0–45)
BASOPHILS # BLD AUTO: 0 10E9/L (ref 0–0.2)
BASOPHILS NFR BLD AUTO: 0.2 %
BILIRUB DIRECT SERPL-MCNC: 0.1 MG/DL (ref 0–0.2)
BILIRUB SERPL-MCNC: 0.5 MG/DL (ref 0.2–1.3)
BILIRUB UR QL STRIP: NEGATIVE
BUN SERPL-MCNC: 28 MG/DL (ref 7–30)
CALCIUM SERPL-MCNC: 8.1 MG/DL (ref 8.5–10.1)
CHLORIDE SERPL-SCNC: 108 MMOL/L (ref 94–109)
CO2 SERPL-SCNC: 25 MMOL/L (ref 20–32)
COLOR UR AUTO: YELLOW
CREAT SERPL-MCNC: 0.96 MG/DL (ref 0.66–1.25)
DIFFERENTIAL METHOD BLD: ABNORMAL
EOSINOPHIL # BLD AUTO: 0.1 10E9/L (ref 0–0.7)
EOSINOPHIL NFR BLD AUTO: 0.6 %
ERYTHROCYTE [DISTWIDTH] IN BLOOD BY AUTOMATED COUNT: 13.4 % (ref 10–15)
GFR SERPL CREATININE-BSD FRML MDRD: 73 ML/MIN/1.7M2
GLUCOSE SERPL-MCNC: 110 MG/DL (ref 70–99)
GLUCOSE UR STRIP-MCNC: NEGATIVE MG/DL
GRAM STN SPEC: NORMAL
HCT VFR BLD AUTO: 35.1 % (ref 40–53)
HGB BLD-MCNC: 11.2 G/DL (ref 13.3–17.7)
HGB UR QL STRIP: NEGATIVE
IMM GRANULOCYTES # BLD: 0 10E9/L (ref 0–0.4)
IMM GRANULOCYTES NFR BLD: 0.2 %
INTERPRETATION ECG - MUSE: NORMAL
KETONES UR STRIP-MCNC: NEGATIVE MG/DL
LEUKOCYTE ESTERASE UR QL STRIP: NEGATIVE
LYMPHOCYTES # BLD AUTO: 1.6 10E9/L (ref 0.8–5.3)
LYMPHOCYTES NFR BLD AUTO: 19.1 %
Lab: NORMAL
MCH RBC QN AUTO: 31.8 PG (ref 26.5–33)
MCHC RBC AUTO-ENTMCNC: 31.9 G/DL (ref 31.5–36.5)
MCV RBC AUTO: 100 FL (ref 78–100)
MONOCYTES # BLD AUTO: 0.9 10E9/L (ref 0–1.3)
MONOCYTES NFR BLD AUTO: 10.7 %
MUCOUS THREADS #/AREA URNS LPF: PRESENT /LPF
NEUTROPHILS # BLD AUTO: 5.7 10E9/L (ref 1.6–8.3)
NEUTROPHILS NFR BLD AUTO: 69.2 %
NITRATE UR QL: NEGATIVE
NRBC # BLD AUTO: 0 10*3/UL
NRBC BLD AUTO-RTO: 0 /100
PH UR STRIP: 5.5 PH (ref 5–7)
PLATELET # BLD AUTO: 120 10E9/L (ref 150–450)
POTASSIUM SERPL-SCNC: 3.7 MMOL/L (ref 3.4–5.3)
PROT SERPL-MCNC: 6 G/DL (ref 6.8–8.8)
RBC # BLD AUTO: 3.52 10E12/L (ref 4.4–5.9)
RBC #/AREA URNS AUTO: 1 /HPF (ref 0–2)
SODIUM SERPL-SCNC: 140 MMOL/L (ref 133–144)
SOURCE: ABNORMAL
SP GR UR STRIP: 1.02 (ref 1–1.03)
SPECIMEN SOURCE: NORMAL
UROBILINOGEN UR STRIP-MCNC: NORMAL MG/DL (ref 0–2)
WBC # BLD AUTO: 8.3 10E9/L (ref 4–11)
WBC #/AREA URNS AUTO: 1 /HPF (ref 0–5)

## 2018-11-01 PROCEDURE — 99232 SBSQ HOSP IP/OBS MODERATE 35: CPT | Performed by: HOSPITALIST

## 2018-11-01 PROCEDURE — 12000001 ZZH R&B MED SURG/OB UMMC

## 2018-11-01 PROCEDURE — 25000128 H RX IP 250 OP 636: Performed by: EMERGENCY MEDICINE

## 2018-11-01 PROCEDURE — 25000132 ZZH RX MED GY IP 250 OP 250 PS 637: Mod: GY | Performed by: INTERNAL MEDICINE

## 2018-11-01 PROCEDURE — 80076 HEPATIC FUNCTION PANEL: CPT | Performed by: INTERNAL MEDICINE

## 2018-11-01 PROCEDURE — 36415 COLL VENOUS BLD VENIPUNCTURE: CPT | Performed by: INTERNAL MEDICINE

## 2018-11-01 PROCEDURE — 25000128 H RX IP 250 OP 636: Performed by: INTERNAL MEDICINE

## 2018-11-01 PROCEDURE — 87205 SMEAR GRAM STAIN: CPT | Performed by: INTERNAL MEDICINE

## 2018-11-01 PROCEDURE — 85025 COMPLETE CBC W/AUTO DIFF WBC: CPT | Performed by: INTERNAL MEDICINE

## 2018-11-01 PROCEDURE — 81001 URINALYSIS AUTO W/SCOPE: CPT | Performed by: EMERGENCY MEDICINE

## 2018-11-01 PROCEDURE — 99207 ZZC CDG-MDM COMPONENT: MEETS LOW - DOWN CODED: CPT | Performed by: HOSPITALIST

## 2018-11-01 PROCEDURE — 80048 BASIC METABOLIC PNL TOTAL CA: CPT | Performed by: INTERNAL MEDICINE

## 2018-11-01 PROCEDURE — 87070 CULTURE OTHR SPECIMN AEROBIC: CPT | Performed by: INTERNAL MEDICINE

## 2018-11-01 PROCEDURE — A9270 NON-COVERED ITEM OR SERVICE: HCPCS | Mod: GY | Performed by: INTERNAL MEDICINE

## 2018-11-01 RX ORDER — LISINOPRIL 10 MG/1
10 TABLET ORAL DAILY
Status: DISCONTINUED | OUTPATIENT
Start: 2018-11-01 | End: 2018-11-03 | Stop reason: HOSPADM

## 2018-11-01 RX ADMIN — CEFTRIAXONE SODIUM 2 G: 2 INJECTION, POWDER, FOR SOLUTION INTRAMUSCULAR; INTRAVENOUS at 18:08

## 2018-11-01 RX ADMIN — AZITHROMYCIN 250 MG: 250 TABLET, FILM COATED ORAL at 18:06

## 2018-11-01 RX ADMIN — SODIUM CHLORIDE: 9 INJECTION, SOLUTION INTRAVENOUS at 04:00

## 2018-11-01 RX ADMIN — LISINOPRIL 10 MG: 10 TABLET ORAL at 09:30

## 2018-11-01 RX ADMIN — THERA TABS 1 TABLET: TAB at 07:02

## 2018-11-01 RX ADMIN — SIMVASTATIN 20 MG: 20 TABLET, FILM COATED ORAL at 07:03

## 2018-11-01 RX ADMIN — ASPIRIN 81 MG: 81 TABLET, COATED ORAL at 22:32

## 2018-11-01 RX ADMIN — ISOSORBIDE MONONITRATE 60 MG: 60 TABLET, EXTENDED RELEASE ORAL at 11:43

## 2018-11-01 RX ADMIN — ACETAMINOPHEN 650 MG: 325 TABLET, FILM COATED ORAL at 09:30

## 2018-11-01 RX ADMIN — ACETAMINOPHEN 650 MG: 325 TABLET, FILM COATED ORAL at 01:37

## 2018-11-01 RX ADMIN — DOCUSATE SODIUM 100 MG: 100 CAPSULE, LIQUID FILLED ORAL at 22:32

## 2018-11-01 RX ADMIN — METOPROLOL SUCCINATE 25 MG: 25 TABLET, EXTENDED RELEASE ORAL at 09:30

## 2018-11-01 RX ADMIN — GUAIFENESIN AND DEXTROMETHORPHAN 10 ML: 100; 10 SYRUP ORAL at 07:01

## 2018-11-01 RX ADMIN — DOCUSATE SODIUM 100 MG: 100 CAPSULE, LIQUID FILLED ORAL at 07:02

## 2018-11-01 ASSESSMENT — ACTIVITIES OF DAILY LIVING (ADL)
ADLS_ACUITY_SCORE: 17
COMMUNICATION: 0 - UNDERSTANDS/COMMUNICATES WITHOUT DIFFICULTY
BATHING: 0-->INDEPENDENT
AMBULATION: 3 - ASSISTIVE EQUIPMENT AND PERSON
BATHING: 1 - ASSISTIVE EQUIPMENT
AMBULATION: 0-->INDEPENDENT
RETIRED_COMMUNICATION: 0-->UNDERSTANDS/COMMUNICATES WITHOUT DIFFICULTY
FALL_HISTORY_WITHIN_LAST_SIX_MONTHS: YES
ADLS_ACUITY_SCORE: 15
ADLS_ACUITY_SCORE: 15
TRANSFERRING: 0-->INDEPENDENT
TOILETING: 1 - ASSISTIVE EQUIPMENT
RETIRED_EATING: 0-->INDEPENDENT
COGNITION: 0 - NO COGNITION ISSUES REPORTED
ADLS_ACUITY_SCORE: 19
WHICH_OF_THE_ABOVE_FUNCTIONAL_RISKS_HAD_A_RECENT_ONSET_OR_CHANGE?: FALL HISTORY
SWALLOWING: 0 - SWALLOWS FOODS/LIQUIDS WITHOUT DIFFICULTY
EATING: 0 - INDEPENDENT
SWALLOWING: 0-->SWALLOWS FOODS/LIQUIDS WITHOUT DIFFICULTY
DRESS: 2 - ASSISTIVE PERSON
ADLS_ACUITY_SCORE: 15
NUMBER_OF_TIMES_PATIENT_HAS_FALLEN_WITHIN_LAST_SIX_MONTHS: 1
CHANGE_IN_FUNCTIONAL_STATUS_SINCE_ONSET_OF_CURRENT_ILLNESS/INJURY: YES
DRESS: 0-->INDEPENDENT
TOILETING: 0-->INDEPENDENT
TRANSFERRING: 3 - ASSISTIVE EQUIPMENT AND PERSON
ADLS_ACUITY_SCORE: 19

## 2018-11-01 NOTE — PROGRESS NOTES
General acute hospital, [place]    Internal Medicine Progress Note  [facility] Hospitalist      ASSESSMENT & PLAN:  Kiran Carcamo is a 89 year old male with history of HTN, CAD, DM, CKD who was admitted on 10/31/18 for CAP.     1. Community acquired pneumonia, RML:  Presented with acute on chronic cough, generalized weakness, leukocytosis (WBC 12.4) and CXR with increased RML opacities concerning for pneumonia. Afebrile. No hypoxia. No evidence of sepsis. WBC normal today. Clinically stable.   - Sputum culture ordered  - Cont IV ceftriaxone and azithromycin  - Supplemental O2 prn for SpO2 <90%    2. HUGO on CKD III:   Baseline Cr 0.9-1.0, up to 1.29 on admission. Suspect prerenal azotemia d/t volume depletion. Cr improved w IV hydration.   - Cont MIVF overnight  - Repeat BMP in AM  - Consider stopping IVF when taking adequate PO or evidence of hypervolemia on exam    3. CAD s/p CABG:  Hx MI and CABG x 5 in 2003 (LIMA to LAD and SVG to RCA, OM1, OM2, and D1). Follow up angiogram in 2002 showed patent grafts to LAD and RCA, and occluded grafts to OM1, OM2, and D2. Last echo in 2011 showed EF >55%. No history of CHF. Last cardiology follow up 7/13/18, noted reviewed. Currently no acute concerns.  - Cont ASA 81mg daily  - Cont metoprolol XL 12.5mg daily  - Cont imdur 60mg daily    4. HTN:  Isolated hypotension noted on admission. Currently stable with -140's.   - Cont metoprolol XL and imdur as above  - Cont lisinopril 10mg dailiy    5. DM2:  Last A1C 6.5% on 9/14/18. PTA regimen includes metformin 500mg BID (on hold d/t HUGO).   - Cont to hold metformin while inpatient  - Check BG AC/HS  - Consider correctional scale if BG consistently >180      Diet: Combination Diet Regular Diet Adult  Fluids:  NS at 75 ml/h  Edmondson Catheter: not present    DVT Prophylaxis: Pneumatic Compression Devices  Code Status: Full Code    Expected discharge: Tomorrow, recommended to prior living arrangement once antibiotic  plan established.    The patient's care was discussed with the Attending Physician, Dr. Brody.    Chet Velazquez PA-C  Internal Medicine Staff Hospitalist Service  [facility]  Pager: [phone]  Please see sticky note for cross cover information  _____________________________________________________________     Interval History   Feeling about the same today. Did not sleep well overnight d/t frequent alarming from oximeter. Currently trying to take a nap. Continues to have productive cough w infrequent sputum production. Denies any dyspnea or chest pain. No fevers or chills. No new complaints.     ROS:  Pulm, CV, GI and  performed and negative unless otherwise noted above.     Data reviewed today: I have personally reviewed all medications, new labs and imaging results over the last 24 hours, as well as pertinent historical data.     Physical Exam   Vital Signs: Temp: 96.6  F (35.9  C) Temp src: Oral BP: 141/66 Pulse: 52 Heart Rate: 63 Resp: 16 SpO2: 97 % O2 Device: Nasal cannula Oxygen Delivery: 2 LPM  Weight: 160 lbs 0 oz    GENERAL:  Awake. Alert. Oriented x 3. NAD.   HEENT:  No scleral icterus. Mucous membranes moist.   CV:  RRR. S1, S2. No murmurs appreciated.   RESPIRATORY:  Coarse rhonchi on the right. Otherwise clear. No wheezing.   GI:  Abdomen soft, non-distended. Active bowel sounds. No tenderness or guarding.   NEUROLOGICAL:  No focal deficits. Moves all extremities.    EXTREMITIES:  No peripheral edema. No calf tenderness. Intact bilateral pedal pulses.   SKIN:  No jaundice, rashes, wounds or lesions.       Data     ROUTINE IP LABS (Last four results)    Recent Labs  Lab 11/01/18  0814 10/31/18  1701    141   POTASSIUM 3.7 3.8   CHLORIDE 108 104   CO2 25 29   ANIONGAP 7 8   * 98   BUN 28 32*   CR 0.96 1.29*   NADER 8.1* 8.6   PROTTOTAL 6.0*  --    ALBUMIN 2.7*  --    BILITOTAL 0.5  --    ALKPHOS 77  --    AST 27  --    ALT 26  --        Recent Labs  Lab 11/01/18  0814  10/31/18  1701   WBC 8.3 12.4*   RBC 3.52* 3.64*   HGB 11.2* 11.8*   HCT 35.1* 36.3*    100   MCH 31.8 32.4   MCHC 31.9 32.5   RDW 13.4 13.5   * 144*     No lab results found in last 7 days.     Glucose Values Latest Ref Rng & Units 10/31/2018 11/1/2018   Bedside Glucose (mg/dl )  - -- --   GLUCOSE 70 - 99 mg/dL 98 110(H)   Some recent data might be hidden        All labs personally reviewed in [facility].  See A&P for additional results.     Unresulted Labs Ordered in the Past 30 Days of this Admission     No orders found for last 61 day(s).

## 2018-11-01 NOTE — PLAN OF CARE
"Problem: Patient Care Overview  Goal: Individualization & Mutuality  Pt is alert and oriented but  woke up disoriented. Daughter at bed side. When staff when into room to assist, pt was questioning why people where there. Made  statemetn that \" the lady asked me for 10 dollar bill\" daughter and the writer reoriented.  VSS. Had low grade fever 99.9, tylenol administered. 02 sats in the 90s on RA, pt is on continues pulse ox.  Lungs congested, productive cough. Needs education on how to use IS. Denies SOB, CP and nausea. Po fluid encouraged. Pt is on IV fluid. Bowel sound active in all quadrants. No BM but passing gas. Voiding into the toilet. Denies pain. CMS intact, denies N/T. PIV patent and infusing . Pt slept between care and is able to make needs known, call light with in reach. Will continue to monitor.         "

## 2018-11-01 NOTE — PROGRESS NOTES
Pt arrived to the unit from ED via cart. Pt alert and oriented x3. VSS, sats in the mid 90s on RA. Lung congested, productive cough.Pt placed on continues pulse oxymetry. Pt and pt's daughter oriented to the room and call light system.  Bed alarm on due to age and hx fall. Call light within reach. Daughter at bed side. Will monitor.

## 2018-11-01 NOTE — PLAN OF CARE
Problem: Patient Care Overview  Goal: Plan of Care/Patient Progress Review  Outcome: Improving    VS: VSS   O2: This AM O2 was 87% on RA - 97% on 2L. Weaned off O2 in the afternoon.   Output: Voiding adequate amounts   Last BM: 10/30   Activity: 1A with FWW   Up for meals? Yes   Skin: Abrasion on R posterior shoulder, scab L shin   Pain: Denies   CMS: Intact   Dressing: NA   Diet: Regular; tolerated well   LDA: PIV SL   Equipment: IV pole, FWW   Plan: TBD   Additional Info: IV rocephin q24h, PO azithromycin daily  Strict I&Os - refused to use the urinal at 1500.

## 2018-11-01 NOTE — ED NOTES
[facility], [place]   ED Nurse to Floor Handoff     Kiran Carcamo is a 89 year old male who speaks English and lives alone,  in a home  They arrived in the ED by car from home    ED Chief Complaint: Cough and Fall    ED Dx;   Final diagnoses:   Dehydration   Fall, initial encounter   Weakness   Pneumonia of right lower lobe due to infectious organism (H)         Needed?: No    Allergies: No Known Allergies.  Past Medical Hx:   Past Medical History:   Diagnosis Date     CKD (chronic kidney disease) stage 2, GFR 60-89 ml/min 7/6/2015     Hypertension goal BP (blood pressure) < 140/90      Syncope 12/12/2011     Type 2 diabetes, HbA1C goal < 8% (H) 2/23/2015      Baseline Mental status: WDL  Current Mental Status changes: at basesline    Infection present or suspected this encounter: yes respiratory  Sepsis suspected: No  Isolation type: No active isolations     Activity level - Baseline/Home:  Independent  Activity Level - Current: Assist of one    Bariatric equipment needed?: No    In the ED these meds were given:   Medications   sodium chloride 0.9% infusion ( Intravenous New Bag 10/31/18 3610)   cefTRIAXone (ROCEPHIN) 2 g vial to attach to  ml bag for ADULTS or NS 50 ml bag for PEDS (not administered)   azithromycin (ZITHROMAX) 500 mg in sodium chloride 0.9 % 250 mL intermittent infusion (500 mg Intravenous New Bag 10/31/18 4645)   0.9% sodium chloride BOLUS (0 mLs Intravenous Stopped 10/31/18 1107)       Drips running?  Yes    Home pump  No    Current LDAs  Peripheral IV 10/31/18 Right (Active)   Site Assessment WDL 10/31/2018  5:01 PM   Line Status Saline locked 10/31/2018  5:01 PM   Phlebitis Scale 0-->no symptoms 10/31/2018  5:01 PM   Dressing Intervention New dressing  10/31/2018  5:01 PM   Number of days:0       Labs results:   Labs Ordered and Resulted from Time of ED Arrival Up to the Time of Departure from the ED   CBC WITH PLATELETS DIFFERENTIAL - Abnormal;  "Notable for the following:        Result Value    WBC 12.4 (*)     RBC Count 3.64 (*)     Hemoglobin 11.8 (*)     Hematocrit 36.3 (*)     Platelet Count 144 (*)     Absolute Neutrophil 9.4 (*)     All other components within normal limits   BASIC METABOLIC PANEL - Abnormal; Notable for the following:     Urea Nitrogen 32 (*)     Creatinine 1.29 (*)     GFR Estimate 51 (*)     All other components within normal limits   ROUTINE UA WITH MICROSCOPIC       Imaging Studies:   Recent Results (from the past 24 hour(s))   XR Chest 2 Views    Narrative    CHEST TWO VIEWS  10/31/2018 5:54 PM     HISTORY: Cough.     COMPARISON: 5/8/2017      Impression    IMPRESSION: Infiltrate in the medial right lung base is similar to on  the previous exam, but slightly more prominent, and neither was  present in 2011. Cardiomegaly is unchanged. Sternal wires. No pleural  effusion.       Recent vital signs:   /54  Pulse 57  Temp 97  F (36.1  C) (Oral)  Resp 20  Ht 1.753 m (5' 9\")  Wt 72.6 kg (160 lb)  SpO2 95%  BMI 23.63 kg/m2    Cardiac Rhythm: Other  Pt needs tele? No  Skin/wound Issues: None    Code Status: DNR / DNI    Pain control: fair    Nausea control: pt had none    Abnormal labs/tests/findings requiring intervention:  X ray showing pneumonia with elevated WBC    Family present during ED course? Yes   Family Comments/Social Situation comments:     Tasks needing completion: IV Cristhian Love RN  asc--   [phone] [facility]  [phone] [facility]      "

## 2018-11-01 NOTE — H&P
"[facility] History and Physical    Kiran Carcamo MRN# [id number]   Age: 89 year old YOB: 1921     Date of Admission:  10/31/2018    Home clinic: [facility]  Primary care provider: Buddy Poe          Assessment and Plan:   Assessment:   Active Problems:    Community acquired pneumonia   Fall      Plan:   Community acquired Pneumonia  -Admit to internal medicine  -IV fluids started gentle bolus overnight and re-assess electrolytes and labs in AM  - Will continue Community acquired Pneumonia coverage with Ceftriaxone and Azithromycin  - prophylaxis against venous thromboembolism with bilateral pneumatic compression devices  -Cardiac diet  -Occupational therapy and Physical therapy  -Up with assist  -Social work assistance with disposition planning    CAD s/p CABG  Resume Aspirin, Nitrates, Metoprolol and Simvastatin    Hypertension  Resume home medications in AM. Reassess renal function and decide on Lisinopril in AM    Diabetes  Resume home medications in AM. Reassess renal function and decide on Metformin in AM      Diabetic Nephropathy (CKD 3)  Stable. Slught worsening compared to June 2018. Will re-assess after patient receives bolus fluids    Assessment and plan reviewed with the patient and daughters.              Chief Complaint:   Fall     History is obtained from the patient, electronic health record and emergency department physician          History of Present Illness (Resident / Clinician):   This patient is a 89 year old  male with a significant past medical history of coronary artery disease s/p CABG, diabetes with diabetic nephropathy, chronic cough and hypertension who presents to the ED after being found down in his home by his children.    At baseline, Mr Carcamo lives alone and is independent of most activities of daily living. He reports about a 2 month history of a cough- initially dry, but has become productive of \"off-white\" sputum in the past 2 weeks. He " denies fevers, chills, sweats, or weight loss. He reports a healthy appetite. He notes some left sided chest pain associated with coughing only. The recollects some of the events surrounding his fall which he reports as mechanical, but the duration of the events is not entirely clear. Denies headaches, trauma, or history of falls.    Patient reports taking his medications earlier today. He also is up to date on his influenza immunization which he received about a week ago.              Past Medical History:     Past Medical History:   Diagnosis Date     CKD (chronic kidney disease) stage 2, GFR 60-89 ml/min 7/6/2015     Hypertension goal BP (blood pressure) < 140/90      Syncope 12/12/2011     Type 2 diabetes, HbA1C goal < 8% (H) 2/23/2015             Past Surgical History:      Past Surgical History:   Procedure Laterality Date     CHOLECYSTECTOMY       CORONARY ARTERY BYPASS  '05    6-vessel     MANDIBLE SURGERY               Social History:             Family History:     Family History   Problem Relation Age of Onset     HEART DISEASE Mother      Eye Disorder Father      Respiratory Brother      HEART DISEASE Sister      Cancer Daughter      Cancer Brother      Skin Cancer No family hx of      Melanoma No family hx of      Family history reviewed and updated in EPIC and not discussed          Immunizations:   Influenza vaccination status: Up-to-date.          Allergies:   No Known Allergies          Medications:     Prescriptions Prior to Admission   Medication Sig Dispense Refill Last Dose     acetaminophen (TYLENOL) 325 MG tablet Take 3 tablets (975 mg) by mouth every 8 hours as needed for mild pain 100 tablet 0 Unknown     aspirin 81 MG EC tablet Take 81 mg by mouth At Bedtime.   10/30/2018 at PM     fluorouracil (EFUDEX) 5 % cream Apply topically daily Every evening for 4 weeks on the frontal scalp, then 2 week break, then another section of the scalp for 4 weeks, etc 40 g 2 Unknown     isosorbide  mononitrate (IMDUR) 60 MG 24 hr tablet Take 1 tablet (60 mg) by mouth daily Please get refills from primary care or schedule with new cardiologist. 90 tablet 3 10/31/2018 at AM     ketoconazole (NIZORAL) 2 % cream Apply topically 2 times daily To the eyebrows and around the nose/cheek area 60 g 0 Unknown     lisinopril (PRINIVIL/ZESTRIL) 10 MG tablet TAKE 1 TABLET BY MOUTH EVERY DAY 90 tablet 0 10/31/2018 at AM     metFORMIN (GLUCOPHAGE) 500 MG tablet Take 1 tablet (500 mg) by mouth 2 times daily (with meals) 180 tablet 3 10/31/2018 at AM     metoprolol succinate (TOPROL-XL) 25 MG 24 hr tablet TAKE ONE-HALF TABLET BY MOUTH DAILY 45 tablet 1 10/31/2018 at AM     Multiple Vitamin (MV-ONE PO) Take 1 tablet by mouth daily    10/31/2018 at AM     simvastatin (ZOCOR) 20 MG tablet Take 1 tablet (20 mg) by mouth daily 90 tablet 3 10/31/2018 at AM     nitroglycerin (NITROSTAT) 0.4 MG sublingual tablet Place 1 tablet (0.4 mg) under the tongue every 5 minutes as needed for chest pain If symptoms after 3 doses (15 minutes) call 911. 25 tablet 1 Unknown at Unknown time     ONE TOUCH ULTRA test strip USE TO TEST BLOOD SUGAR TWICE DAILY 300 strip 2 Unknown at Unknown time     polyethylene glycol (MIRALAX/GLYCOLAX) Packet Take 17 g by mouth daily as needed for constipation 10 packet 0 Unknown at Unknown time             Review of Systems:   The Review of Systems is negative other than noted in the HPI           Physical Exam (Resident / Clinician):   Vitals were reviewed  Temp: 96.7  F (35.9  C) Temp src: Oral BP: 127/51 Pulse: 52 Heart Rate: 64 Resp: 16 SpO2: 96 % O2 Device: None (Room air)    Blood pressure range: Systolic (24hrs), Av , Min:88 , Max:138   Constitutional:   awake, alert, cooperative, no apparent distress, and appears stated age   Lymph: bilateral anterior cervical lymphadenopathy - non-tender, mobile, R>L  Eyes:   Lids and lashes normal, pupils equal, round and reactive to light, extra ocular muscles intact,  sclera clear, conjunctiva normal     Lungs:   no increased work of breathing, mild air exchange, no retractions and crackles right base and right anterior, rhonchi right base and right middle lobe, diminished breath sounds right base and left base     Cardiovascular:   Normal apical impulse, regular rate and rhythm, normal S1 and S2, no S3 or S4, and no murmur noted     Chest / Breast:   Breasts symmetrical, skin without lesion(s), no nipple retraction or dimpling, no nipple discharge, no masses palpated, no axillary or supraclavicular adenopathy   Old well-healed surgical incision         Abdomen:   No scars, normal bowel sounds, soft, non-distended, non-tender, no masses palpated, no hepatosplenomegally     Musculoskeletal:   There is no redness, warmth, or swelling of the joints.  Full range of motion noted.  Motor strength is 5 out of 5 all extremities bilaterally.  Tone is normal.     Neurologic:   Awake, alert, oriented to name, place and time.  Cranial nerves II-XII are grossly intact.  Motor is 5 out of 5 bilaterally.  Cerebellar finger to nose, heel to shin intact.  Sensory is intact.  Babinski down going, Romberg negative, and gait is normal.                Data:     Results for orders placed or performed during the hospital encounter of 10/31/18 (from the past 24 hour(s))   EKG 12-lead, tracing only   Result Value Ref Range    Interpretation ECG Click View Image link to view waveform and result    CBC with platelets differential   Result Value Ref Range    WBC 12.4 (H) 4.0 - 11.0 10e9/L    RBC Count 3.64 (L) 4.4 - 5.9 10e12/L    Hemoglobin 11.8 (L) 13.3 - 17.7 g/dL    Hematocrit 36.3 (L) 40.0 - 53.0 %     78 - 100 fl    MCH 32.4 26.5 - 33.0 pg    MCHC 32.5 31.5 - 36.5 g/dL    RDW 13.5 10.0 - 15.0 %    Platelet Count 144 (L) 150 - 450 10e9/L    Diff Method Automated Method     % Neutrophils 76.0 %    % Lymphocytes 14.3 %    % Monocytes 8.9 %    % Eosinophils 0.2 %    % Basophils 0.2 %    % Immature  Granulocytes 0.4 %    Nucleated RBCs 0 0 /100    Absolute Neutrophil 9.4 (H) 1.6 - 8.3 10e9/L    Absolute Lymphocytes 1.8 0.8 - 5.3 10e9/L    Absolute Monocytes 1.1 0.0 - 1.3 10e9/L    Absolute Eosinophils 0.0 0.0 - 0.7 10e9/L    Absolute Basophils 0.0 0.0 - 0.2 10e9/L    Abs Immature Granulocytes 0.1 0 - 0.4 10e9/L    Absolute Nucleated RBC 0.0    Basic metabolic panel   Result Value Ref Range    Sodium 141 133 - 144 mmol/L    Potassium 3.8 3.4 - 5.3 mmol/L    Chloride 104 94 - 109 mmol/L    Carbon Dioxide 29 20 - 32 mmol/L    Anion Gap 8 3 - 14 mmol/L    Glucose 98 70 - 99 mg/dL    Urea Nitrogen 32 (H) 7 - 30 mg/dL    Creatinine 1.29 (H) 0.66 - 1.25 mg/dL    GFR Estimate 51 (L) >60 mL/min/1.7m2    GFR Estimate If Black 62 >60 mL/min/1.7m2    Calcium 8.6 8.5 - 10.1 mg/dL   XR Chest 2 Views    Narrative    CHEST TWO VIEWS  10/31/2018 5:54 PM     HISTORY: Cough.     COMPARISON: 5/8/2017      Impression    IMPRESSION: Infiltrate in the medial right lung base is similar to on  the previous exam, but slightly more prominent, and neither was  present in 2011. Cardiomegaly is unchanged. Sternal wires. No pleural  effusion.    SHAUN BOWER MD        ECG results from 10/31/18  -EKG 12-lead, tracing only    Value   Interpretation ECG Click View Image link to view waveform and result       Lois Collins MD

## 2018-11-01 NOTE — ED NOTES
Report given to GLEN Disla 8A, transport arranged by [facility].

## 2018-11-02 LAB
GLUCOSE BLDC GLUCOMTR-MCNC: 148 MG/DL (ref 70–99)
GLUCOSE BLDC GLUCOMTR-MCNC: 174 MG/DL (ref 70–99)
GLUCOSE BLDC GLUCOMTR-MCNC: 286 MG/DL (ref 70–99)
GLUCOSE BLDC GLUCOMTR-MCNC: >600 MG/DL (ref 70–99)

## 2018-11-02 PROCEDURE — 25000128 H RX IP 250 OP 636: Performed by: INTERNAL MEDICINE

## 2018-11-02 PROCEDURE — A9270 NON-COVERED ITEM OR SERVICE: HCPCS | Mod: GY | Performed by: INTERNAL MEDICINE

## 2018-11-02 PROCEDURE — A9270 NON-COVERED ITEM OR SERVICE: HCPCS | Mod: GY | Performed by: HOSPITALIST

## 2018-11-02 PROCEDURE — 99232 SBSQ HOSP IP/OBS MODERATE 35: CPT | Performed by: HOSPITALIST

## 2018-11-02 PROCEDURE — 25000132 ZZH RX MED GY IP 250 OP 250 PS 637: Mod: GY | Performed by: INTERNAL MEDICINE

## 2018-11-02 PROCEDURE — 25000132 ZZH RX MED GY IP 250 OP 250 PS 637: Mod: GY | Performed by: HOSPITALIST

## 2018-11-02 PROCEDURE — 12000001 ZZH R&B MED SURG/OB UMMC

## 2018-11-02 PROCEDURE — 00000146 ZZHCL STATISTIC GLUCOSE BY METER IP

## 2018-11-02 RX ADMIN — THERA TABS 1 TABLET: TAB at 08:14

## 2018-11-02 RX ADMIN — AZITHROMYCIN 250 MG: 250 TABLET, FILM COATED ORAL at 19:26

## 2018-11-02 RX ADMIN — SIMVASTATIN 20 MG: 20 TABLET, FILM COATED ORAL at 20:32

## 2018-11-02 RX ADMIN — Medication 12.5 MG: at 08:17

## 2018-11-02 RX ADMIN — CEFTRIAXONE SODIUM 2 G: 2 INJECTION, POWDER, FOR SOLUTION INTRAMUSCULAR; INTRAVENOUS at 19:27

## 2018-11-02 RX ADMIN — DOCUSATE SODIUM 100 MG: 100 CAPSULE, LIQUID FILLED ORAL at 08:18

## 2018-11-02 RX ADMIN — Medication 12.5 MG: at 21:57

## 2018-11-02 RX ADMIN — ISOSORBIDE MONONITRATE 60 MG: 60 TABLET, EXTENDED RELEASE ORAL at 12:56

## 2018-11-02 RX ADMIN — LISINOPRIL 10 MG: 10 TABLET ORAL at 08:15

## 2018-11-02 RX ADMIN — DOCUSATE SODIUM 100 MG: 100 CAPSULE, LIQUID FILLED ORAL at 20:31

## 2018-11-02 RX ADMIN — ASPIRIN 81 MG: 81 TABLET, COATED ORAL at 20:38

## 2018-11-02 ASSESSMENT — ACTIVITIES OF DAILY LIVING (ADL)
ADLS_ACUITY_SCORE: 15
ADLS_ACUITY_SCORE: 12
ADLS_ACUITY_SCORE: 16
ADLS_ACUITY_SCORE: 12
ADLS_ACUITY_SCORE: 16
ADLS_ACUITY_SCORE: 16

## 2018-11-02 NOTE — PROGRESS NOTES
[facility], [place]    Hospitalist Progress Note    Date of Service (when I saw the patient): 11/02/2018    Assessment & Plan     ASSESSMENT & PLAN:  Kiran Carcamo is a 89 year old male with history of HTN, CAD, DM, CKD who was admitted on 10/31/18 for CAP.      1. Community acquired pneumonia, RML:  Presented with acute on chronic cough, generalized weakness, leukocytosis (WBC 12.4) and CXR with increased RML opacities concerning for pneumonia. Afebrile. No hypoxia. No evidence of sepsis. WBC normal today. Clinically stable.   - FU Sputum culture  - Cont IV ceftriaxone and azithromycin  - Supplemental O2 prn for SpO2 <90%  - PT, OT     2. HUGO on CKD III:   Baseline Cr 0.9-1.0, up to 1.29 on admission. Suspect prerenal azotemia d/t volume depletion. Cr improved w IV hydration.   - improved on IVF, discontinue it    3. CAD s/p CABG:  Hx MI and CABG x 5 in 2003 (LIMA to LAD and SVG to RCA, OM1, OM2, and D1). Follow up angiogram in 2002 showed patent grafts to LAD and RCA, and occluded grafts to OM1, OM2, and D2. Last echo in 2011 showed EF >55%. No history of CHF. Last cardiology follow up 7/13/18, noted reviewed. Currently no acute concerns.  - Cont ASA 81mg daily  - Cont metoprolol XL 12.5mg daily  - Cont imdur 60mg daily     4. HTN:  Isolated hypotension noted on admission. Currently stable with -140's.   - Cont metoprolol XL and imdur as above  - Cont lisinopril 10mg dailiy     5. DM2:  Last A1C 6.5% on 9/14/18. PTA regimen includes metformin 500mg BID (on hold d/t HUGO).   - Cont to hold metformin while inpatient  - Check BG AC/HS  - Consider correctional scale if BG consistently >180  6. Metabolic Encephalopathy, sleep deprivation contributing. Likely from infection. Monitor. Avoid sedating medication. Let him sleep. Treat pneumonia.      Diet: Combination Diet Regular Diet Adult  Fluids:  off  Edmondson Catheter: not present     DVT Prophylaxis: Pneumatic Compression  Devices    Disposition: home tomorrow.     Sunshine Brody MD    Interval History     She is confused today. Cough and sob are better. No fever. Chest pain on coughing is better.     -Data reviewed today: I reviewed all new labs and imaging results over the last 24 hours. I personally reviewed no images or EKG's today.    Physical Exam   Temp: 96.5  F (35.8  C) Temp src: Oral BP: 141/55 Pulse: 61 Heart Rate: 50 Resp: 18 SpO2: 97 % O2 Device: Nasal cannula    Vitals:    10/31/18 1619   Weight: 72.6 kg (160 lb)     Vital Signs with Ranges  Temp:  [96.3  F (35.7  C)-98.8  F (37.1  C)] 96.5  F (35.8  C)  Pulse:  [61] 61  Heart Rate:  [50-65] 50  Resp:  [16-18] 18  BP: (117-160)/(47-57) 141/55  SpO2:  [89 %-97 %] 97 %  I/O last 3 completed shifts:  In: 1030 [P.O.:1030]  Out: -     Constitutional: Confused  Respiratory: improved AE on right side, less luncky  Cardiovascular: Regular rate and rhythm, normal S1 and S2, and no murmur noted  GI: Normal bowel sounds, soft, non-distended, non-tender  Skin/Integumen: No rashes, no cyanosis, no edema    Medications     sodium chloride Stopped (11/01/18 1630)       aspirin  81 mg Oral At Bedtime     azithromycin  250 mg Oral Daily     cefTRIAXone  2 g Intravenous Q24H     docusate sodium  100 mg Oral BID     isosorbide mononitrate  60 mg Oral Daily     lisinopril  10 mg Oral Daily     metoprolol succinate  12.5 mg Oral Daily     multivitamin, therapeutic  1 tablet Oral Daily     simvastatin  20 mg Oral Daily       Data     Recent Labs  Lab 11/01/18  0814 10/31/18  1701   WBC 8.3 12.4*   HGB 11.2* 11.8*    100   * 144*    141   POTASSIUM 3.7 3.8   CHLORIDE 108 104   CO2 25 29   BUN 28 32*   CR 0.96 1.29*   ANIONGAP 7 8   NADER 8.1* 8.6   * 98   ALBUMIN 2.7*  --    PROTTOTAL 6.0*  --    BILITOTAL 0.5  --    ALKPHOS 77  --    ALT 26  --    AST 27  --        No results found for this or any previous visit (from the past 24 hour(s)).

## 2018-11-02 NOTE — PLAN OF CARE
Problem: Patient Care Overview  Goal: Plan of Care/Patient Progress Review  VS: VSS   O2: RA   Output: Voiding adequate amounts   Last BM: 10/30   Activity: 1A with FWW   Skin: Abrasion on R posterior shoulder, scab L shin   Pain: Denies   CMS: Intact   Dressing: NA   Diet: Regular; tolerated well   LDA: Pt pulled out PIV    Equipment: IV pole, FWW   Plan: TBD   Additional Info: Pt has moments of confusion upon waking but able to be redirected.

## 2018-11-02 NOTE — PLAN OF CARE
Problem: Patient Care Overview  Goal: Plan of Care/Patient Progress Review  OT:  Attempted twice for OT eval; daughter present and reporting patient has not been able to sleep and is sleeping soundly and preferred to let him sleep.  Family feels patient's confusion is related to change in environment and feels patient would benefit from discharge home.  Family reported that they have no concerns with patient's physical mobility and would feel safe with discharge home.  Patient has both walker and cane at home; does not use at baseline but they did use as patient became more ill prior to admit.  Daughter reports that family would be providing 24 hour assist at discharge; often times reporting there will be more than 1 family member present.  Family had been staying around the clock after patient's fall and prior to admission.  Family reports patient is extremely independent at baseline- manages rental properties, goes up ladders, fixes toilets, etc.

## 2018-11-02 NOTE — PLAN OF CARE
Problem: Pneumonia (Adult)  Goal: Signs and Symptoms of Listed Potential Problems Will be Absent, Minimized or Managed (Pneumonia)  Signs and symptoms of listed potential problems will be absent, minimized or managed by discharge/transition of care (reference Pneumonia (Adult) CPG).   [place] Pt also has slight general confusion at times,  notified in person, bed alarm activated. Pt has been really tired this shift, but is easy to awaken. Writer informed by  to let pt sleep for a couple hours. Afebrile. VSS. Lungs-Coarse with expiratory wheezes bilaterally with both anterior and posterior. Bowels-Hyperactive in all four quadrants. Voids spontaneously without difficulty in the bathroom, and was incontinent X 1 this shift. Denies nausea and vomiting. CMS and Neuro's are intact. Denies numbness and tingling in all extremities. Denies pain. Is on a Regular diet and appetite was Good this shift. Pt is on strict I & O's. Blood glucose level 174 this shift. Pt up in room with Assist X 1 and a FWW. No PIV access. Bilateral heels are elevated off the bed. Pt is able to make needs known, and call light is within reach. Continue to monitor.

## 2018-11-03 ENCOUNTER — APPOINTMENT (OUTPATIENT)
Dept: PHYSICAL THERAPY | Facility: CLINIC | Age: 83
DRG: 193 | End: 2018-11-03
Attending: HOSPITALIST
Payer: MEDICARE

## 2018-11-03 VITALS
WEIGHT: 160 LBS | DIASTOLIC BLOOD PRESSURE: 64 MMHG | TEMPERATURE: 96.6 F | BODY MASS INDEX: 23.7 KG/M2 | SYSTOLIC BLOOD PRESSURE: 164 MMHG | RESPIRATION RATE: 19 BRPM | HEART RATE: 61 BPM | HEIGHT: 69 IN | OXYGEN SATURATION: 93 %

## 2018-11-03 LAB
BACTERIA SPEC CULT: NORMAL
GLUCOSE BLDC GLUCOMTR-MCNC: 126 MG/DL (ref 70–99)
SPECIMEN SOURCE: NORMAL

## 2018-11-03 PROCEDURE — 25000132 ZZH RX MED GY IP 250 OP 250 PS 637: Mod: GY | Performed by: HOSPITALIST

## 2018-11-03 PROCEDURE — 25000132 ZZH RX MED GY IP 250 OP 250 PS 637: Mod: GY | Performed by: INTERNAL MEDICINE

## 2018-11-03 PROCEDURE — 99207 ZZC CDG-CODE CATEGORY CHANGED: CPT | Performed by: HOSPITALIST

## 2018-11-03 PROCEDURE — 97116 GAIT TRAINING THERAPY: CPT | Mod: GP | Performed by: PHYSICAL THERAPIST

## 2018-11-03 PROCEDURE — 00000146 ZZHCL STATISTIC GLUCOSE BY METER IP

## 2018-11-03 PROCEDURE — A9270 NON-COVERED ITEM OR SERVICE: HCPCS | Mod: GY | Performed by: INTERNAL MEDICINE

## 2018-11-03 PROCEDURE — 99239 HOSP IP/OBS DSCHRG MGMT >30: CPT | Performed by: HOSPITALIST

## 2018-11-03 PROCEDURE — 97161 PT EVAL LOW COMPLEX 20 MIN: CPT | Mod: GP | Performed by: PHYSICAL THERAPIST

## 2018-11-03 PROCEDURE — A9270 NON-COVERED ITEM OR SERVICE: HCPCS | Mod: GY | Performed by: HOSPITALIST

## 2018-11-03 PROCEDURE — 40000894 ZZH STATISTIC OT IP EVAL DEFER

## 2018-11-03 PROCEDURE — 40000193 ZZH STATISTIC PT WARD VISIT: Performed by: PHYSICAL THERAPIST

## 2018-11-03 RX ORDER — AZITHROMYCIN 250 MG/1
250 TABLET, FILM COATED ORAL DAILY
Qty: 3 TABLET | Refills: 0 | Status: SHIPPED | OUTPATIENT
Start: 2018-11-03 | End: 2019-05-15

## 2018-11-03 RX ORDER — AMOXICILLIN 250 MG
1 CAPSULE ORAL 2 TIMES DAILY
Qty: 60 TABLET | Refills: 1 | Status: SHIPPED | OUTPATIENT
Start: 2018-11-03 | End: 2019-11-01

## 2018-11-03 RX ORDER — CEFPODOXIME PROXETIL 200 MG/1
200 TABLET, FILM COATED ORAL 2 TIMES DAILY
Qty: 14 TABLET | Refills: 0 | Status: SHIPPED | OUTPATIENT
Start: 2018-11-03 | End: 2018-11-10

## 2018-11-03 RX ADMIN — Medication 12.5 MG: at 09:46

## 2018-11-03 RX ADMIN — DOCUSATE SODIUM 100 MG: 100 CAPSULE, LIQUID FILLED ORAL at 09:46

## 2018-11-03 RX ADMIN — LISINOPRIL 10 MG: 10 TABLET ORAL at 09:47

## 2018-11-03 RX ADMIN — Medication 12.5 MG: at 02:47

## 2018-11-03 RX ADMIN — ISOSORBIDE MONONITRATE 60 MG: 60 TABLET, EXTENDED RELEASE ORAL at 12:55

## 2018-11-03 RX ADMIN — THERA TABS 1 TABLET: TAB at 09:47

## 2018-11-03 RX ADMIN — SIMVASTATIN 20 MG: 20 TABLET, FILM COATED ORAL at 09:47

## 2018-11-03 ASSESSMENT — ACTIVITIES OF DAILY LIVING (ADL)
ADLS_ACUITY_SCORE: 16
ADLS_ACUITY_SCORE: 12
ADLS_ACUITY_SCORE: 16
ADLS_ACUITY_SCORE: 16

## 2018-11-03 NOTE — PLAN OF CARE
"Problem: Patient Care Overview  Goal: Plan of Care/Patient Progress Review  Outcome: No Change  VSS. Lung sounds have crackles and some expiratory wheezing. Denies SOB. Infrequent productive cough. Pt oriented to self, not orientated to place, situation, or time. Pt believes that he is at home, and is upset about \"property management and legalities.\" Pt also had delusions that someone was trying to take his home, and became very agitated. From approximately 0215 to 0400 pt was agitated and upset. Pt refused to go back into bed, stated, \"I already had a 3 hr nap.\" Moonlighter paged, and 1 time dose of Seroquel ordered and given. Pt ambulated in hallway w/ family, gait belt used d/t Seroquel dose. Very restless during shift. Pulse ox on some of shift, but pt became agitated w/ pulse ox while sleeping and awake. O2 sats adequate, and pulse ox removed d/t agitation and adequate 02 sats. Denies numbness or tingling. Baseline tremors at times in bilateral hands upon movement. Pt has abrasion on R shoulder. Some blanchable pink skin on bottom. Up w/ SBA and FWW. Pt had 2 episodes of urinary incontinence. Sheryl care provided. Pt stated, \"incontinence is kind of a problem for me. I have to go about every 2 hrs.\" PIV saline locked. Plan is to leave today to home. Family asked if pt will have assistance at home, and family stated pt's daughters and son help him at home. Bed alarm on during shift while in bed. Unable to determine LBM as pt was confused. Call light in reach. Daughter in room.       "

## 2018-11-03 NOTE — PROGRESS NOTES
Patient with delirium, agitation. Given seroquel 12.5 mg po x 2 total. Ct to monitor.   Consider bedside attendant if family not in the room.  Nkechi Fowler MD  House Physician  Pager: [phone]

## 2018-11-03 NOTE — PLAN OF CARE
Problem: Patient Care Overview  Goal: Plan of Care/Patient Progress Review  Outcome: Improving  Focus: [place]  D: Discharging to home full time family over site. Mentation is better than this morning. He has been up with his walker and ambulating the poole with therapy. Family here and receiving instructions. P: Discharge to home pending MD approval.

## 2018-11-03 NOTE — PROGRESS NOTES
" 11/03/18 1115   Quick Adds   Type of Visit Initial PT Evaluation       Present no   Living Environment   Lives With alone   Living Arrangements house   Home Accessibility stairs (1 railing present);stairs to enter home;stairs within home   Number of Stairs Within Home 25   Stair Railings at Home inside, present on left side   Transportation Available car   Self-Care   Usual Activity Tolerance good   Current Activity Tolerance fair   Regular Exercise no   Equipment Currently Used at Home cane, straight   Activity/Exercise/Self-Care Comment Pt has a walker but does not use. He lives alone and goes out in community independently including driving.  Pt and family report 3 falls in recent time including 1 fall hitting head   Functional Level Prior   Ambulation 1-->assistive equipment   Transferring 0-->independent   Toileting 0-->independent   Bathing 0-->independent   Dressing 0-->independent   Eating 0-->independent   Communication 0-->understands/communicates without difficulty   Swallowing 0-->swallows foods/liquids without difficulty   Cognition 0 - no cognition issues reported   Fall history within last six months yes   Number of times patient has fallen within last six months 3   Which of the above functional risks had a recent onset or change? fall history;ambulation   Prior Functional Level Comment Independent, falls due to not picking up feet. Pt did complete a day of yardwork last week   General Information   Onset of Illness/Injury or Date of Surgery - Date 11/02/18   Referring Physician Dr. Brody   Patient/Family Goals Statement go home   Pertinent History of Current Problem (include personal factors and/or comorbidities that impact the POC) Pt is admitted with pneumonia after 3 days in bed and mechanical fall \"from bed\" which he could not get up himself due to weakness.  Son and daughter present during evaluation. PT with some disorientation/delirium this AM   Precautions/Limitations " fall precautions   General Observations Pt dressed, seated EOB ready to shave   Cognitive Status Examination   Orientation place;person   Level of Consciousness alert   Follows Commands and Answers Questions 75% of the time   Personal Safety and Judgment impaired   Memory intact   Cognitive Comment Pt repeatedly tells PT how he usually walks just carrying the cane but not using without understanding that he has been ill, spent much time in bed, had a fall and thus it is important to use whatever device will make him safest.   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's disease and hip and knee muscle pain)   Integumentary/Edema   Integumentary/Edema no deficits were identifed   Posture    Posture Comments Pt very flexed in thoracic spine and posterior tilted at pelvis making foot clearance with gait challenging   Range of Motion (ROM)   ROM Comment Spinal ROM for extension limited as well as rotation of trunk and neck   Strength   Strength Comments decreased noted functionally on stairs   Bed Mobility   Bed Mobility Comments independent   Transfer Skills   Transfer Comments sba   Gait   Gait Comments min A with cane   Balance   Balance Comments pt leans back against bed in static stance, was able to reach down to  cane from floor.   Sensory Examination   Sensory Perception Comments g   Coordination   Coordination Comments Grossly intact   Muscle Tone   Muscle Tone no deficits were identified   General Therapy Interventions   Planned Therapy Interventions gait training;risk factor education   Clinical Impression   Criteria for Skilled Therapeutic Intervention yes, treatment indicated   PT Diagnosis gait instability, fall risk   Influenced by the following impairments decreased strength, posture, impaired balance   Functional limitations due to impairments gait, fall history, stairs, transfers   Clinical Presentation Stable/Uncomplicated   Clinical Presentation Rationale medically stable,  "pt wants to be active, he does some delirium and independent personality to fault   Clinical Decision Making (Complexity) Low complexity   Therapy Frequency` daily   Predicted Duration of Therapy Intervention (days/wks) 1 day for discharge   Anticipated Equipment Needs at Discharge gait belt   Anticipated Discharge Disposition Home with Assist;Home with Home Therapy   Risk & Benefits of therapy have been explained Yes   Patient, Family & other staff in agreement with plan of care Yes   [facility] TM \"6 Clicks\"   2016, Trustees of [facility], under license to [employer].  All rights reserved.   6 Clicks Short Forms Basic Mobility Inpatient Short Form   [facility]  \"6 Clicks\" V.2 Basic Mobility Inpatient Short Form   1. Turning from your back to your side while in a flat bed without using bedrails? 4 - None   2. Moving from lying on your back to sitting on the side of a flat bed without using bedrails? 4 - None   3. Moving to and from a bed to a chair (including a wheelchair)? 4 - None   4. Standing up from a chair using your arms (e.g., wheelchair, or bedside chair)? 4 - None   5. To walk in hospital room? 3 - A Little   6. Climbing 3-5 steps with a railing? 3 - A Little   Basic Mobility Raw Score (Score out of 24.Lower scores equate to lower levels of function) 22   Total Evaluation Time   Total Evaluation Time (Minutes) 15     "

## 2018-11-03 NOTE — PLAN OF CARE
"Problem: Patient Care Overview  Goal: Plan of Care/Patient Progress Review  OT: checked in on patient this am, daughter and son present and patient initially sleeping. Per discussion with daughter, biggest concern is patient's confusion, no concerns with mobility or ADLs. Daughter believes patient will do better at home in his own environment. Patient then awoke, and did demo confusion, making several statements regarding property, \"who are you associated with? Who owns this property? Are you trespassing on my property?\" Therapist and nurse attempting to re-orient patient that he is in hospital. Patient Ax2 for boost up in bed. Discussed with family the role of OT in inpatient setting, family does not feel any inpatient needs but is agreeable to home OT follow up on cognition. Family will be with patient 24/7 and hope is that patient will clear mentally once back in familiar environment.  Daughter also inquiring about obtaining weighted utensils as patient has essential tremor, educated on where to obtain. Plan is for discharge home today with home health care, will defer inpatient OT eval.     Discharge Planner OT   Patient plan for discharge: Did not state  Current status: see above  Barriers to return to prior living situation: confusion  Recommendations for discharge: home with 24 hr assist from family, recommend home OT follow up on cognition   Rationale for recommendations: patient would benefit from home OT to ensure cognition is improving and patient is getting back to baseline, would be beneficial to see patient in his own home and familiar settings.        Entered by: SPENCER CULVER HELD 11/03/2018 9:35 AM               "

## 2018-11-03 NOTE — PLAN OF CARE
Problem: Patient Care Overview  Goal: Plan of Care/Patient Progress Review  Pt seen by PT.  Unsteady with his cane needing min A, this improved to close SBA using walker. Family educated on need for close supervision and hands on assist on stairs, and use walker for all gait until home PT recommends otherwise. Pt needed some convincing on this recommendation as he felt he needed to be unstable to work on getting better.  He is in agreement to use the walker for prevention of problems since he is below his normal baseline.      Physical Therapy Discharge Summary    Reason for therapy discharge:    Discharged to home with home therapy.    Progress towards therapy goal(s). See goals on Care Plan in [facility] electronic health record for goal details.  Goals met    Therapy recommendation(s):    Continued therapy is recommended.  Rationale/Recommendations:  Pt is below baseline, history of falls, presents with fall risk today and family will provide 24/7 care in the meantime..

## 2018-11-03 NOTE — PLAN OF CARE
"Problem: Patient Care Overview  Goal: Plan of Care/Patient Progress Review  Outcome: No Change    VS: /55  Pulse 61  Temp 96.5  F (35.8  C) (Oral)  Resp 18  Ht 1.753 m (5' 9\")  Wt 72.6 kg (160 lb)  SpO2 97%  BMI 23.63 kg/m2     O2: Room air   Output: Voids spontaneously without difficulty   Last BM: Unknown   Activity: Up in halls with walker, assist of 1   Up for meals? No   Skin: Flaky, scabs   Pain: Denies   CMS: Intact   Dressing: PIV dressing CDI   Diet: Regular   LDA: PIV saline locked   Equipment: IV pump and poll, walker   Plan: Discharge home tomorrow   Additional Info: Patient orientated x4 at 1600 and became disoriented to situation and place at 2030.  Patient getting out of bed and agitated              "

## 2018-11-03 NOTE — CONSULTS
Social Work Services Discharge Note      Patient Name:  Kiran Carcamo     Anticipated Discharge Date:  11/3/2018  Discharge Disposition: Home  Home Care:  [facility]    Following MD:  Sunshine Brody MD  Additional Services/Equipment Arranged:  Home Care w/PT eval      Patient / Family response to discharge plan:  Patient and family in agreement with discharge to home with services     Persons notified of above discharge plan:  Patient daughters who will be staying with him in home following discharge    Staff Discharge Instructions:  Please print a packet and send with patient.     CTS Handoff completed:  YES    Medicare Notice of Rights provided to the patient/family:  YES    Ravi LEVI  Weekend SW  Pager: [phone]  --On Call Pager: [phone] 4pm - Midnight

## 2018-11-04 ENCOUNTER — PATIENT OUTREACH (OUTPATIENT)
Dept: CARE COORDINATION | Facility: CLINIC | Age: 83
End: 2018-11-04

## 2018-11-05 NOTE — PROGRESS NOTES
Clinic Care Coordination Contact  Care Team Conversations    Received notification of discharge from hospital. [facility] care coordinator is reaching out so did not place call for hospital follow up.     Moira Sandhu R.N.  Clinic Care Coordinator  [facility]  [phone]

## 2018-11-09 ENCOUNTER — OFFICE VISIT (OUTPATIENT)
Dept: FAMILY MEDICINE | Facility: CLINIC | Age: 83
End: 2018-11-09
Payer: COMMERCIAL

## 2018-11-09 VITALS
SYSTOLIC BLOOD PRESSURE: 118 MMHG | HEART RATE: 53 BPM | BODY MASS INDEX: 23.33 KG/M2 | OXYGEN SATURATION: 96 % | DIASTOLIC BLOOD PRESSURE: 82 MMHG | RESPIRATION RATE: 18 BRPM | WEIGHT: 158 LBS | TEMPERATURE: 97.4 F

## 2018-11-09 DIAGNOSIS — J18.9 PNEUMONIA OF RIGHT LOWER LOBE DUE TO INFECTIOUS ORGANISM: Primary | ICD-10-CM

## 2018-11-09 DIAGNOSIS — M62.81 GENERALIZED MUSCLE WEAKNESS: ICD-10-CM

## 2018-11-09 PROCEDURE — G0009 ADMIN PNEUMOCOCCAL VACCINE: HCPCS | Performed by: FAMILY MEDICINE

## 2018-11-09 PROCEDURE — 90670 PCV13 VACCINE IM: CPT | Performed by: FAMILY MEDICINE

## 2018-11-09 PROCEDURE — 99214 OFFICE O/P EST MOD 30 MIN: CPT | Mod: 25 | Performed by: FAMILY MEDICINE

## 2018-11-09 RX ORDER — INFLUENZA A VIRUS A/VICTORIA/4897/2022 IVR-238 (H1N1) ANTIGEN (FORMALDEHYDE INACTIVATED), INFLUENZA A VIRUS A/CALIFORNIA/122/2022 SAN-022 (H3N2) ANTIGEN (FORMALDEHYDE INACTIVATED), AND INFLUENZA B VIRUS B/MICHIGAN/01/2021 ANTIGEN (FORMALDEHYDE INACTIVATED) 60; 60; 60 UG/.5ML; UG/.5ML; UG/.5ML
INJECTION, SUSPENSION INTRAMUSCULAR
Refills: 0 | COMMUNITY
Start: 2018-10-26 | End: 2018-11-09

## 2018-11-09 NOTE — MR AVS SNAPSHOT
After Visit Summary   11/9/2018    Kiran Carcamo    MRN: [id number]           Patient Information     Date Of Birth          4/13/1921        Visit Information        Provider Department      11/9/2018 1:15 PM Buddy Poe MD [facility]        Today's Diagnoses     Pneumonia of right lower lobe due to infectious organism (H)    -  1    Generalized muscle weakness          Care Instructions    Follow up in 2 months, earlier if problems worsen.          Follow-ups after your visit        Additional Services     [facility], HAND, AND CHIROPRACTIC REFERRAL       Physical Therapy, Hand Therapy and Chiropractic Care are available through:  *[facility]  *Hand Therapy (Occupational Therapy or Physical Therapy)  *[facility]    Call one number to schedule at any of the above locations: [phone].    Physical therapy, Hand therapy and/or Chiropractic care has been recommended by your physician as an excellent treatment option to reduce pain and help people return to normal activities, including sports.  Therapy and/or chiropractic care services are a great complement or alternative to expensive and invasive surgery, injections, or long-term use of prescription medications. The primary goal is to identify the underlying problem and provide you the tools to manage your condition on your own.     Please be aware that coverage of these services is subject to the terms and limitations of your health insurance plan.  Call member services at your health plan with any benefit or coverage questions.      Please bring the following to your appointment:  *Your personal calendar for scheduling future appointments  *Comfortable clothing                  Follow-up notes from your care team     Return in about 2 months (around 1/9/2019), or if symptoms worsen or fail to improve.      Future tests that were ordered for you today     Open Future Orders         Priority Expected Expires Ordered    [facility] PT, HAND, AND CHIROPRACTIC REFERRAL Routine  11/9/2019 11/9/2018            Who to contact     If you have questions or need follow up information about today's clinic visit or your schedule please contact [facility] directly at [phone].  Normal or non-critical lab and imaging results will be communicated to you by MyChart, letter or phone within 4 business days after the clinic has received the results. If you do not hear from us within 7 days, please contact the clinic through [employer] or phone. If you have a critical or abnormal lab result, we will notify you by phone as soon as possible.  Submit refill requests through [employer] or call your pharmacy and they will forward the refill request to us. Please allow 3 business days for your refill to be completed.          Additional Information About Your Visit        MyChart Information     [employer] gives you secure access to your electronic health record. If you see a primary care provider, you can also send messages to your care team and make appointments. If you have questions, please call your primary care clinic.  If you do not have a primary care provider, please call [phone] and they will assist you.        Care EveryWhere ID     This is your Care EveryWhere ID. This could be used by other organizations to access your [facility] medical records  [id number]        Your Vitals Were     Pulse Temperature Respirations Pulse Oximetry BMI (Body Mass Index)       53 97.4  F (36.3  C) (Oral) 18 96% 23.33 kg/m2        Blood Pressure from Last 3 Encounters:   11/09/18 118/82   11/03/18 164/64   09/14/18 129/73    Weight from Last 3 Encounters:   11/09/18 158 lb (71.7 kg)   10/31/18 160 lb (72.6 kg)   09/14/18 151 lb 12.8 oz (68.9 kg)              We Performed the Following     Pneumococcal vaccine 13 valent PCV13 IM (Prevnar) [91373]          Today's Medication Changes          These changes are accurate  as of 11/9/18  2:13 PM.  If you have any questions, ask your nurse or doctor.               Stop taking these medicines if you haven't already. Please contact your care team if you have questions.     FLUZONE HIGH-DOSE 0.5 ML injection   Generic drug:  influenza Vac Split High-Dose   Stopped by:  Buddy Poe MD                    Primary Care Provider Office Phone # Fax #    Buddy Poe [phone]       [address] [address]        Equal Access to Services     [facility]: Hadii aad ku hadasho Soomaali, waaxda luqadaha, qaybta kaalmada adeegyada, waxay manuel hayconcepcion adedottie quintero . So [facility] [phone].    ATENCIÓN: Si habla español, tiene a arriola disposición servicios gratuitos de asistencia lingüística. [facility] [phone].    We comply with applicable federal civil rights laws and [place] laws. We do not discriminate on the basis of race, color, national origin, age, disability, sex, sexual orientation, or gender identity.            Thank you!     Thank you for choosing [facility]  for your care. Our goal is always to provide you with excellent care. Hearing back from our patients is one way we can continue to improve our services. Please take a few minutes to complete the written survey that you may receive in the mail after your visit with us. Thank you!             Your Updated Medication List - Protect others around you: Learn how to safely use, store and throw away your medicines at www.disposemymeds.org.          This list is accurate as of 11/9/18  2:13 PM.  Always use your most recent med list.                   Brand Name Dispense Instructions for use Diagnosis    acetaminophen 325 MG tablet    TYLENOL    100 tablet    Take 3 tablets (975 mg) by mouth every 8 hours as needed for mild pain    Influenza A       aspirin 81 MG EC tablet      Take 81 mg by mouth At Bedtime.        azithromycin 250 MG tablet     ZITHROMAX    3 tablet    Take 1 tablet (250 mg) by mouth daily    Pneumonia of right lower lobe due to infectious organism (H)       cefpodoxime 200 MG tablet    VANTIN    14 tablet    Take 1 tablet (200 mg) by mouth 2 times daily for 7 days    Pneumonia of right lower lobe due to infectious organism (H)       fluorouracil 5 % cream    EFUDEX    40 g    Apply topically daily Every evening for 4 weeks on the frontal scalp, then 2 week break, then another section of the scalp for 4 weeks, etc    Actinic keratosis       isosorbide mononitrate 60 MG 24 hr tablet    IMDUR    90 tablet    Take 1 tablet (60 mg) by mouth daily Please get refills from primary care or schedule with new cardiologist.    Chest discomfort       ketoconazole 2 % cream    NIZORAL    60 g    Apply topically 2 times daily To the eyebrows and around the nose/cheek area    Seborrheic dermatitis       lisinopril 10 MG tablet    PRINIVIL/ZESTRIL    90 tablet    TAKE 1 TABLET BY MOUTH EVERY DAY    Hypertension goal BP (blood pressure) < 140/90       metFORMIN 500 MG tablet    GLUCOPHAGE    180 tablet    Take 1 tablet (500 mg) by mouth 2 times daily (with meals)    Type 2 diabetes mellitus with diabetic nephropathy, without long-term current use of insulin (H)       metoprolol succinate 25 MG 24 hr tablet    TOPROL-XL    45 tablet    TAKE ONE-HALF TABLET BY MOUTH DAILY    HTN (hypertension)       MV-ONE PO      Take 1 tablet by mouth daily        nitroGLYcerin 0.4 MG sublingual tablet    NITROSTAT    25 tablet    Place 1 tablet (0.4 mg) under the tongue every 5 minutes as needed for chest pain If symptoms after 3 doses (15 minutes) call 911.    History of MI (myocardial infarction)       ONETOUCH ULTRA test strip   Generic drug:  blood glucose monitoring     300 strip    USE TO TEST BLOOD SUGAR TWICE DAILY    Type 2 diabetes mellitus without complication (H)       polyethylene glycol Packet    MIRALAX/GLYCOLAX    10 packet    Take 17 g by mouth daily as  needed for constipation    Constipation, unspecified constipation type       senna-docusate 8.6-50 MG per tablet    SENOKOT-S;PERICOLACE    60 tablet    Take 1 tablet by mouth 2 times daily    Constipation, unspecified constipation type       simvastatin 20 MG tablet    ZOCOR    90 tablet    Take 1 tablet (20 mg) by mouth daily    Hyperlipidemia LDL goal <130

## 2018-11-09 NOTE — PROGRESS NOTES
SUBJECTIVE:   Kiran Carcamo is a 89 year old male who presents to clinic today with 2 daughters for the following health issues:      Hospital Follow-up Visit:    Hospital/Nursing Home/IP Rehab Facility: [facility]  Date of Admission: 10-  Date of Discharge: 11-3-2018  Reason(s) for Admission: Constipation             Problems taking medications regularly:  None       Medication changes since discharge: Antiboitics and Stool softner .       Problems adhering to non-medication therapy:  None    Summary of hospitalization:  [facility] discharge summary reviewed  Diagnostic Tests/Treatments reviewed.  Follow up needed: none  Other Healthcare Providers Involved in Patient s Care:         None  Update since discharge: improved.     Post Discharge Medication Reconciliation: discharge medications reconciled, continue medications without change.  Plan of care communicated with patient     Coding guidelines for this visit:  Type of Medical   Decision Making Face-to-Face Visit       within 7 Days of discharge Face-to-Face Visit        within 14 days of discharge   Moderate Complexity [id number]   High Complexity [id number]           Patient reports for a hospital follow up for his visit on 10- at the [facility] for constipation. He was discharged on 11-3-2018. Patient went to the ER due to a fall as he was getting out of bed as well as an associated bad cough with dehydration.Patient recalls not being able to move as he was wrapped in the radio cord. He was not injured during his fall. He had been sick for 3 days prior to his fall with a persistent productive cough. Patient was diagnosed at the ER with pneumonia in his right lung. He has had this previously. His cough has improved over the last 1.5 days. This is faster in comparison to last year's recovery time. Patient's daughters note that he has been sleeping during the day more than usual. They are  providing 24 hour care. He wakes up every 2-3 hours at night to urinate. His daughters also note that he has a good appetite and does not typically show any confusion, although he did experience some confusion while at the hospital. He uses a walker. Patient went on excursions outside the house before his hospitalization, but has mostly stayed in his home since he was discharged. He still drives, but his daughters mentioned they would like to be with him the next several times he drives somewhere. Patient attests that he received a pneumonia shot 2 years ago while getting a flu shot, but the records show no indication of him receiving the shot.     Problem list and histories reviewed & adjusted, as indicated.  Additional history: as documented    Patient Active Problem List   Diagnosis     Health Care Home     AK (actinic keratosis)     SK (seborrheic keratosis)     Hyperplasia of prostate     Hyperlipidemia LDL goal <70     History of MI (myocardial infarction)     Forgetfulness     CKD (chronic kidney disease) stage 2, GFR 60-89 ml/min     Essential hypertension with goal blood pressure less than 140/90     Eczema, unspecified type     Type 2 diabetes mellitus with diabetic nephropathy, without long-term current use of insulin (H)     Hypocalcemia      Paget's bone disease     History of skin cancer     Community acquired pneumonia     Past Surgical History:   Procedure Laterality Date     CHOLECYSTECTOMY       CORONARY ARTERY BYPASS  '05    6-vessel     MANDIBLE SURGERY         Social History   Substance Use Topics     Smoking status: Never Smoker     Smokeless tobacco: Never Used     Alcohol use No     Family History   Problem Relation Age of Onset     HEART DISEASE Mother      Eye Disorder Father      Respiratory Brother      HEART DISEASE Sister      Cancer Daughter      Cancer Brother      Skin Cancer No family hx of      Melanoma No family hx of          Current Outpatient Prescriptions   Medication Sig  Dispense Refill     acetaminophen (TYLENOL) 325 MG tablet Take 3 tablets (975 mg) by mouth every 8 hours as needed for mild pain 100 tablet 0     aspirin 81 MG EC tablet Take 81 mg by mouth At Bedtime.       azithromycin (ZITHROMAX) 250 MG tablet Take 1 tablet (250 mg) by mouth daily 3 tablet 0     cefpodoxime (VANTIN) 200 MG tablet Take 1 tablet (200 mg) by mouth 2 times daily for 7 days 14 tablet 0     fluorouracil (EFUDEX) 5 % cream Apply topically daily Every evening for 4 weeks on the frontal scalp, then 2 week break, then another section of the scalp for 4 weeks, etc 40 g 2     isosorbide mononitrate (IMDUR) 60 MG 24 hr tablet Take 1 tablet (60 mg) by mouth daily Please get refills from primary care or schedule with new cardiologist. 90 tablet 3     ketoconazole (NIZORAL) 2 % cream Apply topically 2 times daily To the eyebrows and around the nose/cheek area 60 g 0     lisinopril (PRINIVIL/ZESTRIL) 10 MG tablet TAKE 1 TABLET BY MOUTH EVERY DAY 90 tablet 0     metFORMIN (GLUCOPHAGE) 500 MG tablet Take 1 tablet (500 mg) by mouth 2 times daily (with meals) 180 tablet 3     metoprolol succinate (TOPROL-XL) 25 MG 24 hr tablet TAKE ONE-HALF TABLET BY MOUTH DAILY 45 tablet 1     Multiple Vitamin (MV-ONE PO) Take 1 tablet by mouth daily        nitroglycerin (NITROSTAT) 0.4 MG sublingual tablet Place 1 tablet (0.4 mg) under the tongue every 5 minutes as needed for chest pain If symptoms after 3 doses (15 minutes) call 911. 25 tablet 1     ONE TOUCH ULTRA test strip USE TO TEST BLOOD SUGAR TWICE DAILY 300 strip 2     polyethylene glycol (MIRALAX/GLYCOLAX) Packet Take 17 g by mouth daily as needed for constipation 10 packet 0     senna-docusate (SENOKOT-S;PERICOLACE) 8.6-50 MG per tablet Take 1 tablet by mouth 2 times daily 60 tablet 1     simvastatin (ZOCOR) 20 MG tablet Take 1 tablet (20 mg) by mouth daily 90 tablet 3     FLUZONE HIGH-DOSE 0.5 ML injection TO BE ADMINISTERED BY PHARMACIST FOR IMMUNIZATION  0     No  Known Allergies  Recent Labs   Lab Test  11/01/18   0814  10/31/18   1701  09/14/18   0930  06/06/18   1041  03/05/18   0928   09/08/17   0919  05/09/17   0502  05/08/17   1450   11/16/16   1047  08/10/16   1050   11/21/14   1441   A1C   --    --   6.5*  6.5*  7.4*   < >  8.4*   --    --    < >  7.6*  7.4*   < >  6.8*   LDL   --    --   38   --    --    --   54   --    --    --    --   64   --   40   HDL   --    --   44   --    --    --   44   --    --    --    --   41   --   47   TRIG   --    --   74   --    --    --   127   --    --    --    --   97   --   110   ALT  26   --    --    --    --    --    --   69  83*   --    --    --    < >  34   CR  0.96  1.29*   --   1.10   --    --    --   1.07  1.01   < >   --    --    < >  0.94   GFRESTIMATED  73  51*   --   62   --    --    --   64  68   < >   --    --    < >  75   GFRESTBLACK  88  62   --   75   --    --    --   78  83   < >   --    --    < >  >90   GFR Calc     POTASSIUM  3.7  3.8   --    --    --    --    --   4.3  4.3   < >   --    --    < >  4.7   TSH   --    --    --    --    --    --    --    --    --    --   2.57   --    --   1.97    < > = values in this interval not displayed.      BP Readings from Last 3 Encounters:   11/09/18 118/82   11/03/18 164/64   09/14/18 129/73    Wt Readings from Last 3 Encounters:   11/09/18 71.7 kg (158 lb)   10/31/18 72.6 kg (160 lb)   09/14/18 68.9 kg (151 lb 12.8 oz)                  Labs reviewed in EPIC    Reviewed and updated as needed this visit by clinical staff       Reviewed and updated as needed this visit by Provider         ROS:  Remainder of ROS is negative unless otherwise noted above or in HPI.    This document serves as a record of the services and decisions personally performed and made by Buddy Poe MD. It was created on his behalf by Sania Tamez and Gregg Santos, trained medical scribes. The creation of this document is based on the provider's statements to the medical  vielka.  Sania Tamez and Gregg Santos 1:34 PM November 9, 2018    OBJECTIVE:     /82  Pulse 53  Temp 97.4  F (36.3  C) (Oral)  Resp 18  Wt 71.7 kg (158 lb)  SpO2 96%  BMI 23.33 kg/m2  Body mass index is 23.33 kg/(m^2).  GENERAL: healthy, alert and no distress  RESP: lungs clear to auscultation - no rales, or wheezes, slightly delayed expiratory phase with faint musical rhonchi  CV: regular rate and rhythm, normal S1 S2, no S3 or S4, no murmur, click or rub, no peripheral edema and peripheral pulses strong  PSYCH: mentation appears normal, affect normal/bright    Diagnostic Test Results:  none     ASSESSMENT/PLAN:   (J18.1) Pneumonia of right lower lobe due to infectious organism (H)  (primary encounter diagnosis)  Comment: Recovering.  Plan: [facility] PT, HAND, AND CHIROPRACTIC REFERRAL,         Pneumococcal vaccine 13 valent PCV13 IM         (Prevnar) [05338]        Advised patient that it may take up to 6 weeks for full recovery. Follow up as needed.    (M62.81) Generalized muscle weakness  Comment: Uncontrolled  Plan: [facility] PT, HAND, AND CHIROPRACTIC REFERRAL        Patient is going to follow up with a Physical Therapist. Follow up as needed.    Patient Instructions   Follow up in 2 months, earlier if problems worsen.    The information in this document, created by the medical scribes for me, accurately reflects the services I personally performed and the decisions made by me. I have reviewed and approved this document for accuracy prior to leaving the patient care area.  November 9, 2018 2:37 PM    Buddy Poe MD  [facility]

## 2018-11-13 ENCOUNTER — TELEPHONE (OUTPATIENT)
Dept: FAMILY MEDICINE | Facility: CLINIC | Age: 83
End: 2018-11-13

## 2018-11-13 NOTE — TELEPHONE ENCOUNTER
Reason for call:  Other   Patient called regarding (reason for call): Letter request  Additional comments: The patients daughter called and stated that he was recently in the hospital and was released and saw Dr. Poe on 11/9 because he had pneumonia. The patients daughter had to cancel a work trip to take care of her dad and she would like Dr. Poe to write a letter for her stating that it was necessary for her to cancel he work trip in order to care for him since she is one of his primary caretakers.    Phone number to reach patient:  Other phone number: [phone]    Best Time: Any    Can we leave a detailed message on this number?  YES

## 2018-11-13 NOTE — TELEPHONE ENCOUNTER
Dr. Poe,    Please see phone message below. Called daughter, Karla Gonzales, and discussed that we would recommend that she contact her PCP regarding any letters needed for cancellation of her work trip/time off from work.     Daughter requesting letter verifying that she is the patient's daughter and care taker and the dates that she was caring for the patient d/t his illness.  Per chart review, pt hospitalized 10/31/18-11/03/18. Karla stated that she has been with the patient since discharge 11/03/18 until today.    Cued letter, please review/sign or advise. (unsure if hospital dates should be added? I put them in)    FYI: there are no consents to communicate with either daughter.    Stephanie Galeas RN  [facility]

## 2018-11-13 NOTE — LETTER
[facility]  [address] [place] [address]  Phone: [phone]  Fax: [phone]      11/13/18      RE: Kiran Carcamo  [address] [facility] [address]      To Whom It May Concern:     I serve as Mr Carcamo's primary care provider, and am providing the following verification     for Ms Gonzales place of work. Karla Gonzales is the daughter and a primary caretaker of     Mr Carcamo. Karla missed work due to caring for my patient on the dates of     10/31/18-present due to illness. Please contact if questions.         Sincerely,          Buddy Poe MD

## 2018-11-14 NOTE — TELEPHONE ENCOUNTER
Called number in encounter and left voicemail to notify patient's daughter that the letter is ready and will be at the  of clinc

## 2018-11-21 ENCOUNTER — HOSPITAL ENCOUNTER (OUTPATIENT)
Dept: PHYSICAL THERAPY | Facility: CLINIC | Age: 83
Setting detail: THERAPIES SERIES
End: 2018-11-21
Attending: FAMILY MEDICINE
Payer: MEDICARE

## 2018-11-21 DIAGNOSIS — M62.81 GENERALIZED MUSCLE WEAKNESS: ICD-10-CM

## 2018-11-21 DIAGNOSIS — J18.9 PNEUMONIA OF RIGHT LOWER LOBE DUE TO INFECTIOUS ORGANISM: ICD-10-CM

## 2018-11-21 PROCEDURE — G8979 MOBILITY GOAL STATUS: HCPCS | Mod: GP,CI | Performed by: PHYSICAL THERAPIST

## 2018-11-21 PROCEDURE — G8978 MOBILITY CURRENT STATUS: HCPCS | Mod: GP,CJ | Performed by: PHYSICAL THERAPIST

## 2018-11-21 PROCEDURE — 97530 THERAPEUTIC ACTIVITIES: CPT | Mod: GP | Performed by: PHYSICAL THERAPIST

## 2018-11-21 PROCEDURE — 40000185 ZZHC STATISTIC PT OUTPT VISIT: Performed by: PHYSICAL THERAPIST

## 2018-11-21 PROCEDURE — 97162 PT EVAL MOD COMPLEX 30 MIN: CPT | Mod: GP | Performed by: PHYSICAL THERAPIST

## 2018-11-21 NOTE — PROGRESS NOTES
[facility]        OUTPATIENT PHYSICAL THERAPY FUNCTIONAL EVALUATION  PLAN OF TREATMENT FOR OUTPATIENT REHABILITATION  (COMPLETE FOR INITIAL CLAIMS ONLY)  Patient's Last Name, First Name, M.I.  YOB: 1921  Kiran Carcamo     Provider's Name   [facility]   Medical Record No.  [id number]     Start of Care Date:  11/21/18   Onset Date:  10/31/18 (admitted to hospital with pneumonia)   Type:     _X__PT   ____OT  ____SLP Medical Diagnosis:  pneumonia of R lower lobe secondardy to infectious organism, generalized muscle weakness     PT Diagnosis:  impaired general strength Visits from SOC:  1                              __________________________________________________________________________________  Plan of Treatment/Functional Goals:  balance training, neuromuscular re-education, strengthening, transfer training           GOALS  fall prevention  1. Pt to demonstrate/report 3-5 strategies to reduce fall risk and to perform TUG with least restrictive AD in 13.5 seconds or less (as compared to 18.7 seconds today).  02/18/19    Gait speed  2. Pt to demonstrate ability to complete 25 foot walk using cane in 6.3 seconds or less (as compared to 6.85 sec with walker today) to return to previous baseline and assure reduced risk of falls.  02/18/19    HEP  3. Pt indep with HEP that he performs 4+ times/week to improve trunk and LE strength, balance.  02/18/19    3 min walk  4. Pt to achieve 3 min walk distance of 300 ft (as compared to 250 ft today) with least restrictive AD to assure improved walking tolerance.  02/18/19    sit to stand; car transfers  5. Pt to demonstrate ability consistently perform 5 reps of sit to stand from standard height chair in 16 seconds or less (as compared to 20.9 seconds today) and to perform car transfers indep safely.  02/18/19                         Therapy Frequency:  2 times/Week   Predicted Duration of Therapy Intervention:  90 days    Bety Royal, PT                                    I CERTIFY THE NEED FOR THESE SERVICES FURNISHED UNDER        THIS PLAN OF TREATMENT AND WHILE UNDER MY CARE     (Physician co-signature of this document indicates review and certification of the therapy plan).                Certification Date From:  11/21/18   Certification Date To:  02/18/19    Referring Provider:  Buddy Poe MD    Initial Assessment  See Epic Evaluation- Start of Care Date: 11/21/18

## 2018-12-06 ENCOUNTER — HOSPITAL ENCOUNTER (OUTPATIENT)
Dept: PHYSICAL THERAPY | Facility: CLINIC | Age: 83
Setting detail: THERAPIES SERIES
End: 2018-12-06
Attending: FAMILY MEDICINE
Payer: MEDICARE

## 2018-12-06 PROCEDURE — 97110 THERAPEUTIC EXERCISES: CPT | Mod: GP | Performed by: PHYSICAL THERAPIST

## 2018-12-06 PROCEDURE — 97530 THERAPEUTIC ACTIVITIES: CPT | Mod: GP | Performed by: PHYSICAL THERAPIST

## 2018-12-06 PROCEDURE — 40000185 ZZHC STATISTIC PT OUTPT VISIT: Performed by: PHYSICAL THERAPIST

## 2018-12-06 NOTE — DISCHARGE INSTRUCTIONS
Try this with leg exerciser:    Switch to non-skid slippers and try with legs    Otherwise, put on dining room table and use with arms    Medium intensity; 5-10 minutes    Keep working on sit to stand from chair with firm cushion - do as many reps in a row as you can do safely

## 2018-12-06 NOTE — IP AVS SNAPSHOT
MRN:[id number]                      After Visit Summary   12/6/2018    Kiran Carcamo    MRN: [id number]           Visit Information        Provider Department      12/6/2018 10:45 AM Bety Royal, PT [facility], [place], Physical Therapy - Outpatient        Your next 10 appointments already scheduled     Dec 18, 2018  1:45 PM CST   Treatment 45 with Fior Rehman, PT   [facility], [place], Physical Therapy - Outpatient ([facility])    [address], [address]  [place] [address]   [phone]            Dec 20, 2018  3:15 PM CST   Treatment 45 with Fior Rehman, PT   [facility], [place], Physical Therapy - Outpatient ([facility])    [address], [address]  [place] [address]   [phone]            Jan 02, 2019  2:00 PM CST   Treatment 45 with Bety Royal, PT   [facility], [place], Physical Therapy - Outpatient ([facility])    [address], [address]  [place] [address]   [phone]            Jan 08, 2019  2:15 PM CST   Treatment 45 with Bety Royal, PT   [facility], Physical Therapy - Outpatient ([facility])    [address], [address]  [place] [address]   [phone]                Further instructions from your care team       Try this with leg exerciser:    Switch to non-skid slippers and try with legs    Otherwise, put on dining room table and use with arms    Medium intensity; 5-10 minutes    Keep working on sit to stand from chair with firm cushion - do as many reps in a row as you can do safely          MyChart Information     [employer] gives you secure access to your electronic health record. If you see a primary care provider, you can also send messages to your care team and make appointments. If you have questions, please call your primary care clinic.  If you do not have a primary  care provider, please call [phone] and they will assist you.        Care EveryWhere ID     This is your Care EveryWhere ID. This could be used by other organizations to access your [place] medical records  [id number]        Equal Access to Services     BETO QUINONEZ : Calixto Patel, wafaviola demarco, matt kaalmabell mars, dyan benitez. So [facility] [phone].    ATENCIÓN: Si habla español, tiene a arriola disposición servicios gratuitos de asistencia lingüística. Llame al [phone].    We comply with applicable federal civil rights laws and [place] laws. We do not discriminate on the basis of race, color, national origin, age, disability, sex, sexual orientation, or gender identity.

## 2018-12-07 DIAGNOSIS — R29.898 WEAKNESS OF UPPER EXTREMITY: Primary | ICD-10-CM

## 2018-12-12 ENCOUNTER — OFFICE VISIT (OUTPATIENT)
Dept: FAMILY MEDICINE | Facility: CLINIC | Age: 83
End: 2018-12-12
Payer: COMMERCIAL

## 2018-12-12 VITALS
HEART RATE: 58 BPM | OXYGEN SATURATION: 98 % | DIASTOLIC BLOOD PRESSURE: 58 MMHG | WEIGHT: 158 LBS | RESPIRATION RATE: 20 BRPM | BODY MASS INDEX: 23.33 KG/M2 | SYSTOLIC BLOOD PRESSURE: 100 MMHG | TEMPERATURE: 97.5 F

## 2018-12-12 DIAGNOSIS — J06.9 UPPER RESPIRATORY TRACT INFECTION, UNSPECIFIED TYPE: Primary | ICD-10-CM

## 2018-12-12 DIAGNOSIS — R53.83 OTHER FATIGUE: ICD-10-CM

## 2018-12-12 DIAGNOSIS — E11.21 TYPE 2 DIABETES MELLITUS WITH DIABETIC NEPHROPATHY, WITHOUT LONG-TERM CURRENT USE OF INSULIN (H): ICD-10-CM

## 2018-12-12 DIAGNOSIS — I10 ESSENTIAL HYPERTENSION: ICD-10-CM

## 2018-12-12 LAB
ERYTHROCYTE [DISTWIDTH] IN BLOOD BY AUTOMATED COUNT: 13.9 % (ref 10–15)
HBA1C MFR BLD: 7.4 % (ref 0–5.6)
HCT VFR BLD AUTO: 38.3 % (ref 40–53)
HGB BLD-MCNC: 12.5 G/DL (ref 13.3–17.7)
MCH RBC QN AUTO: 32.7 PG (ref 26.5–33)
MCHC RBC AUTO-ENTMCNC: 32.6 G/DL (ref 31.5–36.5)
MCV RBC AUTO: 100 FL (ref 78–100)
PLATELET # BLD AUTO: 187 10E9/L (ref 150–450)
RBC # BLD AUTO: 3.82 10E12/L (ref 4.4–5.9)
WBC # BLD AUTO: 9 10E9/L (ref 4–11)

## 2018-12-12 PROCEDURE — 36415 COLL VENOUS BLD VENIPUNCTURE: CPT | Performed by: NURSE PRACTITIONER

## 2018-12-12 PROCEDURE — 84443 ASSAY THYROID STIM HORMONE: CPT | Performed by: NURSE PRACTITIONER

## 2018-12-12 PROCEDURE — 99214 OFFICE O/P EST MOD 30 MIN: CPT | Performed by: NURSE PRACTITIONER

## 2018-12-12 PROCEDURE — 85027 COMPLETE CBC AUTOMATED: CPT | Performed by: NURSE PRACTITIONER

## 2018-12-12 PROCEDURE — 83036 HEMOGLOBIN GLYCOSYLATED A1C: CPT | Performed by: NURSE PRACTITIONER

## 2018-12-12 PROCEDURE — 80053 COMPREHEN METABOLIC PANEL: CPT | Performed by: NURSE PRACTITIONER

## 2018-12-12 NOTE — PROGRESS NOTES
SUBJECTIVE:   Kiran Carcamo is a 89 year old male who presents to clinic today for the following health issues:    Acute Illness   Acute illness concerns: Possible cold, recent pneumonia 6 weeks ago- daughters aren't used to him sleeping so often. Daughter here from [place]- she has been keeping him pretty busy   Onset: Over the weekend     Fever: no    Chills/Sweats: no    Headache (location?): no    Sinus Pressure:no    Conjunctivitis:  no    Ear Pain: no    Rhinorrhea: YES    Congestion: no    Sore Throat: no     Cough: no    Wheeze: no    Decreased Appetite: no    Nausea: no    Vomiting: no    Diarrhea:  no    Dysuria/Freq.: no    Fatigue/Achiness: YES- sleeping a lot lately  Groggy today    Sick/Strep Exposure: Family has been sick     Therapies Tried and outcome: Regular meds    Has been sleeping more today. Was up and down the stairs more often in the past several days attending to his guests. Appetite is good, has been drinking enough fluids and overall feeling well. No abdominal pains, urinary issues, constipation or diarrhea.     Has been checking sugars at home- recent sugars have been slightly higher than normal- in 150s fasting. He has been eating more sweets with his family in town.  No issues with taking pills everyday.       -------------------------------------    Problem list and histories reviewed & adjusted, as indicated.  Additional history: as documented    Patient Active Problem List   Diagnosis     Health Care Home     AK (actinic keratosis)     SK (seborrheic keratosis)     Hyperplasia of prostate     Hyperlipidemia LDL goal <70     History of MI (myocardial infarction)     Forgetfulness     CKD (chronic kidney disease) stage 2, GFR 60-89 ml/min     Essential hypertension with goal blood pressure less than 140/90     Eczema, unspecified type     Type 2 diabetes mellitus with diabetic nephropathy, without long-term current use of insulin (H)     Hypocalcemia      Paget's bone disease      History of skin cancer     Community acquired pneumonia     Past Surgical History:   Procedure Laterality Date     CHOLECYSTECTOMY       CORONARY ARTERY BYPASS  '05    6-vessel     MANDIBLE SURGERY         Social History     Tobacco Use     Smoking status: Never Smoker     Smokeless tobacco: Never Used   Substance Use Topics     Alcohol use: No     Family History   Problem Relation Age of Onset     Heart Disease Mother      Eye Disorder Father      Respiratory Brother      Heart Disease Sister      Cancer Daughter      Cancer Brother      Skin Cancer No family hx of      Melanoma No family hx of            Reviewed and updated as needed this visit by clinical staff  Meds       Reviewed and updated as needed this visit by Provider         ROS:  Constitutional, HEENT, cardiovascular, pulmonary, GI, , musculoskeletal, neuro, skin, endocrine and psych systems are negative, except as otherwise noted.    OBJECTIVE:     /58   Pulse 58   Temp 97.5  F (36.4  C) (Temporal)   Resp 20   Wt 71.7 kg (158 lb)   SpO2 98%   BMI 23.33 kg/m    Body mass index is 23.33 kg/m .   GENERAL: healthy, alert and no distress  EYES: Eyes grossly normal to inspection, PERRL and conjunctivae and sclerae normal  HENT: ear canals and TM's normal, nose and mouth without ulcers or lesions  HENT: normal cephalic/atraumatic, both ears: clear effusion, nasal mucosa edematous , oropharynx clear and oral mucous membranes moist  RESP: lungs clear to auscultation - no rales, rhonchi or wheezes  CV: regular rate and rhythm, normal S1 S2, no S3 or S4, no murmur, click or rub, no peripheral edema and peripheral pulses strong  ABDOMEN: soft, nontender, no hepatosplenomegaly, no masses and bowel sounds normal  MS: no gross musculoskeletal defects noted, no edema  NEURO: Normal strength and tone, mentation intact and speech normal  PSYCH: mentation appears normal, affect normal/bright    Diagnostic Test Results:  Results for orders placed or  performed in visit on 12/12/18 (from the past 24 hour(s))   CBC with platelets   Result Value Ref Range    WBC 9.0 4.0 - 11.0 10e9/L    RBC Count 3.82 (L) 4.4 - 5.9 10e12/L    Hemoglobin 12.5 (L) 13.3 - 17.7 g/dL    Hematocrit 38.3 (L) 40.0 - 53.0 %     78 - 100 fl    MCH 32.7 26.5 - 33.0 pg    MCHC 32.6 31.5 - 36.5 g/dL    RDW 13.9 10.0 - 15.0 %    Platelet Count 187 150 - 450 10e9/L   Hemoglobin A1c   Result Value Ref Range    Hemoglobin A1C 7.4 (H) 0 - 5.6 %       ASSESSMENT/PLAN:     Problem List Items Addressed This Visit     Type 2 diabetes mellitus with diabetic nephropathy, without long-term current use of insulin (H)      Other Visit Diagnoses     Upper respiratory tract infection, unspecified type    -  Primary    Essential hypertension        Relevant Orders    Comprehensive metabolic panel (Completed)    CBC with platelets (Completed)    TSH with free T4 reflex (Completed)    Other fatigue             Fatigue likely related to increased activity and URI; exam was within normal limits. Encouraged him to make sure he is eating regularly and drinking enough throughout the day, and resting as needed.  Re-checking labs related to hospitalization 6 weeks ago.      MICHAEL Venegas [facility]  Please note greater than 50% of this 30 minute appointment were spent in face to face counseling with the patient of the issues described above in the history of present illness and in the plan, including checking labs

## 2018-12-13 LAB
ALBUMIN SERPL-MCNC: 3.5 G/DL (ref 3.4–5)
ALP SERPL-CCNC: 93 U/L (ref 40–150)
ALT SERPL W P-5'-P-CCNC: 23 U/L (ref 0–70)
ANION GAP SERPL CALCULATED.3IONS-SCNC: 7 MMOL/L (ref 3–14)
AST SERPL W P-5'-P-CCNC: 12 U/L (ref 0–45)
BILIRUB SERPL-MCNC: 0.5 MG/DL (ref 0.2–1.3)
BUN SERPL-MCNC: 38 MG/DL (ref 7–30)
CALCIUM SERPL-MCNC: 9.3 MG/DL (ref 8.5–10.1)
CHLORIDE SERPL-SCNC: 111 MMOL/L (ref 94–109)
CO2 SERPL-SCNC: 25 MMOL/L (ref 20–32)
CREAT SERPL-MCNC: 1 MG/DL (ref 0.66–1.25)
GFR SERPL CREATININE-BSD FRML MDRD: 69 ML/MIN/1.7M2
GLUCOSE SERPL-MCNC: 140 MG/DL (ref 70–99)
POTASSIUM SERPL-SCNC: 4.3 MMOL/L (ref 3.4–5.3)
PROT SERPL-MCNC: 6.9 G/DL (ref 6.8–8.8)
SODIUM SERPL-SCNC: 143 MMOL/L (ref 133–144)
TSH SERPL DL<=0.005 MIU/L-ACNC: 1.58 MU/L (ref 0.4–4)

## 2018-12-17 NOTE — DISCHARGE SUMMARY
[facility]  Discharge Summary    Kiran Carcamo MRN# [id number]   YOB: 1921 Age: 89 year old     Date of Admission:  10/31/2018  Date of Discharge:  11/3/2018  2:45 PM  Admitting Physician:  Lois Collins MD  Discharge Physician:  Sunshine Brody MD  Discharging Service:  Internal Medicine     Primary Provider: Buddy Poe              Discharge Diagnosis:     Primary Discharge Diagnosis:    1. Community acquired pneumonia, RML  2. HUGO on CKD III:   Baseline Cr 0.9-1.0, up to 1.29 on admission   3. CAD s/p CABG:  Hx MI and CABG x 5 in 2003 (LIMA to LAD and SVG to RCA, OM1, OM2, and D1). Follow up angiogram in 2002 showed patent grafts to LAD and RCA, and occluded grafts to OM1, OM2, and D2. Last echo in 2011 showed EF >55%. No history of CHF.  4. HTN  5. DM2:  Last A1C 6.5% on 9/14/18  6. Metabolic Encephalopathy, sleep deprivation contributing. Likely from infection         Past Medical History:   Diagnosis Date     CKD (chronic kidney disease) stage 2, GFR 60-89 ml/min 7/6/2015     Hypertension goal BP (blood pressure) < 140/90      Syncope 12/12/2011     Type 2 diabetes, HbA1C goal < 8% (H) 2/23/2015                  Discharge Medications:     Discharge Medication List as of 11/3/2018  2:27 PM      START taking these medications    Details   azithromycin (ZITHROMAX) 250 MG tablet Take 1 tablet (250 mg) by mouth daily, Disp-3 tablet, R-0, E-Prescribe      cefpodoxime (VANTIN) 200 MG tablet Take 1 tablet (200 mg) by mouth 2 times daily for 7 days, Disp-14 tablet, R-0, E-Prescribe      senna-docusate (SENOKOT-S;PERICOLACE) 8.6-50 MG per tablet Take 1 tablet by mouth 2 times daily, Disp-60 tablet, R-1, E-Prescribe         CONTINUE these medications which have NOT CHANGED    Details   acetaminophen (TYLENOL) 325 MG tablet Take 3 tablets (975 mg) by mouth every 8 hours as needed for mild pain, Disp-100 tablet, R-0, E-PrescribeTake every 4 hours as needed for fevers.       aspirin 81 MG EC tablet Take 81 mg by mouth At Bedtime., Historical      fluorouracil (EFUDEX) 5 % cream Apply topically daily Every evening for 4 weeks on the frontal scalp, then 2 week break, then another section of the scalp for 4 weeks, etcDisp-40 g, [id number]      isosorbide mononitrate (IMDUR) 60 MG 24 hr tablet Take 1 tablet (60 mg) by mouth daily Please get refills from primary care or schedule with new cardiologist., Disp-90 tablet, R-3, E-Prescribe      ketoconazole (NIZORAL) 2 % cream Apply topically 2 times daily To the eyebrows and around the nose/cheek areaDisp-60 g, [id number]      lisinopril (PRINIVIL/ZESTRIL) 10 MG tablet TAKE 1 TABLET BY MOUTH EVERY DAY, Disp-90 tablet, R-0, E-Prescribe      metFORMIN (GLUCOPHAGE) 500 MG tablet Take 1 tablet (500 mg) by mouth 2 times daily (with meals), Disp-180 tablet, R-3, E-Prescribe      metoprolol succinate (TOPROL-XL) 25 MG 24 hr tablet TAKE ONE-HALF TABLET BY MOUTH DAILY, Disp-45 tablet, R-1, E-Prescribe      Multiple Vitamin (MV-ONE PO) Take 1 tablet by mouth daily , Historical      nitroglycerin (NITROSTAT) 0.4 MG sublingual tablet Place 1 tablet (0.4 mg) under the tongue every 5 minutes as needed for chest pain If symptoms after 3 doses (15 minutes) call 911., Disp-25 tablet, R-1, E-Prescribe      ONE TOUCH ULTRA test strip USE TO TEST BLOOD SUGAR TWICE DAILYDisp-300 strip, [id number]      polyethylene glycol (MIRALAX/GLYCOLAX) Packet Take 17 g by mouth daily as needed for constipation, Disp-10 packet, R-0, E-Prescribe      simvastatin (ZOCOR) 20 MG tablet Take 1 tablet (20 mg) by mouth daily, Disp-90 tablet, R-3, E-Prescribe                  Hospital Course:   ASSESSMENT & PLAN:  Kiran Carcamo is a 89 year old male with history of HTN, CAD, DM, CKD who was admitted on 10/31/18 for CAP.      1. Community acquired pneumonia, RML:  Presented with acute on chronic cough, generalized weakness, leukocytosis (WBC 12.4) and CXR with  "increased RML opacities concerning for pneumonia. Afebrile. No hypoxia. No evidence of sepsis. WBC normal today. Clinically stable.     - Treated with  IV ceftriaxone and azithromycin  -responded well, being discharged on Vantin (7d to finish 10 d course) and Azithromycin (3d to finish 5 day course)     2. HUGO on CKD III:   Baseline Cr 0.9-1.0, up to 1.29 on admission. Suspect prerenal azotemia d/t volume depletion. Cr improved w IV hydration.   - improved on IVF     3. CAD s/p CABG:  Hx MI and CABG x 5 in 2003 (LIMA to LAD and SVG to RCA, OM1, OM2, and D1). Follow up angiogram in 2002 showed patent grafts to LAD and RCA, and occluded grafts to OM1, OM2, and D2. Last echo in 2011 showed EF >55%. No history of CHF. Last cardiology follow up 7/13/18, noted reviewed. Currently no acute concerns.  - Cont ASA 81mg daily  - Cont metoprolol XL 12.5mg daily  - Cont imdur 60mg daily     4. HTN:  Isolated hypotension noted on admission. Currently stable with -140's.   - Cont metoprolol XL and imdur as above  - Cont lisinopril 10mg dailiy     5. DM2:  Last A1C 6.5% on 9/14/18. PTA regimen includes metformin 500mg BID (on hold d/t HUGO).   - WE metformin while inpatient, restart at Discharge    6. Metabolic Encephalopathy, sleep deprivation contributing. Likely from infection. Improved with treatment of infection.     Diet: Combination Diet Regular Diet Adult  Fluids:  off  Edmondson Catheter: not present     DVT Prophylaxis: Pneumatic Compression Devices     Disposition: home tomorrow.      Sunshine Brody MD                     Condition on Discharge:   Discharge condition: Stable   Code status on discharge: Full Code            Final Day of Progress before Discharge:       Physical Exam:  Blood pressure 164/64, pulse 61, temperature 96.6  F (35.9  C), temperature source Oral, resp. rate 19, height 1.753 m (5' 9\"), weight 72.6 kg (160 lb), SpO2 93 %.  GENERAL: Alert and oriented x 3. NAD.   HEENT: Anicteric sclera. " PERRL. Mucous membranes moist and without lesions.   CV: RRR. S1, S2. No murmurs appreciated.   RESPIRATORY: Effort normal. Lungs CTAB with no wheezing, rales, rhonchi.   GI: Abdomen soft and non distended with normoactive bowel sounds present in all quadrants. No tenderness, rebound, guarding.      Data:  All laboratory data reviewed             Significant Results:     Results for orders placed or performed during the hospital encounter of 10/31/18   XR Chest 2 Views    Narrative    CHEST TWO VIEWS  10/31/2018 5:54 PM     HISTORY: Cough.     COMPARISON: 5/8/2017      Impression    IMPRESSION: Infiltrate in the medial right lung base is similar to on  the previous exam, but slightly more prominent, and neither was  present in 2011. Cardiomegaly is unchanged. Sternal wires. No pleural  effusion.    SHAUN BOWER MD   CBC with platelets differential   Result Value Ref Range    WBC 12.4 (H) 4.0 - 11.0 10e9/L    RBC Count 3.64 (L) 4.4 - 5.9 10e12/L    Hemoglobin 11.8 (L) 13.3 - 17.7 g/dL    Hematocrit 36.3 (L) 40.0 - 53.0 %     78 - 100 fl    MCH 32.4 26.5 - 33.0 pg    MCHC 32.5 31.5 - 36.5 g/dL    RDW 13.5 10.0 - 15.0 %    Platelet Count 144 (L) 150 - 450 10e9/L    Diff Method Automated Method     % Neutrophils 76.0 %    % Lymphocytes 14.3 %    % Monocytes 8.9 %    % Eosinophils 0.2 %    % Basophils 0.2 %    % Immature Granulocytes 0.4 %    Nucleated RBCs 0 0 /100    Absolute Neutrophil 9.4 (H) 1.6 - 8.3 10e9/L    Absolute Lymphocytes 1.8 0.8 - 5.3 10e9/L    Absolute Monocytes 1.1 0.0 - 1.3 10e9/L    Absolute Eosinophils 0.0 0.0 - 0.7 10e9/L    Absolute Basophils 0.0 0.0 - 0.2 10e9/L    Abs Immature Granulocytes 0.1 0 - 0.4 10e9/L    Absolute Nucleated RBC 0.0    Basic metabolic panel   Result Value Ref Range    Sodium 141 133 - 144 mmol/L    Potassium 3.8 3.4 - 5.3 mmol/L    Chloride 104 94 - 109 mmol/L    Carbon Dioxide 29 20 - 32 mmol/L    Anion Gap 8 3 - 14 mmol/L    Glucose 98 70 - 99 mg/dL    Urea  Nitrogen 32 (H) 7 - 30 mg/dL    Creatinine 1.29 (H) 0.66 - 1.25 mg/dL    GFR Estimate 51 (L) >60 mL/min/1.7m2    GFR Estimate If Black 62 >60 mL/min/1.7m2    Calcium 8.6 8.5 - 10.1 mg/dL   UA with Microscopic   Result Value Ref Range    Color Urine Yellow     Appearance Urine Clear     Glucose Urine Negative NEG^Negative mg/dL    Bilirubin Urine Negative NEG^Negative    Ketones Urine Negative NEG^Negative mg/dL    Specific Gravity Urine 1.019 1.003 - 1.035    Blood Urine Negative NEG^Negative    pH Urine 5.5 5.0 - 7.0 pH    Protein Albumin Urine 30 (A) NEG^Negative mg/dL    Urobilinogen mg/dL Normal 0.0 - 2.0 mg/dL    Nitrite Urine Negative NEG^Negative    Leukocyte Esterase Urine Negative NEG^Negative    Source Midstream Urine     WBC Urine 1 0 - 5 /HPF    RBC Urine 1 0 - 2 /HPF    Mucous Urine Present (A) NEG^Negative /LPF   Basic metabolic panel   Result Value Ref Range    Sodium 140 133 - 144 mmol/L    Potassium 3.7 3.4 - 5.3 mmol/L    Chloride 108 94 - 109 mmol/L    Carbon Dioxide 25 20 - 32 mmol/L    Anion Gap 7 3 - 14 mmol/L    Glucose 110 (H) 70 - 99 mg/dL    Urea Nitrogen 28 7 - 30 mg/dL    Creatinine 0.96 0.66 - 1.25 mg/dL    GFR Estimate 73 >60 mL/min/1.7m2    GFR Estimate If Black 88 >60 mL/min/1.7m2    Calcium 8.1 (L) 8.5 - 10.1 mg/dL   Hepatic panel   Result Value Ref Range    Bilirubin Direct 0.1 0.0 - 0.2 mg/dL    Bilirubin Total 0.5 0.2 - 1.3 mg/dL    Albumin 2.7 (L) 3.4 - 5.0 g/dL    Protein Total 6.0 (L) 6.8 - 8.8 g/dL    Alkaline Phosphatase 77 40 - 150 U/L    ALT 26 0 - 70 U/L    AST 27 0 - 45 U/L   CBC with platelets differential   Result Value Ref Range    WBC 8.3 4.0 - 11.0 10e9/L    RBC Count 3.52 (L) 4.4 - 5.9 10e12/L    Hemoglobin 11.2 (L) 13.3 - 17.7 g/dL    Hematocrit 35.1 (L) 40.0 - 53.0 %     78 - 100 fl    MCH 31.8 26.5 - 33.0 pg    MCHC 31.9 31.5 - 36.5 g/dL    RDW 13.4 10.0 - 15.0 %    Platelet Count 120 (L) 150 - 450 10e9/L    Diff Method Automated Method     % Neutrophils  69.2 %    % Lymphocytes 19.1 %    % Monocytes 10.7 %    % Eosinophils 0.6 %    % Basophils 0.2 %    % Immature Granulocytes 0.2 %    Nucleated RBCs 0 0 /100    Absolute Neutrophil 5.7 1.6 - 8.3 10e9/L    Absolute Lymphocytes 1.6 0.8 - 5.3 10e9/L    Absolute Monocytes 0.9 0.0 - 1.3 10e9/L    Absolute Eosinophils 0.1 0.0 - 0.7 10e9/L    Absolute Basophils 0.0 0.0 - 0.2 10e9/L    Abs Immature Granulocytes 0.0 0 - 0.4 10e9/L    Absolute Nucleated RBC 0.0    Glucose by meter   Result Value Ref Range    Glucose >600 (HH) 70 - 99 mg/dL   Glucose by meter   Result Value Ref Range    Glucose 174 (H) 70 - 99 mg/dL   Glucose by meter   Result Value Ref Range    Glucose 148 (H) 70 - 99 mg/dL   Glucose by meter   Result Value Ref Range    Glucose 286 (H) 70 - 99 mg/dL   Glucose by meter   Result Value Ref Range    Glucose 126 (H) 70 - 99 mg/dL   EKG 12-lead, tracing only   Result Value Ref Range    Interpretation ECG Click View Image link to view waveform and result    Social Work IP Consult    Narrative    Ravi Aguilera LSW     11/3/2018 10:07 AM  Social Work Services Discharge Note      Patient Name:  Krian Carcamo     Anticipated Discharge Date:  11/3/2018  Discharge Disposition: Home  Home Care:  [facility]    Following MD:  Sunshine Brody MD  Additional Services/Equipment Arranged:  Home Care w/PT eval      Patient / Family response to discharge plan:  Patient and family   in agreement with discharge to home with services     Persons notified of above discharge plan:  Patient daughters who   will be staying with him in home following discharge    Staff Discharge Instructions:  Please print a packet and send with patient.     CTS Handoff completed:  YES    Medicare Notice of Rights provided to the patient/family:  YES    Ravi LEVI  Weekend SW  Pager: [phone]  --On Call Pager: [phone] 4pm - Midnight       Sputum Culture Aerobic Bacterial   Result Value Ref Range    Specimen Description Sputum      Culture Micro Light growth  Normal reginald      Gram stain   Result Value Ref Range    Specimen Description Sputum     Special Requests Screen     Gram Stain >10 Squamous epithelial cells/low power field     Gram Stain >25 PMNs/low power field     Gram Stain Many  Mixed gram positive reginald         No results found for this or any previous visit (from the past 48 hour(s)).             Pending Results:   Unresulted Labs Ordered in the Past 30 Days of this Admission     No orders found from 9/1/2018 to 11/1/2018.                  Discharge Instructions and Follow-Up:     Discharge Procedure Orders   XR Chest 2 Views   Standing Status: Future Standing Exp. Date: 11/03/19     Order Specific Question Answer Comments   Priority Routine      Reason for your hospital stay   Order Comments: Admitted for pneumonia, gotten better. Going home with 24 hr supervision.     Adult [facility] Follow-up and recommended labs and tests   Order Comments: Follow up with primary care provider, Buddy Poe, within 7 days for hospital follow- up.  CXR in 2 months      Appointments on [place] and/or [facility] (with [facility] or [facility] provider or service). Call [phone] if you haven't heard regarding these appointments within 7 days of discharge.     Activity   Order Comments: Your activity upon discharge: activity as tolerated with family assistance  Needs 24 hr supervision.     Order Specific Question Answer Comments   Is discharge order? Yes      Full Code     Order Specific Question Answer Comments   Code status determined by: Discussion with patient/legal decision maker      Diet   Order Comments: Follow this diet upon discharge: Orders Placed This Encounter      Combination Diet Regular Diet Adult     Order Specific Question Answer Comments   Is discharge order? Yes             Sunshine Brody  Internal Medicine Staff Hospitalist  [phone]  December 16, 2018        Time spent on patient: 35 minutes total including face  to face and coordinating care time reviewing current illness, any medication changes, and the care plan for today.

## 2018-12-18 ENCOUNTER — HOSPITAL ENCOUNTER (OUTPATIENT)
Dept: PHYSICAL THERAPY | Facility: CLINIC | Age: 83
Setting detail: THERAPIES SERIES
End: 2018-12-18
Attending: FAMILY MEDICINE
Payer: MEDICARE

## 2018-12-18 PROCEDURE — 97110 THERAPEUTIC EXERCISES: CPT | Mod: GP | Performed by: PHYSICAL THERAPIST

## 2018-12-18 PROCEDURE — 97116 GAIT TRAINING THERAPY: CPT | Mod: GP | Performed by: PHYSICAL THERAPIST

## 2018-12-20 ENCOUNTER — HOSPITAL ENCOUNTER (OUTPATIENT)
Dept: PHYSICAL THERAPY | Facility: CLINIC | Age: 83
Setting detail: THERAPIES SERIES
End: 2018-12-20
Attending: FAMILY MEDICINE
Payer: MEDICARE

## 2018-12-20 PROCEDURE — 97112 NEUROMUSCULAR REEDUCATION: CPT | Mod: GP | Performed by: PHYSICAL THERAPIST

## 2018-12-20 PROCEDURE — 97110 THERAPEUTIC EXERCISES: CPT | Mod: GP | Performed by: PHYSICAL THERAPIST

## 2018-12-27 ENCOUNTER — MYC REFILL (OUTPATIENT)
Dept: OTHER | Age: 83
End: 2018-12-27

## 2018-12-27 DIAGNOSIS — E78.5 HYPERLIPIDEMIA LDL GOAL <130: ICD-10-CM

## 2018-12-27 RX ORDER — SIMVASTATIN 20 MG
20 TABLET ORAL DAILY
Qty: 90 TABLET | Refills: 1 | Status: SHIPPED | OUTPATIENT
Start: 2018-12-27 | End: 2018-12-28

## 2018-12-27 NOTE — TELEPHONE ENCOUNTER
Spoke with Omar his grandson and he will forward message to the patient letting him know he has to make appointment with another provider to get further future refills. Provided number to call to schedule and that we can give him refill to hold him off until he see's the provider.

## 2018-12-28 DIAGNOSIS — E78.5 HYPERLIPIDEMIA LDL GOAL <130: ICD-10-CM

## 2018-12-28 RX ORDER — SIMVASTATIN 20 MG
20 TABLET ORAL DAILY
Qty: 90 TABLET | Refills: 3 | Status: SHIPPED | OUTPATIENT
Start: 2018-12-28 | End: 2019-01-04 | Stop reason: ALTCHOICE

## 2019-01-02 ENCOUNTER — HOSPITAL ENCOUNTER (OUTPATIENT)
Dept: PHYSICAL THERAPY | Facility: CLINIC | Age: 84
Setting detail: THERAPIES SERIES
End: 2019-01-02
Attending: FAMILY MEDICINE
Payer: COMMERCIAL

## 2019-01-02 PROCEDURE — 97530 THERAPEUTIC ACTIVITIES: CPT | Mod: GP | Performed by: PHYSICAL THERAPIST

## 2019-01-02 PROCEDURE — 97110 THERAPEUTIC EXERCISES: CPT | Mod: GP | Performed by: PHYSICAL THERAPIST

## 2019-01-04 ENCOUNTER — OFFICE VISIT (OUTPATIENT)
Dept: CARDIOLOGY | Facility: CLINIC | Age: 84
End: 2019-01-04
Attending: NURSE PRACTITIONER
Payer: COMMERCIAL

## 2019-01-04 VITALS
DIASTOLIC BLOOD PRESSURE: 64 MMHG | BODY MASS INDEX: 25.58 KG/M2 | SYSTOLIC BLOOD PRESSURE: 154 MMHG | HEART RATE: 57 BPM | HEIGHT: 68 IN | OXYGEN SATURATION: 97 % | WEIGHT: 168.8 LBS

## 2019-01-04 DIAGNOSIS — E78.5 HYPERLIPIDEMIA LDL GOAL <130: ICD-10-CM

## 2019-01-04 DIAGNOSIS — I25.118 CORONARY ARTERY DISEASE OF NATIVE ARTERY OF NATIVE HEART WITH STABLE ANGINA PECTORIS (H): ICD-10-CM

## 2019-01-04 DIAGNOSIS — I10 ESSENTIAL HYPERTENSION: ICD-10-CM

## 2019-01-04 PROCEDURE — G0463 HOSPITAL OUTPT CLINIC VISIT: HCPCS | Mod: ZF

## 2019-01-04 PROCEDURE — 99214 OFFICE O/P EST MOD 30 MIN: CPT | Mod: ZP | Performed by: INTERNAL MEDICINE

## 2019-01-04 RX ORDER — ROSUVASTATIN CALCIUM 10 MG/1
10 TABLET, COATED ORAL DAILY
Qty: 90 TABLET | Refills: 3 | Status: SHIPPED | OUTPATIENT
Start: 2019-01-04 | End: 2019-11-19

## 2019-01-04 RX ORDER — METOPROLOL SUCCINATE 25 MG/1
TABLET, EXTENDED RELEASE ORAL
Qty: 45 TABLET | Refills: 11 | Status: SHIPPED | OUTPATIENT
Start: 2019-01-04 | End: 2019-06-19

## 2019-01-04 ASSESSMENT — MIFFLIN-ST. JEOR: SCORE: 1357.23

## 2019-01-04 ASSESSMENT — PAIN SCALES - GENERAL: PAINLEVEL: NO PAIN (0)

## 2019-01-04 NOTE — NURSING NOTE
Chief Complaint   Patient presents with     Follow Up     med check per pt's daughter     Vitals were taken and medications were reconciled.     Alexsandra Terrell RMA  8:11 AM

## 2019-01-04 NOTE — PROGRESS NOTES
Med Reconcile: Reviewed and verified all current medications with the patient. The updated medication list was printed and given to the patient.  Return Appointment: Patient given instructions regarding scheduling next clinic visit. Patient demonstrated understanding of this information and agreed to call with further questions or concerns.  Medication Change: Patient was educated regarding prescribed medication change, including discussion of the indication, administration, side effects, and when to report to MD or RN. Patient demonstrated understanding of this information and agreed to call with further questions or concerns.  Patient stated he understood all health information given and agreed to call with further questions or concerns.

## 2019-01-04 NOTE — PATIENT INSTRUCTIONS
You were seen today in the Cardiovascular Clinic at the [facility].     Cardiology Providers you saw during your visit: Dr. Medina     Diagnosis:   Encounter Diagnoses   Name Primary?     Coronary artery disease of native artery of native heart with stable angina pectoris (H)      Essential hypertension      Hyperlipidemia LDL goal <130      HTN (hypertension)             Orders:   Orders Placed This Encounter   Procedures     Follow-Up with Cardiologist     EKG 12-lead, tracing only       Current Medication List  Current Outpatient Medications   Medication Sig Dispense Refill     acetaminophen (TYLENOL) 325 MG tablet Take 3 tablets (975 mg) by mouth every 8 hours as needed for mild pain 100 tablet 0     aspirin 81 MG EC tablet Take 81 mg by mouth At Bedtime.       fluorouracil (EFUDEX) 5 % cream Apply topically daily Every evening for 4 weeks on the frontal scalp, then 2 week break, then another section of the scalp for 4 weeks, etc 40 g 2     isosorbide mononitrate (IMDUR) 60 MG 24 hr tablet Take 1 tablet (60 mg) by mouth daily Please get refills from primary care or schedule with new cardiologist. 90 tablet 3     ketoconazole (NIZORAL) 2 % cream Apply topically 2 times daily To the eyebrows and around the nose/cheek area 60 g 0     lisinopril (PRINIVIL/ZESTRIL) 10 MG tablet TAKE 1 TABLET BY MOUTH EVERY DAY 90 tablet 0     metFORMIN (GLUCOPHAGE) 500 MG tablet Take 1 tablet (500 mg) by mouth 2 times daily (with meals) 180 tablet 3     metoprolol succinate ER (TOPROL-XL) 25 MG 24 hr tablet TAKE ONE-HALF TABLET BY MOUTH DAILY 45 tablet 11     Multiple Vitamin (MV-ONE PO) Take 1 tablet by mouth daily        ONE TOUCH ULTRA test strip USE TO TEST BLOOD SUGAR TWICE DAILY 300 strip 2     rosuvastatin (CRESTOR) 10 MG tablet Take 1 tablet (10 mg) by mouth daily 90 tablet 3     azithromycin (ZITHROMAX) 250 MG tablet Take 1 tablet (250 mg) by mouth daily (Patient not taking: Reported on 1/4/2019) 3 tablet 0  "    nitroglycerin (NITROSTAT) 0.4 MG sublingual tablet Place 1 tablet (0.4 mg) under the tongue every 5 minutes as needed for chest pain If symptoms after 3 doses (15 minutes) call 911. (Patient not taking: Reported on 2019) 25 tablet 1     polyethylene glycol (MIRALAX/GLYCOLAX) Packet Take 17 g by mouth daily as needed for constipation (Patient not taking: Reported on 2019) 10 packet 0     senna-docusate (SENOKOT-S;PERICOLACE) 8.6-50 MG per tablet Take 1 tablet by mouth 2 times daily (Patient not taking: Reported on 2019) 60 tablet 1         Medications Discontinued:  Medications Discontinued During This Encounter   Medication Reason     simvastatin (ZOCOR) 20 MG tablet Alternate therapy     metoprolol succinate (TOPROL-XL) 25 MG 24 hr tablet Reorder         Recommendations:   1. Change simvastatin to rosuvastatin 10 mg daily  2. Continue with other medications  3. Record BP at home    Follow-up: 1 year with EKG and earlier if change in symptoms.         Please feel free to call me with any questions or concerns.       Moiz Collado LPN      Questions: [phone].   First press #1 for the [employer] and then press #3 for \"Medical Questions\" to reach us Cardiology Nurses.     [phone].   First press #1 for the University and then press #1      On Call Cardiologist for after hours or on weekends: [phone]   option #4 and ask to speak to the on-call Cardiologist.          If you need a medication refill please contact your pharmacy.  Please allow 3 business days for your refill to be completed.  ________________________________________________________________________________________________________________________________        "

## 2019-01-04 NOTE — LETTER
1/4/2019      RE: Kiran Carcamo  [address] [facility] [address]       Dear Colleague,    Thank you for the opportunity to participate in the care of your patient, Kiran Carcamo, at the [facility]. Please see a copy of my visit note below.    Med Reconcile: Reviewed and verified all current medications with the patient. The updated medication list was printed and given to the patient.  Return Appointment: Patient given instructions regarding scheduling next clinic visit. Patient demonstrated understanding of this information and agreed to call with further questions or concerns.  Medication Change: Patient was educated regarding prescribed medication change, including discussion of the indication, administration, side effects, and when to report to MD or RN. Patient demonstrated understanding of this information and agreed to call with further questions or concerns.  Patient stated he understood all health information given and agreed to call with further questions or concerns.          CARDIOLOGY RETURN VISIT    Summary:    89-year-old male with history of coronary bypass surgery.  Followed in the past with Dr. Bailey now here for follow-up and renewal of medications.  He did not take his blood pressure medication this mornings.  Believes his blood pressure measurements at home are depending on his diet.  Recently he has not been careful with his diet related to holidays and visits from relatives that live in [place].  He recalls his last systolic blood pressure measurement at home in the 140s.  Denies any chest discomfort or shortness of breath and is able to walk 2 flights of stairs at home.  Does this several times a day.  His walking speed is not great and uses a cane.  When he walks stairs he typically does one step at a time.  Also worried about a cold he had last fall and with mild cough in the morning when he wakes up.  Daughter noted  a little bit of blood-tinged on the pillow in the morning.  Denies any weight loss.  No sergio hemoptysis.  Also with complaint of leg pain, describes it as discomfort of the lower half of his calves.  Also had a fall about 3 weeks ago.  Denies any dizziness on standing up.  Daughters believe his fall was related to tripping but not dizziness.  Daughter is also worried about some dementia and visit with him daily.   Repeat blood pressure this morning 30 minutes after taking medications was 154/64.  Heart rate 54 bpm.  Exam otherwise unremarkable.  EKG not performed.   Impression for coronary disease and hypertension.  Patient taking daily aspirin 81 mg, metoprolol succinate 12.5 mg, simvastatin 20 mg daily, lisinopril 10 mg daily and Imdur 60 mg daily.   Plan to continue with current medications and change simvastatin to rosuvastatin 10 mg daily, as rosuvastatin has le drug to drug interactions and polypharmacy is always a risk in geriatric patients.  Also advised to use a pillbox at home to avoid under and overdosing on medications.  Follow-up in 1 year and earlier if change in status.    HPI: Kiran Carcamo is a 89 year old male being seen today for follow up of CABG x5 in 2002 and following surgery in 2002 angiogram suggested LIMA to LAD and SVT to RCA were patent but SVG to D1 and SVG to OM1-2 were occluded. Followed in the past with Dr. Bailey now here for follow-up and renewal of medications.  He did not take his blood pressure medication this mornings.  Believes his blood pressure measurements at home are depending on his diet.  Recently he has not been careful with his diet related to holidays and visits from relatives that live in [place].  He recalls his last systolic blood pressure measurement at home in the 140s.  Denies any chest discomfort or shortness of breath and is able to walk 2 flights of stairs at home.  Does this several times a day.  His walking speed is not great and uses a cane.  When he walks  stairs he typically does one step at a time.  Also worried about a cold he had last fall and with mild cough in the morning when he wakes up.  Daughter noted a little bit of blood-tinged on the pillow in the morning.  Denies any weight loss.  No sergio hemoptysis.  Also with complaint of leg pain, describes it as discomfort of the lower half of his calves.  Also had a fall about 3 weeks ago.  Denies any dizziness on standing up.  Daughters believe his fall was related to tripping but not dizziness.  Daughter is also worried about some dementia and visit with him daily.     The patient denies a history of chest discomfort, dyspnea, PND (paroxysmal nocturnal dyspnea), orthopnea, pedal edema, palpitations, lightheadedness and syncope.    PAST MEDICAL HISTORY:  Past Medical History:   Diagnosis Date     CKD (chronic kidney disease) stage 2, GFR 60-89 ml/min 7/6/2015     Hypertension goal BP (blood pressure) < 140/90      Syncope 12/12/2011     Type 2 diabetes, HbA1C goal < 8% (H) 2/23/2015       CURRENT MEDICATIONS:  Current Outpatient Medications   Medication Sig Dispense Refill     acetaminophen (TYLENOL) 325 MG tablet Take 3 tablets (975 mg) by mouth every 8 hours as needed for mild pain 100 tablet 0     aspirin 81 MG EC tablet Take 81 mg by mouth At Bedtime.       fluorouracil (EFUDEX) 5 % cream Apply topically daily Every evening for 4 weeks on the frontal scalp, then 2 week break, then another section of the scalp for 4 weeks, etc 40 g 2     isosorbide mononitrate (IMDUR) 60 MG 24 hr tablet Take 1 tablet (60 mg) by mouth daily Please get refills from primary care or schedule with new cardiologist. 90 tablet 3     ketoconazole (NIZORAL) 2 % cream Apply topically 2 times daily To the eyebrows and around the nose/cheek area 60 g 0     lisinopril (PRINIVIL/ZESTRIL) 10 MG tablet TAKE 1 TABLET BY MOUTH EVERY DAY 90 tablet 0     metFORMIN (GLUCOPHAGE) 500 MG tablet Take 1 tablet (500 mg) by mouth 2 times daily (with  meals) 180 tablet 3     metoprolol succinate ER (TOPROL-XL) 25 MG 24 hr tablet TAKE ONE-HALF TABLET BY MOUTH DAILY 45 tablet 11     Multiple Vitamin (MV-ONE PO) Take 1 tablet by mouth daily        ONE TOUCH ULTRA test strip USE TO TEST BLOOD SUGAR TWICE DAILY 300 strip 2     rosuvastatin (CRESTOR) 10 MG tablet Take 1 tablet (10 mg) by mouth daily 90 tablet 3     azithromycin (ZITHROMAX) 250 MG tablet Take 1 tablet (250 mg) by mouth daily (Patient not taking: Reported on 1/4/2019) 3 tablet 0     nitroglycerin (NITROSTAT) 0.4 MG sublingual tablet Place 1 tablet (0.4 mg) under the tongue every 5 minutes as needed for chest pain If symptoms after 3 doses (15 minutes) call 911. (Patient not taking: Reported on 1/4/2019) 25 tablet 1     polyethylene glycol (MIRALAX/GLYCOLAX) Packet Take 17 g by mouth daily as needed for constipation (Patient not taking: Reported on 1/4/2019) 10 packet 0     senna-docusate (SENOKOT-S;PERICOLACE) 8.6-50 MG per tablet Take 1 tablet by mouth 2 times daily (Patient not taking: Reported on 1/4/2019) 60 tablet 1       PAST SURGICAL HISTORY:  Past Surgical History:   Procedure Laterality Date     CHOLECYSTECTOMY       CORONARY ARTERY BYPASS  '05    6-vessel     MANDIBLE SURGERY         ALLERGIES  Patient has no known allergies.    FAMILY HX:  Family History   Problem Relation Age of Onset     Heart Disease Mother      Eye Disorder Father      Respiratory Brother      Heart Disease Sister      Cancer Daughter      Cancer Brother      Skin Cancer No family hx of      Melanoma No family hx of        SOCIAL HX:  Social History     Socioeconomic History     Marital status:      Spouse name: None     Number of children: None     Years of education: None     Highest education level: None   Social Needs     Financial resource strain: None     Food insecurity - worry: None     Food insecurity - inability: None     Transportation needs - medical: None     Transportation needs - non-medical: None  "  Occupational History     None   Tobacco Use     Smoking status: Never Smoker     Smokeless tobacco: Never Used   Substance and Sexual Activity     Alcohol use: No     Drug use: No     Sexual activity: Not Currently     Partners: Female     Birth control/protection: Abstinence   Other Topics Concern     Parent/sibling w/ CABG, MI or angioplasty before 65F 55M? No   Social History Narrative     None       ROS:  Constitutional: No recent fever, chills, or sweats. No significant weight gain/loss.   ENT: No epistaxis but maybe some blood on pillow following dry weather/cold.  Allergies/Immunologic: As above.   Respiratory: No hemoptysis.   Cardiovascular: As per HPI.   GI: No hematemesis, melena, or hematochezia.   : No hematuria.   Skin: No petechia or ecchymosis.   Endocrinology: Diabetes  Musculoskeletal: No myalgia.    VITAL SIGNS:  /64   Pulse 57   Ht 1.715 m (5' 7.5\")   Wt 76.6 kg (168 lb 12.8 oz)   SpO2 97%   BMI 26.05 kg/m       Body mass index is 26.05 kg/m .  Wt Readings from Last 2 Encounters:   01/04/19 76.6 kg (168 lb 12.8 oz)   12/12/18 71.7 kg (158 lb)       PHYSICAL EXAM  Kiran Carcamo is a 89 year old male in no acute distress.  HEENT: Unremarkable.  Neck: JVP normal.  Carotids bilaterally without bruits.  Lungs: CTA.  Cor: RRR. Normal S1 and S2.  No murmur, rub, or gallop.   Abd: Soft, nontender, nondistended.  NABS.  No pulsatile mass.  Extremities: No C/C/E.  Pulses good and symmetric in upper and lower extremities .  Neuro: Grossly intact.    LABS    Lab Results   Component Value Date    WBC 9.0 12/12/2018     Lab Results   Component Value Date    RBC 3.82 12/12/2018     Lab Results   Component Value Date    HGB 12.5 12/12/2018     Lab Results   Component Value Date    HCT 38.3 12/12/2018     No components found for: MCT  Lab Results   Component Value Date     12/12/2018     Lab Results   Component Value Date    MCH 32.7 12/12/2018     Lab Results   Component Value Date    " MCHC 32.6 12/12/2018     Lab Results   Component Value Date    RDW 13.9 12/12/2018     Lab Results   Component Value Date     12/12/2018      Recent Labs   Lab Test 12/12/18  1548 11/01/18  0814    140   POTASSIUM 4.3 3.7   CHLORIDE 111* 108   CO2 25 25   ANIONGAP 7 7   * 110*   BUN 38* 28   CR 1.00 0.96   NADER 9.3 8.1*     Recent Labs   Lab Test 09/14/18  0930 09/08/17  0919  11/21/14  1441 04/16/14  1030   CHOL 97 123   < > 109 118   HDL 44 44   < > 47 36*   LDL 38 54   < > 40 55   TRIG 74 127   < > 110 140   CHOLHDLRATIO  --   --   --  2.3 3.3   NHDL 53 79   < >  --   --     < > = values in this interval not displayed.        EKG:  10/31/18 with SR 68 bpm, IRBB and inferior Q wave    ECHO: 2011  Interpretation Summary  The LVEF is 55%. Inferior wall hypokinesis is present. Global right  ventricular function is normal. Mild to moderate mitral insufficiency is present. The IVC is fully collapsed, c/w reduced intravascular volume. No pericardial effusion is present.    ASSESSMENT AND PLAN:    1. Coronary artery disease of native artery of native heart with stable angina pectoris (H)    2. Essential hypertension    3. Hyperlipidemia LDL goal <130    4. HTN (hypertension)        89-year-old male with history of coronary bypass surgery.  Followed in the past with Dr. Bailey now here for follow-up and renewal of medications.  He did not take his blood pressure medication this mornings.  Believes his blood pressure measurements at home are depending on his diet.    HTN. First measurement today elevated but patient had not taken his medications. Repeat blood pressure this morning 30 minutes after taking medications was 154/64.  Heart rate 54 bpm.  Patient taking daily aspirin 81 mg, metoprolol succinate 12.5 mg, simvastatin 20 mg daily, lisinopril 10 mg daily and Imdur 60 mg daily.     CAD. Plan to continue with current medications and change simvastatin to rosuvastatin 10 mg daily, as rosuvastatin has le  drug to drug interactions and polypharmacy is always a risk in geriatric patients.  Also advised to use a pillbox at home to avoid under and overdosing on medications.  Follow-up in 1 year and earlier if change in status.      Recommendations:   1. Change simvastatin to rosuvastatin 10 mg daily  2. Continue with other medications  3. Record BP at home    Follow-up: 1 year with EKG and earlier if change in symptoms.    Hope Medina MD    Division of Cardiology  [facility]    [facility]  [address] [place] [id number] [phone] Appointments  [phone] Fax  [phone] After hours    Clinic nurse:  Moiz Collado LPN   Nurse Care Coordinator- Heart Care   [phone] option 1, than option 3    Academic Mailing address:  [facility]  Department of Internal Medicine [facility] [phone] [place] [address]

## 2019-01-08 ENCOUNTER — HOSPITAL ENCOUNTER (OUTPATIENT)
Dept: PHYSICAL THERAPY | Facility: CLINIC | Age: 84
Setting detail: THERAPIES SERIES
End: 2019-01-08
Attending: FAMILY MEDICINE
Payer: COMMERCIAL

## 2019-01-08 PROCEDURE — 97110 THERAPEUTIC EXERCISES: CPT | Mod: GP | Performed by: PHYSICAL THERAPIST

## 2019-01-08 PROCEDURE — 97530 THERAPEUTIC ACTIVITIES: CPT | Mod: GP | Performed by: PHYSICAL THERAPIST

## 2019-01-08 PROCEDURE — 97112 NEUROMUSCULAR REEDUCATION: CPT | Mod: GP | Performed by: PHYSICAL THERAPIST

## 2019-01-10 ENCOUNTER — HOSPITAL ENCOUNTER (OUTPATIENT)
Dept: OCCUPATIONAL THERAPY | Facility: CLINIC | Age: 84
Setting detail: THERAPIES SERIES
End: 2019-01-10
Attending: FAMILY MEDICINE
Payer: COMMERCIAL

## 2019-01-10 DIAGNOSIS — R29.898 WEAKNESS OF UPPER EXTREMITY: ICD-10-CM

## 2019-01-10 PROCEDURE — 97165 OT EVAL LOW COMPLEX 30 MIN: CPT | Mod: GO | Performed by: OCCUPATIONAL THERAPIST

## 2019-01-10 ASSESSMENT — ACTIVITIES OF DAILY LIVING (ADL)
IADL_QUICK_ADDS: MEAL PLANNING/PREPARATION;HOME/FINANCIAL/MANAGEMENT;COMMUNICATION/COMPUTER USE;COMMUNITY MOBILITY;CARE OF OTHERS

## 2019-01-12 NOTE — PROGRESS NOTES
CARDIOLOGY RETURN VISIT    Summary:    89-year-old male with history of coronary bypass surgery.  Followed in the past with Dr. Bailey now here for follow-up and renewal of medications.  He did not take his blood pressure medication this mornings.  Believes his blood pressure measurements at home are depending on his diet.  Recently he has not been careful with his diet related to holidays and visits from relatives that live in [place].  He recalls his last systolic blood pressure measurement at home in the 140s.  Denies any chest discomfort or shortness of breath and is able to walk 2 flights of stairs at home.  Does this several times a day.  His walking speed is not great and uses a cane.  When he walks stairs he typically does one step at a time.  Also worried about a cold he had last fall and with mild cough in the morning when he wakes up.  Daughter noted a little bit of blood-tinged on the pillow in the morning.  Denies any weight loss.  No sergio hemoptysis.  Also with complaint of leg pain, describes it as discomfort of the lower half of his calves.  Also had a fall about 3 weeks ago.  Denies any dizziness on standing up.  Daughters believe his fall was related to tripping but not dizziness.  Daughter is also worried about some dementia and visit with him daily.   Repeat blood pressure this morning 30 minutes after taking medications was 154/64.  Heart rate 54 bpm.  Exam otherwise unremarkable.  EKG not performed.   Impression for coronary disease and hypertension.  Patient taking daily aspirin 81 mg, metoprolol succinate 12.5 mg, simvastatin 20 mg daily, lisinopril 10 mg daily and Imdur 60 mg daily.   Plan to continue with current medications and change simvastatin to rosuvastatin 10 mg daily, as rosuvastatin has le drug to drug interactions and polypharmacy is always a risk in geriatric patients.  Also advised to use a pillbox at home to avoid under and overdosing on medications.  Follow-up in 1 year and  earlier if change in status.    HPI: Kiran Carcamo is a 89 year old male being seen today for follow up of CABG x5 in 2002 and following surgery in 2002 angiogram suggested LIMA to LAD and SVT to RCA were patent but SVG to D1 and SVG to OM1-2 were occluded. Followed in the past with Dr. Bailey now here for follow-up and renewal of medications.  He did not take his blood pressure medication this mornings.  Believes his blood pressure measurements at home are depending on his diet.  Recently he has not been careful with his diet related to holidays and visits from relatives that live in [place].  He recalls his last systolic blood pressure measurement at home in the 140s.  Denies any chest discomfort or shortness of breath and is able to walk 2 flights of stairs at home.  Does this several times a day.  His walking speed is not great and uses a cane.  When he walks stairs he typically does one step at a time.  Also worried about a cold he had last fall and with mild cough in the morning when he wakes up.  Daughter noted a little bit of blood-tinged on the pillow in the morning.  Denies any weight loss.  No sergio hemoptysis.  Also with complaint of leg pain, describes it as discomfort of the lower half of his calves.  Also had a fall about 3 weeks ago.  Denies any dizziness on standing up.  Daughters believe his fall was related to tripping but not dizziness.  Daughter is also worried about some dementia and visit with him daily.     The patient denies a history of chest discomfort, dyspnea, PND (paroxysmal nocturnal dyspnea), orthopnea, pedal edema, palpitations, lightheadedness and syncope.    PAST MEDICAL HISTORY:  Past Medical History:   Diagnosis Date     CKD (chronic kidney disease) stage 2, GFR 60-89 ml/min 7/6/2015     Hypertension goal BP (blood pressure) < 140/90      Syncope 12/12/2011     Type 2 diabetes, HbA1C goal < 8% (H) 2/23/2015       CURRENT MEDICATIONS:  Current Outpatient Medications   Medication Sig  Dispense Refill     acetaminophen (TYLENOL) 325 MG tablet Take 3 tablets (975 mg) by mouth every 8 hours as needed for mild pain 100 tablet 0     aspirin 81 MG EC tablet Take 81 mg by mouth At Bedtime.       fluorouracil (EFUDEX) 5 % cream Apply topically daily Every evening for 4 weeks on the frontal scalp, then 2 week break, then another section of the scalp for 4 weeks, etc 40 g 2     isosorbide mononitrate (IMDUR) 60 MG 24 hr tablet Take 1 tablet (60 mg) by mouth daily Please get refills from primary care or schedule with new cardiologist. 90 tablet 3     ketoconazole (NIZORAL) 2 % cream Apply topically 2 times daily To the eyebrows and around the nose/cheek area 60 g 0     lisinopril (PRINIVIL/ZESTRIL) 10 MG tablet TAKE 1 TABLET BY MOUTH EVERY DAY 90 tablet 0     metFORMIN (GLUCOPHAGE) 500 MG tablet Take 1 tablet (500 mg) by mouth 2 times daily (with meals) 180 tablet 3     metoprolol succinate ER (TOPROL-XL) 25 MG 24 hr tablet TAKE ONE-HALF TABLET BY MOUTH DAILY 45 tablet 11     Multiple Vitamin (MV-ONE PO) Take 1 tablet by mouth daily        ONE TOUCH ULTRA test strip USE TO TEST BLOOD SUGAR TWICE DAILY 300 strip 2     rosuvastatin (CRESTOR) 10 MG tablet Take 1 tablet (10 mg) by mouth daily 90 tablet 3     azithromycin (ZITHROMAX) 250 MG tablet Take 1 tablet (250 mg) by mouth daily (Patient not taking: Reported on 1/4/2019) 3 tablet 0     nitroglycerin (NITROSTAT) 0.4 MG sublingual tablet Place 1 tablet (0.4 mg) under the tongue every 5 minutes as needed for chest pain If symptoms after 3 doses (15 minutes) call 911. (Patient not taking: Reported on 1/4/2019) 25 tablet 1     polyethylene glycol (MIRALAX/GLYCOLAX) Packet Take 17 g by mouth daily as needed for constipation (Patient not taking: Reported on 1/4/2019) 10 packet 0     senna-docusate (SENOKOT-S;PERICOLACE) 8.6-50 MG per tablet Take 1 tablet by mouth 2 times daily (Patient not taking: Reported on 1/4/2019) 60 tablet 1       PAST SURGICAL  "HISTORY:  Past Surgical History:   Procedure Laterality Date     CHOLECYSTECTOMY       CORONARY ARTERY BYPASS  '05    6-vessel     MANDIBLE SURGERY         ALLERGIES  Patient has no known allergies.    FAMILY HX:  Family History   Problem Relation Age of Onset     Heart Disease Mother      Eye Disorder Father      Respiratory Brother      Heart Disease Sister      Cancer Daughter      Cancer Brother      Skin Cancer No family hx of      Melanoma No family hx of        SOCIAL HX:  Social History     Socioeconomic History     Marital status:      Spouse name: None     Number of children: None     Years of education: None     Highest education level: None   Social Needs     Financial resource strain: None     Food insecurity - worry: None     Food insecurity - inability: None     Transportation needs - medical: None     Transportation needs - non-medical: None   Occupational History     None   Tobacco Use     Smoking status: Never Smoker     Smokeless tobacco: Never Used   Substance and Sexual Activity     Alcohol use: No     Drug use: No     Sexual activity: Not Currently     Partners: Female     Birth control/protection: Abstinence   Other Topics Concern     Parent/sibling w/ CABG, MI or angioplasty before 65F 55M? No   Social History Narrative     None       ROS:  Constitutional: No recent fever, chills, or sweats. No significant weight gain/loss.   ENT: No epistaxis but maybe some blood on pillow following dry weather/cold.  Allergies/Immunologic: As above.   Respiratory: No hemoptysis.   Cardiovascular: As per HPI.   GI: No hematemesis, melena, or hematochezia.   : No hematuria.   Skin: No petechia or ecchymosis.   Endocrinology: Diabetes  Musculoskeletal: No myalgia.    VITAL SIGNS:  /64   Pulse 57   Ht 1.715 m (5' 7.5\")   Wt 76.6 kg (168 lb 12.8 oz)   SpO2 97%   BMI 26.05 kg/m      Body mass index is 26.05 kg/m .  Wt Readings from Last 2 Encounters:   01/04/19 76.6 kg (168 lb 12.8 oz) "   12/12/18 71.7 kg (158 lb)       PHYSICAL EXAM  Kiran Carcamo is a 89 year old male in no acute distress.  HEENT: Unremarkable.  Neck: JVP normal.  Carotids bilaterally without bruits.  Lungs: CTA.  Cor: RRR. Normal S1 and S2.  No murmur, rub, or gallop.   Abd: Soft, nontender, nondistended.  NABS.  No pulsatile mass.  Extremities: No C/C/E.  Pulses good and symmetric in upper and lower extremities .  Neuro: Grossly intact.    LABS    Lab Results   Component Value Date    WBC 9.0 12/12/2018     Lab Results   Component Value Date    RBC 3.82 12/12/2018     Lab Results   Component Value Date    HGB 12.5 12/12/2018     Lab Results   Component Value Date    HCT 38.3 12/12/2018     No components found for: MCT  Lab Results   Component Value Date     12/12/2018     Lab Results   Component Value Date    MCH 32.7 12/12/2018     Lab Results   Component Value Date    MCHC 32.6 12/12/2018     Lab Results   Component Value Date    RDW 13.9 12/12/2018     Lab Results   Component Value Date     12/12/2018      Recent Labs   Lab Test 12/12/18  1548 11/01/18  0814    140   POTASSIUM 4.3 3.7   CHLORIDE 111* 108   CO2 25 25   ANIONGAP 7 7   * 110*   BUN 38* 28   CR 1.00 0.96   NADER 9.3 8.1*     Recent Labs   Lab Test 09/14/18  0930 09/08/17  0919  11/21/14  1441 04/16/14  1030   CHOL 97 123   < > 109 118   HDL 44 44   < > 47 36*   LDL 38 54   < > 40 55   TRIG 74 127   < > 110 140   CHOLHDLRATIO  --   --   --  2.3 3.3   NHDL 53 79   < >  --   --     < > = values in this interval not displayed.        EKG:  10/31/18 with SR 68 bpm, IRBB and inferior Q wave    ECHO: 2011  Interpretation Summary  The LVEF is 55%. Inferior wall hypokinesis is present. Global right  ventricular function is normal. Mild to moderate mitral insufficiency is present. The IVC is fully collapsed, c/w reduced intravascular volume. No pericardial effusion is present.        ASSESSMENT AND PLAN:    1. Coronary artery disease of native  artery of native heart with stable angina pectoris (H)    2. Essential hypertension    3. Hyperlipidemia LDL goal <130    4. HTN (hypertension)        89-year-old male with history of coronary bypass surgery.  Followed in the past with Dr. Bailey now here for follow-up and renewal of medications.  He did not take his blood pressure medication this mornings.  Believes his blood pressure measurements at home are depending on his diet.    HTN. First measurement today elevated but patient had not taken his medications. Repeat blood pressure this morning 30 minutes after taking medications was 154/64.  Heart rate 54 bpm.  Patient taking daily aspirin 81 mg, metoprolol succinate 12.5 mg, simvastatin 20 mg daily, lisinopril 10 mg daily and Imdur 60 mg daily.     CAD. Plan to continue with current medications and change simvastatin to rosuvastatin 10 mg daily, as rosuvastatin has le drug to drug interactions and polypharmacy is always a risk in geriatric patients.  Also advised to use a pillbox at home to avoid under and overdosing on medications.  Follow-up in 1 year and earlier if change in status.      Recommendations:   1. Change simvastatin to rosuvastatin 10 mg daily  2. Continue with other medications  3. Record BP at home    Follow-up: 1 year with EKG and earlier if change in symptoms.      Hope Medina MD    Division of Cardiology  [facility]    Clinics & Surgery [address] [address]    [phone] Appointments  [phone] Fax  [phone] After hours    Clinic nurse:  Moiz Collado LPN   Nurse Care Coordinator- Heart Care   [phone] option 1, than option 3    Academic Mailing address:  [facility]  Department of Internal Medicine [facility] [phone] [place] [address]

## 2019-01-13 NOTE — PROGRESS NOTES
01/10/19 1500   Quick Adds   Type of Visit Initial Outpatient Occupational Therapy Evaluation   General Information   Start Of Care Date 01/10/19   Referring Physician Buddy Poe   Orders Evaluate and treat as indicated   Other Orders UE function, ae, cognition   Orders Date 01/10/19   Medical Diagnosis Pneumonia, weakness   Onset of Illness/Injury or Date of Surgery 01/10/19  (order)   Precautions/Limitations Fall precautions   Surgical/Medical History Reviewed Yes   Additional Occupational Profile Info/Pertinent History of Current Problem 89 year old male with recent pneumonia and increased confusion and weakness.  Pt attends appointment today with two daughter who are also helping to care for him by bringing food, driving. ( more than they had prior.)   Comments/Observations Pt [place] with HAs bilaterally.    Role/Living Environment   Current Community Support Family/friend caregiver;Emergency call system  (new life alert system to detect falls)   Patient role/Employment history Retired;Other/comments   Community/Avocational Activities [employer], [employer], yardwork, mowing, shoveling, kayak and outdoors    Current Living Environment House   Number of Stairs to Enter Home 2 front entrance, 4 in back   Number of Stairs Within Home full flight up to second floor; full flight to basement; bilateral handrails present with sensor lights   Primary Bathroom Location/Comments Bathroom on same level as bedroom   Primary Bathroom Set Up/Equipment Tub/Shower combo;Tub grab bar;Toilet grab bar   Current Assistive Devices - Mobility Standard cane;Front wheeled walker   Role/Living Environment Comments Patient lives alone; since hospitalization, daughters Karla and Jes have been more involved and over daily.   Patient/family Goals Statement To get stronger and per family to see what changes have occured   Pain   Patient currently in pain Yes   Pain location bilateral thighs that migrates below knee   Fall Risk Screen   Fall screen  completed by PT   Have you fallen 2 or more times in the past year? No   Have you fallen and had an injury in the past year? No   Timed Up and Go score (seconds) 18.7 sec using SEC  (16.8 with ww)   Is patient a fall risk? Yes;Department fall risk interventions implemented   Fall screen comments fell when ill with pneumonia and got tangled up in sheets prior to admission to hospital   Cognitive Status Examination   Level of Consciousness Alert   Follows Commands and Answers Questions 75% of the time   Personal Safety and Judgment Intact   Memory Impaired   Attention Distractible during evaluation   Cognitive Comment MOCA and CPT TBA when patient has glasses as he forgot them today.  Those scores can then be compared to last scores from 2017   Visual Perception   Visual Perception Wears glasses   Sensation   Sensation Comments Pt denies changes or concerns with sensation.   Posture   Posture Protracted shoulders   Range of Motion (ROM)   ROM Comments UE: WFL   Strength   Strength Comments UE: WFL 4+/5 bilaterally shoulder, elbow,   Hand Strength   Hand Dominance Ambidextrous;Right   Left Hand  (pounds) 57 pounds   Right Hand  (pounds) 60 pounds   Left Lateral Pinch (pounds) 8 pounds   Right Lateral Pinch (pounds) 18 pounds   Left Three Point Pinch (pounds) 9 pounds   Right Three Point Pinch (pounds) 13 pounds   Hand Strength Comments Bilateral  WNL; R key pinch WNL and L key pinch below norms (with L thumb arthritic and difficult to engage key pinch); 3 pt pinch below norms bilaterally but within 2 standard deviations of norm.  Minimal change from 2017   Coordination   Left Hand, Nine Hole Peg Test (seconds) 43   Right Hand, Nine Hole Peg Test (seconds) 50   Coordination Comments Comparable scores from 5.22.17 but BNL   Balance   Balance Comments Working with Pt on balance, deficits noted today secondary to posture, challenges with cane, and leg weakness that was reported secondary to having a lot going  on recently and overdoing it on PT exercises.   Functional Mobility   Ambulation Using cane to ambulate today, legs weaker needing family arm for assistance   Bathing   Level of [place] - Bathing independent   Bathing Comments New tub shower chair that slides and paitnet needs help using   Upper Body Dressing   Level of [place]: Dress Upper Body independent   Lower Body Dressing   Level of [place]: Dress Lower Body independent   Toileting   Level of [place]: Toilet independent   Grooming   Level of [place]: Grooming independent   Grooming Comments family reports that he got a new electric razor and cannot turn it on.   Eating/Self-Feeding   Level of [place]: Eating independent   Activity Tolerance   Activity Tolerance Patient continues to do less each time he gets sick and compared to the year prior.  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 him completing all the upkeep.   Instrumental Activities of Daily Living Assessment   IADL Quick Adds Meal Planning/Preparation;Home/Financial/Management;Communication/Computer Use;Community Mobility;Care of Others   Meal Planning/Preparation Daughters bring most meals over and he makes micro meals or other small things   Communication/Computer Use Has a grandpad- simple ipad type device- to communicate with family   Community Mobility Not currently driving since last Pneumonia   Planned Therapy Interventions   Planned Therapy Interventions ADL training;IADL training;Cognitive skills;Cognitive performance testing;ROM;Self care/Home management;Strengthening;Therapeutic activities   OT Goal 1   Goal Identifier Cognition   Goal Description Patient and family will verbalize understanding of cognitive evaluation results and identify methods to enhance independence and safety in the home setting.   Target Date 04/10/19   OT Goal 2   Goal Identifier HEP   Goal Description Pt will demonstrate independence with U/E  strengthening & coordination home exercise program (HEP) for optimal ADL participation & independence.   Target Date 04/10/19   OT Goal 3   Goal Identifier AE   Goal Description Patient and family will report 2-5 new pieces of equipment and how to use them in order for optimal independence within his home.   Target Date 04/10/19   Clinical Impression   Criteria for Skilled Therapeutic Interventions Met Yes, treatment indicated   OT Diagnosis Decreased safety and IND with I/ADLs.   Influenced by the following impairments Weakness, fatigue, impaired cognition.   Assessment of Occupational Performance 3-5 Performance Deficits   Identified Performance Deficits Meal prep, driving, leisure activities, home mgmt, functional mobility   Clinical Decision Making (Complexity) Low complexity   Therapy Frequency 1x/week or less   Predicted Duration of Therapy Intervention (days/wks) 8 visits in 90 days   Risks and Benefits of Treatment have been explained. Yes   Patient, Family & other staff in agreement with plan of care Yes   Clinical Impression Comments Pt would benefit from skilled OT services to address current impairments in order to improve occupational performance and return to prior level of function.    Education Assessment   Barriers To Learning Hearing;Cognitive   Preferred Learning Style Listening;Demonstration;Pictures/video   Therapy Certification   Certification date from 01/10/19   Certification date to 04/10/19   Electronically signed by:  Yandy SHARPE/LEONARD, ATP      Occupational Therapist, Assistive   [phone]      fax: [phone]      [email or web address], [address]  [place]   [address]

## 2019-01-13 NOTE — PROGRESS NOTES
[facility]          OUTPATIENT OCCUPATIONAL THERAPY  EVALUATION  PLAN OF TREATMENT FOR OUTPATIENT REHABILITATION  (COMPLETE FOR INITIAL CLAIMS ONLY)  Patient's Last Name, First Name, M.I.  YOB: 1921  Kiran Carcamo                        Provider's Name  [facility] Medical Record No.  [id number]                               Onset Date:     01/10/19(order)   Start of Care Date:     01/10/19   Type:     ___PT   _X_OT   ___SLP Medical Diagnosis:     Pneumonia, weakness                          OT Diagnosis:     Decreased safety and IND with I/ADLs. Visits from SOC:  1   _________________________________________________________________________________  Plan of Treatment/Functional Goals:  ADL training, IADL training, Cognitive skills, Cognitive performance testing, ROM, Self care/Home management, Strengthening, Therapeutic activities    Goals  Goal Identifier: Cognition  Goal Description: Patient and family will verbalize understanding of cognitive evaluation results and identify methods to enhance independence and safety in the home setting.  Target Date: 04/10/19     Goal Identifier: HEP  Goal Description: Pt will demonstrate independence with U/E strengthening & coordination home exercise program (HEP) for optimal ADL participation & independence.  Target Date: 04/10/19     Goal Identifier: AE  Goal Description: Patient and family will report 2-5 new pieces of equipment and how to use them in order for optimal independence within his home.  Target Date: 04/10/19       Therapy Frequency: 1x/week or less     Predicted Duration of Therapy Intervention (days/wks): 8 visits in 90 days  Yandy Garcia OT          I CERTIFY THE NEED FOR THESE SERVICES FURNISHED UNDER        THIS PLAN OF TREATMENT AND WHILE UNDER MY CARE     (Physician co-signature of this document indicates review  and certification of the therapy plan).           Certification date from: 01/10/19, Certification date to: 04/10/19               Referring Physician: Buddy Poe     Initial Assessment        See Epic Evaluation      Start Of Care Date: 01/10/19

## 2019-01-15 ENCOUNTER — MYC REFILL (OUTPATIENT)
Dept: FAMILY MEDICINE | Facility: CLINIC | Age: 84
End: 2019-01-15

## 2019-01-15 DIAGNOSIS — I25.2 HISTORY OF MI (MYOCARDIAL INFARCTION): ICD-10-CM

## 2019-01-15 RX ORDER — NITROGLYCERIN 0.4 MG/1
0.4 TABLET SUBLINGUAL EVERY 5 MIN PRN
Qty: 25 TABLET | Refills: 1 | Status: ON HOLD | OUTPATIENT
Start: 2019-01-15 | End: 2019-10-06

## 2019-01-15 NOTE — TELEPHONE ENCOUNTER
Dr. Poe,    Please review/sign or advise for refill of nitroglycerine (Nitrostat) 0.4 mg subl tab.     Routing because last BP elevated at cards OV. Last RX sent 2017.    Stephanie Galeas RN  [facility]

## 2019-01-15 NOTE — TELEPHONE ENCOUNTER
"Requested Prescriptions   Pending Prescriptions Disp Refills     nitroGLYcerin (NITROSTAT) 0.4 MG sublingual tablet 25 tablet 1     Sig: Place 1 tablet (0.4 mg) under the tongue every 5 minutes as needed for chest pain If symptoms after 3 doses (15 minutes) call 911.    Nitrates Failed - 1/15/2019  3:51 PM       Failed - Blood pressure under 140/90 in past 12 months    BP Readings from Last 3 Encounters:   01/04/19 154/64   12/12/18 100/58   11/09/18 118/82                Failed - Sublingual nitro order needs review    If refill exceeds 1 bottle per month, please forward request to provider.          Passed - Pt is not on erectile dysfunction medications       Passed - Recent (12 mo) or future (30 days) visit within the authorizing provider's specialty    Patient had office visit in the last 12 months or has a visit in the next 30 days with authorizing provider or within the authorizing provider's specialty.  See \"Patient Info\" tab in inbasket, or \"Choose Columns\" in Meds & Orders section of the refill encounter.             Passed - Medication is active on med list       Passed - Patient is age 18 or older        "

## 2019-01-16 ENCOUNTER — HOSPITAL ENCOUNTER (OUTPATIENT)
Dept: PHYSICAL THERAPY | Facility: CLINIC | Age: 84
Setting detail: THERAPIES SERIES
End: 2019-01-16
Attending: FAMILY MEDICINE
Payer: COMMERCIAL

## 2019-01-16 DIAGNOSIS — R26.81 UNSTABLE GAIT: Primary | ICD-10-CM

## 2019-01-16 PROCEDURE — 97530 THERAPEUTIC ACTIVITIES: CPT | Mod: GP | Performed by: PHYSICAL THERAPIST

## 2019-01-16 PROCEDURE — 97116 GAIT TRAINING THERAPY: CPT | Mod: GP | Performed by: PHYSICAL THERAPIST

## 2019-01-16 PROCEDURE — 97110 THERAPEUTIC EXERCISES: CPT | Mod: GP | Performed by: PHYSICAL THERAPIST

## 2019-01-23 ENCOUNTER — HOSPITAL ENCOUNTER (OUTPATIENT)
Dept: OCCUPATIONAL THERAPY | Facility: CLINIC | Age: 84
Setting detail: THERAPIES SERIES
End: 2019-01-23
Attending: FAMILY MEDICINE
Payer: COMMERCIAL

## 2019-01-23 PROCEDURE — 97535 SELF CARE MNGMENT TRAINING: CPT | Mod: GO | Performed by: OCCUPATIONAL THERAPIST

## 2019-01-24 ENCOUNTER — HOSPITAL ENCOUNTER (OUTPATIENT)
Dept: PHYSICAL THERAPY | Facility: CLINIC | Age: 84
Setting detail: THERAPIES SERIES
End: 2019-01-24
Attending: FAMILY MEDICINE
Payer: COMMERCIAL

## 2019-01-24 PROCEDURE — 97116 GAIT TRAINING THERAPY: CPT | Mod: GP | Performed by: PHYSICAL THERAPIST

## 2019-01-24 PROCEDURE — 97110 THERAPEUTIC EXERCISES: CPT | Mod: GP | Performed by: PHYSICAL THERAPIST

## 2019-01-26 ENCOUNTER — MYC REFILL (OUTPATIENT)
Dept: FAMILY MEDICINE | Facility: CLINIC | Age: 84
End: 2019-01-26

## 2019-01-26 DIAGNOSIS — I10 HYPERTENSION GOAL BP (BLOOD PRESSURE) < 140/90: ICD-10-CM

## 2019-01-26 RX ORDER — LISINOPRIL 10 MG/1
10 TABLET ORAL DAILY
Qty: 90 TABLET | Refills: 0 | Status: CANCELLED | OUTPATIENT
Start: 2019-01-26

## 2019-01-28 ENCOUNTER — MYC REFILL (OUTPATIENT)
Dept: FAMILY MEDICINE | Facility: CLINIC | Age: 84
End: 2019-01-28

## 2019-01-28 DIAGNOSIS — I10 HYPERTENSION GOAL BP (BLOOD PRESSURE) < 140/90: ICD-10-CM

## 2019-01-29 RX ORDER — LISINOPRIL 10 MG/1
10 TABLET ORAL DAILY
Qty: 90 TABLET | Refills: 0 | Status: SHIPPED | OUTPATIENT
Start: 2019-01-29 | End: 2019-04-30

## 2019-02-03 ENCOUNTER — MYC REFILL (OUTPATIENT)
Dept: FAMILY MEDICINE | Facility: CLINIC | Age: 84
End: 2019-02-03

## 2019-02-03 DIAGNOSIS — E11.9 TYPE 2 DIABETES MELLITUS WITHOUT COMPLICATION (H): ICD-10-CM

## 2019-02-04 NOTE — TELEPHONE ENCOUNTER
Prescription approved per [facility] Refill Protocol.    Tabby Qiu, RN  Triage Nurse

## 2019-02-14 ENCOUNTER — HOSPITAL ENCOUNTER (OUTPATIENT)
Dept: PHYSICAL THERAPY | Facility: CLINIC | Age: 84
Setting detail: THERAPIES SERIES
End: 2019-02-14
Attending: FAMILY MEDICINE
Payer: COMMERCIAL

## 2019-02-14 PROCEDURE — 97110 THERAPEUTIC EXERCISES: CPT | Mod: GP | Performed by: PHYSICAL THERAPIST

## 2019-02-14 NOTE — PROGRESS NOTES
[facility]      OUTPATIENT PHYSICAL THERAPY  PLAN OF TREATMENT FOR OUTPATIENT REHABILITATION    Patient's Last Name, First Name, M.I.                YOB: 1921  Kiran Carcamo                        Provider's Name  [facility] Medical Record No.  [id number]                               Onset Date: 10/31/2018   Start of Care Date: 11/21/2018   Type:     _X_PT   ___OT   ___SLP Medical Diagnosis: pneumonia right lower lobe due to infectious organism, generalized muscle weakness                       PT Diagnosis: Impaired general strength      _________________________________________________________________________________  Plan of Treatment:    Frequency/Duration: Continue 1x/week for 90 days     Goals:  Goal Identifier fall prevention   Goal Description 1. Pt to demonstrate/report 3-5 strategies to reduce fall risk and to perform TUG with least restrictive AD in 13.5 seconds or less (as compared to 18.7 seconds today).   Target Date 02/18/19   Date Met      Progress: Able to report 3 strategies, TUG improving     Goal Identifier Gait speed   Goal Description 2. Pt to demonstrate ability to complete 25 foot walk using cane in 6.3 seconds or less (as compared to 6.85 sec with walker today) to return to previous baseline and assure reduced risk of falls.   Target Date 02/18/19   Date Met      Progress: In progress     Goal Identifier HEP   Goal Description 3. Pt indep with HEP that he performs 4+ times/week to improve trunk and LE strength, balance.   Target Date 02/18/19   Date Met      Progress: In progress     Goal Identifier 3 min walk   Goal Description 4. Pt to achieve 3 min walk distance of 300 ft (as compared to 250 ft today) with least restrictive AD to assure improved walking tolerance.   Target Date 02/18/19   Date Met  02/14/19   Progress: MET     Goal Identifier sit  to stand; car transfers   Goal Description 5. Pt to demonstrate ability consistently perform 5 reps of sit to stand from standard height chair in 16 seconds or less (as compared to 20.9 seconds today) and to perform car transfers indep safely.   Target Date 02/18/19   Date Met  02/14/19   Progress: MET     Goal Identifier NEW GOAL: 6MWT with 4WW   Goal Description 6. Pt to achieve 6MWT distance of 900 feet (as compared to 735 feet at assessment) with 4WW to improve community mobility   Target Date 05/14/19   Date Met      Progress: new goal       Progress Toward Goals:   Progress this reporting period: Patient has met two goals, made substantial improvement on his TUG goal, and did not make progress on the 25 foot walk test goal.  He requires continued skilled therapy to address strength, endurance, gait speed, balance, and overall safety with mobility.      Certification date from 2/15/2019 to 5/14/2019.    Fior Rehman, PT          I CERTIFY THE NEED FOR THESE SERVICES FURNISHED UNDER        THIS PLAN OF TREATMENT AND WHILE UNDER MY CARE     (Physician co-signature of this document indicates review and certification of the therapy plan).                Referring Provider: Buddy Poe MD

## 2019-02-15 ENCOUNTER — ANCILLARY PROCEDURE (OUTPATIENT)
Dept: GENERAL RADIOLOGY | Facility: CLINIC | Age: 84
End: 2019-02-15
Attending: HOSPITALIST
Payer: COMMERCIAL

## 2019-02-15 DIAGNOSIS — J18.9 PNEUMONIA OF RIGHT LOWER LOBE DUE TO INFECTIOUS ORGANISM: ICD-10-CM

## 2019-02-16 DIAGNOSIS — E11.21 TYPE 2 DIABETES MELLITUS WITH DIABETIC NEPHROPATHY, WITHOUT LONG-TERM CURRENT USE OF INSULIN (H): ICD-10-CM

## 2019-02-18 NOTE — TELEPHONE ENCOUNTER
Dr. Poe,    Please review/sign or advise metFORMIN (GLUCOPHAGE) 500 MG tablet.     Routing because last BP elevated. Per chart review, BP elevated at cards visit 01/04/19, pt instructed at that visit to continue on current BP meds and record BP at home.    Stephanie Galeas RN  [facility]

## 2019-02-18 NOTE — TELEPHONE ENCOUNTER
"Requested Prescriptions   Pending Prescriptions Disp Refills     metFORMIN (GLUCOPHAGE) 500 MG tablet [Pharmacy Med Name: METFORMIN  MG TABLET] 180 tablet 2    Last Written Prescription Date:  09/08/2017  Last Fill Quantity: 180,  # refills: 3   Last office visit: 12/12/2018 with prescribing provider:  12/12/2018   Future Office Visit:   Sig: TAKE 1 TABLET (500 MG) BY MOUTH 2 TIMES DAILY (WITH MEALS)    Biguanide Agents Failed - 2/16/2019 11:51 AM       Failed - Blood pressure less than 140/90 in past 6 months    BP Readings from Last 3 Encounters:   01/04/19 154/64   12/12/18 100/58   11/09/18 118/82                Passed - Patient has documented LDL within the past 12 mos.    Recent Labs   Lab Test 09/14/18  0930   LDL 38            Passed - Patient has had a Microalbumin in the past 15 mos.    Recent Labs   Lab Test 09/14/18  0935   MICROL 30   UMALCR 27.55*            Passed - Patient is age 10 or older       Passed - Patient has documented A1c within the specified period of time.    If HgbA1C is 8 or greater, it needs to be on file within the past 3 months.  If less than 8, must be on file within the past 6 months.     Recent Labs   Lab Test 12/12/18  1548   A1C 7.4*            Passed - Patient's CR is NOT>1.4 OR Patient's EGFR is NOT<45 within past 12 mos.    Recent Labs   Lab Test 12/12/18  1548   GFRESTIMATED 69   GFRESTBLACK 84       Recent Labs   Lab Test 12/12/18  1548   CR 1.00            Passed - Patient does NOT have a diagnosis of CHF.       Passed - Medication is active on med list       Passed - Recent (6 mo) or future (30 days) visit within the authorizing provider's specialty    Patient had office visit in the last 6 months or has a visit in the next 30 days with authorizing provider or within the authorizing provider's specialty.  See \"Patient Info\" tab in inbasket, or \"Choose Columns\" in Meds & Orders section of the refill encounter.            "

## 2019-02-21 ENCOUNTER — HOSPITAL ENCOUNTER (OUTPATIENT)
Dept: PHYSICAL THERAPY | Facility: CLINIC | Age: 84
Setting detail: THERAPIES SERIES
End: 2019-02-21
Attending: FAMILY MEDICINE
Payer: COMMERCIAL

## 2019-02-21 PROCEDURE — 97110 THERAPEUTIC EXERCISES: CPT | Mod: GP | Performed by: PHYSICAL THERAPIST

## 2019-02-22 ENCOUNTER — OFFICE VISIT (OUTPATIENT)
Dept: FAMILY MEDICINE | Facility: CLINIC | Age: 84
End: 2019-02-22
Payer: COMMERCIAL

## 2019-02-22 VITALS
OXYGEN SATURATION: 98 % | SYSTOLIC BLOOD PRESSURE: 118 MMHG | HEART RATE: 60 BPM | BODY MASS INDEX: 24.84 KG/M2 | RESPIRATION RATE: 16 BRPM | TEMPERATURE: 97.4 F | DIASTOLIC BLOOD PRESSURE: 60 MMHG | WEIGHT: 161 LBS

## 2019-02-22 DIAGNOSIS — E11.21 TYPE 2 DIABETES MELLITUS WITH DIABETIC NEPHROPATHY, WITHOUT LONG-TERM CURRENT USE OF INSULIN (H): ICD-10-CM

## 2019-02-22 DIAGNOSIS — T14.8XXA WOUND OF SKIN: Primary | ICD-10-CM

## 2019-02-22 DIAGNOSIS — L03.019: ICD-10-CM

## 2019-02-22 PROCEDURE — 99214 OFFICE O/P EST MOD 30 MIN: CPT | Performed by: NURSE PRACTITIONER

## 2019-02-22 NOTE — PROGRESS NOTES
SUBJECTIVE:   Kiran Carcamo is a 89 year old male who presents to clinic today for the following health issues:    Concern - Sore on left hip  Onset: 3 days ago    Description:   Seems to be gone now, now there is a scab.   Started from using a heating pad- has stopped using it now    Intensity: mild    Progression of Symptoms:  improving    Accompanying Signs & Symptoms:  None, no discharge or drainage. It is red around it because the scab is receeding    Previous history of similar problem:   No    Precipitating factors:   Worsened by: None    Alleviating factors:  Improved by: Nothing    Therapies Tried and outcome: Nothing    Left hip sore different color  PT yesterday wanted it looked at   history of Neuropathy from DM, has been controlled and checking once a day   also history of CAD bypass surgery, no concerns     fingernail concern - Right thumb nail fungus yellow mild other nails intact       Problem list and histories reviewed & adjusted, as indicated.  Additional history: as documented    Patient Active Problem List   Diagnosis     Health Care Home     AK (actinic keratosis)     SK (seborrheic keratosis)     Hyperplasia of prostate     Hyperlipidemia LDL goal <70     History of MI (myocardial infarction)     Forgetfulness     CKD (chronic kidney disease) stage 2, GFR 60-89 ml/min     Essential hypertension with goal blood pressure less than 140/90     Eczema, unspecified type     Type 2 diabetes mellitus with diabetic nephropathy, without long-term current use of insulin (H)     Hypocalcemia      Paget's bone disease     History of skin cancer     Community acquired pneumonia     Past Surgical History:   Procedure Laterality Date     CHOLECYSTECTOMY       CORONARY ARTERY BYPASS  '05    6-vessel     MANDIBLE SURGERY         Social History     Tobacco Use     Smoking status: Never Smoker     Smokeless tobacco: Never Used   Substance Use Topics     Alcohol use: No     Family History   Problem Relation  Age of Onset     Heart Disease Mother      Eye Disorder Father      Respiratory Brother      Heart Disease Sister      Cancer Daughter      Cancer Brother      Skin Cancer No family hx of      Melanoma No family hx of          Current Outpatient Medications   Medication Sig Dispense Refill     acetaminophen (TYLENOL) 325 MG tablet Take 3 tablets (975 mg) by mouth every 8 hours as needed for mild pain 100 tablet 0     aspirin 81 MG EC tablet Take 81 mg by mouth At Bedtime.       blood glucose (ONETOUCH ULTRA) test strip Use to test blood sugar 2 times daily or as directed. 100 strip 0     fluorouracil (EFUDEX) 5 % cream Apply topically daily Every evening for 4 weeks on the frontal scalp, then 2 week break, then another section of the scalp for 4 weeks, etc 40 g 2     isosorbide mononitrate (IMDUR) 60 MG 24 hr tablet Take 1 tablet (60 mg) by mouth daily Please get refills from primary care or schedule with new cardiologist. 90 tablet 3     ketoconazole (NIZORAL) 2 % cream Apply topically 2 times daily To the eyebrows and around the nose/cheek area 60 g 0     lisinopril (PRINIVIL/ZESTRIL) 10 MG tablet Take 1 tablet (10 mg) by mouth daily 90 tablet 0     metFORMIN (GLUCOPHAGE) 500 MG tablet TAKE 1 TABLET (500 MG) BY MOUTH 2 TIMES DAILY (WITH MEALS) 180 tablet 2     metoprolol succinate ER (TOPROL-XL) 25 MG 24 hr tablet TAKE ONE-HALF TABLET BY MOUTH DAILY 45 tablet 11     Multiple Vitamin (MV-ONE PO) Take 1 tablet by mouth daily        nitroGLYcerin (NITROSTAT) 0.4 MG sublingual tablet Place 1 tablet (0.4 mg) under the tongue every 5 minutes as needed for chest pain If symptoms after 3 doses (15 minutes) call 911. 25 tablet 1     rosuvastatin (CRESTOR) 10 MG tablet Take 1 tablet (10 mg) by mouth daily 90 tablet 3     azithromycin (ZITHROMAX) 250 MG tablet Take 1 tablet (250 mg) by mouth daily (Patient not taking: Reported on 1/4/2019) 3 tablet 0     polyethylene glycol (MIRALAX/GLYCOLAX) Packet Take 17 g by mouth daily  as needed for constipation (Patient not taking: Reported on 1/4/2019) 10 packet 0     senna-docusate (SENOKOT-S;PERICOLACE) 8.6-50 MG per tablet Take 1 tablet by mouth 2 times daily (Patient not taking: Reported on 1/4/2019) 60 tablet 1     No Known Allergies    Reviewed and updated as needed this visit by clinical staff       Reviewed and updated as needed this visit by Provider         ROS:  CONSTITUTIONAL: NEGATIVE for fever, chills, change in weight  INTEGUMENTARY/SKIN: hpi   RESP: NEGATIVE for significant cough or SOB, pt reports pneumonia he had months ago has resolved   CV: NEGATIVE for chest pain, palpitations or peripheral edema  MUSCULOSKELETAL: walks with cane, scattered aches and pains in joints from arthritis controlled with tylenol prn or heat   NEURO: history of neuropathy minimal numbness and tingling reported per pt   ENDOCRINE: history of DM has been controlled well     OBJECTIVE:     /60   Pulse 60   Temp 97.4  F (36.3  C) (Oral)   Resp 16   Wt 73 kg (161 lb)   SpO2 98%   BMI 24.84 kg/m    Body mass index is 24.84 kg/m .  GENERAL: healthy, alert and no distress  RESP: Respiratory rate regular and breathing easily   CV: No abnormal color and extremities warm, no edema and normal pulses   MS: walking slow and steady with cane, no pain over hip today   SKIN: healing sore over Left hip over bony prominence slightly pink, granulation tissue forming and scab dry, no drng, healing well. Right thumb nail fungus yellow mild other nails intact, toenails not examined   NEURO: Normal strength and tone, mentation intact and speech normal  PSYCH: mentation appears normal, affect normal/bright    Diagnostic Test Results:  none     ASSESSMENT/PLAN:         ICD-10-CM    1. Wound of skin T14.8XXA    2. Type 2 diabetes mellitus with diabetic nephropathy, without long-term current use of insulin (H) E11.21    3. Onychia of thumb L03.019    skin wound left hip healing well after a burn he had from heating  pad he used for 3 nights all night long. No signs or symptoms of infection. Per pt wound healing and per dtr also no concerns but PT therapist wanted him to have checked out, indeed pt with history of Neuropathy from DM and discussed wounds are high risk. Do not use heating pads that can cause burns, discussed safe options and pt's dtr here they already have changed to hot water bottles or rice bags type that cool off on their own  Cover with aquphor while healing, pt not picking  See patient instructions for instructions reviewed and monitor, Return to Clinic if any concerns     DM has been stable continue current plan    Tea tree oil twice a day for nail fungus, might need to do for couple months and takes longer to notice normal nail growth, stop if skin irritation occurs.     Patient Instructions     Patient Education   keep sore clean with soap and water and covering with your ointment can help while it heals. Return to Clinic if any concerns  Tea tree oil twice a day for nail fungus, might need to do for couple months and takes longer to notice normal nail growth, stop if skin irritation occurs.   Nail Fungal Infection  A nail fungal infection changes the way fingernails and toenails look. They may thicken, discolor, change shape, or split. This condition is hard to treat because nails grow slowly and have limited blood supply. The infection often comes back after treatment.  There are 2 types of medicines used to treat this condition:    Topical anti-fungal medicines. These are applied to the surface of the skin and nail area. These medicines are not very effective because they can t get deep into the nail.    Oral antifungal medicines. These medicines work better because they go into the nail from the inside out. But the infection may still come back. It may take 9 to 12 months for your nail to look normal again. This means you are cured. You can repeat treatment if needed. Most people take these medicines  without any problems. It is rare to stop therapy because of side effects. But your healthcare provider may give you some monitoring tests. Talk about possible side effects with your provider before starting treatment.  If medicines fail, the nail can be removed surgically or chemically. These methods physically remove the fungus from the body. This helps medical treatment be more effective.  Home care    Use medicines exactly as directed for as long as directed. Treating a fungal infection can take longer than other kinds of infections.    Smoking is a risk factor for fungal infection. This is one more reason to quit.    Wear absorbent socks, and shoes that let your feet breathe. Sweaty feet increase your risk of fungal infection. They also make an existing infection harder to treat.    Use footwear when in damp public places like swimming pools, gyms, and shower rooms. This will help you avoid the fungus that grows there.    Don't share nail clippers or scissors with others.  Follow-up care  Follow up with your healthcare provider, or as advised.  When to seek medical advice  Call your healthcare provider right away if any of these occur:    Skin by the nail becomes red, swollen, painful, or drains pus (a creamy yellow or white liquid)    Side effects from oral anti-fungal medicines  Date Last Reviewed: 8/1/2016 2000-2018 [employer]. [address] [place] [address], [place] [address]. All rights reserved. This information is not intended as a substitute for professional medical care. Always follow your healthcare professional's instructions.           Patient Education     Wound Check, No Infection  Your wound is healing as expected. There are no signs of infection.   Home care  Continue to care for your wound as directed.    Cover your wound with a bandage unless your healthcare provider tells you not to.    Gently clean your wound with soap and water when you shower.     Unless told otherwise, don't swim  or take tub baths until your wound has healed.  Follow-up care  Follow up with your healthcare provider as advised.    If you have sutures or staples, return as directed to have them removed. If they are not taken out on time, they may be harder to remove and scarring may be worse. Infection may develop.    If surgical tape strips were used, you can remove them yourself if they have not fallen off by 10 days after they were applied.   When to seek medical advice  Call your healthcare provider right away if any of these occur:    Fever of 100.4 F (38 C) or higher, or as directed by your healthcare provider    Symptoms of a wound infection, including:  ? Redness or swelling around the wound  ? Warmth coming from the wound  ? New or worsening pain  ? Red streaks around the wound  ? Draining pus  Date Last Reviewed: 3/1/2018    6506-9618 [employer]. [address] [place] [address]. All rights reserved. This information is not intended as a substitute for professional medical care. Always follow your healthcare professional's instructions.               MICHAEL Muñoz [facility]

## 2019-02-22 NOTE — PATIENT INSTRUCTIONS
Patient Education   keep sore clean with soap and water and covering with your ointment can help while it heals. Return to Clinic if any concerns  Tea tree oil twice a day for nail fungus, might need to do for couple months and takes longer to notice normal nail growth, stop if skin irritation occurs.   Nail Fungal Infection  A nail fungal infection changes the way fingernails and toenails look. They may thicken, discolor, change shape, or split. This condition is hard to treat because nails grow slowly and have limited blood supply. The infection often comes back after treatment.  There are 2 types of medicines used to treat this condition:    Topical anti-fungal medicines. These are applied to the surface of the skin and nail area. These medicines are not very effective because they can t get deep into the nail.    Oral antifungal medicines. These medicines work better because they go into the nail from the inside out. But the infection may still come back. It may take 9 to 12 months for your nail to look normal again. This means you are cured. You can repeat treatment if needed. Most people take these medicines without any problems. It is rare to stop therapy because of side effects. But your healthcare provider may give you some monitoring tests. Talk about possible side effects with your provider before starting treatment.  If medicines fail, the nail can be removed surgically or chemically. These methods physically remove the fungus from the body. This helps medical treatment be more effective.  Home care    Use medicines exactly as directed for as long as directed. Treating a fungal infection can take longer than other kinds of infections.    Smoking is a risk factor for fungal infection. This is one more reason to quit.    Wear absorbent socks, and shoes that let your feet breathe. Sweaty feet increase your risk of fungal infection. They also make an existing infection harder to treat.    Use footwear when  in damp public places like swimming pools, gyms, and shower rooms. This will help you avoid the fungus that grows there.    Don't share nail clippers or scissors with others.  Follow-up care  Follow up with your healthcare provider, or as advised.  When to seek medical advice  Call your healthcare provider right away if any of these occur:    Skin by the nail becomes red, swollen, painful, or drains pus (a creamy yellow or white liquid)    Side effects from oral anti-fungal medicines  Date Last Reviewed: 8/1/2016 2000-2018 The [employer]. [address] [place] [address]. All rights reserved. This information is not intended as a substitute for professional medical care. Always follow your healthcare professional's instructions.           Patient Education     Wound Check, No Infection  Your wound is healing as expected. There are no signs of infection.   Home care  Continue to care for your wound as directed.    Cover your wound with a bandage unless your healthcare provider tells you not to.    Gently clean your wound with soap and water when you shower.     Unless told otherwise, don't swim or take tub baths until your wound has healed.  Follow-up care  Follow up with your healthcare provider as advised.    If you have sutures or staples, return as directed to have them removed. If they are not taken out on time, they may be harder to remove and scarring may be worse. Infection may develop.    If surgical tape strips were used, you can remove them yourself if they have not fallen off by 10 days after they were applied.   When to seek medical advice  Call your healthcare provider right away if any of these occur:    Fever of 100.4 F (38 C) or higher, or as directed by your healthcare provider    Symptoms of a wound infection, including:  ? Redness or swelling around the wound  ? Warmth coming from the wound  ? New or worsening pain  ? Red streaks around the wound  ? Draining pus  Date Last  Reviewed: 3/1/2018    7567-3212 [employer], [employer]. [address] [place] [address], [place] [address]. All rights reserved. This information is not intended as a substitute for professional medical care. Always follow your healthcare professional's instructions.

## 2019-02-28 ENCOUNTER — HOSPITAL ENCOUNTER (OUTPATIENT)
Dept: PHYSICAL THERAPY | Facility: CLINIC | Age: 84
Setting detail: THERAPIES SERIES
End: 2019-02-28
Attending: FAMILY MEDICINE
Payer: COMMERCIAL

## 2019-02-28 PROCEDURE — 97110 THERAPEUTIC EXERCISES: CPT | Mod: GP | Performed by: PHYSICAL THERAPIST

## 2019-02-28 PROCEDURE — 97112 NEUROMUSCULAR REEDUCATION: CPT | Mod: GP | Performed by: PHYSICAL THERAPIST

## 2019-03-07 ENCOUNTER — HOSPITAL ENCOUNTER (OUTPATIENT)
Dept: OCCUPATIONAL THERAPY | Facility: CLINIC | Age: 84
Setting detail: THERAPIES SERIES
End: 2019-03-07
Attending: FAMILY MEDICINE
Payer: COMMERCIAL

## 2019-03-07 PROCEDURE — 97535 SELF CARE MNGMENT TRAINING: CPT | Mod: GO | Performed by: OCCUPATIONAL THERAPIST

## 2019-03-11 NOTE — ADDENDUM NOTE
Encounter addended by: Yandy Garcia, OT on: 3/11/2019 3:52 PM   Actions taken: Flowsheet data copied forward, Flowsheet accepted

## 2019-03-14 ENCOUNTER — HOSPITAL ENCOUNTER (OUTPATIENT)
Dept: OCCUPATIONAL THERAPY | Facility: CLINIC | Age: 84
Setting detail: THERAPIES SERIES
End: 2019-03-14
Attending: FAMILY MEDICINE
Payer: COMMERCIAL

## 2019-03-14 PROCEDURE — 97110 THERAPEUTIC EXERCISES: CPT | Mod: GO | Performed by: OCCUPATIONAL THERAPIST

## 2019-03-14 PROCEDURE — 97535 SELF CARE MNGMENT TRAINING: CPT | Mod: GO | Performed by: OCCUPATIONAL THERAPIST

## 2019-03-22 NOTE — PROGRESS NOTES
"   03/14/19 1600   Signing Clinician's Name / Credentials   Signing clinician's name / credentials Yandy Barahona OTR/L,ATP   Session Number   Session Number 3   Additional Session Number Discharge    Adult OT Eval Goals   OT Eval Goals (Adult) 2;1;3   OT Goal 1   Goal Identifier Cognition   Goal Description Patient and family will verbalize understanding of cognitive evaluation results and identify methods to enhance independence and safety in the home setting.   Target Date 04/10/19   Date Met 03/14/19   OT Goal 2   Goal Identifier HEP   Goal Description Pt will demonstrate independence with U/E strengthening & coordination home exercise program (HEP) for optimal ADL participation & independence.   Target Date 04/10/19   Date Met 03/14/19   OT Goal 3   Goal Identifier AE   Goal Description Patient and family will report 2-5 new pieces of equipment and how to use them in order for optimal independence within his home.   Target Date 04/10/19   Date Met 03/14/19   Subjective Report   Subjective Report I am feeling like I can take better steps   Therapeutic Interventions   Therapeutic Interventions Therapeutic Procedure/Exercise   Self Care/Home Management   Treatment Detail Educated on ways to assist with decreased FMC and writing challenges.  Issued bold line paper and had patient trial different types of pens/adaptations to increase ease of writing.  Disucussed attention to detail and need to focus when writing.  Also, educated on the idea of using a stamp signature to assist with signing but also the need to keep \"practicing\" writing via to -d o lists and letters.    Self-Care/Home Mgmt/ADL, Compensatory, Meal Prep Minutes (09108) 30 Minutes   Skilled Intervention Writing AE and tremor mgmt   Patient Response Patient reports and is aware that his writing tends to trail off and that he looses attention when writing   Progress goals met   Therapeutic Procedure/Exercise   Therapeutic Procedure: strength, endurance, " ROM, flexibillity minutes (23535) 30   Skilled Intervention Training in UE HEP with bands   Patient Response I have the red band at home   Treatment Detail Demo and training in red TB for shoulder strengthing and posutral training.  Shoulder rows and pulls downs x20 reps with cues, demo, training with family and handouts.     Progress goals met   Education   Learner Patient;Family   Readiness Acceptance   Method Explanation   Response Verbalizes understanding;Needs reinforcement   Comments   Comments Discharge Summary- Patient seen for 3 ot sessions focusing on AE needs, cogntion and HEP.  Patient and family agreeable to D/c and they feel patient is now back to baseline and has more tools to assist with deficits and continued strenthening.   Total Session Time   Timed Code Treatment Minutes 60   Total Treatment Time (sum of timed and untimed services) 60   Electronically signed by:  Yandy SHARPE/LEONARD, ATP      Occupational Therapist, Assistive   [phone]      fax: [phone]      [email or web address], [address]  [place]   [address]

## 2019-03-22 NOTE — ADDENDUM NOTE
Encounter addended by: Yandy Garcia, OT on: 3/22/2019 12:09 PM   Actions taken: Sign clinical note, Flowsheet accepted

## 2019-03-25 DIAGNOSIS — E11.9 TYPE 2 DIABETES MELLITUS WITHOUT COMPLICATION (H): ICD-10-CM

## 2019-03-25 NOTE — TELEPHONE ENCOUNTER
Prescription approved per [facility] Refill Protocol.    Marilyn Johnston RN   [facility]

## 2019-03-25 NOTE — TELEPHONE ENCOUNTER
"Requested Prescriptions   Pending Prescriptions Disp Refills     blood glucose (ONETOUCH ULTRA) test strip [Pharmacy Med Name: ONE TOUCH ULTRA BLUE TEST STRP] 100 strip 0    Last Written Prescription Date:  02/04/2019  Last Fill Quantity: 100,  # refills: 0   Last office visit: 2/22/2019 with prescribing provider:  11/09/2018   Future Office Visit:   Sig: USE TO TEST BLOOD SUGAR 2 TIMES DAILY OR AS DIRECTED.    Diabetic Supplies Protocol Passed - 3/25/2019  1:24 AM       Passed - Medication is active on med list       Passed - Patient is 18 years of age or older       Passed - Recent (6 mo) or future (30 days) visit within the authorizing provider's specialty    Patient had office visit in the last 6 months or has a visit in the next 30 days with authorizing provider.  See \"Patient Info\" tab in inbasket, or \"Choose Columns\" in Meds & Orders section of the refill encounter.            "

## 2019-04-26 ENCOUNTER — TELEPHONE (OUTPATIENT)
Dept: FAMILY MEDICINE | Facility: CLINIC | Age: 84
End: 2019-04-26

## 2019-04-26 PROBLEM — E83.51 HYPOCALCEMIA: Status: RESOLVED | Noted: 2017-10-16 | Resolved: 2019-04-26

## 2019-04-26 NOTE — TELEPHONE ENCOUNTER
Reason for Call:  Form, our goal is to have forms completed with 72 hours, however, some forms may require a visit or additional information.    Type of letter, form or note:  disability    Who is the form from?: Patient    Where did the form come from: Patient or family brought in       What clinic location was the form placed at?: [facility]    Where the form was placed: Given to MA/RN    What number is listed as a contact on the form?: [phone]       Additional comments: Please Mail letter to:  [employer], [address] [place] [id number]    Call taken on 4/26/2019 at 8:37 AM by Alanis Newberry

## 2019-04-29 NOTE — TELEPHONE ENCOUNTER
[employer] disability certificate mailed to    [employer]  [address] [place] [address] [address]  [place] [address]

## 2019-04-30 DIAGNOSIS — I10 HYPERTENSION GOAL BP (BLOOD PRESSURE) < 140/90: ICD-10-CM

## 2019-04-30 RX ORDER — LISINOPRIL 10 MG/1
10 TABLET ORAL DAILY
Qty: 90 TABLET | Refills: 0 | Status: SHIPPED | OUTPATIENT
Start: 2019-04-30 | End: 2019-07-22

## 2019-04-30 NOTE — TELEPHONE ENCOUNTER
Prescription approved per [facility] Refill Protocol. Called Jes to notify, LVM.    Stephanie Galeas RN  [facility]

## 2019-04-30 NOTE — TELEPHONE ENCOUNTER
The patients daughter would like a call back once this has been taken care of so she knows she can go and pick it up at the pharmacy

## 2019-04-30 NOTE — TELEPHONE ENCOUNTER
"Requested Prescriptions   Pending Prescriptions Disp Refills     lisinopril (PRINIVIL/ZESTRIL) 10 MG tablet 90 tablet 0     Sig: Take 1 tablet (10 mg) by mouth daily       ACE Inhibitors (Including Combos) Protocol Passed - 4/30/2019  2:35 PM        Passed - Blood pressure under 140/90 in past 12 months     BP Readings from Last 3 Encounters:   02/22/19 118/60   01/04/19 154/64   12/12/18 100/58                 Passed - Recent (12 mo) or future (30 days) visit within the authorizing provider's specialty     Patient had office visit in the last 12 months or has a visit in the next 30 days with authorizing provider or within the authorizing provider's specialty.  See \"Patient Info\" tab in inbasket, or \"Choose Columns\" in Meds & Orders section of the refill encounter.              Passed - Medication is active on med list        Passed - Patient is age 18 or older        Passed - Normal serum creatinine on file in past 12 months     Recent Labs   Lab Test 12/12/18  1548   CR 1.00             Passed - Normal serum potassium on file in past 12 months     Recent Labs   Lab Test 12/12/18  1548   POTASSIUM 4.3             "

## 2019-05-15 ENCOUNTER — OFFICE VISIT (OUTPATIENT)
Dept: FAMILY MEDICINE | Facility: CLINIC | Age: 84
End: 2019-05-15
Payer: COMMERCIAL

## 2019-05-15 VITALS
BODY MASS INDEX: 25.03 KG/M2 | DIASTOLIC BLOOD PRESSURE: 77 MMHG | TEMPERATURE: 97.2 F | WEIGHT: 159.5 LBS | SYSTOLIC BLOOD PRESSURE: 151 MMHG | HEART RATE: 67 BPM | OXYGEN SATURATION: 98 % | HEIGHT: 67 IN

## 2019-05-15 DIAGNOSIS — E11.21 TYPE 2 DIABETES MELLITUS WITH DIABETIC NEPHROPATHY, WITHOUT LONG-TERM CURRENT USE OF INSULIN (H): ICD-10-CM

## 2019-05-15 DIAGNOSIS — M25.561 CHRONIC PAIN OF BOTH KNEES: ICD-10-CM

## 2019-05-15 DIAGNOSIS — M25.562 CHRONIC PAIN OF BOTH KNEES: ICD-10-CM

## 2019-05-15 DIAGNOSIS — I73.9 CLAUDICATION OF CALF MUSCLES (H): Primary | ICD-10-CM

## 2019-05-15 DIAGNOSIS — G89.29 CHRONIC PAIN OF BOTH KNEES: ICD-10-CM

## 2019-05-15 PROBLEM — J18.9 COMMUNITY ACQUIRED PNEUMONIA: Status: RESOLVED | Noted: 2018-10-31 | Resolved: 2019-05-15

## 2019-05-15 PROCEDURE — 99215 OFFICE O/P EST HI 40 MIN: CPT | Performed by: FAMILY MEDICINE

## 2019-05-15 ASSESSMENT — MIFFLIN-ST. JEOR: SCORE: 1302.12

## 2019-05-15 NOTE — PROGRESS NOTES
SUBJECTIVE:   Kiran Carcamo is a 89 year old male who presents to clinic today with two of his daughters for the following health issues:    Joint Pain    Onset: 2 years    Description:   Location: left knee, right knee and left hip  Character: Dull ache    Intensity: mild    Progression of Symptoms: intermittent    Accompanying Signs & Symptoms:  Other symptoms: weakness of both knees     History:   Previous similar pain: no       Precipitating factors:   Trauma or overuse: no     Alleviating factors:  Improved by: rest/inactivity    Therapies Tried and outcome:     Patient has joint pain in his knees and left hip. The pain tends to worsen after he has been walking. His daughter believes it could be arthritic pain, patient is wondering if it is nerve pain. He denies having sores on his feet. Patient has taken Tylenol for pain relief but he has been concerned about hurting his liver.    Respiratory  Patient is recovering from a recent bought of pneumonia.     Diabetes  He has been checking his blood sugars at home regularly. He is currently taking Metformin 500 mg twice daily.     Neuro  Patient notes that he has become dizzy recently when sitting up, this lasts for a few seconds.      Additional history: as documented    Reviewed  and updated as needed this visit by clinical staff  Tobacco  Allergies  Meds         Reviewed and updated as needed this visit by Provider         Patient Active Problem List   Diagnosis     Health Care Home     AK (actinic keratosis)     SK (seborrheic keratosis)     Hyperplasia of prostate     Hyperlipidemia LDL goal <70     History of MI (myocardial infarction)     Forgetfulness     CKD (chronic kidney disease) stage 2, GFR 60-89 ml/min     Essential hypertension with goal blood pressure less than 140/90     Eczema, unspecified type     Type 2 diabetes mellitus with diabetic nephropathy, without long-term current use of insulin (H)     Paget's bone disease     History of skin  cancer     Community acquired pneumonia     Past Surgical History:   Procedure Laterality Date     CHOLECYSTECTOMY       CORONARY ARTERY BYPASS  '05    6-vessel     MANDIBLE SURGERY         Social History     Tobacco Use     Smoking status: Never Smoker     Smokeless tobacco: Never Used   Substance Use Topics     Alcohol use: No     Family History   Problem Relation Age of Onset     Heart Disease Mother      Eye Disorder Father      Respiratory Brother      Heart Disease Sister      Cancer Daughter      Cancer Brother      Skin Cancer No family hx of      Melanoma No family hx of          Current Outpatient Medications   Medication Sig Dispense Refill     acetaminophen (TYLENOL) 325 MG tablet Take 3 tablets (975 mg) by mouth every 8 hours as needed for mild pain 100 tablet 0     aspirin 81 MG EC tablet Take 81 mg by mouth At Bedtime.       blood glucose (ONETOUCH ULTRA) test strip USE TO TEST BLOOD SUGAR 2 TIMES DAILY OR AS DIRECTED. 100 strip 1     fluorouracil (EFUDEX) 5 % cream Apply topically daily Every evening for 4 weeks on the frontal scalp, then 2 week break, then another section of the scalp for 4 weeks, etc 40 g 2     isosorbide mononitrate (IMDUR) 60 MG 24 hr tablet Take 1 tablet (60 mg) by mouth daily Please get refills from primary care or schedule with new cardiologist. 90 tablet 3     lisinopril (PRINIVIL/ZESTRIL) 10 MG tablet Take 1 tablet (10 mg) by mouth daily 90 tablet 0     metFORMIN (GLUCOPHAGE) 500 MG tablet TAKE 1 TABLET (500 MG) BY MOUTH 2 TIMES DAILY (WITH MEALS) 180 tablet 2     metoprolol succinate ER (TOPROL-XL) 25 MG 24 hr tablet TAKE ONE-HALF TABLET BY MOUTH DAILY 45 tablet 11     Multiple Vitamin (MV-ONE PO) Take 1 tablet by mouth daily        nitroGLYcerin (NITROSTAT) 0.4 MG sublingual tablet Place 1 tablet (0.4 mg) under the tongue every 5 minutes as needed for chest pain If symptoms after 3 doses (15 minutes) call 911. 25 tablet 1     polyethylene glycol (MIRALAX/GLYCOLAX) Packet  Take 17 g by mouth daily as needed for constipation 10 packet 0     rosuvastatin (CRESTOR) 10 MG tablet Take 1 tablet (10 mg) by mouth daily 90 tablet 3     senna-docusate (SENOKOT-S;PERICOLACE) 8.6-50 MG per tablet Take 1 tablet by mouth 2 times daily 60 tablet 1     ketoconazole (NIZORAL) 2 % cream Apply topically 2 times daily To the eyebrows and around the nose/cheek area (Patient not taking: Reported on 5/15/2019) 60 g 0     No Known Allergies  Recent Labs   Lab Test 12/12/18  1548 11/01/18  0814  09/14/18  0930 06/06/18  1041  09/08/17  0919 05/09/17  0502  11/16/16  1047 08/10/16  1050   A1C 7.4*  --   --  6.5* 6.5*   < > 8.4*  --    < > 7.6* 7.4*   LDL  --   --   --  38  --   --  54  --   --   --  64   HDL  --   --   --  44  --   --  44  --   --   --  41   TRIG  --   --   --  74  --   --  127  --   --   --  97   ALT 23 26  --   --   --   --   --  69   < >  --   --    CR 1.00 0.96   < >  --  1.10  --   --  1.07   < >  --   --    GFRESTIMATED 69 73   < >  --  62  --   --  64   < >  --   --    GFRESTBLACK 84 88   < >  --  75  --   --  78   < >  --   --    POTASSIUM 4.3 3.7   < >  --   --   --   --  4.3   < >  --   --    TSH 1.58  --   --   --   --   --   --   --   --  2.57  --     < > = values in this interval not displayed.      BP Readings from Last 3 Encounters:   05/15/19 151/77   02/22/19 118/60   01/04/19 154/64    Wt Readings from Last 3 Encounters:   05/15/19 72.3 kg (159 lb 8 oz)   02/22/19 73 kg (161 lb)   01/04/19 76.6 kg (168 lb 12.8 oz)                  Labs reviewed in EPIC    ROS:  Remainder of ROS is negative unless otherwise noted above or in HPI.    This document serves as a record of the services and decisions personally performed and made by Buddy Poe MD. It was created on his behalf by Gregg Santos, trained medical scribe. The creation of this document is based on the provider's statements to the medical scribe.  Gregg Santos 3:48 PM May 15, 2019    OBJECTIVE:     /77   Pulse  "67   Temp 97.2  F (36.2  C) (Oral)   Ht 1.702 m (5' 7\")   Wt 72.3 kg (159 lb 8 oz)   SpO2 98%   BMI 24.98 kg/m    Body mass index is 24.98 kg/m .  GENERAL: healthy, alert and no distress  RESP: lungs clear to auscultation - no rales, rhonchi or wheezes  CV: regular rate and rhythm, normal S1 S2, no S3 or S4, no murmur, click or rub, no peripheral edema and peripheral pulses strong  MS: no gross musculoskeletal defects noted, no edema  SKIN: no suspicious lesions or rashes  NEURO: Normal strength and tone, mentation intact and speech normal  PSYCH: mentation appears normal, affect normal/bright    Diagnostic Test Results:  none     ASSESSMENT/PLAN:   (I73.9) Claudication of calf muscles (H)  (primary encounter diagnosis)  Comment: Patient will schedule and complete ultrasound.  Plan: US CHRIS Doppler No Exercise, US Lower Extremity         Arterial Duplex Bilateral, CANCELED: US CHRIS         Doppler No Exercise        Will notify with results. Follow up as needed.    (E11.21) Type 2 diabetes mellitus with diabetic nephropathy, without long-term current use of insulin (H)  Comment: Stable. Controlled by current medication.  Plan: Reduced patient's dose of Metformin to once daily. Follow up as needed.    (M25.561,  M25.562,  G89.29) Chronic pain of both knees  Comment: Patient will schedule and complete x-ray.  Plan: XR Knee Standing Bilateral 3 Views        Will notify with results. Follow up as needed.        Patient Instructions   Cut down your Metformin to 500 mg in the morning.     Call [phone] to schedule imaging.     40 minutes were spent with the patient with more than 50% of time spent in counseling and coordination of care regarding above assessment and plan.     The information in this document, created by the medical scribe for me, accurately reflects the services I personally performed and the decisions made by me. I have reviewed and approved this document for accuracy prior to leaving the patient " [facility] area.  May 15, 2019 3:49 PM    Buddy Poe MD  [facility]

## 2019-05-21 ENCOUNTER — ANCILLARY PROCEDURE (OUTPATIENT)
Dept: ULTRASOUND IMAGING | Facility: CLINIC | Age: 84
End: 2019-05-21
Attending: FAMILY MEDICINE
Payer: COMMERCIAL

## 2019-05-21 ENCOUNTER — ANCILLARY PROCEDURE (OUTPATIENT)
Dept: GENERAL RADIOLOGY | Facility: CLINIC | Age: 84
End: 2019-05-21
Attending: FAMILY MEDICINE
Payer: COMMERCIAL

## 2019-05-21 DIAGNOSIS — M25.561 CHRONIC PAIN OF BOTH KNEES: ICD-10-CM

## 2019-05-21 DIAGNOSIS — M25.562 CHRONIC PAIN OF BOTH KNEES: ICD-10-CM

## 2019-05-21 DIAGNOSIS — G89.29 CHRONIC PAIN OF BOTH KNEES: ICD-10-CM

## 2019-05-21 DIAGNOSIS — I73.9 CLAUDICATION OF CALF MUSCLES (H): ICD-10-CM

## 2019-05-24 NOTE — RESULT ENCOUNTER NOTE
Praveen Darling/Jes: Your recent XR results show inside half of both knee joints with severe arthritis worse on the right. Please contact if questions.    Buddy

## 2019-05-24 NOTE — RESULT ENCOUNTER NOTE
Praveen Darling/Jes, your recent results are back and are all normal. Please contact if any questions.   Buddy

## 2019-05-29 ENCOUNTER — TELEPHONE (OUTPATIENT)
Dept: FAMILY MEDICINE | Facility: CLINIC | Age: 84
End: 2019-05-29

## 2019-05-29 DIAGNOSIS — M25.561 CHRONIC PAIN OF BOTH KNEES: Primary | ICD-10-CM

## 2019-05-29 DIAGNOSIS — M25.562 CHRONIC PAIN OF BOTH KNEES: Primary | ICD-10-CM

## 2019-05-29 DIAGNOSIS — G89.29 CHRONIC PAIN OF BOTH KNEES: Primary | ICD-10-CM

## 2019-05-29 NOTE — TELEPHONE ENCOUNTER
The patients daughter returned a call and would like a call back whenever someone is available. [phone] is her direct office number and she would like to be called at that number.

## 2019-05-29 NOTE — TELEPHONE ENCOUNTER
Reason for call:  Other   Patient called regarding (reason for call): call back  Additional comments:   Patient's daughter is requesting a call back to discuss treatment options for Phong. Xrays determined he has arthritis in both his left and right knee.     Phone number to reach patient:  Cell number on file:    Telephone Information:   Mobile [phone]       Best Time:  any    Can we leave a detailed message on this number?  YES

## 2019-05-29 NOTE — TELEPHONE ENCOUNTER
Left vm for pt/family to return call to clinic    Marilyn Johnston RN   [facility]

## 2019-05-29 NOTE — TELEPHONE ENCOUNTER
Dr. Poe    With verbal consent was given from pt to speak with his Daughter Jes    Message was given, the have decided to try the Ortho referral with steroid injections    Also they asked if pt could be on any kind of anti inflammatory    Referral cued    Marilyn Johnston RN   [facility]

## 2019-05-29 NOTE — TELEPHONE ENCOUNTER
Spoke with Daughter Jes, gave her the referral info and dosing instruction for ibuprofen    Huddle with Dr. Poe regarding anti inflammatory per provider  Ok to take ibuprofen 200mg, 1 tab, 1 to 2x day as needed with food and not before going to bed and  on an intermittent basis only for pain    Ortho referral  [phone]    Marilyn Johnston RN   [facility]

## 2019-05-29 NOTE — TELEPHONE ENCOUNTER
Dr. Poe    See pt/family message    Advise    Marilyn Johnston, RN   [facility]

## 2019-06-04 NOTE — ADDENDUM NOTE
Encounter addended by: Bety Royal, PT on: 6/4/2019 3:54 PM   Actions taken: Sign clinical note, Flowsheet accepted, Episode resolved

## 2019-06-04 NOTE — PROGRESS NOTES
"Outpatient Physical Therapy Discharge Note     Patient: Kiran Carcamo  : 1921  Date of Note:  2019      Beginning/End Dates of Reporting Period:  18 to 2019    Referring Provider: Buddy Poe MD    Therapy Diagnosis: impaired general strength     Client Self Report: pt last seen on 19. Pt's rose's cancelled remaining PT appointments. When contacted via phone today, rose Acosta stated \"dad's doing fine. Don't need more PT at this time\"  Objective Measurements:                                        Goals:  Goal Identifier fall prevention   Goal Description 1. Pt to demonstrate/report 3-5 strategies to reduce fall risk and to perform TUG with least restrictive AD in 13.5 seconds or less (as compared to 18.7 seconds today).   Target Date 19   Date Met      Progress: not fully met     Goal Identifier Gait speed   Goal Description 2. Pt to demonstrate ability to complete 25 foot walk using cane in 6.3 seconds or less (as compared to 6.85 sec with walker today) to return to previous baseline and assure reduced risk of falls.   Target Date 19   Date Met      Progress:not fully met     Goal Identifier HEP   Goal Description 3. Pt indep with HEP that he performs 4+ times/week to improve trunk and LE strength, balance.   Target Date 19   Date Met   19   Progress:MET     Goal Identifier 3 min walk   Goal Description 4. Pt to achieve 3 min walk distance of 300 ft (as compared to 250 ft today) with least restrictive AD to assure improved walking tolerance.   Target Date 19   Date Met  19   Progress:MET     Goal Identifier sit to stand; car transfers   Goal Description 5. Pt to demonstrate ability consistently perform 5 reps of sit to stand from standard height chair in 16 seconds or less (as compared to 20.9 seconds today) and to perform car transfers indep safely.   Target Date 19   Date Met  19   Progress:MET     Goal Identifier NEW GOAL: " 6MWT with 4WW   Goal Description 6. Pt to achieve 6MWT distance of 900 feet (as compared to 735 feet at assessment) with 4WW to improve community mobility   Target Date 05/14/19   Date Met      Progress:not fully met     Progress Toward Goals:   Progress this reporting period: pt last seen on 2/28/19. Pt has met goals 3,4,5; demonstrated progress toward remaining goals; pt and family have decided to discontinue PT as of 2/28/19.    Plan:  Discharge from therapy.      Reason for Discharge: Patient has failed to schedule further appointments.    Equipment Issued: none    Discharge Plan: Patient to continue home program.

## 2019-06-05 NOTE — TELEPHONE ENCOUNTER
RECORDS RECEIVED FROM: Internal Referral for Chronic pain of both knees, injuections: Schedule Per PT's Daughter, Jes   DATE RECEIVED: 6.7.19   NOTES STATUS DETAILS   OFFICE NOTE from referring provider Internal 5.29.19 Dr. Poe (telephone)  5.15.19 Dr. Poe   OFFICE NOTE from other specialist N/A    DISCHARGE SUMMARY from hospital N/A    DISCHARGE REPORT from the ER N/A    OPERATIVE REPORT N/A    MEDICATION LIST Internal    IMPLANT RECORD/STICKER N/A    LABS     CBC/DIFF Internal 12.12.18, 11.1.18, 10.31.18,  More in Epic   CULTURES N/A    INJECTIONS DONE IN RADIOLOGY N/A    MRI N/A    CT SCAN N/A    XRAYS (IMAGES & REPORTS) Internal 5.21.19   TUMOR     PATHOLOGY  Slides & report N/A

## 2019-06-07 ENCOUNTER — OFFICE VISIT (OUTPATIENT)
Dept: ORTHOPEDICS | Facility: CLINIC | Age: 84
End: 2019-06-07
Payer: COMMERCIAL

## 2019-06-07 ENCOUNTER — PRE VISIT (OUTPATIENT)
Dept: ORTHOPEDICS | Facility: CLINIC | Age: 84
End: 2019-06-07

## 2019-06-07 VITALS — WEIGHT: 159 LBS | BODY MASS INDEX: 24.96 KG/M2 | HEIGHT: 67 IN

## 2019-06-07 DIAGNOSIS — M17.12 PRIMARY LOCALIZED OSTEOARTHRITIS OF LEFT KNEE: Primary | ICD-10-CM

## 2019-06-07 DIAGNOSIS — N18.2 CKD (CHRONIC KIDNEY DISEASE) STAGE 2, GFR 60-89 ML/MIN: ICD-10-CM

## 2019-06-07 DIAGNOSIS — E11.21 TYPE 2 DIABETES MELLITUS WITH DIABETIC NEPHROPATHY, WITHOUT LONG-TERM CURRENT USE OF INSULIN (H): ICD-10-CM

## 2019-06-07 RX ORDER — TRIAMCINOLONE ACETONIDE 40 MG/ML
40 INJECTION, SUSPENSION INTRA-ARTICULAR; INTRAMUSCULAR
Status: SHIPPED | OUTPATIENT
Start: 2019-06-07

## 2019-06-07 RX ORDER — LIDOCAINE HYDROCHLORIDE 10 MG/ML
4 INJECTION, SOLUTION EPIDURAL; INFILTRATION; INTRACAUDAL; PERINEURAL
Status: SHIPPED | OUTPATIENT
Start: 2019-06-07

## 2019-06-07 RX ADMIN — LIDOCAINE HYDROCHLORIDE 4 ML: 10 INJECTION, SOLUTION EPIDURAL; INFILTRATION; INTRACAUDAL; PERINEURAL at 11:03

## 2019-06-07 RX ADMIN — TRIAMCINOLONE ACETONIDE 40 MG: 40 INJECTION, SUSPENSION INTRA-ARTICULAR; INTRAMUSCULAR at 11:03

## 2019-06-07 ASSESSMENT — PAIN SCALES - GENERAL: PAINLEVEL: MILD PAIN (2)

## 2019-06-07 ASSESSMENT — MIFFLIN-ST. JEOR: SCORE: 1299.85

## 2019-06-07 NOTE — LETTER
6/7/2019      RE: Kiran Carcamo  [address]  [facility] [address]        Subjective:   Kiran Carcamo is a 89 year old male who presents with bilateral knee pain.  He has a long-standing history of stiffness and aching discomfort in his bilateral knees.  He feels his left knee is more bothersome than his right.  He notes that his symptoms are worse in the morning and as the day progresses they seem to improve.  He has not had any known trauma to his knees.  He is not had any previous interventions.  He does have both diabetes and chronic kidney disease which precludes him from taking nonsteroidals.  His daughters are very involved in his care.  Dr. Buddy Poe got radiographs as noted below.    Radiographs from 5/21/2019 were previously obtained and those are reviewed today.  Those demonstrate moderate to severe medial tibiofemoral degenerative changes bilaterally.    Background:   Duration of symptoms: 2 years  Mechanism of Injury: Chronic; Unknown   Aggravating factors: Walking  Relieving Factors: Nothing has been tried   Prior Evaluation: Dr. Poe, xrays    PAST MEDICAL, SOCIAL, SURGICAL AND FAMILY HISTORY: He  has a past medical history of CKD (chronic kidney disease) stage 2, GFR 60-89 ml/min (7/6/2015), Syncope (12/12/2011), and Type 2 diabetes, HbA1C goal < 8% (H) (2/23/2015).  He  has a past surgical history that includes coronary artery bypass ('05); Mandible surgery; and Cholecystectomy.  His family history includes Cancer in his brother and daughter; Eye Disorder in his father; Heart Disease in his mother and sister; Respiratory in his brother.  He reports that he has never smoked. He has never used smokeless tobacco. He reports that he does not drink alcohol or use drugs.    ALLERGIES: He has No Known Allergies.    CURRENT MEDICATIONS: He has a current medication list which includes the following prescription(s): acetaminophen, aspirin, blood glucose, fluorouracil, isosorbide  "mononitrate, lisinopril, metformin, metoprolol succinate er, multiple vitamin, nitroglycerin, polyethylene glycol, rosuvastatin, senna-docusate, and ketoconazole.     REVIEW OF SYSTEMS: 10 point review of systems is negative except as noted above.     Exam:   Ht 1.702 m (5' 7\")   Wt 72.1 kg (159 lb)   BMI 24.90 kg/m         CONSTITUTIONAL: healthy, alert and no distress  HEAD: Normocephalic. No masses, lesions, tenderness or abnormalities  SKIN: no suspicious lesions or rashes  GAIT: broad based and walker  NEUROLOGIC: Non-focal  PSYCHIATRIC: affect normal/bright    MUSCULOSKELETAL:   RIGHT KNEE: There is no effusion.  He has full range of motion but discomfort with full flexion.  There is no ligamentous laxity.  He does have some mild diffuse medial joint line tenderness.    LEFT KNEE: There is no effusion.  He has full range of motion but discomfort with full flexion.  There is no ligamentous laxity.  He does have mild to moderate diffuse medial joint line tenderness.       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  He has chronic kidney disease and not tolerant of nonsteroidal anti-inflammatory medications.  He also has type 2 diabetes and so we have discussed associated hyperglycemia with intra-articular corticosteroid injections.  I did discuss with him the options of either intra-articular corticosteroid injections or use of a medial  brace but in conjunction with the patient and his daughter and myself we elected to proceed with a trial of intra-articular corticosteroid injections and if those are not successful then to consider bracing.  " The daily application of the brace could be difficult and challenging for gel with an S1 of the barriers.    We discussed that he will have elevated blood sugars for the next 5 days and these usually peak at 48 hours.  If his fasting blood sugar is above 200 he will contact Dr. Poe's office for further advice.  He is aware that he should check fasting daily blood sugars for the next 5 days.  He will return in 2 weeks for a corticosteroid injection into the right knee if he remains symptomatic.    Thank you for allowing me to participate in his care.    Large Joint Injection/Arthocentesis: L knee joint  Date/Time: 6/7/2019 11:03 AM  Performed by: Kiran Melchor MD  Authorized by: Kiran Melchor MD     Indications:  Osteoarthritis  Needle Size:  22 G  Guidance: landmark guided    Approach:  Anterolateral  Location:  Knee      Medications:  40 mg triamcinolone 40 MG/ML; 4 mL lidocaine (PF) 1 %  Outcome:  Tolerated well, no immediate complications  Procedure discussed: discussed risks, benefits, and alternatives    Consent Given by:  Patient  Timeout: timeout called immediately prior to procedure    Prep: patient was prepped and draped in usual sterile fashion     Scribed by Renay Vergara MS, ATC for Dr. Melchor on 6/7/2019 at 11:04 AM, based on the provider s statements to me.                  Kiran Melchor MD

## 2019-06-07 NOTE — NURSING NOTE
[address] [address]  Dept: [phone]  ______________________________________________________________________________    Patient: Kiran Carcamo   : 1921   MRN: [id number]   2019    INVASIVE PROCEDURE SAFETY CHECKLIST    Date: 2019  Procedure: Left knee kenalog injection  Patient Name: Kiran Carcamo  MRN: [id number]  YOB: 1921    Action: Complete sections as appropriate. Any discrepancy results in a HARD COPY until resolved.     PRE PROCEDURE:  Patient ID verified with 2 identifiers (name and  or MRN): Yes  Procedure and site verified with patient/designee (when able): Yes  Accurate consent documentation in medical record: Yes  H&P (or appropriate assessment) documented in medical record: Yes  H&P must be up to 20 days prior to procedure and updates within 24 hours of procedure as applicable: NA  Relevant diagnostic and radiology test results appropriately labeled and displayed as applicable: Yes  Procedure site(s) marked with provider initials: NA    TIMEOUT:  Time-Out performed immediately prior to starting procedure, including verbal and active participation of all team members addressing the following:Yes  * Correct patient identify  * Confirmed that the correct side and site are marked  * An accurate procedure consent form  * Agreement on the procedure to be done  * Correct patient position  * Relevant images and results are properly labeled and appropriately displayed  * The need to administer antibiotics or fluids for irrigation purposes during the procedure as applicable   * Safety precautions based on patient history or medication use    DURING PROCEDURE: Verification of correct person, site, and procedures any time the responsibility for care of the patient is transferred to another member of the care team.       Prior to injection, verified patient identity using patient's name and date of  birth.  Due to injection administration, patient instructed to remain in clinic for 15 minutes  afterwards, and to report any adverse reaction to me immediately.    Joint injection was performed.      Drug Amount Wasted:  Yes: 26 mg/ml lidocaine and 0 mL kenalog  Vial/Syringe: Single dose vial  Expiration Date:  SEP 2021 and 02/2021      Renay Vergara, ATC  June 7, 2019

## 2019-06-07 NOTE — PROGRESS NOTES
Subjective:   Kiran Carcamo is a 89 year old male who presents with bilateral knee pain.  He has a long-standing history of stiffness and aching discomfort in his bilateral knees.  He feels his left knee is more bothersome than his right.  He notes that his symptoms are worse in the morning and as the day progresses they seem to improve.  He has not had any known trauma to his knees.  He is not had any previous interventions.  He does have both diabetes and chronic kidney disease which precludes him from taking nonsteroidals.  His daughters are very involved in his care.  Dr. Buddy Poe got radiographs as noted below.    Radiographs from 5/21/2019 were previously obtained and those are reviewed today.  Those demonstrate moderate to severe medial tibiofemoral degenerative changes bilaterally.    Background:   Duration of symptoms: 2 years  Mechanism of Injury: Chronic; Unknown   Aggravating factors: Walking  Relieving Factors: Nothing has been tried   Prior Evaluation: Dr. Poe, xrays    PAST MEDICAL, SOCIAL, SURGICAL AND FAMILY HISTORY: He  has a past medical history of CKD (chronic kidney disease) stage 2, GFR 60-89 ml/min (7/6/2015), Syncope (12/12/2011), and Type 2 diabetes, HbA1C goal < 8% (H) (2/23/2015).  He  has a past surgical history that includes coronary artery bypass ('05); Mandible surgery; and Cholecystectomy.  His family history includes Cancer in his brother and daughter; Eye Disorder in his father; Heart Disease in his mother and sister; Respiratory in his brother.  He reports that he has never smoked. He has never used smokeless tobacco. He reports that he does not drink alcohol or use drugs.    ALLERGIES: He has No Known Allergies.    CURRENT MEDICATIONS: He has a current medication list which includes the following prescription(s): acetaminophen, aspirin, blood glucose, fluorouracil, isosorbide mononitrate, lisinopril, metformin, metoprolol succinate er, multiple vitamin, nitroglycerin,  "polyethylene glycol, rosuvastatin, senna-docusate, and ketoconazole.     REVIEW OF SYSTEMS: 10 point review of systems is negative except as noted above.     Exam:   Ht 1.702 m (5' 7\")   Wt 72.1 kg (159 lb)   BMI 24.90 kg/m        CONSTITUTIONAL: healthy, alert and no distress  HEAD: Normocephalic. No masses, lesions, tenderness or abnormalities  SKIN: no suspicious lesions or rashes  GAIT: broad based and walker  NEUROLOGIC: Non-focal  PSYCHIATRIC: affect normal/bright    MUSCULOSKELETAL:   RIGHT KNEE: There is no effusion.  He has full range of motion but discomfort with full flexion.  There is no ligamentous laxity.  He does have some mild diffuse medial joint line tenderness.    LEFT KNEE: There is no effusion.  He has full range of motion but discomfort with full flexion.  There is no ligamentous laxity.  He does have mild to moderate diffuse medial joint line tenderness.       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  He has chronic kidney disease and not tolerant of nonsteroidal anti-inflammatory medications.  He also has type 2 diabetes and so we have discussed associated hyperglycemia with intra-articular corticosteroid injections.  I did discuss with him the options of either intra-articular corticosteroid injections or use of a medial  brace but in conjunction with the patient and his daughter and myself we elected to proceed with a trial of intra-articular corticosteroid injections and if those are not successful then to consider bracing.  The daily application of the brace could be difficult and challenging for gel with an S1 of " the barriers.    We discussed that he will have elevated blood sugars for the next 5 days and these usually peak at 48 hours.  If his fasting blood sugar is above 200 he will contact Dr. Poe's office for further advice.  He is aware that he should check fasting daily blood sugars for the next 5 days.  He will return in 2 weeks for a corticosteroid injection into the right knee if he remains symptomatic.    Thank you for allowing me to participate in his care.    Large Joint Injection/Arthocentesis: L knee joint  Date/Time: 6/7/2019 11:03 AM  Performed by: Kiran Melchor MD  Authorized by: Kiran Melchor MD     Indications:  Osteoarthritis  Needle Size:  22 G  Guidance: landmark guided    Approach:  Anterolateral  Location:  Knee      Medications:  40 mg triamcinolone 40 MG/ML; 4 mL lidocaine (PF) 1 %  Outcome:  Tolerated well, no immediate complications  Procedure discussed: discussed risks, benefits, and alternatives    Consent Given by:  Patient  Timeout: timeout called immediately prior to procedure    Prep: patient was prepped and draped in usual sterile fashion     Scribed by Renay Vergara MS, ATC for Dr. Melchor on 6/7/2019 at 11:04 AM, based on the provider s statements to me.

## 2019-06-19 ENCOUNTER — MYC REFILL (OUTPATIENT)
Dept: CARDIOLOGY | Facility: CLINIC | Age: 84
End: 2019-06-19

## 2019-06-19 ENCOUNTER — MYC REFILL (OUTPATIENT)
Dept: FAMILY MEDICINE | Facility: CLINIC | Age: 84
End: 2019-06-19

## 2019-06-19 DIAGNOSIS — I10 HTN (HYPERTENSION): ICD-10-CM

## 2019-06-19 DIAGNOSIS — I25.118 CORONARY ARTERY DISEASE OF NATIVE ARTERY OF NATIVE HEART WITH STABLE ANGINA PECTORIS (H): Primary | ICD-10-CM

## 2019-06-19 DIAGNOSIS — E11.9 TYPE 2 DIABETES MELLITUS WITHOUT COMPLICATION (H): ICD-10-CM

## 2019-06-20 NOTE — TELEPHONE ENCOUNTER
Prescription approved per [facility] Refill Protocol.  LOV: 05/15/2019    Tabby Qiu, RN  Triage Nurse

## 2019-06-20 NOTE — TELEPHONE ENCOUNTER
"Requested Prescriptions   Pending Prescriptions Disp Refills     blood glucose (ONETOUCH ULTRA) test strip 100 strip 1     Sig: USE TO TEST BLOOD SUGAR 2 TIMES DAILY OR AS DIRECTED.       Diabetic Supplies Protocol Passed - 6/19/2019 11:51 AM        Passed - Medication is active on med list        Passed - Patient is 18 years of age or older        Passed - Recent (6 mo) or future (30 days) visit within the authorizing provider's specialty     Patient had office visit in the last 6 months or has a visit in the next 30 days with authorizing provider.  See \"Patient Info\" tab in inbasket, or \"Choose Columns\" in Meds & Orders section of the refill encounter.            "

## 2019-06-21 ENCOUNTER — OFFICE VISIT (OUTPATIENT)
Dept: ORTHOPEDICS | Facility: CLINIC | Age: 84
End: 2019-06-21
Payer: COMMERCIAL

## 2019-06-21 VITALS — HEIGHT: 67 IN | WEIGHT: 159 LBS | BODY MASS INDEX: 24.96 KG/M2

## 2019-06-21 DIAGNOSIS — M17.11 PRIMARY OSTEOARTHRITIS OF RIGHT KNEE: Primary | ICD-10-CM

## 2019-06-21 DIAGNOSIS — E11.21 TYPE 2 DIABETES MELLITUS WITH DIABETIC NEPHROPATHY, WITHOUT LONG-TERM CURRENT USE OF INSULIN (H): ICD-10-CM

## 2019-06-21 RX ORDER — TRIAMCINOLONE ACETONIDE 40 MG/ML
40 INJECTION, SUSPENSION INTRA-ARTICULAR; INTRAMUSCULAR
Status: SHIPPED | OUTPATIENT
Start: 2019-06-21

## 2019-06-21 RX ORDER — LIDOCAINE HYDROCHLORIDE 10 MG/ML
4 INJECTION, SOLUTION EPIDURAL; INFILTRATION; INTRACAUDAL; PERINEURAL
Status: SHIPPED | OUTPATIENT
Start: 2019-06-21

## 2019-06-21 RX ORDER — METOPROLOL SUCCINATE 25 MG/1
12.5 TABLET, EXTENDED RELEASE ORAL DAILY
Qty: 45 TABLET | Refills: 3 | Status: SHIPPED | OUTPATIENT
Start: 2019-06-21 | End: 2019-07-11

## 2019-06-21 RX ADMIN — TRIAMCINOLONE ACETONIDE 40 MG: 40 INJECTION, SUSPENSION INTRA-ARTICULAR; INTRAMUSCULAR at 11:30

## 2019-06-21 RX ADMIN — LIDOCAINE HYDROCHLORIDE 4 ML: 10 INJECTION, SOLUTION EPIDURAL; INFILTRATION; INTRACAUDAL; PERINEURAL at 11:30

## 2019-06-21 ASSESSMENT — MIFFLIN-ST. JEOR: SCORE: 1299.85

## 2019-06-21 NOTE — NURSING NOTE
[address] [address]  Dept: [phone]  ______________________________________________________________________________    Patient: Kiran Carcamo   : 1921   MRN: [id number]   2019    INVASIVE PROCEDURE SAFETY CHECKLIST    Date: 2019   Procedure: Right knee kenalog injection  Patient Name: Kiran Carcamo  MRN: [id number]  YOB: 1921    Action: Complete sections as appropriate. Any discrepancy results in a HARD COPY until resolved.     PRE PROCEDURE:  Patient ID verified with 2 identifiers (name and  or MRN): Yes  Procedure and site verified with patient/designee (when able): Yes  Accurate consent documentation in medical record: Yes  H&P (or appropriate assessment) documented in medical record: Yes  H&P must be up to 20 days prior to procedure and updates within 24 hours of procedure as applicable: NA  Relevant diagnostic and radiology test results appropriately labeled and displayed as applicable: Yes  Procedure site(s) marked with provider initials: NA    TIMEOUT:  Time-Out performed immediately prior to starting procedure, including verbal and active participation of all team members addressing the following:Yes  * Correct patient identify  * Confirmed that the correct side and site are marked  * An accurate procedure consent form  * Agreement on the procedure to be done  * Correct patient position  * Relevant images and results are properly labeled and appropriately displayed  * The need to administer antibiotics or fluids for irrigation purposes during the procedure as applicable   * Safety precautions based on patient history or medication use    DURING PROCEDURE: Verification of correct person, site, and procedures any time the responsibility for care of the patient is transferred to another member of the care team.       Prior to injection, verified patient identity using patient's name and date of  birth.  Due to injection administration, patient instructed to remain in clinic for 15 minutes  afterwards, and to report any adverse reaction to me immediately.    Joint injection was performed.      Drug Amount Wasted:  Yes: 26 mg/ml lidocaine and 0 mL kenalog   Vial/Syringe: Single dose vial  Expiration Date:  SEP 2021 and 03/2021      Renay Vergara, ATC  June 21, 2019

## 2019-06-21 NOTE — PROGRESS NOTES
June 21, 2019: Kiran Carcamo is a 89 year old male who is seen in f/u up for right knee pain.  He had a good response to his left knee corticosteroid injection.  His peak blood sugars were 160.    On examination: Right knee has small effusion.  He has diffuse joint line tenderness.    Assessment: Knee osteoarthritis    Plan: We will proceed with a right knee intra-articular corticosteroid injection today.  He can receive injections 3-4 times per year as needed.    Large Joint Injection/Arthocentesis: R knee joint  Date/Time: 6/21/2019 11:30 AM  Performed by: Kiran Melchor MD  Authorized by: Kiran Melchor MD     Indications:  Osteoarthritis  Needle Size:  22 G  Guidance: landmark guided    Approach:  Anterolateral  Location:  Knee      Medications:  40 mg triamcinolone 40 MG/ML; 4 mL lidocaine (PF) 1 %  Outcome:  Tolerated well, no immediate complications  Procedure discussed: discussed risks, benefits, and alternatives    Consent Given by:  Patient  Timeout: timeout called immediately prior to procedure    Prep: patient was prepped and draped in usual sterile fashion     Scribed by Renay Vergara MS, ATC for Dr. Melchor on 6/21/2019 at 11:32 AM, based on the provider s statements to me.

## 2019-06-21 NOTE — LETTER
6/21/2019      RE: Kiran Carcamo  [address] [facility] [address]       June 21, 2019: Kiran Carcamo is a 89 year old male who is seen in f/u up for right knee pain.  He had a good response to his left knee corticosteroid injection.  His peak blood sugars were 160.    On examination: Right knee has small effusion.  He has diffuse joint line tenderness.    Assessment: Knee osteoarthritis    Plan: We will proceed with a right knee intra-articular corticosteroid injection today.  He can receive injections 3-4 times per year as needed.    Large Joint Injection/Arthocentesis: R knee joint  Date/Time: 6/21/2019 11:30 AM  Performed by: Kiran Melchor MD  Authorized by: Kiran Melchor MD     Indications:  Osteoarthritis  Needle Size:  22 G  Guidance: landmark guided    Approach:  Anterolateral  Location:  Knee      Medications:  40 mg triamcinolone 40 MG/ML; 4 mL lidocaine (PF) 1 %  Outcome:  Tolerated well, no immediate complications  Procedure discussed: discussed risks, benefits, and alternatives    Consent Given by:  Patient  Timeout: timeout called immediately prior to procedure    Prep: patient was prepped and draped in usual sterile fashion     Scribed by Renay Vergara MS, ATC for Dr. Melchor on 6/21/2019 at 11:32 AM, based on the provider s statements to me.      Kiran Melchor MD

## 2019-06-22 ENCOUNTER — NURSE TRIAGE (OUTPATIENT)
Dept: NURSING | Facility: CLINIC | Age: 84
End: 2019-06-22

## 2019-06-22 NOTE — TELEPHONE ENCOUNTER
Jes calling reporting patient had a cortisone shot yesterday in his right knee and has elevated blood sugar readings. Conferenced in caller with patient and  Karla. Karla reporting she is with patient and current blood sugar in past 10 minutes was 384 fasting. Denies symptoms. Patient took Metformin 500 mg 5 minutes ago. Reporting patient is currently taking Metformin 1 time a day since May 2019.    Paged on call Dr Scott through [employer] at 908 a.m. To call Oumou at [facility]. Dr Scott returned call advising to have patient increase Metformin 500mg to BID for the weekend (today and tomorrow).   Reviewed information with Karla. Caller verbalized understanding. Denies further questions.     Oumou Latham RN  [facility]    Reason for Disposition    [1] Blood glucose > 300 mg/dl (16.7 mmol/l) AND [2] two or more times in a row    Additional Information    Negative: Unconscious or difficult to awaken    Negative: Acting confused (e.g., disoriented, slurred speech)    Negative: Very weak (e.g., can't stand)    Negative: Sounds like a life-threatening emergency to the triager    Negative: [1] Vomiting AND [2] signs of dehydration (e.g., very dry mouth, lightheaded, etc.)    Negative: [1] Blood glucose > 240 mg/dl (13 mmol/l) AND [2] rapid breathing    Negative: Blood glucose > 500 mg/dl (27.5 mmol/l)    Negative: [1] Blood glucose > 240 mg/dl (13 mmol/l) AND [2] urine ketones moderate-large (or more than 1+)    Negative: [1] Blood glucose > 240 mg/dl (13 mmol/l) AND [2] blood ketones > 1.5 mmol/l    Negative: [1] Blood glucose > 240 mg/dl (13 mmol/l) AND [2] vomiting AND [3] unable to check for ketones (in blood or urine)    Negative: [1] New onset Diabetes suspected (e.g., frequent urination, weak, weight loss) AND [2] vomiting or rapid breathing    Negative: Vomiting lasts > 4 hours    Negative: Patient sounds very sick or weak to the triager    Negative: Fever > 100.5 F (38.1 C)    Negative: Blood  glucose > 400 mg/dl (22 mmol/l)    Protocols used: DIABETES - HIGH BLOOD SUGAR-A-AH

## 2019-07-22 ENCOUNTER — MYC REFILL (OUTPATIENT)
Dept: FAMILY MEDICINE | Facility: CLINIC | Age: 84
End: 2019-07-22

## 2019-07-22 DIAGNOSIS — I10 HYPERTENSION GOAL BP (BLOOD PRESSURE) < 140/90: ICD-10-CM

## 2019-07-23 NOTE — TELEPHONE ENCOUNTER
"Requested Prescriptions   Pending Prescriptions Disp Refills     lisinopril (PRINIVIL/ZESTRIL) 10 MG tablet 90 tablet 0     Sig: Take 1 tablet (10 mg) by mouth daily       ACE Inhibitors (Including Combos) Protocol Failed - 7/22/2019  1:43 PM        Failed - Blood pressure under 140/90 in past 12 months     BP Readings from Last 3 Encounters:   05/15/19 151/77   02/22/19 118/60   01/04/19 154/64                 Passed - Recent (12 mo) or future (30 days) visit within the authorizing provider's specialty     Patient had office visit in the last 12 months or has a visit in the next 30 days with authorizing provider or within the authorizing provider's specialty.  See \"Patient Info\" tab in inbasket, or \"Choose Columns\" in Meds & Orders section of the refill encounter.              Passed - Medication is active on med list        Passed - Patient is age 18 or older        Passed - Normal serum creatinine on file in past 12 months     Recent Labs   Lab Test 12/12/18  1548   CR 1.00             Passed - Normal serum potassium on file in past 12 months     Recent Labs   Lab Test 12/12/18  1548   POTASSIUM 4.3             "

## 2019-07-23 NOTE — TELEPHONE ENCOUNTER
Dr. Poe,  Please review/sign or advise for refill request of: lisinopril (PRINIVIL/ZESTRIL) 10 MG tablet    Routing refill request to provider for review/approval because:  BP not at goal.    Would you like MA only or office visit to follow up on HTN?    Thank You!  Tabby Qiu, RN  Triage Nurse

## 2019-07-24 RX ORDER — LISINOPRIL 10 MG/1
10 TABLET ORAL DAILY
Qty: 90 TABLET | Refills: 0 | Status: ON HOLD | OUTPATIENT
Start: 2019-07-24 | End: 2019-10-06

## 2019-07-29 ENCOUNTER — TELEPHONE (OUTPATIENT)
Dept: DERMATOLOGY | Facility: CLINIC | Age: 84
End: 2019-07-29

## 2019-07-30 ENCOUNTER — OFFICE VISIT (OUTPATIENT)
Dept: DERMATOLOGY | Facility: CLINIC | Age: 84
End: 2019-07-30
Payer: COMMERCIAL

## 2019-07-30 DIAGNOSIS — L85.3 XEROSIS OF SKIN: ICD-10-CM

## 2019-07-30 DIAGNOSIS — L21.9 DERMATITIS, SEBORRHEIC: Primary | ICD-10-CM

## 2019-07-30 DIAGNOSIS — L57.0 ACTINIC KERATOSIS: ICD-10-CM

## 2019-07-30 RX ORDER — KETOCONAZOLE 20 MG/ML
SHAMPOO TOPICAL DAILY
Qty: 120 ML | Refills: 3 | Status: SHIPPED | OUTPATIENT
Start: 2019-07-30 | End: 2020-09-23

## 2019-07-30 RX ORDER — EMOLLIENT BASE
CREAM (GRAM) TOPICAL DAILY
Qty: 453 G | Refills: 3 | Status: SHIPPED | OUTPATIENT
Start: 2019-07-30 | End: 2020-09-23

## 2019-07-30 ASSESSMENT — PAIN SCALES - GENERAL: PAINLEVEL: NO PAIN (0)

## 2019-07-30 NOTE — NURSING NOTE
Dermatology Rooming Note    Kiran Carcamo's goals for this visit include:   Chief Complaint   Patient presents with     Skin Check     Phong is here today for a recheck on AKs and a skin check.     Sara Cohen, CMA

## 2019-07-30 NOTE — LETTER
7/30/2019       RE: Kiran Carcamo  [address] [facility] [address]     Dear Colleague,    Thank you for referring your patient, Kiran Carcamo, to the [facility] DERMATOLOGY at [facility]. Please see a copy of my visit note below.    [facility] Dermatology Note    Dermatology Problem List:  1. AK  - LN2 8/6/18 to three areas of left cheek  - Previously used efudex cream on scalp  2. Seborrheic dermatitis  - Current Tx: ketoconazole shampoo  3. Onychomycosis and tinea pedis  - Treatment deferred, treat dryness with Vaseline and/or urea 10% cream    Encounter Date: Jul 30, 2019    CC:   6 month f/u for seborrheic dermatitis and AK    History of Present Illness:  Mr. Kiran Carcamo is a 89 year old male with past dermatologic history listed above who presents as a follow-up for AK and seborrheic dermatitis. The patient was last seen by Dr. Arevalo on 8/6/18 when he was started on efudex cream for scalp AKs and ketoconazole cream for seborrheic dermatitis. Presents with his daughters. Reports that he was able to use the creams for a couple months, but got confused about how to use them and stopped. Believes that the efudex helped with his scalp AKs. Has noted increased flakiness around his ears, his mouth, and eyebrows and associated erythema    Past Medical History:   Patient Active Problem List   Diagnosis     Health Care Home     AK (actinic keratosis)     SK (seborrheic keratosis)     Hyperplasia of prostate     Hyperlipidemia LDL goal <70     History of MI (myocardial infarction)     Forgetfulness     CKD (chronic kidney disease) stage 2, GFR 60-89 ml/min     Essential hypertension with goal blood pressure less than 140/90     Eczema, unspecified type     Type 2 diabetes mellitus with diabetic nephropathy, without long-term current use of insulin (H)     Paget's bone disease     History of skin cancer     Past Medical History:   Diagnosis  Date     CKD (chronic kidney disease) stage 2, GFR 60-89 ml/min 7/6/2015     Syncope 12/12/2011     Type 2 diabetes, HbA1C goal < 8% (H) 2/23/2015     Past Surgical History:   Procedure Laterality Date     CHOLECYSTECTOMY       CORONARY ARTERY BYPASS  '05    6-vessel     MANDIBLE SURGERY         Social History:  Patient  reports that he has never smoked. He has never used smokeless tobacco. He reports that he does not drink alcohol or use drugs.    Family History:  Family History   Problem Relation Age of Onset     Heart Disease Mother      Eye Disorder Father      Respiratory Brother      Heart Disease Sister      Cancer Daughter      Cancer Brother      Skin Cancer No family hx of      Melanoma No family hx of        Medications:  Current Outpatient Medications   Medication Sig Dispense Refill     acetaminophen (TYLENOL) 325 MG tablet Take 3 tablets (975 mg) by mouth every 8 hours as needed for mild pain 100 tablet 0     aspirin 81 MG EC tablet Take 81 mg by mouth At Bedtime.       blood glucose (ONETOUCH ULTRA) test strip USE TO TEST BLOOD SUGAR 2 TIMES DAILY OR AS DIRECTED. 100 strip 1     fluorouracil (EFUDEX) 5 % cream Apply topically daily Every evening for 4 weeks on the frontal scalp, then 2 week break, then another section of the scalp for 4 weeks, etc 40 g 2     isosorbide mononitrate (IMDUR) 60 MG 24 hr tablet Take 1 tablet (60 mg) by mouth daily Please get refills from primary care or schedule with new cardiologist. 90 tablet 3     lisinopril (PRINIVIL/ZESTRIL) 10 MG tablet Take 1 tablet (10 mg) by mouth daily 90 tablet 0     metFORMIN (GLUCOPHAGE) 500 MG tablet TAKE 1 TABLET (500 MG) BY MOUTH 2 TIMES DAILY (WITH MEALS) 180 tablet 2     metoprolol succinate ER (TOPROL-XL) 25 MG 24 hr tablet Take 0.5 tablets (12.5 mg) by mouth daily 45 tablet 3     Multiple Vitamin (MV-ONE PO) Take 1 tablet by mouth daily        nitroGLYcerin (NITROSTAT) 0.4 MG sublingual tablet Place 1 tablet (0.4 mg) under the tongue  every 5 minutes as needed for chest pain If symptoms after 3 doses (15 minutes) call 911. 25 tablet 1     polyethylene glycol (MIRALAX/GLYCOLAX) Packet Take 17 g by mouth daily as needed for constipation 10 packet 0     rosuvastatin (CRESTOR) 10 MG tablet Take 1 tablet (10 mg) by mouth daily 90 tablet 3     senna-docusate (SENOKOT-S;PERICOLACE) 8.6-50 MG per tablet Take 1 tablet by mouth 2 times daily 60 tablet 1     ketoconazole (NIZORAL) 2 % cream Apply topically 2 times daily To the eyebrows and around the nose/cheek area (Patient not taking: Reported on 5/15/2019) 60 g 0        No Known Allergies    Review of Systems:  - As per HPI  - Constitutional: Otherwise feeling well today, in usual state of health.  - Skin: As above in HPI. No additional skin concerns.  No oral sores    Physical exam:  Vitals: There were no vitals taken for this visit.  GEN: This is a well developed, well-nourished male in no acute distress, in a pleasant mood.    SKIN: Sun-exposed skin, which includes the head/face, neck, both arms, digits, and/or nails was examined.   - [place] Skin Type 2  - There is macular erythema of the posterior scalp, post-auricular, perioral, and nasolabial skin with moderate flaky white scale.  - There are erythematous macules with overyling adherent scale on the temples, lateral neck, and cheeks.   - There is xerosis of the forearms.   - No other lesions of concern on areas examined.     Impression/Plan:  1. Seborrheic dermatitis    Discussed the possible etiologies, clinical course, and management options of this benign condition with the patient.    Transitioned to ketoconazole 2% shampoo. Instructed to use as a lather on face/scalp daily to every other day    Prescription sent electronically    2. Actinic keratosis    Discussed the possible etiologies, clinical course, and management options of this condition with the patient. Improved after Efudex course.  Will continue to monitor, as after discussion  with patient, we agreed to defer any treatment today.  No lesions concerning for NMSC.    3. Xerosis    Discussed the possible etiologies, clinical course, and management options of this benign condition with the patient.    Recommended emollient (vanicream or aquaphor) to be used after a shower or bathing    CC Buddy Poe MD  [address]  [place] [address] on close of this encounter.    Follow-up prn for new or changing lesions.     Staff Involved:  Patient was seen and staffed with attending physician Dr. Marbin Nino MD  Med/Derm Resident PGY-2  P:[phone]    I have seen and examined this patient and agree with the assessment and plan as documented in the resident's note    Bienvenido Zazueta MD  Dermatology Attending

## 2019-07-30 NOTE — PROGRESS NOTES
[facility] Dermatology Note    Dermatology Problem List:  1. AK  - LN2 8/6/18 to three areas of left cheek  - Previously used efudex cream on scalp  2. Seborrheic dermatitis  - Current Tx: ketoconazole shampoo  3. Onychomycosis and tinea pedis  - Treatment deferred, treat dryness with Vaseline and/or urea 10% cream    Encounter Date: Jul 30, 2019    CC:   6 month f/u for seborrheic dermatitis and AK    History of Present Illness:  Mr. Kiran Carcamo is a 89 year old male with past dermatologic history listed above who presents as a follow-up for AK and seborrheic dermatitis. The patient was last seen by Dr. Arevalo on 8/6/18 when he was started on efudex cream for scalp AKs and ketoconazole cream for seborrheic dermatitis. Presents with his daughters. Reports that he was able to use the creams for a couple months, but got confused about how to use them and stopped. Believes that the efudex helped with his scalp AKs. Has noted increased flakiness around his ears, his mouth, and eyebrows and associated erythema    Past Medical History:   Patient Active Problem List   Diagnosis     Health Care Home     AK (actinic keratosis)     SK (seborrheic keratosis)     Hyperplasia of prostate     Hyperlipidemia LDL goal <70     History of MI (myocardial infarction)     Forgetfulness     CKD (chronic kidney disease) stage 2, GFR 60-89 ml/min     Essential hypertension with goal blood pressure less than 140/90     Eczema, unspecified type     Type 2 diabetes mellitus with diabetic nephropathy, without long-term current use of insulin (H)     Paget's bone disease     History of skin cancer     Past Medical History:   Diagnosis Date     CKD (chronic kidney disease) stage 2, GFR 60-89 ml/min 7/6/2015     Syncope 12/12/2011     Type 2 diabetes, HbA1C goal < 8% (H) 2/23/2015     Past Surgical History:   Procedure Laterality Date     CHOLECYSTECTOMY       CORONARY ARTERY BYPASS  '05    6-vessel     MANDIBLE  SURGERY         Social History:  Patient  reports that he has never smoked. He has never used smokeless tobacco. He reports that he does not drink alcohol or use drugs.    Family History:  Family History   Problem Relation Age of Onset     Heart Disease Mother      Eye Disorder Father      Respiratory Brother      Heart Disease Sister      Cancer Daughter      Cancer Brother      Skin Cancer No family hx of      Melanoma No family hx of        Medications:  Current Outpatient Medications   Medication Sig Dispense Refill     acetaminophen (TYLENOL) 325 MG tablet Take 3 tablets (975 mg) by mouth every 8 hours as needed for mild pain 100 tablet 0     aspirin 81 MG EC tablet Take 81 mg by mouth At Bedtime.       blood glucose (ONETOUCH ULTRA) test strip USE TO TEST BLOOD SUGAR 2 TIMES DAILY OR AS DIRECTED. 100 strip 1     fluorouracil (EFUDEX) 5 % cream Apply topically daily Every evening for 4 weeks on the frontal scalp, then 2 week break, then another section of the scalp for 4 weeks, etc 40 g 2     isosorbide mononitrate (IMDUR) 60 MG 24 hr tablet Take 1 tablet (60 mg) by mouth daily Please get refills from primary care or schedule with new cardiologist. 90 tablet 3     lisinopril (PRINIVIL/ZESTRIL) 10 MG tablet Take 1 tablet (10 mg) by mouth daily 90 tablet 0     metFORMIN (GLUCOPHAGE) 500 MG tablet TAKE 1 TABLET (500 MG) BY MOUTH 2 TIMES DAILY (WITH MEALS) 180 tablet 2     metoprolol succinate ER (TOPROL-XL) 25 MG 24 hr tablet Take 0.5 tablets (12.5 mg) by mouth daily 45 tablet 3     Multiple Vitamin (MV-ONE PO) Take 1 tablet by mouth daily        nitroGLYcerin (NITROSTAT) 0.4 MG sublingual tablet Place 1 tablet (0.4 mg) under the tongue every 5 minutes as needed for chest pain If symptoms after 3 doses (15 minutes) call 911. 25 tablet 1     polyethylene glycol (MIRALAX/GLYCOLAX) Packet Take 17 g by mouth daily as needed for constipation 10 packet 0     rosuvastatin (CRESTOR) 10 MG tablet Take 1 tablet (10 mg) by  mouth daily 90 tablet 3     senna-docusate (SENOKOT-S;PERICOLACE) 8.6-50 MG per tablet Take 1 tablet by mouth 2 times daily 60 tablet 1     ketoconazole (NIZORAL) 2 % cream Apply topically 2 times daily To the eyebrows and around the nose/cheek area (Patient not taking: Reported on 5/15/2019) 60 g 0        No Known Allergies    Review of Systems:  - As per HPI  - Constitutional: Otherwise feeling well today, in usual state of health.  - Skin: As above in HPI. No additional skin concerns.  No oral sores    Physical exam:  Vitals: There were no vitals taken for this visit.  GEN: This is a well developed, well-nourished male in no acute distress, in a pleasant mood.    SKIN: Sun-exposed skin, which includes the head/face, neck, both arms, digits, and/or nails was examined.   - [place] Skin Type 2  - There is macular erythema of the posterior scalp, post-auricular, perioral, and nasolabial skin with moderate flaky white scale.  - There are erythematous macules with overyling adherent scale on the temples, lateral neck, and cheeks.   - There is xerosis of the forearms.   - No other lesions of concern on areas examined.     Impression/Plan:  1. Seborrheic dermatitis    Discussed the possible etiologies, clinical course, and management options of this benign condition with the patient.    Transitioned to ketoconazole 2% shampoo. Instructed to use as a lather on face/scalp daily to every other day    Prescription sent electronically    2. Actinic keratosis    Discussed the possible etiologies, clinical course, and management options of this condition with the patient. Improved after Efudex course.  Will continue to monitor, as after discussion with patient, we agreed to defer any treatment today.  No lesions concerning for NMSC.    3. Xerosis    Discussed the possible etiologies, clinical course, and management options of this benign condition with the patient.    Recommended emollient (vanicream or aquaphor) to be used  after a shower or bathing    CC Buddy Poe MD  [address]  [place] [address] on close of this encounter.    Follow-up prn for new or changing lesions.     Staff Involved:  Patient was seen and staffed with attending physician Dr. Marbin Nino MD  Med/Derm Resident PGY-2  P:[phone]    I have seen and examined this patient and agree with the assessment and plan as documented in the resident's note    Bienvenido Zazueta MD  Dermatology Attending

## 2019-07-30 NOTE — PATIENT INSTRUCTIONS
1. Dry skin  - Can use a thick ointment (like aquaphor and vanicream) within 5-10 minutes after shower    2. Flakiness of skin  - Can use the ketoconazole shampoo as a lather on your face and scalp 3-4 times a week  - Rinse afterwards

## 2019-08-03 ENCOUNTER — MYC REFILL (OUTPATIENT)
Dept: FAMILY MEDICINE | Facility: CLINIC | Age: 84
End: 2019-08-03

## 2019-08-03 ENCOUNTER — MYC REFILL (OUTPATIENT)
Dept: CARDIOLOGY | Facility: CLINIC | Age: 84
End: 2019-08-03

## 2019-08-03 DIAGNOSIS — E11.21 TYPE 2 DIABETES MELLITUS WITH DIABETIC NEPHROPATHY, WITHOUT LONG-TERM CURRENT USE OF INSULIN (H): ICD-10-CM

## 2019-08-03 DIAGNOSIS — I25.118 CORONARY ARTERY DISEASE OF NATIVE ARTERY OF NATIVE HEART WITH STABLE ANGINA PECTORIS (H): ICD-10-CM

## 2019-08-03 DIAGNOSIS — R07.89 CHEST DISCOMFORT: ICD-10-CM

## 2019-08-03 RX ORDER — ISOSORBIDE MONONITRATE 60 MG/1
60 TABLET, EXTENDED RELEASE ORAL DAILY
Qty: 90 TABLET | Refills: 3 | Status: CANCELLED | OUTPATIENT
Start: 2019-08-03

## 2019-08-03 RX ORDER — ROSUVASTATIN CALCIUM 10 MG/1
10 TABLET, COATED ORAL DAILY
Qty: 90 TABLET | Refills: 3 | Status: CANCELLED | OUTPATIENT
Start: 2019-08-03

## 2019-08-05 NOTE — TELEPHONE ENCOUNTER
"Requested Prescriptions   Pending Prescriptions Disp Refills     metFORMIN (GLUCOPHAGE) 500 MG tablet 180 tablet 2     Sig: Take 1 tablet (500 mg) by mouth 2 times daily (with meals)       Biguanide Agents Failed - 8/3/2019  6:09 PM        Failed - Blood pressure less than 140/90 in past 6 months     BP Readings from Last 3 Encounters:   05/15/19 151/77   02/22/19 118/60   01/04/19 154/64                 Failed - Patient has documented A1c within the specified period of time.     If HgbA1C is 8 or greater, it needs to be on file within the past 3 months.  If less than 8, must be on file within the past 6 months.     Recent Labs   Lab Test 12/12/18  1548   A1C 7.4*             Passed - Patient has documented LDL within the past 12 mos.     Recent Labs   Lab Test 09/14/18  0930   LDL 38             Passed - Patient has had a Microalbumin in the past 15 mos.     Recent Labs   Lab Test 09/14/18  0935   MICROL 30   UMALCR 27.55*             Passed - Patient is age 10 or older        Passed - Patient's CR is NOT>1.4 OR Patient's EGFR is NOT<45 within past 12 mos.     Recent Labs   Lab Test 12/12/18  1548   GFRESTIMATED 69   GFRESTBLACK 84       Recent Labs   Lab Test 12/12/18  1548   CR 1.00             Passed - Patient does NOT have a diagnosis of CHF.        Passed - Medication is active on med list        Passed - Recent (6 mo) or future (30 days) visit within the authorizing provider's specialty     Patient had office visit in the last 6 months or has a visit in the next 30 days with authorizing provider or within the authorizing provider's specialty.  See \"Patient Info\" tab in inbasket, or \"Choose Columns\" in Meds & Orders section of the refill encounter.            "

## 2019-08-05 NOTE — TELEPHONE ENCOUNTER
Dr. Poe,     Please review/sign or advise for refill of metFORMIN (GLUCOPHAGE) 500 MG tablet.    Routing because A1c failing and BP. Pt does not have any upcoming appts at this time.    Stephanie Galeas RN  [facility]

## 2019-08-07 ENCOUNTER — TELEPHONE (OUTPATIENT)
Dept: FAMILY MEDICINE | Facility: CLINIC | Age: 84
End: 2019-08-07

## 2019-08-07 NOTE — TELEPHONE ENCOUNTER
Dr Poe    Are you ok with pt getting foot care at home with [facility] , he has dx DM    Marilyn Johnston, RN   [facility]

## 2019-08-07 NOTE — TELEPHONE ENCOUNTER
Reason for call:  Order   Order or referral being requested: Verbal  Reason for request:   Verbal okay to provide private pay foot care services as requested in patient's home.     Date needed: as soon as possible  Has the patient been seen by the PCP for this problem? Unable to answer    Additional comments:   [facility]: [phone]  Mike     Phone number to reach patient:  Other phone number:      Best Time:  any    Can we leave a detailed message on this number?  YES

## 2019-08-08 DIAGNOSIS — R07.89 CHEST DISCOMFORT: ICD-10-CM

## 2019-08-08 RX ORDER — ISOSORBIDE MONONITRATE 60 MG/1
60 TABLET, EXTENDED RELEASE ORAL DAILY
Qty: 90 TABLET | Refills: 1 | Status: ON HOLD | OUTPATIENT
Start: 2019-08-08 | End: 2019-10-06

## 2019-08-08 NOTE — TELEPHONE ENCOUNTER
[facility] Call Center    Phone Message    May a detailed message be left on voicemail: yes    Reason for Call: Medication Refill Request    Has the patient contacted the pharmacy for the refill? Yes   Name of medication being requested: isosorbide mononitrate (IMDUR) 60 MG 24 hr   Provider who prescribed the medication: Tasia Zayas  Pharmacy: [facility] - [place], [place] - [facility] [address]  Date medication is needed: as soon as possible           Action Taken: Message routed to:  [facility] ([facility] cardio

## 2019-08-09 NOTE — TELEPHONE ENCOUNTER
VM left for Mike CARROLL, [facility], orders approved per Dr Deepika Johnston, RN   [facility]

## 2019-08-14 ENCOUNTER — TELEPHONE (OUTPATIENT)
Dept: FAMILY MEDICINE | Facility: CLINIC | Age: 84
End: 2019-08-14

## 2019-08-14 NOTE — TELEPHONE ENCOUNTER
Reason for call:  Order   Order or referral being requested: 12 PRN visits for foot care  Reason for request: Nail Care  Date needed: as soon as possible  Has the patient been seen by the PCP for this problem? Not Applicable    Additional comments: N/A    Phone number to reach patient:  Other phone number:  [phone]    Best Time:  N/A    Can we leave a detailed message on this number?  YES

## 2019-08-14 NOTE — TELEPHONE ENCOUNTER
verbal ok for PP HC given to Charissa [facility] foot care, message left on her secure vm    Marilyn Johnston RN   [facility]

## 2019-08-19 PROBLEM — L21.9 DERMATITIS, SEBORRHEIC: Status: ACTIVE | Noted: 2019-08-19

## 2019-09-28 ENCOUNTER — HEALTH MAINTENANCE LETTER (OUTPATIENT)
Age: 84
End: 2019-09-28

## 2019-10-05 ENCOUNTER — APPOINTMENT (OUTPATIENT)
Dept: CT IMAGING | Facility: CLINIC | Age: 84
End: 2019-10-05
Attending: EMERGENCY MEDICINE
Payer: COMMERCIAL

## 2019-10-05 ENCOUNTER — OFFICE VISIT (OUTPATIENT)
Dept: URGENT CARE | Facility: URGENT CARE | Age: 84
End: 2019-10-05
Payer: COMMERCIAL

## 2019-10-05 ENCOUNTER — APPOINTMENT (OUTPATIENT)
Dept: GENERAL RADIOLOGY | Facility: CLINIC | Age: 84
End: 2019-10-05
Attending: EMERGENCY MEDICINE
Payer: COMMERCIAL

## 2019-10-05 ENCOUNTER — HOSPITAL ENCOUNTER (OUTPATIENT)
Facility: CLINIC | Age: 84
Setting detail: OBSERVATION
Discharge: HOME | End: 2019-10-06
Attending: EMERGENCY MEDICINE | Admitting: EMERGENCY MEDICINE
Payer: COMMERCIAL

## 2019-10-05 VITALS
DIASTOLIC BLOOD PRESSURE: 80 MMHG | SYSTOLIC BLOOD PRESSURE: 134 MMHG | HEART RATE: 58 BPM | OXYGEN SATURATION: 99 % | TEMPERATURE: 98.4 F

## 2019-10-05 DIAGNOSIS — I12.9 MALIGNANT HYPERTENSIVE KIDNEY DISEASE WITH CHRONIC KIDNEY DISEASE STAGE I THROUGH STAGE IV, OR UNSPECIFIED(403.00): ICD-10-CM

## 2019-10-05 DIAGNOSIS — E11.22 TYPE 2 DIABETES MELLITUS WITH DIABETIC CHRONIC KIDNEY DISEASE, UNSPECIFIED CKD STAGE, UNSPECIFIED WHETHER LONG TERM INSULIN USE (H): ICD-10-CM

## 2019-10-05 DIAGNOSIS — R55 SYNCOPE, UNSPECIFIED SYNCOPE TYPE: Primary | ICD-10-CM

## 2019-10-05 DIAGNOSIS — Z87.891 PERSONAL HISTORY OF TOBACCO USE, PRESENTING HAZARDS TO HEALTH: ICD-10-CM

## 2019-10-05 DIAGNOSIS — Z53.9 DIAGNOSIS NOT YET DEFINED: Primary | ICD-10-CM

## 2019-10-05 DIAGNOSIS — R55 RECURRENT SYNCOPE: ICD-10-CM

## 2019-10-05 DIAGNOSIS — I25.10 ATHEROSCLEROSIS OF NATIVE CORONARY ARTERY, ANGINA PRESENCE UNSPECIFIED, UNSPECIFIED WHETHER NATIVE OR TRANSPLANTED HEART: ICD-10-CM

## 2019-10-05 DIAGNOSIS — N18.2 CHRONIC KIDNEY DISEASE, STAGE II (MILD): ICD-10-CM

## 2019-10-05 DIAGNOSIS — Z95.1 STATUS POST CORONARY ARTERY BYPASS GRAFT: ICD-10-CM

## 2019-10-05 DIAGNOSIS — E11.21 TYPE 2 DIABETES MELLITUS WITH DIABETIC NEPHROPATHY, WITHOUT LONG-TERM CURRENT USE OF INSULIN (H): ICD-10-CM

## 2019-10-05 LAB
ALBUMIN SERPL-MCNC: 3.6 G/DL (ref 3.4–5)
ALBUMIN UR-MCNC: 10 MG/DL
ALP SERPL-CCNC: 102 U/L (ref 40–150)
ALT SERPL W P-5'-P-CCNC: 26 U/L (ref 0–70)
ANION GAP SERPL CALCULATED.3IONS-SCNC: 6 MMOL/L (ref 3–14)
APPEARANCE UR: CLEAR
AST SERPL W P-5'-P-CCNC: 13 U/L (ref 0–45)
BASOPHILS # BLD AUTO: 0 10E9/L (ref 0–0.2)
BASOPHILS NFR BLD AUTO: 0.4 %
BILIRUB SERPL-MCNC: 0.5 MG/DL (ref 0.2–1.3)
BILIRUB UR QL STRIP: NEGATIVE
BUN SERPL-MCNC: 30 MG/DL (ref 7–30)
CALCIUM SERPL-MCNC: 9.1 MG/DL (ref 8.5–10.1)
CHLORIDE SERPL-SCNC: 106 MMOL/L (ref 94–109)
CO2 BLDCOV-SCNC: 27 MMOL/L (ref 21–28)
CO2 SERPL-SCNC: 29 MMOL/L (ref 20–32)
COLOR UR AUTO: YELLOW
CREAT SERPL-MCNC: 1.09 MG/DL (ref 0.66–1.25)
DIFFERENTIAL METHOD BLD: ABNORMAL
EOSINOPHIL # BLD AUTO: 0.2 10E9/L (ref 0–0.7)
EOSINOPHIL NFR BLD AUTO: 2 %
ERYTHROCYTE [DISTWIDTH] IN BLOOD BY AUTOMATED COUNT: 13.1 % (ref 10–15)
ERYTHROCYTE [DISTWIDTH] IN BLOOD BY AUTOMATED COUNT: 13.2 % (ref 10–15)
GFR SERPL CREATININE-BSD FRML MDRD: 56 ML/MIN/{1.73_M2}
GLUCOSE BLDC GLUCOMTR-MCNC: 222 MG/DL (ref 70–99)
GLUCOSE SERPL-MCNC: 168 MG/DL (ref 70–99)
GLUCOSE UR STRIP-MCNC: NEGATIVE MG/DL
HCT VFR BLD AUTO: 35.7 % (ref 40–53)
HCT VFR BLD AUTO: 38.7 % (ref 40–53)
HGB BLD-MCNC: 11.4 G/DL (ref 13.3–17.7)
HGB BLD-MCNC: 12.4 G/DL (ref 13.3–17.7)
HGB UR QL STRIP: NEGATIVE
IMM GRANULOCYTES # BLD: 0.1 10E9/L (ref 0–0.4)
IMM GRANULOCYTES NFR BLD: 0.5 %
KETONES UR STRIP-MCNC: NEGATIVE MG/DL
LACTATE BLD-SCNC: 1.6 MMOL/L (ref 0.7–2.1)
LEUKOCYTE ESTERASE UR QL STRIP: NEGATIVE
LYMPHOCYTES # BLD AUTO: 1.7 10E9/L (ref 0.8–5.3)
LYMPHOCYTES NFR BLD AUTO: 16.8 %
MCH RBC QN AUTO: 32.5 PG (ref 26.5–33)
MCH RBC QN AUTO: 32.7 PG (ref 26.5–33)
MCHC RBC AUTO-ENTMCNC: 31.9 G/DL (ref 31.5–36.5)
MCHC RBC AUTO-ENTMCNC: 32 G/DL (ref 31.5–36.5)
MCV RBC AUTO: 102 FL (ref 78–100)
MCV RBC AUTO: 102 FL (ref 78–100)
MONOCYTES # BLD AUTO: 0.8 10E9/L (ref 0–1.3)
MONOCYTES NFR BLD AUTO: 7.8 %
MUCOUS THREADS #/AREA URNS LPF: PRESENT /LPF
NEUTROPHILS # BLD AUTO: 7.2 10E9/L (ref 1.6–8.3)
NEUTROPHILS NFR BLD AUTO: 72.5 %
NITRATE UR QL: NEGATIVE
NRBC # BLD AUTO: 0 10*3/UL
NRBC BLD AUTO-RTO: 0 /100
NT-PROBNP SERPL-MCNC: 537 PG/ML (ref 0–1800)
PCO2 BLDV: 49 MM HG (ref 40–50)
PH BLDV: 7.35 PH (ref 7.32–7.43)
PH UR STRIP: 5.5 PH (ref 5–7)
PLATELET # BLD AUTO: 166 10E9/L (ref 150–450)
PLATELET # BLD AUTO: 192 10E9/L (ref 150–450)
PO2 BLDV: 17 MM HG (ref 25–47)
POTASSIUM SERPL-SCNC: 4.1 MMOL/L (ref 3.4–5.3)
PROT SERPL-MCNC: 7 G/DL (ref 6.8–8.8)
RBC # BLD AUTO: 3.49 10E12/L (ref 4.4–5.9)
RBC # BLD AUTO: 3.81 10E12/L (ref 4.4–5.9)
RBC #/AREA URNS AUTO: <1 /HPF (ref 0–2)
SAO2 % BLDV FROM PO2: 22 %
SODIUM SERPL-SCNC: 141 MMOL/L (ref 133–144)
SOURCE: ABNORMAL
SP GR UR STRIP: 1.01 (ref 1–1.03)
TROPONIN I BLD-MCNC: 0.02 UG/L (ref 0–0.08)
TROPONIN I SERPL-MCNC: <0.015 UG/L (ref 0–0.04)
TROPONIN I SERPL-MCNC: <0.015 UG/L (ref 0–0.04)
UROBILINOGEN UR STRIP-MCNC: NORMAL MG/DL (ref 0–2)
WBC # BLD AUTO: 9.8 10E9/L (ref 4–11)
WBC # BLD AUTO: 9.9 10E9/L (ref 4–11)
WBC #/AREA URNS AUTO: <1 /HPF (ref 0–5)

## 2019-10-05 PROCEDURE — 71046 X-RAY EXAM CHEST 2 VIEWS: CPT

## 2019-10-05 PROCEDURE — 25000128 H RX IP 250 OP 636: Performed by: EMERGENCY MEDICINE

## 2019-10-05 PROCEDURE — 83605 ASSAY OF LACTIC ACID: CPT

## 2019-10-05 PROCEDURE — 96361 HYDRATE IV INFUSION ADD-ON: CPT | Performed by: EMERGENCY MEDICINE

## 2019-10-05 PROCEDURE — 85025 COMPLETE CBC W/AUTO DIFF WBC: CPT | Performed by: EMERGENCY MEDICINE

## 2019-10-05 PROCEDURE — 70498 CT ANGIOGRAPHY NECK: CPT

## 2019-10-05 PROCEDURE — 36415 COLL VENOUS BLD VENIPUNCTURE: CPT | Performed by: NURSE PRACTITIONER

## 2019-10-05 PROCEDURE — 82803 BLOOD GASES ANY COMBINATION: CPT

## 2019-10-05 PROCEDURE — 93308 TTE F-UP OR LMTD: CPT | Mod: 26 | Performed by: EMERGENCY MEDICINE

## 2019-10-05 PROCEDURE — 93308 TTE F-UP OR LMTD: CPT | Performed by: EMERGENCY MEDICINE

## 2019-10-05 PROCEDURE — 84484 ASSAY OF TROPONIN QUANT: CPT | Mod: 91 | Performed by: NURSE PRACTITIONER

## 2019-10-05 PROCEDURE — 84484 ASSAY OF TROPONIN QUANT: CPT

## 2019-10-05 PROCEDURE — 25800030 ZZH RX IP 258 OP 636: Performed by: NURSE PRACTITIONER

## 2019-10-05 PROCEDURE — 70450 CT HEAD/BRAIN W/O DYE: CPT

## 2019-10-05 PROCEDURE — 83880 ASSAY OF NATRIURETIC PEPTIDE: CPT | Performed by: EMERGENCY MEDICINE

## 2019-10-05 PROCEDURE — 00000146 ZZHCL STATISTIC GLUCOSE BY METER IP

## 2019-10-05 PROCEDURE — 80053 COMPREHEN METABOLIC PANEL: CPT | Performed by: EMERGENCY MEDICINE

## 2019-10-05 PROCEDURE — 93005 ELECTROCARDIOGRAM TRACING: CPT | Performed by: EMERGENCY MEDICINE

## 2019-10-05 PROCEDURE — 99219 ZZC INITIAL OBSERVATION CARE,LEVL II: CPT | Mod: Z6 | Performed by: NURSE PRACTITIONER

## 2019-10-05 PROCEDURE — 96372 THER/PROPH/DIAG INJ SC/IM: CPT

## 2019-10-05 PROCEDURE — 25000132 ZZH RX MED GY IP 250 OP 250 PS 637: Performed by: NURSE PRACTITIONER

## 2019-10-05 PROCEDURE — 93010 ELECTROCARDIOGRAM REPORT: CPT | Mod: Z6 | Performed by: EMERGENCY MEDICINE

## 2019-10-05 PROCEDURE — 25000128 H RX IP 250 OP 636: Performed by: STUDENT IN AN ORGANIZED HEALTH CARE EDUCATION/TRAINING PROGRAM

## 2019-10-05 PROCEDURE — 81001 URINALYSIS AUTO W/SCOPE: CPT | Performed by: EMERGENCY MEDICINE

## 2019-10-05 PROCEDURE — 96360 HYDRATION IV INFUSION INIT: CPT | Performed by: EMERGENCY MEDICINE

## 2019-10-05 PROCEDURE — 84484 ASSAY OF TROPONIN QUANT: CPT | Performed by: EMERGENCY MEDICINE

## 2019-10-05 PROCEDURE — 85027 COMPLETE CBC AUTOMATED: CPT | Performed by: NURSE PRACTITIONER

## 2019-10-05 PROCEDURE — 99285 EMERGENCY DEPT VISIT HI MDM: CPT | Mod: 25 | Performed by: EMERGENCY MEDICINE

## 2019-10-05 PROCEDURE — G0378 HOSPITAL OBSERVATION PER HR: HCPCS

## 2019-10-05 RX ORDER — ACETAMINOPHEN 325 MG/1
650 TABLET ORAL EVERY 4 HOURS PRN
Status: DISCONTINUED | OUTPATIENT
Start: 2019-10-05 | End: 2019-10-06 | Stop reason: HOSPADM

## 2019-10-05 RX ORDER — NICOTINE POLACRILEX 4 MG
15-30 LOZENGE BUCCAL
Status: DISCONTINUED | OUTPATIENT
Start: 2019-10-05 | End: 2019-10-06 | Stop reason: HOSPADM

## 2019-10-05 RX ORDER — LIDOCAINE 40 MG/G
CREAM TOPICAL
Status: DISCONTINUED | OUTPATIENT
Start: 2019-10-05 | End: 2019-10-06 | Stop reason: HOSPADM

## 2019-10-05 RX ORDER — IOPAMIDOL 755 MG/ML
75 INJECTION, SOLUTION INTRAVASCULAR ONCE
Status: COMPLETED | OUTPATIENT
Start: 2019-10-05 | End: 2019-10-05

## 2019-10-05 RX ORDER — ACETAMINOPHEN 650 MG/1
650 SUPPOSITORY RECTAL EVERY 4 HOURS PRN
Status: DISCONTINUED | OUTPATIENT
Start: 2019-10-05 | End: 2019-10-06

## 2019-10-05 RX ORDER — ASPIRIN 81 MG/1
81 TABLET ORAL AT BEDTIME
Status: DISCONTINUED | OUTPATIENT
Start: 2019-10-05 | End: 2019-10-06 | Stop reason: HOSPADM

## 2019-10-05 RX ORDER — DEXTROSE MONOHYDRATE 25 G/50ML
25-50 INJECTION, SOLUTION INTRAVENOUS
Status: DISCONTINUED | OUTPATIENT
Start: 2019-10-05 | End: 2019-10-06 | Stop reason: HOSPADM

## 2019-10-05 RX ORDER — POLYETHYLENE GLYCOL 3350 17 G/17G
17 POWDER, FOR SOLUTION ORAL DAILY PRN
Status: DISCONTINUED | OUTPATIENT
Start: 2019-10-05 | End: 2019-10-06 | Stop reason: HOSPADM

## 2019-10-05 RX ORDER — SODIUM CHLORIDE 9 MG/ML
INJECTION, SOLUTION INTRAVENOUS CONTINUOUS
Status: DISCONTINUED | OUTPATIENT
Start: 2019-10-05 | End: 2019-10-06 | Stop reason: HOSPADM

## 2019-10-05 RX ORDER — NITROGLYCERIN 0.4 MG/1
0.4 TABLET SUBLINGUAL EVERY 5 MIN PRN
Status: DISCONTINUED | OUTPATIENT
Start: 2019-10-05 | End: 2019-10-06 | Stop reason: HOSPADM

## 2019-10-05 RX ORDER — ONDANSETRON 4 MG/1
4 TABLET, ORALLY DISINTEGRATING ORAL EVERY 6 HOURS PRN
Status: DISCONTINUED | OUTPATIENT
Start: 2019-10-05 | End: 2019-10-06 | Stop reason: HOSPADM

## 2019-10-05 RX ORDER — AMOXICILLIN 250 MG
1 CAPSULE ORAL 2 TIMES DAILY
Status: DISCONTINUED | OUTPATIENT
Start: 2019-10-05 | End: 2019-10-06 | Stop reason: HOSPADM

## 2019-10-05 RX ORDER — ONDANSETRON 2 MG/ML
4 INJECTION INTRAMUSCULAR; INTRAVENOUS EVERY 6 HOURS PRN
Status: DISCONTINUED | OUTPATIENT
Start: 2019-10-05 | End: 2019-10-06 | Stop reason: HOSPADM

## 2019-10-05 RX ORDER — ROSUVASTATIN CALCIUM 10 MG/1
10 TABLET, COATED ORAL DAILY
Status: DISCONTINUED | OUTPATIENT
Start: 2019-10-05 | End: 2019-10-06 | Stop reason: HOSPADM

## 2019-10-05 RX ADMIN — SODIUM CHLORIDE: 9 INJECTION, SOLUTION INTRAVENOUS at 21:00

## 2019-10-05 RX ADMIN — IOPAMIDOL 75 ML: 755 INJECTION, SOLUTION INTRAVENOUS at 13:36

## 2019-10-05 RX ADMIN — SENNOSIDES AND DOCUSATE SODIUM 1 TABLET: 8.6; 5 TABLET ORAL at 21:01

## 2019-10-05 RX ADMIN — ASPIRIN 81 MG: 81 TABLET, COATED ORAL at 21:01

## 2019-10-05 RX ADMIN — ROSUVASTATIN CALCIUM 10 MG: 10 TABLET, FILM COATED ORAL at 21:01

## 2019-10-05 RX ADMIN — SODIUM CHLORIDE 1000 ML: 9 INJECTION, SOLUTION INTRAVENOUS at 15:16

## 2019-10-05 ASSESSMENT — ENCOUNTER SYMPTOMS
FEVER: 0
APPETITE CHANGE: 0
SHORTNESS OF BREATH: 0
COUGH: 1
ACTIVITY CHANGE: 0
NAUSEA: 0
NECK PAIN: 0
HEADACHES: 1
CONFUSION: 0
SPEECH DIFFICULTY: 0
VOMITING: 0
WEAKNESS: 1
DIZZINESS: 1
ABDOMINAL PAIN: 0
LIGHT-HEADEDNESS: 1
FREQUENCY: 0
DYSURIA: 0
DIARRHEA: 0

## 2019-10-05 ASSESSMENT — MIFFLIN-ST. JEOR: SCORE: 1311.19

## 2019-10-05 NOTE — PROGRESS NOTES
"Social Work: Assessment with Discharge Plan    Patient Name:  Kiran Carcamo  :  1921  Age:  89 year old  MRN:  [id number]  Risk/Complexity Score:     Completed assessment with:  Patient, Family, Chart Review, ED RN    Presenting Information   Reason for Referral:  Potential need for community services upon discharge  Date of Intake:  2019  Referral Source:  Chart Review  Decision Maker:  Patient   Alternate Decision Maker:  Daughters Karla and Jes  Health Care Directive:  Patient considering completing, Currently working with  to complete HCD/Will/POA  Living Situation:  House  Previous Functional Status:  Assistance with Transportation:  Phong stopped driving about 1 year ago and now gets transportation from his two daughters. , Housekeeping:  Support from family, Medication Management:  Support from family and Appointments:  Support from family  Patient and family understanding of hospitalization:  \"He is coming down with a cold, more weak, and falling because of weakness\"  Cultural/Language/Spiritual Considerations:  Phong is [employer] and goes to [employer] everyday to maintain social relationships.   Adjustment to Illness:  Family notes that Phong does get frustrated about his functional status but not to the point that he is not coping with it.      Physical Health  Reason for Admission:    1. Recurrent syncope      Services Needed/Recommended:  Home with homecare VS TCU depending on patients level of weakness when discharging.     Mental Health/Chemical Dependency  Diagnosis:  No MH/CD diagnosis  Support/Services in Place:    Services Needed/Recommended:      Support System  Significant relationship at present time:  Phong is close with his family, neighbors, and parishioners from his [employer].   Family of origin is available for support:  Two of Phong's daughters live within minutes/miles of his home and go to his house daily for multiple hours a day. They also provide all transportation needed. "   Other support available:  Phong has been receiving services from [facility]. Daughter Jes notes that they come for nail trims. Phong also uses [employer], a monitoring program  Gaps in support system:  Family reports that additional supports in the home would be helpful. Phong's goal is to stay in the home until death. Family feels that might not be an option and have begun looking into [facility]. Phong is not interested in moving. Family also note that Phong has financial means to have additional supports in the home but that Phong is not as agreeable to outside help.   Patient is caregiver to:  None     Provider Information   Primary Care Physician:  Buddy Poe   [phone]   Clinic:  [address] [place] [id number]      :  [facility]    Financial   Income Source:  [facility], and several rental properties (duplexes) in the city.   Financial Concerns:  None  Insurance:    Payor/Plan Subscriber Name Rel Member # Group #   MEDICA - MEDICA ADVAN* NURY CHRISTENSEN  [id number]       [id number]       Discharge Plan   Patient and family discharge goal:  To have Phong go home and and be safe in the home.   Provided education on discharge plan:  YES  Patient agreeable to discharge plan:  NA-Discharge plan TBD  A list of Medicare Certified Facilities was provided to the patient and/or family to encourage patient choice. Patient's choices for facility are:  Pending patients level of strength at discharge, patient and family do not want to go to an inpatient program and would like to seek OT/PT in the clinic.   Will NH provide Skilled rehabilitation or complex medical:  NA  General information regarding anticipated insurance coverage and possible out of pocket cost was discussed. Patient and patient's family are aware patient may incur the cost of transportation to the facility, pending insurance payment: Not discussed  Barriers to discharge:  TBD based on patient condition at  "discharge.     Discharge Recommendations   Anticipated Disposition:  TBD   Transportation Needs:  Other:  TBD  Name of Transportation Company and Phone:      Additional comments     Kiran Carcamo is a 89 year old male that is being admitted to the Obs Unit from the ED today for frequent recent falls at home. ED SW identified that SW assessment would be appropriate at this time. I met with the patient and his family (daughter Jes, and daughter/son-in-law from [place] who are here visiting. Daughter Jes and daughter Karla are primary caregivers as he other children live out of state and country. Phong shared that he was at home in bed and needed to go to the bathroom. When he attempted to get up, he was lightheaded and lost his balance. He was able to crawl to the phone and call daughter Jes to come help him. Jes notes that he has been falling more often and wonders if he is getting a cold or \"fighting something\" and that is making him more weak.     Phong shared that he mostly uses a cane when at home and a walker when he goes out. He was not using either when he fell. Phong also has an alert bracelet that he wears that his daughters can monitor his activity with. It does not sound like he was wearing it at the time of the fall. Phong stopped driving approx. 1 year ago but still gets around via his daughters. He goes to [employer] everyday and enjoys the social aspects of attending. He likes sports but does not watch TV often. Jes and Karla live within a mile/few minutes of him and go to his house everyday. Phong has [facility] come to his home but they mainly focus on nail care. He has no previous history of MH/CD concerns but his did note that when he falls, he is very fearful. Phong would like to stay in his home for as long possible and has a goal of living to be 89. Family is looking at [facility] as an alternative if he cannot not stay home.      KRYSTA Rodriguez. [facility].   Clinical , Emergency " Department   [facility]  Pager: [phone] Mon-Sat 9 am - 9 pm  On-call/after hours pager [phone]

## 2019-10-05 NOTE — ED TRIAGE NOTES
Patient presents with syncopal episodes, reports 3 over the last 3 days. Has fallen but denies hitting head.

## 2019-10-05 NOTE — H&P
[facility], [place]    History and Physical - Emergency Department Observation Unit       Date of Admission:  10/5/2019    Assessment & Plan   Kiran Carcamo is a 89 year old male admitted on 10/5/2019. He  has a past medical history of CKD (chronic kidney disease) stage 2, GFR 60-89 ml/min (7/6/2015), Syncope (12/12/2011), and Type 2 diabetes, HbA1C goal < 8% (H) (2/23/2015).  He  has a past surgical history that includes coronary artery bypass ('05); Mandible surgery; and Cholecystectomy. He presents to the ED today after a syncopal event.     1. Syncope with hypovolemia: Patient's family reports the patient has been having lightheadedness with position changes. None of his falls have been witnessed. Patient lives alone, but his daughter visits daily.  He denies any associated chest pain, palpitations, SOB, NAIR, dizziness prior. Pt remembers all occurrences surrounding the fall. He notes each object in the room as he fell, how he landed and how he had to get on all fours to get back up. He reports left sided rib pain that caused him a sharp pain today which he feels made him loose his balance and feel dizzy and fall today. He has fallen often in the last 2-3 days.    No loss of bowel or bladder. No abrasions on hands or elsewhere to think he protected himself from fall. This morning, the patient had lightheadedness when getting up out of bed to go use the bathroom and fell. Pt has HR 40s at baseline for at least past several yrs on metoprolol ER 12.5 mg daily.. Last Echo completed 11/10/2011: The LVEF is 55%. Inferior wall hypokinesis is present. Global right ventricular function is normal. Mild to moderate mitral insufficiency is present. The IVC is fully collapsed, c/w reduced intravascular volume. No pericardial effusion is present. In the ED: Vitals:BP: 134/80 Pulse:  Temp:98.4 Resp: 17 SP02:100 % Labs:Na 141, K 4.1, Cr 1.09, BUN 30, GFR 56, LFTS normal, , WBC 9.9,  Hgb 12.4, Plt 192, UA negative, Lactic acid 1.6, Troponin negative.  Medications: Received 1 liter IV bolus in the ED. Imaging: Head CTA demonstrates no aneurysm or stenosis of the major intracranial arteries. Severe internal carotid atherosclerosis intracranially bilaterally. Neck CTA demonstrates no high-grade stenosis of the major cervical arteries. Less than 40% stenosis left ICA origin with severe atherosclerosis. Dominant left vertebral artery with the left vertebral artery only contributing to the basilar artery.No intracranial hemorrhage on the noncontrast head CT. No significant change of ex vacuo dilatation of the left lateral ventricle posteriorly and diffuse cerebral atrophy with underlying leukoariosis, but not greatly disproportionate to the patient's age. CT head w/o contrast shows: No acute intracranial pathology. Generalized parenchymal volume loss and changes of chronic small vessel ischemic disease. Tiny left lacunar infarcts are unchanged since the previous exam on 11/9/2011. Chest xray shows: Pulmonary vascular congestion. No focal airspace opacities. POC US Echo in the ED shows: No pericardial effusion. No RV dilation. Grossly preserved left ventricular ejection fraction. Small IVC with complete collapse during respirations. Consistent with intravascular volume depletion. Consults: Cardiology Plan: Admit to ED observation under the syncopal protocol. Discussed with cardiology who recommended holding blood pressure medications until seen by cardiology.  - Gentle IVF  - Serial hemoglobins  - Serial troponins  - Orthostatics every 8 hours  - cardiac/diabetic diet no caffeine  - Echo in am  - PT/OT evaluation for safety  - SW consult for possible TCU placement. (patient currently lives alone)   - Cardiology consult to assess hypertensive medications and to make adjustments as necessary.     2. CAD s/p CABG:   3. Hypertension. BP soft in the ED 98/56- 100/62, Patient reports taking his blood  "pressure medications today.  Patient taking daily aspirin 81 mg, metoprolol succinate 12.5 mg, Rosuvastatin 10 mg daily, lisinopril 10 mg daily and Imdur 60 mg daily.   - Continue PTA aspirin and Rrosuvastatin  - Hold Imdur, metoprolol and Lisinopril until seen by cardiology.      4. Type 2 diabetes mellitus: BG in  PTA Metformin 500 mg BID.   - BG checks TID with meals and HS  - Hold PTA Metformin 500 mg BID as GFR is less than 60.  - Medium sliding scale insulin  - Hypoglycemic protocol    5. CKD stage 2: Cr 1.09, BUN 30, GFR 56  - Recheck BMP in am.     6. Constipation: PTA Senna-S 1 tablet BID, prn Miralax    Diet: cardiac/diabetic diet no caffeine  DVT Prophylaxis: Low Risk/Ambulatory with no VTE prophylaxis indicated  Edmondson Catheter: not present    Disposition Plan   Expected discharge: Tomorrow, recommended to prior living arrangement once blood pressure stable, cardiology consult complete  Entered: MICHAEL Cox CNP 10/05/2019, 3:33 PM     The patient's care was discussed with the Bedside Nurse, Patient and ED physician, Dr. Alexander.    MICHAEL Gunn, NP  Emergency Department  [phone] Ex [id number]  ______________________________________________________________________    Chief Complaint   Syncope    History is obtained from the patient and patient's daughter    History of Present Illness   Per ED note, \"Kiran Carcamo is a 89 year old male with history of CAD status post 6 x bypass 20 years ago, HTN, type II DM and CKD who presents with multiple falls in the past 2 days. Patient accompanied by daughters who provide history. They state that patient lives alone but patient's daughters see him regularly. Daughters state that lately patient has been having lightheadedness upon rising out of bed and when walking back to bed after using the bathroom.  Usually he is able to sense when he is about to fall and is able to prevent this but there are other times where he actually does fall.  Is " "unclear if there is any loss of consciousness with this as there have been no witnessed falls.  Yesterday patient got up out of bed to get ready for [employer] but developed lightheadedness and fell.  This morning he had another episode of lightheadedness with fall with trying to get out of bed to go to the bathroom. He thinks he was out for a few seconds with this fall.  Daughters note that whenever he tries to sit up on the edge of the bed he ends up feeling dizzy.  He has had other falls at home as well, daughter notes a tooth fell out recently and has a healing abrasion on his head for this.  Daughter states that they tried to him to went to urgent care and he was sent here for evaluation. He has an abrasion on back of his head from 1 of his falls. No recent illness. He does not typically get UTIs. He did get pneumonia last year. He has had a subtle cough. He has complained of a headache with some tingling in back of head. No vertiginous symptoms sitting upright but did have dizziness when his head of bed was being tilted back.  He has occasional neck pain and endorses slight headache at this time.  As for any recent health changes daughter states that yesterday he didn't want to get out of bed but did go to [employer].  He was ambulating with his walker normally. No confusion, nausea, vomiting, diarrhea. No dysuria or urinary frequency. Patient states last 3-4 days he has felt need to cough something but couldn't expectorate this. He is not on any blood thinners. No abdominal distension. \"    Review of Systems    The 10 point Review of Systems is negative other than noted in the HPI or here. Reports dizziness, syncope, weakness, light-headedness and headaches    Past Medical History    I have reviewed this patient's medical history and updated it with pertinent information if needed.   Past Medical History:   Diagnosis Date     CKD (chronic kidney disease) stage 2, GFR 60-89 ml/min 7/6/2015     Syncope 12/12/2011     " Type 2 diabetes, HbA1C goal < 8% (H) 2/23/2015       Past Surgical History   I have reviewed this patient's surgical history and updated it with pertinent information if needed.  Past Surgical History:   Procedure Laterality Date     CHOLECYSTECTOMY       CORONARY ARTERY BYPASS  '05    6-vessel     MANDIBLE SURGERY         Social History   I have reviewed this patient's social history and updated it with pertinent information if needed.  Social History     Tobacco Use     Smoking status: Never Smoker     Smokeless tobacco: Never Used   Substance Use Topics     Alcohol use: No     Drug use: No       Family History   I have reviewed this patient's family history and updated it with pertinent information if needed.   Family History   Problem Relation Age of Onset     Heart Disease Mother      Eye Disorder Father      Respiratory Brother      Heart Disease Sister      Cancer Daughter      Cancer Brother      Skin Cancer No family hx of      Melanoma No family hx of        Prior to Admission Medications   Prior to Admission Medications   Prescriptions Last Dose Informant Patient Reported? Taking?   Multiple Vitamin (MV-ONE PO)   Yes No   Sig: Take 1 tablet by mouth daily    acetaminophen (TYLENOL) 325 MG tablet   No No   Sig: Take 3 tablets (975 mg) by mouth every 8 hours as needed for mild pain   aspirin 81 MG EC tablet   Yes No   Sig: Take 81 mg by mouth At Bedtime.   blood glucose (ONETOUCH ULTRA) test strip   No No   Sig: USE TO TEST BLOOD SUGAR 2 TIMES DAILY OR AS DIRECTED.   emollient (VANICREAM) external cream   No No   Sig: Apply topically daily After the shower on arms   fluorouracil (EFUDEX) 5 % cream   No No   Sig: Apply topically daily Every evening for 4 weeks on the frontal scalp, then 2 week break, then another section of the scalp for 4 weeks, etc   isosorbide mononitrate (IMDUR) 60 MG 24 hr tablet   No No   Sig: Take 1 tablet (60 mg) by mouth daily Please get refills from primary care or schedule with  new cardiologist.   ketoconazole (NIZORAL) 2 % external shampoo   No No   Sig: Apply topically daily   lisinopril (PRINIVIL/ZESTRIL) 10 MG tablet   No No   Sig: Take 1 tablet (10 mg) by mouth daily   metFORMIN (GLUCOPHAGE) 500 MG tablet   No No   Sig: Take 1 tablet (500 mg) by mouth 2 times daily (with meals)   metoprolol succinate ER (TOPROL-XL) 25 MG 24 hr tablet   No No   Sig: Take 0.5 tablets (12.5 mg) by mouth daily   nitroGLYcerin (NITROSTAT) 0.4 MG sublingual tablet   No No   Sig: Place 1 tablet (0.4 mg) under the tongue every 5 minutes as needed for chest pain If symptoms after 3 doses (15 minutes) call 911.   polyethylene glycol (MIRALAX/GLYCOLAX) Packet   No No   Sig: Take 17 g by mouth daily as needed for constipation   rosuvastatin (CRESTOR) 10 MG tablet   No No   Sig: Take 1 tablet (10 mg) by mouth daily   senna-docusate (SENOKOT-S;PERICOLACE) 8.6-50 MG per tablet   No No   Sig: Take 1 tablet by mouth 2 times daily      Facility-Administered Medications Last Administration Doses Remaining   lidocaine (PF) (XYLOCAINE) 1 % injection 4 mL 6/7/2019 11:03 AM    lidocaine (PF) (XYLOCAINE) 1 % injection 4 mL 6/21/2019 11:30 AM    triamcinolone (KENALOG-40) injection 40 mg 6/7/2019 11:03 AM    triamcinolone (KENALOG-40) injection 40 mg 6/21/2019 11:30 AM         Allergies   No Known Allergies    Physical Exam   Vital Signs: Temp: 97.6  F (36.4  C)   BP: 100/62 Pulse: 59 Heart Rate: 60 Resp: 17 SpO2: 94 % O2 Device: None (Room air)    Weight: 165 lbs 0 oz    Constitutional: healthy, alert and no distress   Head: Normocephalic. No masses, lesions, tenderness or abnormalities   Neck: Neck supple. No adenopathy. Thyroid symmetric, normal size,, Carotids without bruits.   ENT: ENT exam normal, no neck nodes or sinus tenderness   Cardiovascular: Rhythm irregular and bradycardia. No murmurs, clicks gallops or rub   Respiratory: . Good diaphragmatic excursion. Lungs clear   Gastrointestinal: Abdomen soft,non-tender. BS  normal. No masses, organomegaly.   : Deferred   Musculoskeletal: extremities normal- no gross deformities noted, gait normal and normal muscle tone   Skin: no suspicious lesions or rashes   Neurologic: Gait normal. Reflexes normal and symmetric. Sensation grossly WNL.   Psychiatric: mentation appears normal and affect normal/bright   Hematologic/Lymphatic/Immunologic: normal ant/post cervical, axillary, supraclavicular and inguinal     Data   Data reviewed today: I reviewed all medications, new labs and imaging results over the last 24 hours.    Recent Labs   Lab 10/05/19  1216 10/05/19  1213   WBC 9.9  --    HGB 12.4*  --    *  --      --      --    POTASSIUM 4.1  --    CHLORIDE 106  --    CO2 29  --    BUN 30  --    CR 1.09  --    ANIONGAP 6  --    NADER 9.1  --    *  --    ALBUMIN 3.6  --    PROTTOTAL 7.0  --    BILITOTAL 0.5  --    ALKPHOS 102  --    ALT 26  --    AST 13  --    TROPI <0.015  --    TROPONIN  --  0.02     Most Recent 3 CBC's:  Recent Labs   Lab Test 10/05/19  1216 12/12/18  1548 11/01/18  0814   WBC 9.9 9.0 8.3   HGB 12.4* 12.5* 11.2*   * 100 100    187 120*     Most Recent 3 BMP's:  Recent Labs   Lab Test 10/05/19  1216 12/12/18  1548 11/01/18  0814    143 140   POTASSIUM 4.1 4.3 3.7   CHLORIDE 106 111* 108   CO2 29 25 25   BUN 30 38* 28   CR 1.09 1.00 0.96   ANIONGAP 6 7 7   NADER 9.1 9.3 8.1*   * 140* 110*     Most Recent 2 LFT's:  Recent Labs   Lab Test 10/05/19  1216 12/12/18  1548   AST 13 12   ALT 26 23   ALKPHOS 102 93   BILITOTAL 0.5 0.5     Recent Results (from the past 24 hour(s))   POC US ECHO LIMITED    Impression    Limited Bedside Cardiac Ultrasound, performed and interpreted by me.   Indication: Weakness, Abnormal EKG and syncope.  Parasternal long axis, parasternal short axis and apical 4 chamber views were acquired.   Image quality was satisfactory.    Findings:    No pericardial effusion.  Small IVC with complete collapse  during respirations.  No RV dilation.  Grossly preserved left ventricular ejection fraction.      IMPRESSION: No pericardial effusion. No RV dilation.  Grossly preserved left ventricular ejection fraction.  Small IVC with complete collapse during respirations. Consistent with intravascular volume depletion.   XR Chest 2 Views    Narrative    XR CHEST 2 VW10/5/2019 1:54 PM    INDICATION: cough, syncope    COMPARISON:  Plain film 2/15/2019    FINDINGS: Upright and lateral views of the chest. Postoperative  changes from CABG. Cardiomediastinal silhouette is enlarged. No focal  airspace opacities. Slight cephalization of the interstitium. Upper  abdomen is unremarkable. No acute osseous abnormalities. Pneumothorax  or pleural effusion.      Impression    IMPRESSION: Pulmonary vascular congestion. No focal airspace  opacities.    I have personally reviewed the examination and initial interpretation  and I agree with the findings.    ALBERT CLAY MD   CTA Head Neck with Contrast    Narrative    CTA  HEAD NECK WITH CONTRAST 10/5/2019 1:57 PM    Head CT without contrast  CT angiogram of the neck   CT angiogram of the base of the brain with contrast  Reconstruction by the Radiologist on the 3D workstation    Provided History:  syncope, headache, dizzy  ICD-10: Syncope    Comparison: .      Technique:  HEAD CT:  Using multidetector thin collimation helical acquisition  technique, axial, coronal and sagittal CT images from the skull base  to the vertex were obtained without intravenous contrast.   HEAD and NECK CTA: During rapid bolus intravenous injection of  nonionic contrast material, axial images were obtained using thin  collimation multidetector helical technique from the base of the skull  through the [place]. This CT angiogram data was reconstructed  at thin intervals with mild overlap. Images were sent to the [employer]  workstation, and 3D reconstructions were obtained. The axial source  images,  multiplanar reformations, 3D reconstructions in both maximum  intensity projection display and volume rendered models were reviewed,  with reconstructions performed by the technologist and the  radiologist.    Contrast: isovue 370    Findings:  Head CT: There is no intracranial hemorrhage, mass effect, or midline  shift. Gray/white matter differentiation in both cerebral hemispheres  is preserved. Ventricles are proportionate to the cerebral sulci.  Atrophy of the brain and white matter consistent with chronic small  vessel ischemic disease.    Head CTA demonstrates no aneurysm or stenosis of the major  intracranial arteries. Severe carotid atherosclerosis intracranially.    Neck CTA demonstrates no high-grade stenosis of the major cervical  arteries; there is just under 40 % stenosis of the left ICA at the  origin where it measures 3.5 mm at its narrowest point and about 5 mm  distally. Incidental kinking of the internal carotid arteries the  upper neck, particularly on the right, without significant stenosis.  The origins of the great vessels from the aortic arch are patent.  There is severe atherosclerosis of the subclavian, but without  significant stenosis, and to lesser degree of the right common carotid  artery, while the left common carotid artery origin and innominate  origins are not visualized on this study. Dominant left vertebral  artery with the left vertebral artery only contributing to the basilar  artery. The normal distal right internal carotid artery measures 5 mm.  Minimal inflammatory debris right maxillary sinus. No evident mucosal  space abnormality in the neck or of the anterior soft tissues, while  there is multilevel severe degenerative disease, with multilevel mild  spinal canal stenosis and likely most prominent at C4-5. No mass is  noted within the visualized portions of the cervical soft tissues or  lung apices.       Impression    Impression:    1. Head CTA demonstrates no aneurysm or  stenosis of the major  intracranial arteries. Severe internal carotid atherosclerosis  intracranially bilaterally.  2. Neck CTA demonstrates no high-grade stenosis of the major cervical  arteries. Less than 40% stenosis left ICA origin with severe  atherosclerosis. Dominant left vertebral artery with the left  vertebral artery only contributing to the basilar artery.  3. No intracranial hemorrhage on the noncontrast head CT. No  significant change of ex vacuo dilatation of the left lateral  ventricle posteriorly and diffuse cerebral atrophy with underlying  leukoariosis, but not greatly disproportionate to the patient's age.     I have personally reviewed the examination and initial interpretation  and I agree with the findings.    HENRY SANCHEZ MD   CT Head w/o Contrast    Narrative    1. No acute intracranial pathology.  2. Generalized parenchymal volume loss and changes of chronic small  vessel ischemic disease. Tiny left lacunar infarcts are unchanged  since the previous exam on 11/9/2011.           This patient was discussed with the Care Team in the OBS Unit.  The patient's chart was reviewed and the patient was also seen and evaluated by me.  The plan of care was discussed and reviewed with the Care Team.  The above documentation reflects the initial evaluation, medical decision making and plan under my supervision.    Rich Sandhu MD, FACEP  [facility] Staff Emergency Physician

## 2019-10-05 NOTE — ED PROVIDER NOTES
[place] EMERGENCY DEPARTMENT ([facility])  10/05/19 ED 30 11:58 AM   History     Chief Complaint   Patient presents with     Loss of Consciousness     HPI  Kiran Carcamo is a 89 year old male with history of CAD status post 6 x bypass 20 years ago, HTN, type II DM and CKD who presents with multiple falls in the past 2 days. Patient accompanied by daughters who provide history. They state that patient lives alone but patient's daughters see him regularly. Daughters state that lately patient has been having lightheadedness upon rising out of bed and when walking back to bed after using the bathroom.  Usually he is able to sense when he is about to fall and is able to prevent this but there are other times where he actually does fall.  Is unclear if there is any loss of consciousness with this as there have been no witnessed falls.  Yesterday patient got up out of bed to get ready for [employer] but developed lightheadedness and fell.  This morning he had another episode of lightheadedness with fall with trying to get out of bed to go to the bathroom. He thinks he was out for a few seconds with this fall.  Daughters note that whenever he tries to sit up on the edge of the bed he ends up feeling dizzy.  He has had other falls at home as well, daughter notes a tooth fell out recently and has a healing abrasion on his head for this.  Daughter states that they tried to him to went to urgent care and he was sent here for evaluation. He has an abrasion on back of his head from 1 of his falls. No recent illness. He does not typically get UTIs. He did get pneumonia last year. He has had a subtle cough. He has complained of a headache with some tingling in back of head. No vertiginous symptoms sitting upright but did have dizziness when his head of bed was being tilted back.  He has occasional neck pain and endorses slight headache at this time.  As for any recent health changes daughter states that yesterday he  didn't want to get out of bed but did go to [employer].  He was ambulating with his walker normally. No confusion, nausea, vomiting, diarrhea. No dysuria or urinary frequency. Patient states last 3-4 days he has felt need to cough something but couldn't expectorate this. He is not on any blood thinners. No abdominal distension.     Epic records reviewed, last echocardiogram was in 2011 with LVEF 55%, though notable for inferior wall hypokinesis and mild to moderate mitral insufficiency.     I have reviewed the Medications, Allergies, Past Medical and Surgical History, and Social History in the [facility] system.  Past Medical History:   Diagnosis Date     CKD (chronic kidney disease) stage 2, GFR 60-89 ml/min 7/6/2015     Syncope 12/12/2011     Type 2 diabetes, HbA1C goal < 8% (H) 2/23/2015       Past Surgical History:   Procedure Laterality Date     CHOLECYSTECTOMY       CORONARY ARTERY BYPASS  '05    6-vessel     MANDIBLE SURGERY         Family History   Problem Relation Age of Onset     Heart Disease Mother      Eye Disorder Father      Respiratory Brother      Heart Disease Sister      Cancer Daughter      Cancer Brother      Skin Cancer No family hx of      Melanoma No family hx of        Social History     Tobacco Use     Smoking status: Never Smoker     Smokeless tobacco: Never Used   Substance Use Topics     Alcohol use: No      Review of Systems   Constitutional: Negative for activity change, appetite change and fever.   Respiratory: Positive for cough. Negative for shortness of breath.    Cardiovascular: Negative for chest pain and leg swelling.   Gastrointestinal: Negative for abdominal pain, diarrhea, nausea and vomiting.   Genitourinary: Negative for dysuria and frequency.   Musculoskeletal: Negative for neck pain.   Neurological: Positive for dizziness, syncope, weakness, light-headedness and headaches. Negative for speech difficulty.   Psychiatric/Behavioral: Negative for confusion.   All other systems reviewed  "and are negative.      Physical Exam   BP: 114/59  Pulse: 58  Heart Rate: 57  Temp: 97.6  F (36.4  C)  Resp: 16  Height: 167.6 cm (5' 6\")  Weight: 74.8 kg (165 lb)  SpO2: 100 %  Lying Orthostatic BP: 117/68  Lying Orthostatic Pulse: 62 bpm  Sitting Orthostatic BP: 121/63  Sitting Orthostatic Pulse: 71 bpm  Standing Orthostatic BP: 93/64  Standing Orthostatic Pulse: 80 bpm      Physical Exam  Vitals signs and nursing note reviewed.   Constitutional:       General: He is not in acute distress.     Appearance: Normal appearance. He is well-developed. He is not ill-appearing, toxic-appearing or diaphoretic.   HENT:      Head: Normocephalic and atraumatic.      Nose: Nose normal.      Mouth/Throat:      Mouth: Mucous membranes are dry.   Eyes:      General: No scleral icterus.     Extraocular Movements: Extraocular movements intact.      Conjunctiva/sclera: Conjunctivae normal.      Pupils: Pupils are equal, round, and reactive to light.   Neck:      Musculoskeletal: Normal range of motion and neck supple. No neck rigidity or muscular tenderness.   Cardiovascular:      Rate and Rhythm: Bradycardia present. Rhythm irregular.      Heart sounds: Normal heart sounds. No murmur.   Pulmonary:      Effort: Pulmonary effort is normal. No respiratory distress.      Breath sounds: Normal breath sounds. No stridor. No wheezing, rhonchi or rales.   Abdominal:      General: Bowel sounds are normal. There is no distension.      Palpations: Abdomen is soft. There is no mass.      Tenderness: There is no tenderness. There is no guarding.      Hernia: No hernia is present.   Musculoskeletal: Normal range of motion.         General: No swelling, tenderness, deformity or signs of injury.      Right lower leg: No edema.      Left lower leg: No edema.   Skin:     General: Skin is warm and dry.      Coloration: Skin is not jaundiced or pale.      Findings: No rash.   Neurological:      General: No focal deficit present.      Mental Status: He " is alert and oriented to person, place, and time.      Cranial Nerves: No cranial nerve deficit, dysarthria or facial asymmetry.      Motor: No weakness or tremor.   Psychiatric:         Mood and Affect: Mood normal.         Speech: Speech is delayed.         Behavior: Behavior is slowed and withdrawn. Behavior is cooperative.         Thought Content: Thought content normal.         Cognition and Memory: Cognition is impaired. Memory is impaired. He exhibits impaired recent memory.         ED Course        Procedures  Results for orders placed during the hospital encounter of 10/05/19   POC US ECHO LIMITED    Impression Limited Bedside Cardiac Ultrasound, performed and interpreted by me.   Indication: Weakness, Abnormal EKG and syncope.  Parasternal long axis, parasternal short axis and apical 4 chamber views were acquired.   Image quality was satisfactory.    Findings:    No pericardial effusion.  Small IVC with complete collapse during respirations.  No RV dilation.  Grossly preserved left ventricular ejection fraction.      IMPRESSION: No pericardial effusion. No RV dilation.  Grossly preserved left ventricular ejection fraction.  Small IVC with complete collapse during respirations. Consistent with intravascular volume depletion.             EKG Interpretation:      Interpreted by Shaylee Alexander MD  Time reviewed: 12:05 PM  Symptoms at time of EKG: syncope    Rhythm: sinus bradycardia  Rate: normal  Axis: normal  Ectopy: premature atrial complexes   Conduction: incomplete RBBB   ST Segments/ T Waves: age indeterminate inferior infarct  Q Waves: none  Comparison to prior: Unchanged from 10/31/18,     Clinical Impression: sinus bradycardia with PACs, no acute ischemic changes    Critical Care time:  none             Labs Ordered and Resulted from Time of ED Arrival Up to the Time of Departure from the ED   CBC WITH PLATELETS DIFFERENTIAL - Abnormal; Notable for the following components:       Result Value    RBC  Count 3.81 (*)     Hemoglobin 12.4 (*)     Hematocrit 38.7 (*)      (*)     All other components within normal limits   COMPREHENSIVE METABOLIC PANEL - Abnormal; Notable for the following components:    Glucose 168 (*)     GFR Estimate 56 (*)     All other components within normal limits   ROUTINE UA WITH MICROSCOPIC REFLEX TO CULTURE - Abnormal; Notable for the following components:    Protein Albumin Urine 10 (*)     Mucous Urine Present (*)     All other components within normal limits   ISTAT  GASES LACTATE ARCELIA POCT - Abnormal; Notable for the following components:    PO2 Venous 17 (*)     All other components within normal limits   NT PROBNP INPATIENT   TROPONIN I   GLUCOSE MONITOR NURSING POCT   GLUCOSE MONITOR NURSING POCT   PERIPHERAL IV CATHETER   ISTAT TROPONIN NURSING POCT   ISTAT CG4 GASES LACTATE ARCELIA NURSING POCT   IP CARBOHYDRATE COUNTING BY NURSING   PATIENT EDUCATION   ASSESS FOR HYPOGLYCEMIA SYMPTOMS   NOTIFY PHYSICIAN   IP ASSIGN PROVIDER TEAM TO TREATMENT TEAM   OBSERVATION GOALS   MEASURE HEIGHT AND WEIGHT   VITAL SIGNS   NURSING OBSERVATION   ORTHOSTATIC BLOOD PRESSURE AND PULSE   TELEMETRY MONITORING CARDIOLOGY ADULT   PERIPHERAL IV CATHETER   NOTIFY   NOTIFY   INTAKE AND OUTPUT   ACTIVITY   TROPONIN POCT            Assessments & Plan (with Medical Decision Making)   Kiran Carcamo is a 89 year old male with history of CAD status post 6 x bypass 20 years ago, HTN, type II DM and CKD who presents with multiple falls in the past 2 days.       Ddx: orthostatic hypotension, dehydration, cardiac arrhythmia, medication side effect, posterior circulation stroke/vertebral insufficiency, lacunar infarct, PNA, UTI    EKG unchanged from baseline. Denies CP. Has had a productive cough but no fever or SOB. Will obtain broad work up. Unclear if patient had LOC with episodes of dizziness and subsequent collapse. Sounds as if he was conscious at the time that his family came to him shortly after  hearing him fall. No one witnessed these falls but they were in his home when they happened. They do not think he hit his head. Patient lives alone with family visiting frequently.     Labs unremarkable. EKG and trop nonischemic. CXR w/o focal opacification to suggest bacterial PNA. Patient's pressure soft. POCUS ECHO with flat IVC. BP improved with IV fluids. CT and CTA w/o acute abnormality to explain symptoms.     Admited obs for PT/OT, syncope eval.     I have reviewed the nursing notes.    I have reviewed the findings, diagnosis, plan and need for follow up with the patient.    Current Discharge Medication List          Final diagnoses:   Recurrent syncope   I, Glenda Power, am serving as a trained medical scribe to document services personally performed by Shaylee Alexander MD based on the provider's statements to me on October 5, 2019.  This document has been checked and approved by the attending provider.    IShaylee MD, was physically present and have reviewed and verified the accuracy of this note documented by Glenda Power, medical scribe.     10/5/2019   [facility], [place], EMERGENCY DEPARTMENT     Shaylee Alexander MD  10/05/19 1914

## 2019-10-06 ENCOUNTER — APPOINTMENT (OUTPATIENT)
Dept: PHYSICAL THERAPY | Facility: CLINIC | Age: 84
End: 2019-10-06
Attending: NURSE PRACTITIONER
Payer: COMMERCIAL

## 2019-10-06 ENCOUNTER — APPOINTMENT (OUTPATIENT)
Dept: CARDIOLOGY | Facility: CLINIC | Age: 84
End: 2019-10-06
Attending: NURSE PRACTITIONER
Payer: COMMERCIAL

## 2019-10-06 VITALS
SYSTOLIC BLOOD PRESSURE: 146 MMHG | DIASTOLIC BLOOD PRESSURE: 67 MMHG | RESPIRATION RATE: 18 BRPM | HEIGHT: 66 IN | WEIGHT: 165 LBS | OXYGEN SATURATION: 98 % | TEMPERATURE: 97.8 F | HEART RATE: 58 BPM | BODY MASS INDEX: 26.52 KG/M2

## 2019-10-06 LAB
ANION GAP SERPL CALCULATED.3IONS-SCNC: 7 MMOL/L (ref 3–14)
BUN SERPL-MCNC: 29 MG/DL (ref 7–30)
CALCIUM SERPL-MCNC: 8.8 MG/DL (ref 8.5–10.1)
CHLORIDE SERPL-SCNC: 111 MMOL/L (ref 94–109)
CO2 SERPL-SCNC: 24 MMOL/L (ref 20–32)
CREAT SERPL-MCNC: 1.14 MG/DL (ref 0.66–1.25)
ERYTHROCYTE [DISTWIDTH] IN BLOOD BY AUTOMATED COUNT: 13 % (ref 10–15)
ERYTHROCYTE [DISTWIDTH] IN BLOOD BY AUTOMATED COUNT: 13.1 % (ref 10–15)
GFR SERPL CREATININE-BSD FRML MDRD: 53 ML/MIN/{1.73_M2}
GLUCOSE BLDC GLUCOMTR-MCNC: 162 MG/DL (ref 70–99)
GLUCOSE SERPL-MCNC: 150 MG/DL (ref 70–99)
HBA1C MFR BLD: 7.5 % (ref 0–5.6)
HCT VFR BLD AUTO: 33.8 % (ref 40–53)
HCT VFR BLD AUTO: 34.3 % (ref 40–53)
HCT VFR BLD AUTO: 34.4 % (ref 40–53)
HCT VFR BLD AUTO: 37 % (ref 40–53)
HGB BLD-MCNC: 10.9 G/DL (ref 13.3–17.7)
HGB BLD-MCNC: 10.9 G/DL (ref 13.3–17.7)
HGB BLD-MCNC: 11 G/DL (ref 13.3–17.7)
HGB BLD-MCNC: 11.8 G/DL (ref 13.3–17.7)
INTERPRETATION ECG - MUSE: NORMAL
MAGNESIUM SERPL-MCNC: 1.8 MG/DL (ref 1.6–2.3)
MCH RBC QN AUTO: 32.2 PG (ref 26.5–33)
MCH RBC QN AUTO: 32.4 PG (ref 26.5–33)
MCH RBC QN AUTO: 32.5 PG (ref 26.5–33)
MCH RBC QN AUTO: 32.7 PG (ref 26.5–33)
MCHC RBC AUTO-ENTMCNC: 31.8 G/DL (ref 31.5–36.5)
MCHC RBC AUTO-ENTMCNC: 31.9 G/DL (ref 31.5–36.5)
MCHC RBC AUTO-ENTMCNC: 32 G/DL (ref 31.5–36.5)
MCHC RBC AUTO-ENTMCNC: 32.2 G/DL (ref 31.5–36.5)
MCV RBC AUTO: 101 FL (ref 78–100)
MCV RBC AUTO: 102 FL (ref 78–100)
MCV RBC AUTO: 102 FL (ref 78–100)
MCV RBC AUTO: 103 FL (ref 78–100)
PLATELET # BLD AUTO: 162 10E9/L (ref 150–450)
PLATELET # BLD AUTO: 164 10E9/L (ref 150–450)
PLATELET # BLD AUTO: 167 10E9/L (ref 150–450)
PLATELET # BLD AUTO: 168 10E9/L (ref 150–450)
POTASSIUM SERPL-SCNC: 3.8 MMOL/L (ref 3.4–5.3)
RBC # BLD AUTO: 3.35 10E12/L (ref 4.4–5.9)
RBC # BLD AUTO: 3.38 10E12/L (ref 4.4–5.9)
RBC # BLD AUTO: 3.39 10E12/L (ref 4.4–5.9)
RBC # BLD AUTO: 3.61 10E12/L (ref 4.4–5.9)
SODIUM SERPL-SCNC: 142 MMOL/L (ref 133–144)
TSH SERPL DL<=0.005 MIU/L-ACNC: 1.58 MU/L (ref 0.4–4)
WBC # BLD AUTO: 8 10E9/L (ref 4–11)
WBC # BLD AUTO: 8.2 10E9/L (ref 4–11)
WBC # BLD AUTO: 8.4 10E9/L (ref 4–11)
WBC # BLD AUTO: 8.5 10E9/L (ref 4–11)

## 2019-10-06 PROCEDURE — 97530 THERAPEUTIC ACTIVITIES: CPT | Mod: GP

## 2019-10-06 PROCEDURE — 93306 TTE W/DOPPLER COMPLETE: CPT | Mod: 26 | Performed by: INTERNAL MEDICINE

## 2019-10-06 PROCEDURE — 93306 TTE W/DOPPLER COMPLETE: CPT

## 2019-10-06 PROCEDURE — 25000132 ZZH RX MED GY IP 250 OP 250 PS 637: Performed by: NURSE PRACTITIONER

## 2019-10-06 PROCEDURE — 83036 HEMOGLOBIN GLYCOSYLATED A1C: CPT | Performed by: NURSE PRACTITIONER

## 2019-10-06 PROCEDURE — 36415 COLL VENOUS BLD VENIPUNCTURE: CPT | Performed by: NURSE PRACTITIONER

## 2019-10-06 PROCEDURE — G0378 HOSPITAL OBSERVATION PER HR: HCPCS

## 2019-10-06 PROCEDURE — 97116 GAIT TRAINING THERAPY: CPT | Mod: GP,59

## 2019-10-06 PROCEDURE — 80048 BASIC METABOLIC PNL TOTAL CA: CPT | Performed by: NURSE PRACTITIONER

## 2019-10-06 PROCEDURE — 85027 COMPLETE CBC AUTOMATED: CPT | Performed by: NURSE PRACTITIONER

## 2019-10-06 PROCEDURE — 99217 ZZC OBSERVATION CARE DISCHARGE: CPT | Mod: Z6 | Performed by: FAMILY MEDICINE

## 2019-10-06 PROCEDURE — 00000146 ZZHCL STATISTIC GLUCOSE BY METER IP

## 2019-10-06 PROCEDURE — 84443 ASSAY THYROID STIM HORMONE: CPT | Performed by: NURSE PRACTITIONER

## 2019-10-06 PROCEDURE — 84443 ASSAY THYROID STIM HORMONE: CPT | Performed by: INTERNAL MEDICINE

## 2019-10-06 PROCEDURE — 83735 ASSAY OF MAGNESIUM: CPT | Performed by: NURSE PRACTITIONER

## 2019-10-06 PROCEDURE — 97161 PT EVAL LOW COMPLEX 20 MIN: CPT | Mod: GP

## 2019-10-06 RX ORDER — LIDOCAINE 4 G/G
1 PATCH TOPICAL EVERY 24 HOURS
Qty: 5 PATCH | Refills: 0 | Status: SHIPPED | OUTPATIENT
Start: 2019-10-06 | End: 2019-11-01

## 2019-10-06 RX ORDER — LIDOCAINE 4 G/G
1 PATCH TOPICAL
Status: DISCONTINUED | OUTPATIENT
Start: 2019-10-06 | End: 2019-10-06 | Stop reason: HOSPADM

## 2019-10-06 RX ADMIN — ROSUVASTATIN CALCIUM 10 MG: 10 TABLET, FILM COATED ORAL at 09:05

## 2019-10-06 RX ADMIN — LIDOCAINE 1 PATCH: 560 PATCH PERCUTANEOUS; TOPICAL; TRANSDERMAL at 09:04

## 2019-10-06 RX ADMIN — ACETAMINOPHEN 650 MG: 325 TABLET, FILM COATED ORAL at 12:31

## 2019-10-06 RX ADMIN — SENNOSIDES AND DOCUSATE SODIUM 1 TABLET: 8.6; 5 TABLET ORAL at 09:05

## 2019-10-06 NOTE — PLAN OF CARE
"Observation goals PRIOR TO DISCHARGE    Comments: List all  goals to be met before discharge:   - Diagnostic tests and consults completed and resulted NO  - No further episodes of syncope   Non new arrhythmia addressed with controlled heart rates : remains in sinus ariadna.  - Vital signs normal or at patient baseline and orthostatic vitals are normal and patient not lightheaded with standing - No  - Tolerating oral intake to maintain hydration : Yes  - Safe disposition plan has been identified NO  - Nurse to notify provider when observation goals have been met and patient is ready for discharge  Orthostatic done and pt ate well, void with SBA with walker. AVSS, family at the bedside.   BP (!) 146/67 (BP Location: Left arm)   Pulse 58   Temp 97.8  F (36.6  C) (Oral)   Resp 18   Ht 1.676 m (5' 6\")   Wt 74.8 kg (165 lb)   SpO2 98%   BMI 26.63 kg/m      "

## 2019-10-06 NOTE — PROGRESS NOTES
10/06/19 1038   Quick Adds   Type of Visit Initial PT Evaluation   Living Environment   Lives With alone   Living Arrangements house   Home Accessibility stairs to enter home;stairs within home   Number of Stairs, Main Entrance 3   Stair Railings, Main Entrance railings on both sides of stairs   Number of Stairs, Within Home, Primary 10   Stair Railings, Within Home, Primary railings on both sides of stairs   Transportation Anticipated family or friend will provide   Living Environment Comment Pt lives alone but has 2 daughters who live locally who check in on pt for several hours daily and provide transportation and meals. Pt has another daughter who lives [place] who will be here for the next week to stay with pt. 24/7.   Self-Care   Usual Activity Tolerance good   Current Activity Tolerance moderate   Regular Exercise No   Equipment Currently Used at Home cane, straight;walker, rolling;tub bench   Activity/Exercise/Self-Care Comment Pt is active in [employer] daily, also active around the home though no formal exercise. Pt mainly uses SPC in the home, 4WW outside.   Functional Level Prior   Ambulation 1-->assistive equipment   Transferring 1-->assistive equipment   Toileting 0-->independent   Bathing 3-->assistive equipment and person   Communication 0-->understands/communicates without difficulty   Swallowing 0-->swallows foods/liquids without difficulty   Cognition 0 - no cognition issues reported   Fall history within last six months yes   Number of times patient has fallen within last six months 4   Which of the above functional risks had a recent onset or change? ambulation;transferring;toileting;fall history   Prior Functional Level Comment Prior [place] with functional mobility, daughters assist with some ADLs including bathing, cooking, cleaning. Uses SPC in the home and 4WW outside and in the community.   General Information   Onset of Illness/Injury or Date of Surgery - Date 10/05/19   Referring Physician  Mray Varghese, APRN CNP   Patient/Family Goals Statement To return home   Pertinent History of Current Problem (include personal factors and/or comorbidities that impact the POC) Per chart, Kiran Carcamo is a 89 year old male admitted on 10/5/2019. He  has a past medical history of CKD (chronic kidney disease) stage 2, GFR 60-89 ml/min (7/6/2015), Syncope (12/12/2011), and Type 2 diabetes, HbA1C goal < 8% (H) (2/23/2015).  He  has a past surgical history that includes coronary artery bypass ('05); Mandible surgery; and Cholecystectomy. He presents to the ED today after a syncopal event.    Precautions/Limitations fall precautions   General Info Comments Activity orders: ambulate with assist   Cognitive Status Examination   Orientation orientation to person, place and time   Level of Consciousness alert   Follows Commands and Answers Questions 100% of the time   Personal Safety and Judgment intact   Memory intact   Pain Assessment   Patient Currently in Pain   (intermittent L sided flank pain)   Integumentary/Edema   Integumentary/Edema no deficits were identifed   Posture    Posture Forward head position;Protracted shoulders;Kyphosis   Posture Comments Significant kyphotic posture in standing   Range of Motion (ROM)   ROM Comment B LE ROM WFL   Strength   Strength Comments B LE strength grossly 4/5   Bed Mobility   Bed Mobility Comments Celia supine>sit with HOB elevated   Transfer Skills   Transfer Comments CGA-SBA sit<>stand, pt slightly impulsive   Gait   Gait Comments CGA x50ft with 4WW; significantly flexed posture, decreased gait speed, narrow LISA   Balance   Balance Comments Good seated balance, SBA and B UE support on 4WW for standing balance   Sensory Examination   Sensory Perception no deficits were identified   General Therapy Interventions   Planned Therapy Interventions balance training;bed mobility training;gait training;neuromuscular re-education;strengthening;transfer training;risk factor  "education;home program guidelines;progressive activity/exercise   Clinical Impression   Criteria for Skilled Therapeutic Intervention yes, treatment indicated   PT Diagnosis Impaired functional mobility   Influenced by the following impairments Decreased strength and endurance, impaired balance, fall history and risk   Functional limitations due to impairments Pt requires assist for safe bed mobility, transfers, and gait   Clinical Presentation Stable/Uncomplicated   Clinical Presentation Rationale PMH and clinical judgment   Clinical Decision Making (Complexity) Low complexity   Therapy Frequency 5x/week  (if transitions to inpt)   Predicted Duration of Therapy Intervention (days/wks) 3 days   Anticipated Equipment Needs at Discharge   (Likely none)   Anticipated Discharge Disposition Transitional Care Facility   Risk & Benefits of therapy have been explained Yes   Patient, Family & other staff in agreement with plan of care Yes   Clinical Impression Comments PT evaluation complete. Pt would benefit from TCU stay as he has had several recent falls and would benefit from continued strengthening and endurance and balance training though pt and family have strong preference to return home. A daughter and son in law will be able to provide 24/7 assist for 1 week and then 2 other daughters will be able to then assist when other daughter returns home. If pt returns home, would recommend 24/7 assist and HHPT/OT.   [facility] [facility] TM \"6 Clicks\"   2016, Trustees of [facility], under license to [employer].  All rights reserved.   6 Clicks Short Forms Basic Mobility Inpatient Short Form   [facility] [employer]  \"6 Clicks\" V.2 Basic Mobility Inpatient Short Form   1. Turning from your back to your side while in a flat bed without using bedrails? 4 - None   2. Moving from lying on your back to sitting on the side of a flat bed without using bedrails? 3 - A Little   3. Moving to and from a bed to a chair " (including a wheelchair)? 3 - A Little   4. Standing up from a chair using your arms (e.g., wheelchair, or bedside chair)? 3 - A Little   5. To walk in hospital room? 3 - A Little   6. Climbing 3-5 steps with a railing? 3 - A Little   Basic Mobility Raw Score (Score out of 24.Lower scores equate to lower levels of function) 19   Total Evaluation Time   Total Evaluation Time (Minutes) 12

## 2019-10-06 NOTE — PLAN OF CARE
Observation goals PRIOR TO DISCHARGE    Comments: List all  goals to be met before discharge:   - Diagnostic tests and consults completed and resulted NO  - No further episodes of syncope and any new New arrhythmia addressed with controlled heart rates NO not currently out of bed.  - Vital signs normal or at patient baseline and orthostatic vitals are normal and patient not lightheaded with standing NO not out of bed.  - Tolerating oral intake to maintain hydration : Currently only IV fluids.  - Safe disposition plan has been identified NO  - Nurse to notify provider when observation goals have been met and patient is ready for discharge.

## 2019-10-06 NOTE — PLAN OF CARE
"Observation goals PRIOR TO DISCHARGE    Comments: List all  goals to be met before discharge:   - Diagnostic tests and consults completed and resulted NO  - No further episodes of syncope   Non new arrhythmia addressed with controlled heart rates : remains in sinus ariadna.  - Vital signs normal or at patient baseline and orthostatic vitals are normal and patient not lightheaded with standing - Pt still reports dizziness when standing  - Tolerating oral intake to maintain hydration : Yes  - Safe disposition plan has been identified NO  - Nurse to notify provider when observation goals have been met and patient is ready for discharge.  /62 (BP Location: Left arm)   Pulse 62   Temp 97.9  F (36.6  C) (Oral)   Resp 18   Ht 1.676 m (5' 6\")   Wt 74.8 kg (165 lb)   SpO2 98%   BMI 26.63 kg/m         "

## 2019-10-06 NOTE — PLAN OF CARE
OBSERVATION GOALS: PRIOR TO DISCHARGE       Diagnostic tests and consults completed and resulted: Met.  No further episodes of syncope and any new arrhythmia addressed with controlled heart rates: Not Met. Bradycardic in the 40's and 50's.  Vital signs normal or at patient baseline and orthostatic vitals are normal and patient not lightheaded with standing: Not Met. Pt reports dizziness upon sitting at edge of bed. Bradycardic.  Tolerating oral intake to maintain hydration: Not Met. IVMF continued.  Safe disposition plan has been identified: Not Met. No discharge plan identified.    1900 - 2330:  VSS ex HR on RA. Bradycardic in the 40's and 50's. A&Ox4. Assist of 2 with walker and gait belt. PIV infusing NS @ 50 mL/hour. Pt reports dizziness upon sitting on side of bed. Very weak and unable to remain in seated position without support from staff. Left rib cage slightly reddened and swollen from fall. Blanchable redness on coccyx. Mepilex in place. Voiding WNL. Urinal at bedside. No BM this shift. Will continue to monitor.

## 2019-10-06 NOTE — PROGRESS NOTES
Care Coordinator - Discharge Planning    Admission Date/Time:  10/5/2019  Attending MD:  Leslie att. providers found     Data  Chart reviewed, discussed with interdisciplinary team.   Patient was admitted for:   1. Recurrent syncope         Assessment   Concerns with insurance coverage for discharge needs: None.  Current Living Situation: Patient lives alone.  Support System: Supportive and Involved  Services Involved: [facility] for foot cares only.  Has done outpatient PT in the past  Transportation at Discharge: Car and Family or friend will provide  Transportation to Medical Appointments:    - Name of caregiver: Sefl and family  Barriers to Discharge: Medical stability, rehab recommendaitons    Pt admitted to observation unit secondary to a syncopal episode.  Pt with a medical history significant for CKD, type II DM, CAD.   RNCC consulted for discharge planning.  Noted per chart review pt triggered RILEY form.  ED SW evaluated patient as well.      Attempted to meet with pt however he was in the process of having a cardiac echo done.  Met with pt two daughters and a son in law.   Pt lives alone however two of his daughters that live locally check on the patient daily, assist with transportation, and meals.   Pt would like to remain in his home as long as he can.   Pt family is willing to support the patient in being able to stay in his home with their support and possibly other outside agency support - homemaker, PCA etc.   Pt daughter and son in law are here from [place] and plan to stay with the patient for the next week.  Provided family with a list of home care agencies, private duty agencies, and information on  senior linkage line, care options network as well as a place for mom.   Reviewed MOON form with pt family and agreed to f/u with pt to review form.  Form left with pt family.  Review/discussed home care services.  Family anticpiates being able to take pt home and would plan on outpatient PT as pt  has done outpatient PT in the recent past and family  Feels it is import for the patient to be able to get out of his home.  Pt would also likely not be home bound as his family takes him to [place] several days a week.      Reviewed above with DAVID Jones.  Anticipate pt will be discharged pending cardiology recommendations and PT recommendations.  Anticipate that pt will discharge to home with outpatient patient PT.     10:25  Met with pt and his family.   Reviewed anticipated plan for discharge. Reviewed MOON form.  Form signed and copy left with patient.      Coordination of Care and Referrals: Provided patient/family with options for Home Care and Skilled Nursing Facility, TCU.      Plan  Anticipated Discharge Date:  TBD  Anticipated Discharge Plan:  TBD    Wendi Moreno RN BSN, PHN RN Care Coordinator  Internal Medicine   [phone]  Pager: [phone]  Weekend RN Care Coordinator job code * * * [phone]  [facility]  10/6/2019 10:01 AM

## 2019-10-06 NOTE — PLAN OF CARE
OT/6C: OT evaluation orders received and appreciated. Per collaboration with PT/consideration of OBS status, OT to HOLD at this time. Family anticipated to provide 24/7 assistance for ADLs as needed initially. Daughters to provide increased assistance with medication management. Looking into increasing services if needed. HH OT able to address ongoing OT needs within the home. Per PT, patient has tub bench available for bathing needs. Will monitor peripherally and initiate as indicated.

## 2019-10-06 NOTE — PLAN OF CARE
6D PT  Discharge Planner PT   Patient plan for discharge: Home (pt and family have strong preference for home; daughter and son-in-law able to provide 24/7 assist for the next week, then 2 local daughters will be able to provide assist as needed and state they will consider increasing services if needed)  Current status: PT evaluation and treatment complete. James for supine>sit, reports some dizziness upon sitting though this resolves with prolonged sitting. Pt slightly impulsive with mobility, stands up independently without instruction with anterior trunk lean and resultant flexed posture, overall contact guard assist with 4WW for transfers. Pt ambulates 350ft with 4WW and James, ascends/descends 3 stairs with James + 1 hand rail + SPC which is what pt uses at baseline, unsteadiness noted with this. PT to follow peripherally unless transition to inpatient status as safe discharge plan in place.  Barriers to return to prior living situation: Medical status, recent falls and risk of falls, impaired balance, weakness, decreased safety awareness  Recommendations for discharge: TCU, though if pt returns home would recommend 24/7 A and HHPT/OT, use of 4WW at all times  Rationale for recommendations: Pt would benefit from further rehab to progress strength, endurance, balance, and safety and independence with functional mobility. Pt and family, though, have strong preference to return home. If dizziness symptoms resolve this is likely a safe option as he will have 24/7 assist from family and they are open to HHPT/OT for home safety assessment. Strongly recommend use of 4WW at all times rather than SPC, SBA at minimum on stairs with B rail use, and assist for medication management.       Entered by: Imani Kearney 10/06/2019 9:03 AM

## 2019-10-06 NOTE — PROGRESS NOTES
"BP (!) 146/67 (BP Location: Left arm)   Pulse 58   Temp 97.8  F (36.6  C) (Oral)   Resp 18   Ht 1.676 m (5' 6\")   Wt 74.8 kg (165 lb)   SpO2 98%   BMI 26.63 kg/m    Patient's condition and vital Signs are stable/WNL.  Discharge instructions reviewed with patient and questions answered. Patient verbalizes understanding. IV removed. Pain under control.  Patient is tolerating regular diet and denies any N/V. Patient to be discharged to home via his children. Patient has all belongings.    "

## 2019-10-06 NOTE — PLAN OF CARE
Observation goals PRIOR TO DISCHARGE    Comments: List all  goals to be met before discharge:   - Diagnostic tests and consults completed and resulted NO  - No further episodes of syncope   Non new arrhythmia addressed with controlled heart rates : remains in sinus ariadna.  - Vital signs normal or at patient baseline and orthostatic vitals are normal and patient not lightheaded with standing NO not out of bed.  - Tolerating oral intake to maintain hydration : Currently only IV fluids.  - Safe disposition plan has been identified NO  - Nurse to notify provider when observation goals have been met and patient is ready for discharge.

## 2019-10-06 NOTE — DISCHARGE SUMMARY
"ED Observation Discharge Summary    Kiran Carcamo   MRN# [id number]  Age: 89 year old   YOB: 1921            Date of Admission: 10/5/2019    Date of Discharge: 10/6/2019  Admitting Physician: Dr. Rich Sandhu MD  Discharge Physician: Dr. Jovani MD  NP/PA: Karen Bearden CNP    DISCHARGE DIAGNOSIS:   1. Syncope  2. Type 2 DM  3. CKD    INTERVAL HISTORY: VSS, afebrile. Up ad joaquina. PT evaluated him and recommended TCU. The patient and family prefer to discharge to home with home care and PT/OT. Discussed risk for fall and life threatening or debilitating injury. The patient and family verbalize understanding.     PHYSICAL EXAM:   Blood pressure (!) 146/67, pulse 58, temperature 97.8  F (36.6  C), temperature source Oral, resp. rate 18, height 1.676 m (5' 6\"), weight 74.8 kg (165 lb), SpO2 98 %.     GENERAL APPEARENCE: A/O x4. NAD.  SKIN: Clean, dry, and intact   HEENT/NECK: NCAT w/out masses, lesions, or abnormalities. Sclera anicteric, PERRLA, EOMI.  Oral mucosa pink and moist without erythema, exudate, lesions, ulcerations, or thrush. Teeth and gums normal. Neck supple, no masses. No jugular venous distention.   CARDIOVASCULAR: S1, S2 RRR. No murmurs, rubs, or gallops.   RESPIRATORY: Respiratory effort WNL. CTA  bilaterally without crackles/rales/wheeze   GI: Active BS in all 4 quadrants. Abdomen soft and non-tender. No masses or hepatosplenomegaly.  : Deferred  MUSCULOSKELETAL:   Extremities normal, no gross deformities noted, non-tender to palpation.   PV: 2+ bilateral radial and pedal pulses. No edema noted.   NEURO: CN II-XII grossly intact. Speech normal. Appropriate throughout interview.   Sensation grossly WNL.  HEME/LYMPH: No visible bleeding.  PSYCHIATRIC: Mentation and affect appear normal  VASCULAR ACCESS: CDI without erythema or discharge. Non-tender.    PROCEDURES AND IMAGING:   Results for orders placed or performed during the hospital encounter of 10/05/19 (from the " past 24 hour(s))   Cardiology General Adult IP Consult: Patient to be seen: Routine within 24 hrs; Call back #: [phone]; Syncopal episodes with position changes. May need adjustments in blood pressure medications.; Consultant may enter orders: Yes; Requesting...    Narrative    Benny Will MD     10/6/2019 11:39 AM  CARDIOLOGY CONSULTATION  [facility], [place]    Assessment & Plan   Kiran Carcamo is a 89 year old male with PHM of DM 2, CKD   stage II, CABG x5 in 2002 and following surgery in 2002 angiogram   suggested LIMA to LAD and SVT to RCA were patent but SVG to D1   and SVG to OM1-2 were occluded.  He is admitted to ED observation unit with multiple fall.  No   signs of ACS or heart failure on exam and imaging study/lab   study.  EKG showed sinus bradycardia however no signs of branch   block or AV block.  His symptom is likely orthostatic   hypotension.  We agree with holding antihypertensive medications.    He will be followed up by his primary provider within a week to   decide if he needs to resume those medications.  Echocardiogram   also result is still pending however no major aortic stenosis or   LV dysfunction seen at least.     Recommendations  -Agree with holding metoprolol, lisinopril, and Imdur  -Consider resume lisinopril at outpatient even with small dosage  -Cardiology clinic follow-up within 2 months or earlier as needed    The results and significance of today's testing was discussed   with the patient in lay terms. More the 50% of time was for   counseling and coordinating care.    Thank you for allowing us to participate in the care of your   patient. Please do not hesitate to contact the cardiology consult   service if you have any questions.     Cardiology will sign off at this time, please call if any further   questions or concerns.    Patient seen and examined with cardiology consult attending   cardiologist, Dr. NEGRETE, BENITO Zapata  MD Nicolette  Cardiology Fellow [phone]      History of Present Illness   Reason for cardiology consult:  Requested by the ED obs service to provide clinical guidance   regarding syncope.  History and present illness     Kiran Carcamo is a 89 year old male with PHM of DM 2, CKD   stage II, CABG x5 in 2002 and following surgery in 2002 angiogram   suggested LIMA to LAD and SVT to RCA were patent but SVG to D1   and SVG to OM1-2 were occluded. Followed in the past with Dr. Bailey   now by Dr. Medina.     The patient came to the emergency department with multiple falls   in the past few days.  He was accompanied by his family who gave   us clinical history.  He lives alone but his daughter is see him   regularly.  They state  that that the patient has been having   lightheadedness when he sits up or stands up.  It is unclear if   there is actual loss of consciousness however he has bruises on   his forearm and forehead.  He states chest rib pain but he does   not recall he hit his rib.  His daughters  brought him to the   emergency department for further assessment.  Then he was   admitted to ED observation unit.  The patient states he has been   drinking and eating well.  Making good amount of urine.  No PND.    Sleeping well.  He does not recall anginal chest pain when he had   bypass surgeries but he has not had anginal symptoms lately.  He   states he is taking medications regularly however family has   concern he may have  taken wrong dosage.    On arrival to ED his blood pressure was 134/80 heart rate of 58.    Primary team held his hypertension medication including   metoprolol 12.5 mg, lisinopril 10 mg, and Imdur 60 mg.  This   morning his blood pressure is 130-140 in systolic.  Heart rate is   50 to 60s.  His standing orthostatic blood pressure yesterday was   93/64 with heart rate 80.  Sitting blood pressure was 121/63,   heart rate 71.  Lying blood pressure was 117/68 with 62 heart   rate.    Echocardiogram  today results pending.  Per personal review, LV   function is normal with possible inferior small area of akineses   which is probably old myocardial infarction.  No aortic stenosis.    Mild MR.  AI.  IVC is normal and collapsible.    EKG sinus bradycardia with PAC.  Unchanged from the last year.      Past Medical History:   Diagnosis Date     CKD (chronic kidney disease) stage 2, GFR 60-89 ml/min 7/6/2015       Syncope 12/12/2011     Type 2 diabetes, HbA1C goal < 8% (H) 2/23/2015     Past Surgical History:   Procedure Laterality Date     CHOLECYSTECTOMY       CORONARY ARTERY BYPASS  '05    6-vessel     MANDIBLE SURGERY       Social History     Socioeconomic History     Marital status:      Spouse name: Not on file     Number of children: Not on file     Years of education: Not on file     Highest education level: Not on file   Occupational History     Not on file   Social Needs     Financial resource strain: Not on file     Food insecurity:     Worry: Not on file     Inability: Not on file     Transportation needs:     Medical: Not on file     Non-medical: Not on file   Tobacco Use     Smoking status: Never Smoker     Smokeless tobacco: Never Used   Substance and Sexual Activity     Alcohol use: No     Drug use: No     Sexual activity: Not Currently     Partners: Female     Birth control/protection: Abstinence   Lifestyle     Physical activity:     Days per week: Not on file     Minutes per session: Not on file     Stress: Not on file   Relationships     Social connections:     Talks on phone: Not on file     Gets together: Not on file     Attends Christian service: Not on file     Active member of club or organization: Not on file     Attends meetings of clubs or organizations: Not on file     Relationship status: Not on file     Intimate partner violence:     Fear of current or ex partner: Not on file     Emotionally abused: Not on file     Physically abused: Not on file     Forced sexual activity: Not on  file   Other Topics Concern     Parent/sibling w/ CABG, MI or angioplasty before 65F 55M? No   Social History Narrative     Not on file     Tobacco: non smoker  EtOH: occasional drinker  Illicit substances: No  Family History   Problem Relation Age of Onset     Heart Disease Mother      Eye Disorder Father      Respiratory Brother      Heart Disease Sister      Cancer Daughter      Cancer Brother      Skin Cancer No family hx of      Melanoma No family hx of      Non contributory  Prior to Admission Medications   Prior to Admission Medications   Prescriptions Last Dose Informant Patient Reported? Taking?   Multiple Vitamin (MV-ONE PO)   Yes No   Sig: Take 1 tablet by mouth daily    acetaminophen (TYLENOL) 325 MG tablet   No No   Sig: Take 3 tablets (975 mg) by mouth every 8 hours as needed for   mild pain   aspirin 81 MG EC tablet   Yes No   Sig: Take 81 mg by mouth At Bedtime.   blood glucose (ONETOUCH ULTRA) test strip   No No   Sig: USE TO TEST BLOOD SUGAR 2 TIMES DAILY OR AS DIRECTED.   emollient (VANICREAM) external cream   No No   Sig: Apply topically daily After the shower on arms   fluorouracil (EFUDEX) 5 % cream   No No   Sig: Apply topically daily Every evening for 4 weeks on the   frontal scalp, then 2 week break, then another section of the   scalp for 4 weeks, etc   isosorbide mononitrate (IMDUR) 60 MG 24 hr tablet   No No   Sig: Take 1 tablet (60 mg) by mouth daily Please get refills from   primary care or schedule with new cardiologist.   ketoconazole (NIZORAL) 2 % external shampoo   No No   Sig: Apply topically daily   lisinopril (PRINIVIL/ZESTRIL) 10 MG tablet   No No   Sig: Take 1 tablet (10 mg) by mouth daily   metFORMIN (GLUCOPHAGE) 500 MG tablet   No No   Sig: Take 1 tablet (500 mg) by mouth 2 times daily (with meals)   metoprolol succinate ER (TOPROL-XL) 25 MG 24 hr tablet   No No   Sig: Take 0.5 tablets (12.5 mg) by mouth daily   nitroGLYcerin (NITROSTAT) 0.4 MG sublingual tablet   No No    Sig: Place 1 tablet (0.4 mg) under the tongue every 5 minutes as   needed for chest pain If symptoms after 3 doses (15 minutes) call   911.   polyethylene glycol (MIRALAX/GLYCOLAX) Packet   No No   Sig: Take 17 g by mouth daily as needed for constipation   rosuvastatin (CRESTOR) 10 MG tablet   No No   Sig: Take 1 tablet (10 mg) by mouth daily   senna-docusate (SENOKOT-S;PERICOLACE) 8.6-50 MG per tablet   No   No   Sig: Take 1 tablet by mouth 2 times daily      Facility-Administered Medications Last Administration Doses   Remaining   lidocaine (PF) (XYLOCAINE) 1 % injection 4 mL 6/7/2019 11:03 AM    lidocaine (PF) (XYLOCAINE) 1 % injection 4 mL 6/21/2019 11:30 AM      triamcinolone (KENALOG-40) injection 40 mg 6/7/2019 11:03 AM    triamcinolone (KENALOG-40) injection 40 mg 6/21/2019 11:30 AM         No current facility-administered medications on file prior to   encounter.   acetaminophen (TYLENOL) 325 MG tablet, Take 3 tablets (975 mg) by   mouth every 8 hours as needed for mild pain  aspirin 81 MG EC tablet, Take 81 mg by mouth At Bedtime.  blood glucose (ONETOUCH ULTRA) test strip, USE TO TEST BLOOD   SUGAR 2 TIMES DAILY OR AS DIRECTED.  emollient (VANICREAM) external cream, Apply topically daily After   the shower on arms  fluorouracil (EFUDEX) 5 % cream, Apply topically daily Every   evening for 4 weeks on the frontal scalp, then 2 week break, then   another section of the scalp for 4 weeks, etc  isosorbide mononitrate (IMDUR) 60 MG 24 hr tablet, Take 1 tablet   (60 mg) by mouth daily Please get refills from primary care or   schedule with new cardiologist.  ketoconazole (NIZORAL) 2 % external shampoo, Apply topically   daily  lisinopril (PRINIVIL/ZESTRIL) 10 MG tablet, Take 1 tablet (10 mg)   by mouth daily  metFORMIN (GLUCOPHAGE) 500 MG tablet, Take 1 tablet (500 mg) by   mouth 2 times daily (with meals)  metoprolol succinate ER (TOPROL-XL) 25 MG 24 hr tablet, Take 0.5   tablets (12.5 mg) by mouth  daily  Multiple Vitamin (MV-ONE PO), Take 1 tablet by mouth daily   nitroGLYcerin (NITROSTAT) 0.4 MG sublingual tablet, Place 1   tablet (0.4 mg) under the tongue every 5 minutes as needed for   chest pain If symptoms after 3 doses (15 minutes) call 911.  polyethylene glycol (MIRALAX/GLYCOLAX) Packet, Take 17 g by mouth   daily as needed for constipation  rosuvastatin (CRESTOR) 10 MG tablet, Take 1 tablet (10 mg) by   mouth daily  senna-docusate (SENOKOT-S;PERICOLACE) 8.6-50 MG per tablet, Take   1 tablet by mouth 2 times daily      Allergies   No Known Allergies  Review of Systems     10-point ROS reviewed & found negative w/ exceptions noted in the   HPI.    Physical Exam   Temp: 97.9  F (36.6  C) Temp src: Oral BP: 135/62 Pulse: 62 Heart   Rate: 62 Resp: 18 SpO2: 98 % O2 Device: None (Room air)      Vital Signs with Ranges  Temp:  [97.6  F (36.4  C)-98.4  F (36.9  C)] 97.9  F (36.6  C)  Pulse:  [58-68] 62  Heart Rate:  [47-71] 62  Resp:  [14-27] 18  BP: ()/(56-80) 135/62  SpO2:  [94 %-100 %] 98 %  165 lbs 0 oz    Resp: 18        Intake/Output Summary (Last 24 hours) at 10/6/2019 0845  Last data filed at 10/6/2019 0700  Gross per 24 hour   Intake 500 ml   Output 250 ml   Net 250 ml       GEN:  Alert, oriented x 3, appears comfortable, NAD.  HEENT:  Normocephalic/atraumatic, no scleral icterus, no nasal   discharge, mouth moist.  CV:  Heart is with regular rate/rhythm. No murmurs,   No rubs. Normal S1 and S2. Normal splitting S2 on respiration. No   S3, No S4   No JVD. Normal a and v waves and x and y descent seen.  Peripheral arteries:RA, FA, DP, PT all 2+/2+  no bruit on carotid arteries bilaterally, normal uptake of the   both carotid arteries.  No bruit on abdomen or upper chest.  No lower extremities edema  LUNGS:  Clear to auscultation bilaterally   ABD:  Active bowel sounds, soft, non-tender/non-distended.  No   rebound/guarding/rigidity.  EXT:  No edema or cyanosis.  Hands/feet warm to touch with good    signs of peripheral perfusion.   SKIN:  Dry to touch, no exanthems noted in the visualized areas.  NEURO:  No new focal deficits appreciated.    Imaging Study  Data          Labs  Data       Metabolic Studies    Recent Labs   Lab 10/05/19  1216      POTASSIUM 4.1   CHLORIDE 106   CO2 29   ANIONGAP 6   *   BUN 30   CR 1.09   GFRESTIMATED 56*   GFRESTBLACK 65   NADER 9.1   PROTTOTAL 7.0   ALBUMIN 3.6   BILITOTAL 0.5   ALKPHOS 102   AST 13   ALT 26     Hematology  Recent Labs   Lab 10/06/19  0610 10/05/19  2332 10/05/19  1836 10/05/19  1216   WBC 8.2 8.4 9.8 9.9   RBC 3.38* 3.39* 3.49* 3.81*   HGB 10.9* 11.0* 11.4* 12.4*   HCT 34.3* 34.4* 35.7* 38.7*   * 102* 102* 102*   MCH 32.2 32.4 32.7 32.5   MCHC 31.8 32.0 31.9 32.0   RDW 13.1 13.1 13.2 13.1    168 166 192     INRNo lab results found in last 7 days.    Troponin  Recent Labs   Lab 10/05/19  1836 10/05/19  1216 10/05/19  1213   TROPI <0.015 <0.015  --    TROPONIN  --   --  0.02     Lipid Panel  Recent Labs   Lab Test 09/14/18  0930 09/08/17  0919  11/21/14  1441 04/16/14  1030   CHOL 97 123   < > 109 118   HDL 44 44   < > 47 36*   LDL 38 54   < > 40 55   TRIG 74 127   < > 110 140   CHOLHDLRATIO  --   --   --  2.3 3.3    < > = values in this interval not displayed.     DM  Lab Results   Component Value Date    A1C 7.4 12/12/2018    A1C 6.5 09/14/2018    A1C 6.5 06/06/2018    A1C 7.4 03/05/2018    A1C 7.1 12/06/2017       Arterial Blood Gas  No lab results found in last 7 days.  Venous Blood Gas   Recent Labs   Lab 10/05/19  1215   PHV 7.35   PCO2V 49   PO2V 17*   HCO3V 27       Thyroid Studies   Recent Labs   Lab Test 12/12/18  1548 11/16/16  1047 11/21/14  1441 04/09/12  1038 12/13/11  1016   TSH 1.58 2.57 1.97 2.04 2.28  2.28              Physical Exam     Benny Will MD  Cardiology Fellow [phone]                 CBC with platelets   Result Value Ref Range    WBC 9.8 4.0 - 11.0 10e9/L    RBC Count 3.49 (L) 4.4 - 5.9 10e12/L     Hemoglobin 11.4 (L) 13.3 - 17.7 g/dL    Hematocrit 35.7 (L) 40.0 - 53.0 %     (H) 78 - 100 fl    MCH 32.7 26.5 - 33.0 pg    MCHC 31.9 31.5 - 36.5 g/dL    RDW 13.2 10.0 - 15.0 %    Platelet Count 166 150 - 450 10e9/L   Troponin I - Now then in 4 hours x 2   Result Value Ref Range    Troponin I ES <0.015 0.000 - 0.045 ug/L   Glucose by meter   Result Value Ref Range    Glucose 222 (H) 70 - 99 mg/dL   CBC with platelets   Result Value Ref Range    WBC 8.4 4.0 - 11.0 10e9/L    RBC Count 3.39 (L) 4.4 - 5.9 10e12/L    Hemoglobin 11.0 (L) 13.3 - 17.7 g/dL    Hematocrit 34.4 (L) 40.0 - 53.0 %     (H) 78 - 100 fl    MCH 32.4 26.5 - 33.0 pg    MCHC 32.0 31.5 - 36.5 g/dL    RDW 13.1 10.0 - 15.0 %    Platelet Count 168 150 - 450 10e9/L   CBC with platelets   Result Value Ref Range    WBC 8.2 4.0 - 11.0 10e9/L    RBC Count 3.38 (L) 4.4 - 5.9 10e12/L    Hemoglobin 10.9 (L) 13.3 - 17.7 g/dL    Hematocrit 34.3 (L) 40.0 - 53.0 %     (H) 78 - 100 fl    MCH 32.2 26.5 - 33.0 pg    MCHC 31.8 31.5 - 36.5 g/dL    RDW 13.1 10.0 - 15.0 %    Platelet Count 162 150 - 450 10e9/L   Glucose by meter   Result Value Ref Range    Glucose 162 (H) 70 - 99 mg/dL   Echocardiogram Complete    Narrative    [id number]  [id number]  [id number]  [id number]^IRINEO^YUSUF^R           [facility],[place]  Echocardiography Laboratory  [address] [place] [id number]     Name: NURY CHRISTENSEN  MRN: [id number]  : 1921  Study Date: 10/06/2019 09:08 AM  Age: 89 yrs  Gender: Male  Patient Location: [facility]  Reason For Study: Syncope  Ordering Physician: YUSUF CABELLO  Performed By: Dallin Moreno RDCS     BSA: 1.8 m2  Height: 66 in  Weight: 165 lb  HR: 60  BP: 109/57 mmHg  _____________________________________________________________________________  __        Procedure  Complete Portable Echo Adult.  _____________________________________________________________________________  __        Interpretation  Summary  The Ejection Fraction is estimated at 55-60%.  Basal inferior and basal inferoseptal akinesis.Grade II or moderate diastolic  dysfunction.  The right ventricle is normal size.  Global right ventricular function is normal.  No significant valvular abnormalities were noted.  No pericardial effusion is present.     This study was compared with the study from 11/10/2011. There has been no  change.  _____________________________________________________________________________  __        Left Ventricle  Left ventricular size is normal. Left ventricular wall thickness cannot  evaluate. The Ejection Fraction is estimated at 55-60%. Grade II or moderate  diastolic dysfunction. Basal inferior and basal inferoseptal akinesis.     Right Ventricle  The right ventricle is normal size. Global right ventricular function is  normal.     Atria  Severe left atrial enlargement is present. Mild right atrial enlargement is  present.     Mitral Valve  Mitral leaflet thickness is increased . Mild mitral insufficiency is present.        Aortic Valve  The aortic valve is tricuspid. Trileaflet aortic sclerosis without stenosis.  Trace aortic insufficiency is present.     Tricuspid Valve  The tricuspid valve is normal. Trace tricuspid insufficiency is present. The  peak velocity of the tricuspid regurgitant jet is not obtainable.     Pulmonic Valve  The pulmonic valve is normal. Trace pulmonic insufficiency is present.     Vessels  The aorta root is normal. The inferior vena cava was normal in size with  preserved respiratory variability. Ascending aorta 3.4 cm. IVC diameter <2.1  cm collapsing >50% with sniff suggests a normal RA pressure of 3 mmHg.     Pericardium  No pericardial effusion is present.        Compared to Previous Study  This study was compared with the study from 11/10/2011 . There has been no  change.  _____________________________________________________________________________  __  MMode/2D Measurements &  Calculations     asc Aorta Diam: 3.4 cm  LVOT diam: 2.6 cm  LVOT area: 5.1 cm2  LA Volume Index (BP): 57.6 ml/m2  TAPSE: 1.8 cm        Doppler Measurements & Calculations  MV E max nora: 69.7 cm/sec  MV A max nora: 120.7 cm/sec  MV E/A: 0.58  MV dec slope: 321.9 cm/sec2  MV dec time: 0.22 sec  PA acc time: 0.09 sec  E/E' av.1     Lateral E/e': 10.4  Medial E/e': 21.9     _____________________________________________________________________________  __           Report approved by: Rahel NICHOLSON 10/06/2019 02:18 PM      Hemoglobin A1c   Result Value Ref Range    Hemoglobin A1C 7.5 (H) 0 - 5.6 %   Basic metabolic panel   Result Value Ref Range    Sodium 142 133 - 144 mmol/L    Potassium 3.8 3.4 - 5.3 mmol/L    Chloride 111 (H) 94 - 109 mmol/L    Carbon Dioxide 24 20 - 32 mmol/L    Anion Gap 7 3 - 14 mmol/L    Glucose 150 (H) 70 - 99 mg/dL    Urea Nitrogen 29 7 - 30 mg/dL    Creatinine 1.14 0.66 - 1.25 mg/dL    GFR Estimate 53 (L) >60 mL/min/[1.73_m2]    GFR Estimate If Black 61 >60 mL/min/[1.73_m2]    Calcium 8.8 8.5 - 10.1 mg/dL   CBC with platelets   Result Value Ref Range    WBC 8.0 4.0 - 11.0 10e9/L    RBC Count 3.35 (L) 4.4 - 5.9 10e12/L    Hemoglobin 10.9 (L) 13.3 - 17.7 g/dL    Hematocrit 33.8 (L) 40.0 - 53.0 %     (H) 78 - 100 fl    MCH 32.5 26.5 - 33.0 pg    MCHC 32.2 31.5 - 36.5 g/dL    RDW 13.1 10.0 - 15.0 %    Platelet Count 164 150 - 450 10e9/L   Magnesium   Result Value Ref Range    Magnesium 1.8 1.6 - 2.3 mg/dL   TSH   Result Value Ref Range    TSH 1.58 0.40 - 4.00 mU/L   CBC with platelets   Result Value Ref Range    WBC 8.5 4.0 - 11.0 10e9/L    RBC Count 3.61 (L) 4.4 - 5.9 10e12/L    Hemoglobin 11.8 (L) 13.3 - 17.7 g/dL    Hematocrit 37.0 (L) 40.0 - 53.0 %     (H) 78 - 100 fl    MCH 32.7 26.5 - 33.0 pg    MCHC 31.9 31.5 - 36.5 g/dL    RDW 13.0 10.0 - 15.0 %    Platelet Count 167 150 - 450 10e9/L     DISCHARGE MEDICATIONS:   Current Discharge Medication List      START taking these  medications    Details   Lidocaine (LIDOCARE) 4 % Patch Place 1 patch onto the skin every 24 hours To prevent lidocaine toxicity, patient should be patch free for 12 hrs daily.Apply patch(s) to back. To prevent lidocaine toxicity, be sure to patch free for 12 hrs daily  Qty: 5 patch, Refills: 0    Associated Diagnoses: Syncope, unspecified syncope type         CONTINUE these medications which have CHANGED    Details   metFORMIN (GLUCOPHAGE) 500 MG tablet Take 1 tablet (500 mg) by mouth daily (with breakfast) FOLLOW-UP WITH YOUR PRIMARY CARE DOCTOR GIVEN KIDNEY FUNCTION  Qty: 180 tablet, Refills: 2    Associated Diagnoses: Type 2 diabetes mellitus with diabetic nephropathy, without long-term current use of insulin (H)         CONTINUE these medications which have NOT CHANGED    Details   acetaminophen (TYLENOL) 325 MG tablet Take 3 tablets (975 mg) by mouth every 8 hours as needed for mild pain  Qty: 100 tablet, Refills: 0    Comments: Take every 4 hours as needed for fevers.  Associated Diagnoses: Influenza A      aspirin 81 MG EC tablet Take 81 mg by mouth At Bedtime.      blood glucose (ONETOUCH ULTRA) test strip USE TO TEST BLOOD SUGAR 2 TIMES DAILY OR AS DIRECTED.  Qty: 100 strip, Refills: 1    Associated Diagnoses: Type 2 diabetes mellitus without complication (H)      emollient (VANICREAM) external cream Apply topically daily After the shower on arms  Qty: 453 g, Refills: 3    Associated Diagnoses: Xerosis of skin      fluorouracil (EFUDEX) 5 % cream Apply topically daily Every evening for 4 weeks on the frontal scalp, then 2 week break, then another section of the scalp for 4 weeks, etc  Qty: 40 g, Refills: 2    Associated Diagnoses: Actinic keratosis      ketoconazole (NIZORAL) 2 % external shampoo Apply topically daily  Qty: 120 mL, Refills: 3    Associated Diagnoses: Dermatitis, seborrheic      Multiple Vitamin (MV-ONE PO) Take 1 tablet by mouth daily       polyethylene glycol (MIRALAX/GLYCOLAX) Packet  "Take 17 g by mouth daily as needed for constipation  Qty: 10 packet, Refills: 0    Associated Diagnoses: Constipation, unspecified constipation type      rosuvastatin (CRESTOR) 10 MG tablet Take 1 tablet (10 mg) by mouth daily  Qty: 90 tablet, Refills: 3    Associated Diagnoses: Coronary artery disease of native artery of native heart with stable angina pectoris (H)      senna-docusate (SENOKOT-S;PERICOLACE) 8.6-50 MG per tablet Take 1 tablet by mouth 2 times daily  Qty: 60 tablet, Refills: 1    Associated Diagnoses: Constipation, unspecified constipation type         STOP taking these medications       isosorbide mononitrate (IMDUR) 60 MG 24 hr tablet Comments:   Reason for Stopping:         lisinopril (PRINIVIL/ZESTRIL) 10 MG tablet Comments:   Reason for Stopping:         metoprolol succinate ER (TOPROL-XL) 25 MG 24 hr tablet Comments:   Reason for Stopping:         nitroGLYcerin (NITROSTAT) 0.4 MG sublingual tablet Comments:   Reason for Stopping:                 CONSULTATIONS:   Consultation during this admission received from:  Cardiology      BRIEF HISTORY OF PRESENT ILLNESS:   (Adopted from admission H&P).  \"Kiran Carcamo is a 89 year old male with history of CAD status post 6 x bypass 20 years ago, HTN, type II DM and CKD who presents with multiple falls in the past 2 days. Patient accompanied by daughters who provide history. They state that patient lives alone but patient's daughters see him regularly. Daughters state that lately patient has been having lightheadedness upon rising out of bed and when walking back to bed after using the bathroom.  Usually he is able to sense when he is about to fall and is able to prevent this but there are other times where he actually does fall.  Is unclear if there is any loss of consciousness with this as there have been no witnessed falls.  Yesterday patient got up out of bed to get ready for [facility] but developed lightheadedness and fell.  This morning he had " "another episode of lightheadedness with fall with trying to get out of bed to go to the bathroom. He thinks he was out for a few seconds with this fall.  Daughters note that whenever he tries to sit up on the edge of the bed he ends up feeling dizzy.  He has had other falls at home as well, daughter notes a tooth fell out recently and has a healing abrasion on his head for this.  Daughter states that they tried to him to went to urgent care and he was sent here for evaluation. He has an abrasion on back of his head from 1 of his falls. No recent illness. He does not typically get UTIs. He did get pneumonia last year. He has had a subtle cough. He has complained of a headache with some tingling in back of head. No vertiginous symptoms sitting upright but did have dizziness when his head of bed was being tilted back.  He has occasional neck pain and endorses slight headache at this time.  As for any recent health changes daughter states that yesterday he didn't want to get out of bed but did go to [facility].  He was ambulating with his walker normally. No confusion, nausea, vomiting, diarrhea. No dysuria or urinary frequency. Patient states last 3-4 days he has felt need to cough something but couldn't expectorate this. He is not on any blood thinners. No abdominal distension. \"    ED OBSERVATION COURSE:  disease) stage 2, GFR 60-89 ml/min (7/6/2015), Syncope (12/12/2011), and Type 2 diabetes, HbA1C goal < 8% (H) (2/23/2015).  He  has a past surgical history that includes coronary artery bypass ('05); Mandible surgery; and Cholecystectomy. He presents to the ED today after a syncopal event.      ##Syncope with Collapse: lightheadedness with position changes at admission. Improved today. None of his falls have been witnessed. Head CTA demonstrates no aneurysm or stenosis of the major intracranial arteries. Severe internal carotid atherosclerosis intracranially bilaterally. Neck CTA demonstrates no high-grade stenosis of " the major cervical arteries. Less than 40% stenosis left ICA origin with severe atherosclerosis. Dominant left vertebral artery with the left vertebral artery only contributing to the basilar artery .No intracranial hemorrhage on the noncontrast head CT. No significant change of ex vacuo dilatation of the left lateral ventricle posteriorly and diffuse cerebral atrophy with underlying leukoariosis, but not greatly disproportionate to the patient's age. CT head w/o contrast shows: No acute intracranial pathology. Generalized parenchymal volume loss and changes of chronic small vessel ischemic disease. Tiny left lacunar infarcts are unchanged since the previous exam on 11/9/2011. Chest xray shows: Pulmonary vascular congestion. No focal airspace opacities. POC US Echo in the ED shows: No pericardial effusion. No RV dilation. Grossly preserved left ventricular ejection fraction. Small IVC with complete collapse during respirations. Consistent with intravascular volume depletion. He is admitted to ED observation for syncope evaluation and Cardiology consult.  Serial hemoglobins stable. Orthostatics + at admission but improved with hydration. Echo this am is unchanged from prior. PT/OT evaluated the patient and recommended TCU. Patient and family prefer discharge to home with home care. Discussed risk and benefits. Patient and family verbalized understanding. CC Consulted and did set up home care.  Cardiology consult to assess hypertensive medications and to make adjustments as necessary. Recommend holding bleed pressure medications and follow-up with PCP. Do not recommend zio-patch as bradycardia is sinus. Will discharge Lidoderm patch and Tylenol PRN rib pain. This did help his pain. He was instructed to return to the ED with fever, uncontrolled nausea, vomiting, unrelieved pain, bleeding not relieved with pressure, dizziness, chest pain, shortness of breath, loss of consciousness, and any new or concerning symptoms.           ##CAD s/p CABG:   ##Hypertension: BP soft in the ED 98/56- 100/62. Home medications include: daily aspirin 81 mg, metoprolol succinate 12.5 mg, Rosuvastatin 10 mg daily, lisinopril 10 mg daily and Imdur 60 mg daily.   - Continue PTA aspirin and Rrosuvastatin  - Hold Imdur, metoprolol and Lisinopril until seen by cardiology.      ##Type 2 diabetes mellitus: BG in  PTA Metformin 500 mg daily. Given creatinine clearance consider switching to different agent. Still safe to resume. Recommend he follow-up with his PCP to discuss.      ##CKD stage 2: 1.14 at discharge. Monitor out-patient      ##Constipation: PTA Senna-S 1 tablet BID, prn Miralax    DISCHARGE DISPOSITION:   Discharged to home. The patient was discharged in a stable condition and agreed to discharge plan.    DISCHARGE INSTRUCTIONS AND FOLLOW-UP:  Discharge Procedure Orders   Physical Therapy Referral   Standing Status: Future   Referral Priority: Routine Referral Type: Rehab Therapy Physical Therapy   Number of Visits Requested: 1     Adult [facility] Follow-up and recommended labs and tests   Order Comments: Follow up with primary care provider, Buddy Poe, within 2 days for hospital follow- up.  The following labs/tests are recommended: Blood pressure recheck, blood sugar check .      Appointments on [facility] and/or [facility] campus (with [facility] or [facility] provider or service). Call [phone] if you haven't heard regarding these appointments within 7 days of discharge.     Monitor and record   Order Comments: blood pressure and blood glucose     Activity   Order Comments: Your activity upon discharge: ambulate in hous with 4 wheeled walker. Recommend 24 hour assist     Order Specific Question Answer Comments   Is discharge order? Yes      When to contact your care team   Order Comments: Return to the ED with fever, uncontrolled nausea, vomiting, unrelieved pain, bleeding not relieved with pressure, dizziness, chest pain, shortness of  breath, loss of consciousness, and any new or concerning symptoms.     Reason for your hospital stay   Order Comments: You were admitted to observation with falls.  Your EKG was normal, labs checking for heart damage were negative, chest x-ray was normal.  Your ECHO did not show any changes from 2011.  Your blood pressure was low and your CT of your head did show Severe internal carotid atherosclerosis intracranially bilaterally. This as well as low blood pressure could cause falls. Other causes could be loss of balance when not using the walker or dehydration.       Physical therapy saw you and recommended a rehab stay. You prefer to discharge with home assistance.  Please note, returning home does increase your risk for more falls. Fall could lead to life threatening bleeding or debilitation. WE recommend rehab. However, if unable to do this, we recommend recommend 24 hour assistance at home and physical therapy to improve your strength.     Cardiology was consulted and recommended:   -holding metoprolol, lisinopril, and Imdur and follow-up with primary care doctor for blood pressure recheck early next week.   -Cardiology clinic follow-up within 2 months or earlier as needed    Your blood pressure was low and your CT did show severe internal carotid atherosclerosis  intracranially bilaterally. This combination could cause falls. Be sure to use walker at all times, maintain hydration, and keep blood pressure in a normal range.     Your kidney function is a little high. I am concerned about resuming your metformin. Please follow-up with your primary care doctor to discuss.     Diet   Order Comments: Follow this diet upon discharge: Orders Placed This Encounter      Combination Diet 4220-5696 Calories: Moderate Consistent CHO (4-6 CHO units/meal); Low Saturated Fat Na <2400mg Diet, No Caffeine Diet  Be sure to drink plenty of non-caffeinated beverages.     Order Specific Question Answer Comments   Is discharge order?  Yes       Attestation:   I have reviewed today's vital signs, notes, medications, labs and imaging.      MICHAEL Stovall, CNP  Emergency Department Observation Unit

## 2019-10-06 NOTE — CONSULTS
CARDIOLOGY CONSULTATION  [facility], [place]    Assessment & Plan   Kiran Carcamo is a 89 year old male with PHM of DM 2, CKD stage II, CABG x5 in 2002 and following surgery in 2002 angiogram suggested LIMA to LAD and SVT to RCA were patent but SVG to D1 and SVG to OM1-2 were occluded.  He is admitted to ED observation unit with multiple fall.  No signs of ACS or heart failure on exam and imaging study/lab study.  EKG showed sinus bradycardia however no signs of branch block or AV block.  His symptom is likely orthostatic hypotension.  We agree with holding antihypertensive medications.  He will be followed up by his primary provider within a week to decide if he needs to resume those medications.  Echocardiogram also result is still pending however no major aortic stenosis or LV dysfunction seen at least.     Recommendations  -Agree with holding metoprolol, lisinopril, and Imdur  -Consider resume lisinopril at outpatient even with small dosage  -Cardiology clinic follow-up within 2 months or earlier as needed    The results and significance of today's testing was discussed with the patient in lay terms. More the 50% of time was for counseling and coordinating care.    Thank you for allowing us to participate in the care of your patient. Please do not hesitate to contact the cardiology consult service if you have any questions.     Cardiology will sign off at this time, please call if any further questions or concerns.    Patient seen and examined with cardiology consult attending cardiologist, Dr. NEGRETE, BENITO Will MD  Cardiology Fellow [phone]      History of Present Illness   Reason for cardiology consult:  Requested by the ED obs service to provide clinical guidance regarding syncope.  History and present illness     Kiran Carcamo is a 89 year old male with PHM of DM 2, CKD stage II, CABG x5 in 2002 and following surgery in 2002 angiogram suggested LIMA to LAD  and SVT to RCA were patent but SVG to D1 and SVG to OM1-2 were occluded. Followed in the past with Dr. Bailey now by Dr. Medina.     The patient came to the emergency department with multiple falls in the past few days.  He was accompanied by his family who gave us clinical history.  He lives alone but his daughter is see him regularly.  They state  that that the patient has been having lightheadedness when he sits up or stands up.  It is unclear if there is actual loss of consciousness however he has bruises on his forearm and forehead.  He states chest rib pain but he does not recall he hit his rib.  His daughters  brought him to the emergency department for further assessment.  Then he was admitted to ED observation unit.  The patient states he has been drinking and eating well.  Making good amount of urine.  No PND.  Sleeping well.  He does not recall anginal chest pain when he had bypass surgeries but he has not had anginal symptoms lately.  He states he is taking medications regularly however family has concern he may have  taken wrong dosage.    On arrival to ED his blood pressure was 134/80 heart rate of 58.  Primary team held his hypertension medication including metoprolol 12.5 mg, lisinopril 10 mg, and Imdur 60 mg.  This morning his blood pressure is 130-140 in systolic.  Heart rate is 50 to 60s.  His standing orthostatic blood pressure yesterday was 93/64 with heart rate 80.  Sitting blood pressure was 121/63, heart rate 71.  Lying blood pressure was 117/68 with 62 heart rate.    Echocardiogram today results pending.  Per personal review, LV function is normal with possible inferior small area of akineses which is probably old myocardial infarction.  No aortic stenosis.  Mild MR.  AI.  IVC is normal and collapsible.    EKG sinus bradycardia with PAC.  Unchanged from the last year.      Past Medical History:   Diagnosis Date     CKD (chronic kidney disease) stage 2, GFR 60-89 ml/min 7/6/2015     Syncope  12/12/2011     Type 2 diabetes, HbA1C goal < 8% (H) 2/23/2015     Past Surgical History:   Procedure Laterality Date     CHOLECYSTECTOMY       CORONARY ARTERY BYPASS  '05    6-vessel     MANDIBLE SURGERY       Social History     Socioeconomic History     Marital status:      Spouse name: Not on file     Number of children: Not on file     Years of education: Not on file     Highest education level: Not on file   Occupational History     Not on file   Social Needs     Financial resource strain: Not on file     Food insecurity:     Worry: Not on file     Inability: Not on file     Transportation needs:     Medical: Not on file     Non-medical: Not on file   Tobacco Use     Smoking status: Never Smoker     Smokeless tobacco: Never Used   Substance and Sexual Activity     Alcohol use: No     Drug use: No     Sexual activity: Not Currently     Partners: Female     Birth control/protection: Abstinence   Lifestyle     Physical activity:     Days per week: Not on file     Minutes per session: Not on file     Stress: Not on file   Relationships     Social connections:     Talks on phone: Not on file     Gets together: Not on file     Attends Jew service: Not on file     Active member of club or organization: Not on file     Attends meetings of clubs or organizations: Not on file     Relationship status: Not on file     Intimate partner violence:     Fear of current or ex partner: Not on file     Emotionally abused: Not on file     Physically abused: Not on file     Forced sexual activity: Not on file   Other Topics Concern     Parent/sibling w/ CABG, MI or angioplasty before 65F 55M? No   Social History Narrative     Not on file     Tobacco: non smoker  EtOH: occasional drinker  Illicit substances: No  Family History   Problem Relation Age of Onset     Heart Disease Mother      Eye Disorder Father      Respiratory Brother      Heart Disease Sister      Cancer Daughter      Cancer Brother      Skin Cancer No  family hx of      Melanoma No family hx of      Non contributory  Prior to Admission Medications   Prior to Admission Medications   Prescriptions Last Dose Informant Patient Reported? Taking?   Multiple Vitamin (MV-ONE PO)   Yes No   Sig: Take 1 tablet by mouth daily    acetaminophen (TYLENOL) 325 MG tablet   No No   Sig: Take 3 tablets (975 mg) by mouth every 8 hours as needed for mild pain   aspirin 81 MG EC tablet   Yes No   Sig: Take 81 mg by mouth At Bedtime.   blood glucose (ONETOUCH ULTRA) test strip   No No   Sig: USE TO TEST BLOOD SUGAR 2 TIMES DAILY OR AS DIRECTED.   emollient (VANICREAM) external cream   No No   Sig: Apply topically daily After the shower on arms   fluorouracil (EFUDEX) 5 % cream   No No   Sig: Apply topically daily Every evening for 4 weeks on the frontal scalp, then 2 week break, then another section of the scalp for 4 weeks, etc   isosorbide mononitrate (IMDUR) 60 MG 24 hr tablet   No No   Sig: Take 1 tablet (60 mg) by mouth daily Please get refills from primary care or schedule with new cardiologist.   ketoconazole (NIZORAL) 2 % external shampoo   No No   Sig: Apply topically daily   lisinopril (PRINIVIL/ZESTRIL) 10 MG tablet   No No   Sig: Take 1 tablet (10 mg) by mouth daily   metFORMIN (GLUCOPHAGE) 500 MG tablet   No No   Sig: Take 1 tablet (500 mg) by mouth 2 times daily (with meals)   metoprolol succinate ER (TOPROL-XL) 25 MG 24 hr tablet   No No   Sig: Take 0.5 tablets (12.5 mg) by mouth daily   nitroGLYcerin (NITROSTAT) 0.4 MG sublingual tablet   No No   Sig: Place 1 tablet (0.4 mg) under the tongue every 5 minutes as needed for chest pain If symptoms after 3 doses (15 minutes) call 911.   polyethylene glycol (MIRALAX/GLYCOLAX) Packet   No No   Sig: Take 17 g by mouth daily as needed for constipation   rosuvastatin (CRESTOR) 10 MG tablet   No No   Sig: Take 1 tablet (10 mg) by mouth daily   senna-docusate (SENOKOT-S;PERICOLACE) 8.6-50 MG per tablet   No No   Sig: Take 1 tablet  by mouth 2 times daily      Facility-Administered Medications Last Administration Doses Remaining   lidocaine (PF) (XYLOCAINE) 1 % injection 4 mL 6/7/2019 11:03 AM    lidocaine (PF) (XYLOCAINE) 1 % injection 4 mL 6/21/2019 11:30 AM    triamcinolone (KENALOG-40) injection 40 mg 6/7/2019 11:03 AM    triamcinolone (KENALOG-40) injection 40 mg 6/21/2019 11:30 AM         No current facility-administered medications on file prior to encounter.   acetaminophen (TYLENOL) 325 MG tablet, Take 3 tablets (975 mg) by mouth every 8 hours as needed for mild pain  aspirin 81 MG EC tablet, Take 81 mg by mouth At Bedtime.  blood glucose (ONETOUCH ULTRA) test strip, USE TO TEST BLOOD SUGAR 2 TIMES DAILY OR AS DIRECTED.  emollient (VANICREAM) external cream, Apply topically daily After the shower on arms  fluorouracil (EFUDEX) 5 % cream, Apply topically daily Every evening for 4 weeks on the frontal scalp, then 2 week break, then another section of the scalp for 4 weeks, etc  isosorbide mononitrate (IMDUR) 60 MG 24 hr tablet, Take 1 tablet (60 mg) by mouth daily Please get refills from primary care or schedule with new cardiologist.  ketoconazole (NIZORAL) 2 % external shampoo, Apply topically daily  lisinopril (PRINIVIL/ZESTRIL) 10 MG tablet, Take 1 tablet (10 mg) by mouth daily  metFORMIN (GLUCOPHAGE) 500 MG tablet, Take 1 tablet (500 mg) by mouth 2 times daily (with meals)  metoprolol succinate ER (TOPROL-XL) 25 MG 24 hr tablet, Take 0.5 tablets (12.5 mg) by mouth daily  Multiple Vitamin (MV-ONE PO), Take 1 tablet by mouth daily   nitroGLYcerin (NITROSTAT) 0.4 MG sublingual tablet, Place 1 tablet (0.4 mg) under the tongue every 5 minutes as needed for chest pain If symptoms after 3 doses (15 minutes) call 911.  polyethylene glycol (MIRALAX/GLYCOLAX) Packet, Take 17 g by mouth daily as needed for constipation  rosuvastatin (CRESTOR) 10 MG tablet, Take 1 tablet (10 mg) by mouth daily  senna-docusate (SENOKOT-S;PERICOLACE) 8.6-50 MG  per tablet, Take 1 tablet by mouth 2 times daily      Allergies   No Known Allergies  Review of Systems     10-point ROS reviewed & found negative w/ exceptions noted in the HPI.    Physical Exam   Temp: 97.9  F (36.6  C) Temp src: Oral BP: 135/62 Pulse: 62 Heart Rate: 62 Resp: 18 SpO2: 98 % O2 Device: None (Room air)      Vital Signs with Ranges  Temp:  [97.6  F (36.4  C)-98.4  F (36.9  C)] 97.9  F (36.6  C)  Pulse:  [58-68] 62  Heart Rate:  [47-71] 62  Resp:  [14-27] 18  BP: ()/(56-80) 135/62  SpO2:  [94 %-100 %] 98 %  165 lbs 0 oz    Resp: 18        Intake/Output Summary (Last 24 hours) at 10/6/2019 0845  Last data filed at 10/6/2019 0700  Gross per 24 hour   Intake 500 ml   Output 250 ml   Net 250 ml       GEN:  Alert, oriented x 3, appears comfortable, NAD.  HEENT:  Normocephalic/atraumatic, no scleral icterus, no nasal discharge, mouth moist.  CV:  Heart is with regular rate/rhythm. No murmurs,   No rubs. Normal S1 and S2. Normal splitting S2 on respiration. No S3, No S4   No JVD. Normal a and v waves and x and y descent seen.  Peripheral arteries:RA, FA, DP, PT all 2+/2+  no bruit on carotid arteries bilaterally, normal uptake of the both carotid arteries.  No bruit on abdomen or upper chest.  No lower extremities edema  LUNGS:  Clear to auscultation bilaterally   ABD:  Active bowel sounds, soft, non-tender/non-distended.  No rebound/guarding/rigidity.  EXT:  No edema or cyanosis.  Hands/feet warm to touch with good signs of peripheral perfusion.   SKIN:  Dry to touch, no exanthems noted in the visualized areas.  NEURO:  No new focal deficits appreciated.    Imaging Study  Data          Labs  Data       Metabolic Studies    Recent Labs   Lab 10/05/19  1216      POTASSIUM 4.1   CHLORIDE 106   CO2 29   ANIONGAP 6   *   BUN 30   CR 1.09   GFRESTIMATED 56*   GFRESTBLACK 65   NADER 9.1   PROTTOTAL 7.0   ALBUMIN 3.6   BILITOTAL 0.5   ALKPHOS 102   AST 13   ALT 26     Hematology  Recent Labs   Lab  10/06/19  0610 10/05/19  2332 10/05/19  1836 10/05/19  1216   WBC 8.2 8.4 9.8 9.9   RBC 3.38* 3.39* 3.49* 3.81*   HGB 10.9* 11.0* 11.4* 12.4*   HCT 34.3* 34.4* 35.7* 38.7*   * 102* 102* 102*   MCH 32.2 32.4 32.7 32.5   MCHC 31.8 32.0 31.9 32.0   RDW 13.1 13.1 13.2 13.1    168 166 192     INRNo lab results found in last 7 days.    Troponin  Recent Labs   Lab 10/05/19  1836 10/05/19  1216 10/05/19  1213   TROPI <0.015 <0.015  --    TROPONIN  --   --  0.02     Lipid Panel  Recent Labs   Lab Test 09/14/18  0930 09/08/17  0919  11/21/14  1441 04/16/14  1030   CHOL 97 123   < > 109 118   HDL 44 44   < > 47 36*   LDL 38 54   < > 40 55   TRIG 74 127   < > 110 140   CHOLHDLRATIO  --   --   --  2.3 3.3    < > = values in this interval not displayed.     DM  Lab Results   Component Value Date    A1C 7.4 12/12/2018    A1C 6.5 09/14/2018    A1C 6.5 06/06/2018    A1C 7.4 03/05/2018    A1C 7.1 12/06/2017       Arterial Blood Gas  No lab results found in last 7 days.  Venous Blood Gas   Recent Labs   Lab 10/05/19  1215   PHV 7.35   PCO2V 49   PO2V 17*   HCO3V 27       Thyroid Studies   Recent Labs   Lab Test 12/12/18  1548 11/16/16  1047 11/21/14  1441 04/09/12  1038 12/13/11  1016   TSH 1.58 2.57 1.97 2.04 2.28  2.28              Physical Exam     Benny Will MD  Cardiology Fellow [id number]

## 2019-10-06 NOTE — PROGRESS NOTES
[facility]  Daily Progress Note          Assessment & Plan:   Kiran Carcamo is a 89 year old male admitted on 10/5/2019. He  has a past medical history of CKD (chronic kidney disease) stage 2, GFR 60-89 ml/min (7/6/2015), Syncope (12/12/2011), and Type 2 diabetes, HbA1C goal < 8% (H) (2/23/2015).  He  has a past surgical history that includes coronary artery bypass ('05); Mandible surgery; and Cholecystectomy. He presents to the ED today after a syncopal event.      ##Syncope with Collapse: lightheadedness with position changes. None of his falls have been witnessed. Patient lives alone, but his daughter visits daily. He denies any associated chest pain, palpitations, SOB, NAIR, dizziness prior. Pt remembers all occurrences surrounding the fall.  He reports left sided rib pain.  Yesterday he noted lightheadedness when getting up out of bed to go use the bathroom and fell. Pt has HR 40s at baseline for at least past several yrs on metoprolol ER 12.5 mg daily. Head CTA demonstrates no aneurysm or stenosis of the major intracranial arteries. Severe internal carotid atherosclerosis intracranially bilaterally. Neck CTA demonstrates no high-grade stenosis of the major cervical arteries. Less than 40% stenosis left ICA origin with severe atherosclerosis. Dominant left vertebral artery with the left vertebral artery only contributing to the basilar artery .No intracranial hemorrhage on the noncontrast head CT. No significant change of ex vacuo dilatation of the left lateral ventricle posteriorly and diffuse cerebral atrophy with underlying leukoariosis, but not greatly disproportionate to the patient's age. CT head w/o contrast shows: No acute intracranial pathology. Generalized parenchymal volume loss and changes of chronic small vessel ischemic disease. Tiny left lacunar infarcts are unchanged since the previous exam on 11/9/2011. Chest xray shows: Pulmonary vascular congestion. No  focal airspace opacities. POC US Echo in the ED shows: No pericardial effusion. No RV dilation. Grossly preserved left ventricular ejection fraction. Small IVC with complete collapse during respirations. Consistent with intravascular volume depletion. He is admitted to ED observation for syncope evaluation and Cardiology consult  Differential diagnosis: seizure (unlikely given no loss of bowel or bladder and no convulsions), ACS (negative work-up thus far), arrhythmia, valvular disease, dehydration, infection, vasovagal syncope, versus other  - Gentle IVF  - Serial hemoglobins  - Orthostatics   - Echo this am  - PT/OT evaluated the patient and recommended TCU. Patient and family prefer discharge to home with home care.   - SW consult/CC Consult    - Cardiology consult to assess hypertensive medications and to make adjustments as necessary.   -Lidoderm patch and Tylenol PRN rib pain.     ##CAD s/p CABG:   ##Hypertension: BP soft in the ED 98/56- 100/62. Home medications include: daily aspirin 81 mg, metoprolol succinate 12.5 mg, Rosuvastatin 10 mg daily, lisinopril 10 mg daily and Imdur 60 mg daily.   - Continue PTA aspirin and Rrosuvastatin  - Hold Imdur, metoprolol and Lisinopril until seen by cardiology.      ##Type 2 diabetes mellitus: BG in  PTA Metformin 500 mg BID.   - BG checks TID with meals and HS  - Hold PTA Metformin 500 mg BID as GFR is less than 60.  - Medium sliding scale insulin  - Hypoglycemic protocol     ##CKD stage 2: Cr 1.09, BUN 30, GFR 56  - Recheck BMP this am      ##Constipation: PTA Senna-S 1 tablet BID, prn Miralax    FEN: cardiac/diabetic diet no caffeine  Lines: PIV  Prophylaxis: Early ambulation           Consults:   Cardiology          Discharge Planning:   Pending above work-up, likely home with home care and 24/7 family support.         Interval History:   Resting in bed. Daughter at bed-side. Notes pain to left rib cage. Notes weakness.     ROS:   Constitutional: No  "fevers/chills. Tolerating diet.   Cardiovascular: No chest pain or palpitations.   Respiratory: No cough or SOB.   GI: No abdominal pain. No N/V.                 Physical Exam:   /62 (BP Location: Left arm)   Pulse 62   Temp 97.9  F (36.6  C) (Oral)   Resp 18   Ht 1.676 m (5' 6\")   Wt 74.8 kg (165 lb)   SpO2 98%   BMI 26.63 kg/m       GENERAL: Alert and oriented x 3. NAD.   HEENT: Anicteric sclera. Mucous membranes moist.   CV: RRR. S1, S2. No murmurs appreciated.   RESPIRATORY: Effort normal. Lungs CTAB with no wheezing, rales, rhonchi.   GI: Abdomen soft and non distended with normoactive bowel sounds present in all quadrants. No tenderness, rebound, guarding.   NEUROLOGICAL: No focal deficits. Moves all extremities.    EXTREMITIES: No peripheral edema. Intact bilateral pedal pulses.   SKIN: No jaundice. No rashes.     Medication list reviewed.   Today's labs and imaging were reviewed.     MICHAEL Stovall, CNP  Emergency Department Observation Unit      "

## 2019-10-07 NOTE — PLAN OF CARE
Physical Therapy Discharge Summary    Reason for therapy discharge:    Discharged to home with home therapy.    Progress towards therapy goal(s). See goals on Care Plan in [facility] electronic health record for goal details.  Goals partially met.  Barriers to achieving goals:   discharge from facility.    Therapy recommendation(s):    Continued therapy is recommended.  Rationale/Recommendations:  TCU recommended though pt and family decline, therefore recommend 24/7 assist for safety and HHPT/OT to progress safety and independence within the home environment.

## 2019-10-09 ENCOUNTER — TELEPHONE (OUTPATIENT)
Dept: FAMILY MEDICINE | Facility: CLINIC | Age: 84
End: 2019-10-09

## 2019-10-09 NOTE — TELEPHONE ENCOUNTER
LVM on the mobile phone of patient and his daughter, Jes, requesting call back to schedule hospital f/u OV and check in with the RN staff re: patient status, medication review, and any concerns. Tania Barone RN October 9, 2019 1:29 PM

## 2019-10-09 NOTE — TELEPHONE ENCOUNTER
Spoke with patient's daughter, who reports that the patient is doing well and has an appointment scheduled for OV next week.  No concerns, no questions at this time. F/u prn . Tania Barone RN October 9, 2019 3:18 PM

## 2019-10-09 NOTE — TELEPHONE ENCOUNTER
Routing to RN triage pool to help schedule an appointment for a hospital discharge follow up.  Thanks,  Greta Nelson

## 2019-10-23 ENCOUNTER — OFFICE VISIT (OUTPATIENT)
Dept: FAMILY MEDICINE | Facility: CLINIC | Age: 84
End: 2019-10-23
Payer: COMMERCIAL

## 2019-10-23 VITALS
HEART RATE: 66 BPM | TEMPERATURE: 97.4 F | BODY MASS INDEX: 24.96 KG/M2 | WEIGHT: 155.3 LBS | DIASTOLIC BLOOD PRESSURE: 56 MMHG | HEIGHT: 66 IN | SYSTOLIC BLOOD PRESSURE: 108 MMHG | OXYGEN SATURATION: 99 %

## 2019-10-23 DIAGNOSIS — R26.81 UNSTABLE GAIT: ICD-10-CM

## 2019-10-23 DIAGNOSIS — Z23 NEED FOR INFLUENZA VACCINATION: ICD-10-CM

## 2019-10-23 DIAGNOSIS — Z00.00 ENCOUNTER FOR MEDICARE ANNUAL WELLNESS EXAM: Primary | ICD-10-CM

## 2019-10-23 DIAGNOSIS — R25.1 TREMOR: ICD-10-CM

## 2019-10-23 PROCEDURE — 90662 IIV NO PRSV INCREASED AG IM: CPT | Performed by: FAMILY MEDICINE

## 2019-10-23 PROCEDURE — 99397 PER PM REEVAL EST PAT 65+ YR: CPT | Mod: 25 | Performed by: FAMILY MEDICINE

## 2019-10-23 PROCEDURE — 99213 OFFICE O/P EST LOW 20 MIN: CPT | Mod: 25 | Performed by: FAMILY MEDICINE

## 2019-10-23 PROCEDURE — G0008 ADMIN INFLUENZA VIRUS VAC: HCPCS | Performed by: FAMILY MEDICINE

## 2019-10-23 ASSESSMENT — MIFFLIN-ST. JEOR: SCORE: 1267.19

## 2019-10-23 NOTE — PROGRESS NOTES
"  SUBJECTIVE:   Kiran Carcamo is a 89 year old male who presents for Preventive Visit.  Are you in the first 12 months of your Medicare Part B coverage?  No    Physical Health:    In general, how would you rate your overall physical health? Good, except balance issues.     Outside of work, how many days during the week do you exercise? Yes, goes up and down the stairs    Outside of work, approximately how many minutes a day do you exercise?not applicable    If you drink alcohol do you typically have >3 drinks per day or >7 drinks per week? No    Do you usually eat at least 4 servings of fruit and vegetables a day, include whole grains & fiber and avoid regularly eating high fat or \"junk\" foods? Yes    Do you have any problems taking medications regularly?  No    Do you have any side effects from medications? none, muscle pains sometimes. Randomly throughout the day in different areas of his legs.     Needs assistance for the following daily activities:     Which of the following safety concerns are present in your home?  none identified     Hearing impairment: Yes, has hearing aids     In the past 6 months, have you been bothered by leaking of urine? Yes, wears a pad.     Mental Health:    In general, how would you rate your overall mental or emotional health? good  PHQ-2 Score:      HPI given with the help of his daughters.    ENT  Patient expresses having a hoarse voice at times, will try to talk but \"nothing comes out\". Daughter relates that his posture has worsened, she believes he is not getting good enough breaths to be able to speak clearly. She notes that he will aspirate water at times which causes some coughing fits.    ER Follow Up  Patient was in the ER on 10/5/19 for dizziness and syncope that caused some falls. He has been taken off all of his blood pressure medications. His BP in clinic today is 108/56, it has been higher when they measured at home. He lives at his home, is alone for much of the " time but is seen regularly by his daughters.    Musculoskeletal  He reports having pain in both legs, daughter reports that he has arthritis and has had injections in his knee in the past. Extra strength Tylenol seems to be effective for this. Patient's daughter's would like to him to attend some exercises classes to keep him more active, as well as be seen by home care.    Neuro  Patient reports a tremor in both arms. Daughter notes that he has difficulty with fine motor skills like using a spoon or buttoning his shirt. Denies family medical history of Parkinson's.     Weight  Patient's weight has been gradually dropping. He states that he has a strong appetite.     Immunizations  He would like a flu shot today.    Do you feel safe in your environment? Yes    Do you have a Health Care Directive? Yes: Advance Directive has been received and scanned.    Additional concerns to address?  YES    Fall risk:      Cognitive Screening:           Reviewed and updated as needed this visit by clinical staff  Tobacco  Allergies         Reviewed and updated as needed this visit by Provider        Social History     Tobacco Use     Smoking status: Never Smoker     Smokeless tobacco: Never Used   Substance Use Topics     Alcohol use: No                           Current providers sharing in care for this patient include:   Patient Care Team:  Buddy Poe MD as PCP - General (Family Practice)  Buddy oPe MD as Assigned PCP  Michael Arevalo MD as MD (Dermatology)  Hope Medina MD as MD (Cardiology)    The following health maintenance items are reviewed in Epic and correct as of today:  Health Maintenance   Topic Date Due     ZOSTER IMMUNIZATION (1 of 2) 04/13/1971     MEDICARE ANNUAL WELLNESS VISIT  06/02/2018     EYE EXAM  02/01/2019     INFLUENZA VACCINE (1) 09/01/2019     LIPID  09/14/2019     MICROALBUMIN  09/14/2019     DIABETIC FOOT EXAM  09/14/2019     A1C  04/06/2020     FALL RISK ASSESSMENT   07/30/2020     BMP  10/06/2020     TSH W/FREE T4 REFLEX  10/06/2021     DTAP/TDAP/TD IMMUNIZATION (3 - Td) 11/09/2021     ADVANCE CARE PLANNING  11/01/2023     PHQ-2  Completed     PNEUMOCOCCAL IMMUNIZATION 65+ LOW/MEDIUM RISK  Completed     IPV IMMUNIZATION  Aged Out     MENINGITIS IMMUNIZATION  Aged Out     Labs reviewed in EPIC  BP Readings from Last 3 Encounters:   10/23/19 108/56   10/06/19 (!) 146/67   10/05/19 134/80    Wt Readings from Last 3 Encounters:   10/23/19 70.4 kg (155 lb 4.8 oz)   10/05/19 74.8 kg (165 lb)   06/21/19 72.1 kg (159 lb)                  Patient Active Problem List   Diagnosis     Health Care Home     Actinic keratosis     SK (seborrheic keratosis)     Hyperplasia of prostate     Hyperlipidemia LDL goal <70     History of MI (myocardial infarction)     Forgetfulness     CKD (chronic kidney disease) stage 2, GFR 60-89 ml/min     Essential hypertension with goal blood pressure less than 140/90     Eczema, unspecified type     Type 2 diabetes mellitus with diabetic nephropathy, without long-term current use of insulin (H)     Paget's bone disease     History of skin cancer     Dermatitis, seborrheic     Syncope     Past Surgical History:   Procedure Laterality Date     CHOLECYSTECTOMY       CORONARY ARTERY BYPASS  '05    6-vessel     MANDIBLE SURGERY         Social History     Tobacco Use     Smoking status: Never Smoker     Smokeless tobacco: Never Used   Substance Use Topics     Alcohol use: No     Family History   Problem Relation Age of Onset     Heart Disease Mother      Eye Disorder Father      Respiratory Brother      Heart Disease Sister      Cancer Daughter      Cancer Brother      Skin Cancer No family hx of      Melanoma No family hx of          Current Outpatient Medications   Medication Sig Dispense Refill     acetaminophen (TYLENOL) 325 MG tablet Take 3 tablets (975 mg) by mouth every 8 hours as needed for mild pain 100 tablet 0     aspirin 81 MG EC tablet Take 81 mg by  mouth At Bedtime.       blood glucose (ONETOUCH ULTRA) test strip USE TO TEST BLOOD SUGAR 2 TIMES DAILY OR AS DIRECTED. 100 strip 1     emollient (VANICREAM) external cream Apply topically daily After the shower on arms 453 g 3     ketoconazole (NIZORAL) 2 % external shampoo Apply topically daily 120 mL 3     metFORMIN (GLUCOPHAGE) 500 MG tablet Take 1 tablet (500 mg) by mouth daily (with breakfast) FOLLOW-UP WITH YOUR PRIMARY CARE DOCTOR GIVEN KIDNEY FUNCTION 180 tablet 2     Multiple Vitamin (MV-ONE PO) Take 1 tablet by mouth daily        rosuvastatin (CRESTOR) 10 MG tablet Take 1 tablet (10 mg) by mouth daily 90 tablet 3     senna-docusate (SENOKOT-S;PERICOLACE) 8.6-50 MG per tablet Take 1 tablet by mouth 2 times daily 60 tablet 1     fluorouracil (EFUDEX) 5 % cream Apply topically daily Every evening for 4 weeks on the frontal scalp, then 2 week break, then another section of the scalp for 4 weeks, etc (Patient not taking: Reported on 10/23/2019) 40 g 2     Lidocaine (LIDOCARE) 4 % Patch Place 1 patch onto the skin every 24 hours To prevent lidocaine toxicity, patient should be patch free for 12 hrs daily.Apply patch(s) to back. To prevent lidocaine toxicity, be sure to patch free for 12 hrs daily (Patient not taking: Reported on 10/23/2019) 5 patch 0     polyethylene glycol (MIRALAX/GLYCOLAX) Packet Take 17 g by mouth daily as needed for constipation (Patient not taking: Reported on 10/23/2019) 10 packet 0     No Known Allergies  Recent Labs   Lab Test 10/06/19  0934 10/05/19  1216 12/12/18  1548 11/01/18  0814  09/14/18  0930  09/08/17  0919  08/10/16  1050   A1C 7.5*  --  7.4*  --   --  6.5*   < > 8.4*   < > 7.4*   LDL  --   --   --   --   --  38  --  54  --  64   HDL  --   --   --   --   --  44  --  44  --  41   TRIG  --   --   --   --   --  74  --  127  --  97   ALT  --  26 23 26  --   --   --   --    < >  --    CR 1.14 1.09 1.00 0.96   < >  --    < >  --    < >  --    GFRESTIMATED 53* 56* 69 73   < >  --    " < >  --    < >  --    PIPPA 61 65 84 88   < >  --    < >  --    < >  --    POTASSIUM 3.8 4.1 4.3 3.7   < >  --   --   --    < >  --    TSH 1.58  --  1.58  --   --   --   --   --    < >  --     < > = values in this interval not displayed.      ROS:  POSITIVE leg pain, hoarse voice, tremor    Denies headache, insomnia, chest pain, shortness of breath, cough, heartburn, bowel issues, bladder issues, neck pain, back pain, hip pain, knee pain, ankle pain, or foot pain. Remainder of ROS is negative unless otherwise noted above or in HPI.    This document serves as a record of the services and decisions personally performed and made by Buddy Poe MD. It was created on his behalf by Gregg Santos, trained medical scribe. The creation of this document is based on the provider's statements to the medical scribe.  Gregg Santos 1:51 PM October 23, 2019    OBJECTIVE:   /56   Pulse 66   Temp 97.4  F (36.3  C) (Oral)   Ht 1.676 m (5' 6\")   Wt 70.4 kg (155 lb 4.8 oz)   SpO2 99%   BMI 25.07 kg/m   Estimated body mass index is 25.07 kg/m  as calculated from the following:    Height as of this encounter: 1.676 m (5' 6\").    Weight as of this encounter: 70.4 kg (155 lb 4.8 oz).  EXAM:   GENERAL: healthy, alert and no distress  RESP: lungs clear to auscultation - no rales, rhonchi or wheezes  CV: regular rate and rhythm, normal S1 S2, no S3 or S4, no murmur, click or rub, no peripheral edema and peripheral pulses strong  MS: no gross musculoskeletal defects noted, no edema  SKIN: no suspicious lesions or rashes  NEURO: Normal strength and tone, mentation intact and speech normal  PSYCH: mentation appears normal, affect normal/bright    Diagnostic Test Results:  Labs reviewed in Epic  No results found for this or any previous visit (from the past 24 hour(s)).    ASSESSMENT / PLAN:   (Z00.00) Encounter for Medicare annual wellness exam  (primary encounter diagnosis)  Comment: Patient is doing well. Routine physical " "completed.  Plan: Follow up as needed.    (R26.81) Unstable gait  Comment: Referred to specialist.  Plan: [facility] REFERRAL        Follow up with referral.    (R25.1) Tremor  Comment: Referred to specialist.  Plan: NEUROLOGY ADULT REFERRAL        Follow up with referral.    (Z23) Need for influenza vaccination  Comment: Patient agrees to vaccine.  Plan: HC FLU VACCINE, INCREASED ANTIGEN, PRESV FREE         [[id number]]      Patient Instructions       Patient Education   Personalized Prevention Plan  You are due for the preventive services outlined below.  Your care team is available to assist you in scheduling these services.  If you have already completed any of these items, please share that information with your care team to update in your medical record.  Health Maintenance Due   Topic Date Due     Zoster (Shingles) Vaccine (1 of 2) 04/13/1971     Annual Wellness Visit  06/02/2018     Eye Exam  02/01/2019     Flu Vaccine (1) 09/01/2019     Cholesterol Lab  09/14/2019     Kidney Microalbumin Urine Test  09/14/2019     Diabetic Foot Exam  09/14/2019            End of Life Planning:  Patient currently has an advanced directive: not discussed.    COUNSELING:  Reviewed preventive health counseling, as reflected in patient instructions    Estimated body mass index is 25.07 kg/m  as calculated from the following:    Height as of this encounter: 1.676 m (5' 6\").    Weight as of this encounter: 70.4 kg (155 lb 4.8 oz).     reports that he has never smoked. He has never used smokeless tobacco.      Appropriate preventive services were discussed with this patient, including applicable screening as appropriate for cardiovascular disease, diabetes, osteopenia/osteoporosis, and glaucoma.  As appropriate for age/gender, discussed screening for colorectal cancer, prostate cancer, breast cancer, and cervical cancer. Checklist reviewing preventive services available has been given to the patient.    Reviewed " patients plan of care and provided an AVS. The Basic Care Plan (routine screening as documented in Health Maintenance) for Kiran meets the Care Plan requirement. This Care Plan has been established and reviewed with the Patient.    The information in this document, created by the medical scribe for me, accurately reflects the services I personally performed and the decisions made by me. I have reviewed and approved this document for accuracy prior to leaving the patient care area.  October 23, 2019 1:57 PM    Buddy Poe MD  [facility] [place]

## 2019-10-23 NOTE — PATIENT INSTRUCTIONS
Patient Education   Personalized Prevention Plan  You are due for the preventive services outlined below.  Your care team is available to assist you in scheduling these services.  If you have already completed any of these items, please share that information with your care team to update in your medical record.  Health Maintenance Due   Topic Date Due     Zoster (Shingles) Vaccine (1 of 2) 04/13/1971     Annual Wellness Visit  06/02/2018     Eye Exam  02/01/2019     Flu Vaccine (1) 09/01/2019     Cholesterol Lab  09/14/2019     Kidney Microalbumin Urine Test  09/14/2019     Diabetic Foot Exam  09/14/2019

## 2019-10-30 ENCOUNTER — DOCUMENTATION ONLY (OUTPATIENT)
Dept: CARE COORDINATION | Facility: CLINIC | Age: 84
End: 2019-10-30

## 2019-10-30 RX ORDER — ISOSORBIDE MONONITRATE 60 MG/1
TABLET, EXTENDED RELEASE ORAL
Refills: 1 | COMMUNITY
Start: 2019-08-08 | End: 2020-03-09

## 2019-10-30 RX ORDER — NITROGLYCERIN 0.4 MG/1
TABLET SUBLINGUAL
Refills: 1 | COMMUNITY
Start: 2019-01-15 | End: 2020-09-23

## 2019-10-30 NOTE — PROGRESS NOTES
Summary and Recommendations:     Tremor - would not treat with medication at this time.   If needed use weighted utensils, etc.     Occupational Therapy ordered   Physical therapy ordered   Speech therapy ordered.     Given blood pressure issues - would hold off on a beta blocker - but if is somewhat high could try propanolol 10mg as needed in the future but would keep his medications to a minimum    Diabetes    Cholesterol    Heart disease.     Gait problems.     Cognitive disorder.       Return as needed.     Family signed up for proxy access per patient's wishes.       Michael De Leon MD  _____________________________________________________________________  PATIENT: Kiran Carcamo  89 year old male   : 1921  BRETT: 2019    Consult requested by Dr. Poe    Outside records reviewed and revealed  - inserted.       History obtained from patient      History of Present Illness  89 yo right handed man with tremor and gait issues.   Falls presumably due to syncope.   Has type 2 DM and CKD.  Off BP medications    Chu are here today with him  He does not have problems with stairs  He had some problems with blood pressure medication and fainting.   He is careful going up and down stairs.   He goes down to the basement as well - 3 floors  Has a walker on the second floor.     Nest[place] lives in the [place] [facility] [place]  [place]     He has had tremor since his 70s and worse the past 4 yrs.   He wont use heavy utensils or covered cups. He is spilling. He has a weighted spoon but does not want to use it.     Daughters Elaine and nestor with tremor as well as paternal uncle with tremor       10/5/2019 imaging studies  No acute intracranial pathology.  2. Generalized parenchymal volume loss and changes of chronic small  vessel ischemic disease. Tiny left lacunar infarcts are unchanged  since the previous exam on 2011.    ROS  Bilateral cataract surgery  Wears  "glasses  Hearing loss and uses hearing aides bilateral  Rare swallowing problems  Has a bit of constipation  Has had this after pneumonia x 2 - hospitalized.   Got shots after pneumonia.   Has a history of eczema and facial skin cancer  Broken mandible and surgery in 1984  Bypass in 2005  heart attack - possibly in the past.   No pacemaker    Gallbladder in the past  On cholesterol medication.   He has diabetes and is on metformin  a1c was 7.5  No thyroid medications   No lung problems   No allergies  No infections other than lung - pneumonia in the past.   Has nail fungus.   Bladder - no surgery on his prostate.   He has had bladder issues - frequent urination. He wears the guard.   He is urinating once per night.   Has no urinary urgency.   Has some incontinence.   Voice is softer when his hearing aides are working  Memory - at times it is \"amazing\" and has some forgetfulness  He sets up his pill. 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     He is using a walker for about one year.     Blood pressure today was 151/74 and heart rate was 71    Appetite was good    Great     One person is over there    He goes to [place] every day - he is not driving          --------------------------------------------------------------------------------------------------------------------------------------    Impression:    1. Head CTA demonstrates no aneurysm or stenosis of the major  intracranial arteries. Severe internal carotid atherosclerosis  intracranially bilaterally.  2. Neck CTA demonstrates no high-grade stenosis of the major cervical  arteries. Less than 40% stenosis left ICA origin with severe  atherosclerosis. Dominant left vertebral artery with the left  vertebral artery only " contributing to the basilar artery.  3. No intracranial hemorrhage on the noncontrast head CT. No  significant change of ex vacuo dilatation of the left lateral  ventricle posteriorly and diffuse cerebral atrophy with underlying  leukoariosis, but not greatly disproportionate to the patient's age.      I have personally reviewed the examination and initial interpretation  and I agree with the findings.     MEDICATION LIST BELOW IS NOT UPDATED.     Medications            Acetaminophen tylenol 325mg        Aspirin 81mg         Emollient vanicream        Fluorouracil efudex 5% cream        Isosorbide mononitrate imdur 60mg 24 hr tablet        Ketoconazole nizoral 2% external shampoo        Lidocaine lidocare 4%  patch        Metformin glucophage 500mg         MVI        Nitroglycerin nitrostat 0.4mg sublingual tablet         Polyethylene glycol miralax        Rosuvastatin crestor 10mg        Senna-docusate senokot-S 8.6-50mg                                                                                                      14 Review of systems  are negative except for   Patient Active Problem List   Diagnosis     Health Care Home     Actinic keratosis     SK (seborrheic keratosis)     Hyperplasia of prostate     Hyperlipidemia LDL goal <70     History of MI (myocardial infarction)     Forgetfulness     CKD (chronic kidney disease) stage 2, GFR 60-89 ml/min     Essential hypertension with goal blood pressure less than 140/90     Eczema, unspecified type     Type 2 diabetes mellitus with diabetic nephropathy, without long-term current use of insulin (H)     Paget's bone disease     History of skin cancer     Dermatitis, seborrheic     Syncope      No Known Allergies  Past Surgical History:   Procedure Laterality Date     CHOLECYSTECTOMY       CORONARY ARTERY BYPASS  '05    6-vessel     MANDIBLE SURGERY       Past Medical History:   Diagnosis Date     CKD (chronic kidney disease) stage 2, GFR 60-89 ml/min 7/6/2015      Syncope 12/12/2011     Type 2 diabetes, HbA1C goal < 8% (H) 2/23/2015     Social History     Socioeconomic History     Marital status:      Spouse name: Not on file     Number of children: Not on file     Years of education: Not on file     Highest education level: Not on file   Occupational History     Not on file   Social Needs     Financial resource strain: Not on file     Food insecurity:     Worry: Not on file     Inability: Not on file     Transportation needs:     Medical: Not on file     Non-medical: Not on file   Tobacco Use     Smoking status: Never Smoker     Smokeless tobacco: Never Used   Substance and Sexual Activity     Alcohol use: No     Drug use: No     Sexual activity: Not Currently     Partners: Female     Birth control/protection: Abstinence   Lifestyle     Physical activity:     Days per week: Not on file     Minutes per session: Not on file     Stress: Not on file   Relationships     Social connections:     Talks on phone: Not on file     Gets together: Not on file     Attends [employer] service: Not on file     Active member of club or organization: Not on file     Attends meetings of clubs or organizations: Not on file     Relationship status: Not on file     Intimate partner violence:     Fear of current or ex partner: Not on file     Emotionally abused: Not on file     Physically abused: Not on file     Forced sexual activity: Not on file   Other Topics Concern     Parent/sibling w/ CABG, MI or angioplasty before 65F 55M? No   Social History Narrative    . lives in [facility]. Jes Carcamo daughter     Family History   Problem Relation Age of Onset     Heart Disease Mother      Eye Disorder Father      Respiratory Brother      Heart Disease Sister      Cancer Daughter      Cancer Brother      Skin Cancer No family hx of      Melanoma No family hx of      Current Outpatient Medications   Medication Sig Dispense Refill     acetaminophen (TYLENOL) 325 MG tablet Take 3 tablets (975 mg)  by mouth every 8 hours as needed for mild pain 100 tablet 0     aspirin 81 MG EC tablet Take 81 mg by mouth At Bedtime.       blood glucose (ONETOUCH ULTRA) test strip USE TO TEST BLOOD SUGAR 2 TIMES DAILY OR AS DIRECTED. 100 strip 1     emollient (VANICREAM) external cream Apply topically daily After the shower on arms 453 g 3     fluorouracil (EFUDEX) 5 % cream Apply topically daily Every evening for 4 weeks on the frontal scalp, then 2 week break, then another section of the scalp for 4 weeks, etc (Patient not taking: Reported on 10/23/2019) 40 g 2     ketoconazole (NIZORAL) 2 % external shampoo Apply topically daily 120 mL 3     Lidocaine (LIDOCARE) 4 % Patch Place 1 patch onto the skin every 24 hours To prevent lidocaine toxicity, patient should be patch free for 12 hrs daily.Apply patch(s) to back. To prevent lidocaine toxicity, be sure to patch free for 12 hrs daily (Patient not taking: Reported on 10/23/2019) 5 patch 0     metFORMIN (GLUCOPHAGE) 500 MG tablet Take 1 tablet (500 mg) by mouth daily (with breakfast) FOLLOW-UP WITH YOUR PRIMARY CARE DOCTOR GIVEN KIDNEY FUNCTION 180 tablet 2     Multiple Vitamin (MV-ONE PO) Take 1 tablet by mouth daily        polyethylene glycol (MIRALAX/GLYCOLAX) Packet Take 17 g by mouth daily as needed for constipation (Patient not taking: Reported on 10/23/2019) 10 packet 0     rosuvastatin (CRESTOR) 10 MG tablet Take 1 tablet (10 mg) by mouth daily 90 tablet 3     senna-docusate (SENOKOT-S;PERICOLACE) 8.6-50 MG per tablet Take 1 tablet by mouth 2 times daily 60 tablet 1     Examination  B/P: Data Unavailable, T: Data Unavailable, P: Data Unavailable, R: Data Unavailable 0 lbs 0 oz  There were no vitals taken for this visit., There is no height or weight on file to calculate BMI.    Vitals signs were added and reviewed if not above. Please refer to the chart from this visit.    General examination: well developed, nourished and normal affect  Carotid: No bruits. Chest CTA,  Heart regular without gallops or murmurs. Abdomen soft nontender, no masses, bowel sounds intact. Periphery: normal pulses without edema. No skin lesions. MENTAL STATUS:  Alert, oriented x3.  Speech fluent with normal naming, repetition, comprehension.  Good right-left orientation, Can remember 0/3 objects.  WORLD forward 5/5 and unable to spell backwards 0/5 CRANIAL NERVES:  Disks flat. Pupils are equal, round, reactive to light.  Normal vascularity and fields. Extraocular movements full.  Facial sensation and movement normal.  Hearing intact. Palate moves symmetrically.  Tongue midline.  Sternocleidomastoid and trapezius strength intact.  Neck strength was normal.  NEUROLOGIC:  Tone: slight increased tone. Motor in upper and lower extremities. 5/5.  Reflexes 1/4.  Toe signs downgoing.  Good finger-nose-finger, fine finger movement, heel-shin maneuver, sensation to light touch, position sense and vibration and temperature was normal. Gait normal except for wide based. Romberg and postural stability  - not safe Tremor  - subtle

## 2019-10-30 NOTE — TELEPHONE ENCOUNTER
RECORDS RECEIVED FROM: Internal    DATE RECEIVED: 11/1/19   NOTES (FOR ALL VISITS) STATUS DETAILS   OFFICE NOTE from referring provider Internal 10/23/19   OFFICE NOTE from other specialist N/A    DISCHARGE SUMMARY from hospital Internal [facility]:  10/5/19-10/6/19   DISCHARGE REPORT from the ER N/A    OPERATIVE REPORT N/A    MEDICATION LIST Internal    IMAGING  (FOR ALL VISITS)     EMG N/A    EEG N/A    ECT N/A    MRI (HEAD, NECK, SPINE) N/A    LUMBAR PUNCTURE N/A    REYNA Scan N/A    CT (HEAD, NECK, SPINE) Internal [facility]:  CT Head 10/5/19  CTA Head Neck 10/5/19

## 2019-11-01 ENCOUNTER — OFFICE VISIT (OUTPATIENT)
Dept: NEUROLOGY | Facility: CLINIC | Age: 84
End: 2019-11-01
Attending: FAMILY MEDICINE
Payer: COMMERCIAL

## 2019-11-01 ENCOUNTER — PRE VISIT (OUTPATIENT)
Dept: NEUROLOGY | Facility: CLINIC | Age: 84
End: 2019-11-01

## 2019-11-01 VITALS
BODY MASS INDEX: 24.9 KG/M2 | DIASTOLIC BLOOD PRESSURE: 74 MMHG | HEART RATE: 71 BPM | WEIGHT: 154.3 LBS | SYSTOLIC BLOOD PRESSURE: 151 MMHG | OXYGEN SATURATION: 98 %

## 2019-11-01 DIAGNOSIS — R26.9 GAIT DISORDER: ICD-10-CM

## 2019-11-01 DIAGNOSIS — F80.0 ARTICULATION DISORDER: ICD-10-CM

## 2019-11-01 DIAGNOSIS — K59.00 CONSTIPATION, UNSPECIFIED CONSTIPATION TYPE: ICD-10-CM

## 2019-11-01 DIAGNOSIS — R68.89 FORGETFULNESS: ICD-10-CM

## 2019-11-01 DIAGNOSIS — R25.1 TREMOR: Primary | ICD-10-CM

## 2019-11-01 PROBLEM — H91.93 BILATERAL HEARING LOSS: Status: ACTIVE | Noted: 2019-11-01

## 2019-11-01 PROBLEM — Z98.49 HISTORY OF CATARACT SURGERY: Status: ACTIVE | Noted: 2019-11-01

## 2019-11-01 RX ORDER — AMOXICILLIN 250 MG
1 CAPSULE ORAL 2 TIMES DAILY PRN
COMMUNITY
Start: 2019-11-01 | End: 2020-01-07

## 2019-11-01 ASSESSMENT — PAIN SCALES - GENERAL: PAINLEVEL: NO PAIN (0)

## 2019-11-01 NOTE — LETTER
"[facility]  [facility] NEUROLOGY  [address] [address]  [phone]            2019          Dear Maren Proxy Patient,    We received a request to activate you as a proxy for another patient of [facility] or [place].  In order to do so, we need to activate your [employer] account as well.    Your access code is: [unfilled]      Please access the [employer] website:  -  [employer] [email or web address]  -  [employer] [email or web address].    Below the ID and password fields, select the \"Sign Up Now\" as New User.  You will be prompted to enter the access code listed above as well as additional personal information.  Please follow the directions carefully when creating your username and password.    Once your account is activated, you can access the proxy accounts under \"Shared Medical Records\".    If you allow your access code to , or if you have any questions please call a [employer] Representative during normal clinic hours.     Sincerely,        [employer] Customer Service    "

## 2019-11-01 NOTE — PATIENT INSTRUCTIONS
Tremor - would not treat with medication at this time.   If needed use weighted utensils, etc.     Occupational Therapy ordered   Physical therapy ordered   Speech therapy ordered.     Given blood pressure issues - would hold off on a beta blocker - but if is somewhat high could try propanolol 10mg as needed in the future but would keep his medications to a minimum    Diabetes    Cholesterol    Heart disease.     Gait problems.     Cognitive disorder.       Return as needed.     Family signed up for proxy access per patient's wishes.

## 2019-11-01 NOTE — LETTER
2019       RE: Kiran Carcamo  [address]  [facility] [address]     Dear Colleague,    Thank you for referring your patient, Kiran Carcamo, to the [facility] NEUROLOG[facility] at [facility]. Please see a copy of my visit note below.    Summary and Recommendations:     Tremor - would not treat with medication at this time.   If needed use weighted utensils, etc.     Occupational Therapy ordered   Physical therapy ordered   Speech therapy ordered.     Given blood pressure issues - would hold off on a beta blocker - but if is somewhat high could try propanolol 10mg as needed in the future but would keep his medications to a minimum    Diabetes    Cholesterol    Heart disease.     Gait problems.     Cognitive disorder.       Return as needed.     Family signed up for proxy access per patient's wishes.       Michael De Leon MD  _____________________________________________________________________  PATIENT: Kiran Carcamo  89 year old male   : 1921  BRETT: 2019    Consult requested by Dr. Poe    Outside records reviewed and revealed  - inserted.     History obtained from patient    History of Present Illness  89 yo right handed man with tremor and gait issues.   Falls presumably due to syncope.   Has type 2 DM and CKD.  Off BP medications    Nestor and Jes are here today with him  He does not have problems with stairs  He had some problems with blood pressure medication and fainting.   He is careful going up and down stairs.   He goes down to the basement as well - 3 floors  Has a walker on the second floor.     Nestor lives in the [facility]  Jes lives 4 blocks away in \[facility]\"".     He has had tremor since his 70s and worse the past 4 yrs.   He wont use heavy utensils or covered cups. He is spilling. He has a weighted spoon but does not want to use it.     Daughters Elaine and nestor with tremor as well as paternal uncle with tremor       10/5/2019  "imaging studies  No acute intracranial pathology.  2. Generalized parenchymal volume loss and changes of chronic small  vessel ischemic disease. Tiny left lacunar infarcts are unchanged  since the previous exam on 11/9/2011.    ROS  Bilateral cataract surgery  Wears glasses  Hearing loss and uses hearing aides bilateral  Rare swallowing problems  Has a bit of constipation  Has had this after pneumonia x 2 - hospitalized.   Got shots after pneumonia.   Has a history of eczema and facial skin cancer  Broken mandible and surgery in 1984  Bypass in 2005  heart attack - possibly in the past.   No pacemaker    Gallbladder in the past  On cholesterol medication.   He has diabetes and is on metformin  a1c was 7.5  No thyroid medications   No lung problems   No allergies  No infections other than lung - pneumonia in the past.   Has nail fungus.   Bladder - no surgery on his prostate.   He has had bladder issues - frequent urination. He wears the guard.   He is urinating once per night.   Has no urinary urgency.   Has some incontinence.   Voice is softer when his hearing aides are working  Memory - at times it is \"amazing\" and has some forgetfulness  He sets up his pill. 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     He is using a walker for about one year.     Blood pressure today was 151/74 and heart rate was 71    Appetite was good    Great     One person is over there    He goes to [employer] every day - he is not driving          --------------------------------------------------------------------------------------------------------------------------------------    Impression:    1. Head CTA demonstrates no aneurysm or stenosis of the major  intracranial arteries. Severe internal carotid " atherosclerosis  intracranially bilaterally.  2. Neck CTA demonstrates no high-grade stenosis of the major cervical  arteries. Less than 40% stenosis left ICA origin with severe  atherosclerosis. Dominant left vertebral artery with the left  vertebral artery only contributing to the basilar artery.  3. No intracranial hemorrhage on the noncontrast head CT. No  significant change of ex vacuo dilatation of the left lateral  ventricle posteriorly and diffuse cerebral atrophy with underlying  leukoariosis, but not greatly disproportionate to the patient's age.      I have personally reviewed the examination and initial interpretation  and I agree with the findings.     MEDICATION LIST BELOW IS NOT UPDATED.     Medications            Acetaminophen tylenol 325mg        Aspirin 81mg         Emollient vanicream        Fluorouracil efudex 5% cream        Isosorbide mononitrate imdur 60mg 24 hr tablet        Ketoconazole nizoral 2% external shampoo        Lidocaine lidocare 4%  patch        Metformin glucophage 500mg         MVI        Nitroglycerin nitrostat 0.4mg sublingual tablet         Polyethylene glycol miralax        Rosuvastatin crestor 10mg        Senna-docusate senokot-S 8.6-50mg                                                                                                      14 Review of systems  are negative except for   Patient Active Problem List   Diagnosis     Health Care Home     Actinic keratosis     SK (seborrheic keratosis)     Hyperplasia of prostate     Hyperlipidemia LDL goal <70     History of MI (myocardial infarction)     Forgetfulness     CKD (chronic kidney disease) stage 2, GFR 60-89 ml/min     Essential hypertension with goal blood pressure less than 140/90     Eczema, unspecified type     Type 2 diabetes mellitus with diabetic nephropathy, without long-term current use of insulin (H)     Paget's bone disease     History of skin cancer     Dermatitis, seborrheic     Syncope      No Known  Allergies  Past Surgical History:   Procedure Laterality Date     CHOLECYSTECTOMY       CORONARY ARTERY BYPASS  '05    6-vessel     MANDIBLE SURGERY       Past Medical History:   Diagnosis Date     CKD (chronic kidney disease) stage 2, GFR 60-89 ml/min 7/6/2015     Syncope 12/12/2011     Type 2 diabetes, HbA1C goal < 8% (H) 2/23/2015     Social History     Socioeconomic History     Marital status:      Spouse name: Not on file     Number of children: Not on file     Years of education: Not on file     Highest education level: Not on file   Occupational History     Not on file   Social Needs     Financial resource strain: Not on file     Food insecurity:     Worry: Not on file     Inability: Not on file     Transportation needs:     Medical: Not on file     Non-medical: Not on file   Tobacco Use     Smoking status: Never Smoker     Smokeless tobacco: Never Used   Substance and Sexual Activity     Alcohol use: No     Drug use: No     Sexual activity: Not Currently     Partners: Female     Birth control/protection: Abstinence   Lifestyle     Physical activity:     Days per week: Not on file     Minutes per session: Not on file     Stress: Not on file   Relationships     Social connections:     Talks on phone: Not on file     Gets together: Not on file     Attends Hinduism service: Not on file     Active member of club or organization: Not on file     Attends meetings of clubs or organizations: Not on file     Relationship status: Not on file     Intimate partner violence:     Fear of current or ex partner: Not on file     Emotionally abused: Not on file     Physically abused: Not on file     Forced sexual activity: Not on file   Other Topics Concern     Parent/sibling w/ CABG, MI or angioplasty before 65F 55M? No   Social History Narrative    . lives in [facility]. Jes Carcamo daughter     Family History   Problem Relation Age of Onset     Heart Disease Mother      Eye Disorder Father      Respiratory  Brother      Heart Disease Sister      Cancer Daughter      Cancer Brother      Skin Cancer No family hx of      Melanoma No family hx of      Current Outpatient Medications   Medication Sig Dispense Refill     acetaminophen (TYLENOL) 325 MG tablet Take 3 tablets (975 mg) by mouth every 8 hours as needed for mild pain 100 tablet 0     aspirin 81 MG EC tablet Take 81 mg by mouth At Bedtime.       blood glucose (ONETOUCH ULTRA) test strip USE TO TEST BLOOD SUGAR 2 TIMES DAILY OR AS DIRECTED. 100 strip 1     emollient (VANICREAM) external cream Apply topically daily After the shower on arms 453 g 3     fluorouracil (EFUDEX) 5 % cream Apply topically daily Every evening for 4 weeks on the frontal scalp, then 2 week break, then another section of the scalp for 4 weeks, etc (Patient not taking: Reported on 10/23/2019) 40 g 2     ketoconazole (NIZORAL) 2 % external shampoo Apply topically daily 120 mL 3     Lidocaine (LIDOCARE) 4 % Patch Place 1 patch onto the skin every 24 hours To prevent lidocaine toxicity, patient should be patch free for 12 hrs daily.Apply patch(s) to back. To prevent lidocaine toxicity, be sure to patch free for 12 hrs daily (Patient not taking: Reported on 10/23/2019) 5 patch 0     metFORMIN (GLUCOPHAGE) 500 MG tablet Take 1 tablet (500 mg) by mouth daily (with breakfast) FOLLOW-UP WITH YOUR PRIMARY CARE DOCTOR GIVEN KIDNEY FUNCTION 180 tablet 2     Multiple Vitamin (MV-ONE PO) Take 1 tablet by mouth daily        polyethylene glycol (MIRALAX/GLYCOLAX) Packet Take 17 g by mouth daily as needed for constipation (Patient not taking: Reported on 10/23/2019) 10 packet 0     rosuvastatin (CRESTOR) 10 MG tablet Take 1 tablet (10 mg) by mouth daily 90 tablet 3     senna-docusate (SENOKOT-S;PERICOLACE) 8.6-50 MG per tablet Take 1 tablet by mouth 2 times daily 60 tablet 1     Examination  B/P: Data Unavailable, T: Data Unavailable, P: Data Unavailable, R: Data Unavailable 0 lbs 0 oz  There were no vitals  taken for this visit., There is no height or weight on file to calculate BMI.    Vitals signs were added and reviewed if not above. Please refer to the chart from this visit.    General examination: well developed, nourished and normal affect  Carotid: No bruits. Chest CTA, Heart regular without gallops or murmurs. Abdomen soft nontender, no masses, bowel sounds intact. Periphery: normal pulses without edema. No skin lesions. MENTAL STATUS:  Alert, oriented x3.  Speech fluent with normal naming, repetition, comprehension.  Good right-left orientation, Can remember 0/3 objects.  WORLD forward 5/5 and unable to spell backwards 0/5 CRANIAL NERVES:  Disks flat. Pupils are equal, round, reactive to light.  Normal vascularity and fields. Extraocular movements full.  Facial sensation and movement normal.  Hearing intact. Palate moves symmetrically.  Tongue midline.  Sternocleidomastoid and trapezius strength intact.  Neck strength was normal.  NEUROLOGIC:  Tone: slight increased tone. Motor in upper and lower extremities. 5/5.  Reflexes 1/4.  Toe signs downgoing.  Good finger-nose-finger, fine finger movement, heel-shin maneuver, sensation to light touch, position sense and vibration and temperature was normal. Gait normal except for wide based. Romberg and postural stability  - not safe Tremor  - subtle    Again, thank you for allowing me to participate in the care of your patient.      Sincerely,    Michael De Leon MD

## 2019-11-01 NOTE — LETTER
"[facility]  [facility] NEUROLOGY  [address] [address]  [phone]            2019          Dear Maren Proxy Patient,    We received a request to activate you as a proxy for another patient of [facility] or [place].  In order to do so, we need to activate your [employer] account as well.    Your access code is: [unfilled]      Please access the [employer] website:  -  [employer] [email or web address]  -  [employer] [email or web address].    Below the ID and password fields, select the \"Sign Up Now\" as New User.  You will be prompted to enter the access code listed above as well as additional personal information.  Please follow the directions carefully when creating your username and password.    Once your account is activated, you can access the proxy accounts under \"Shared Medical Records\".    If you allow your access code to , or if you have any questions please call a [employer] Representative during normal clinic hours.     Sincerely,        [employer] Customer Service    "

## 2019-11-10 ENCOUNTER — APPOINTMENT (OUTPATIENT)
Dept: GENERAL RADIOLOGY | Facility: CLINIC | Age: 84
End: 2019-11-10
Attending: FAMILY MEDICINE
Payer: COMMERCIAL

## 2019-11-10 ENCOUNTER — APPOINTMENT (OUTPATIENT)
Dept: MRI IMAGING | Facility: CLINIC | Age: 84
End: 2019-11-10
Attending: FAMILY MEDICINE
Payer: COMMERCIAL

## 2019-11-10 ENCOUNTER — HOSPITAL ENCOUNTER (EMERGENCY)
Facility: CLINIC | Age: 84
Discharge: HOME | End: 2019-11-10
Attending: FAMILY MEDICINE | Admitting: FAMILY MEDICINE
Payer: COMMERCIAL

## 2019-11-10 ENCOUNTER — APPOINTMENT (OUTPATIENT)
Dept: CT IMAGING | Facility: CLINIC | Age: 84
End: 2019-11-10
Attending: FAMILY MEDICINE
Payer: COMMERCIAL

## 2019-11-10 VITALS
OXYGEN SATURATION: 99 % | DIASTOLIC BLOOD PRESSURE: 61 MMHG | WEIGHT: 154.2 LBS | HEART RATE: 65 BPM | TEMPERATURE: 97.5 F | SYSTOLIC BLOOD PRESSURE: 98 MMHG | BODY MASS INDEX: 24.89 KG/M2 | RESPIRATION RATE: 12 BRPM

## 2019-11-10 DIAGNOSIS — R53.1 GENERALIZED WEAKNESS: ICD-10-CM

## 2019-11-10 DIAGNOSIS — R47.9 DIFFICULTY WITH SPEECH: ICD-10-CM

## 2019-11-10 LAB
ALBUMIN SERPL-MCNC: 3.7 G/DL (ref 3.4–5)
ALBUMIN UR-MCNC: NEGATIVE MG/DL
ALP SERPL-CCNC: 115 U/L (ref 40–150)
ALT SERPL W P-5'-P-CCNC: 42 U/L (ref 0–70)
ANION GAP SERPL CALCULATED.3IONS-SCNC: 4 MMOL/L (ref 3–14)
APPEARANCE UR: CLEAR
APTT PPP: 30 SEC (ref 22–37)
AST SERPL W P-5'-P-CCNC: 43 U/L (ref 0–45)
B-HCG FREE SERPL-ACNC: <0.9 [IU]/L (ref 0.86–1.14)
BASOPHILS # BLD AUTO: 0.1 10E9/L (ref 0–0.2)
BASOPHILS NFR BLD AUTO: 0.6 %
BILIRUB DIRECT SERPL-MCNC: 0.2 MG/DL (ref 0–0.2)
BILIRUB SERPL-MCNC: 0.6 MG/DL (ref 0.2–1.3)
BILIRUB UR QL STRIP: NEGATIVE
BUN SERPL-MCNC: 29 MG/DL (ref 7–30)
CALCIUM SERPL-MCNC: 9.5 MG/DL (ref 8.5–10.1)
CHLORIDE SERPL-SCNC: 107 MMOL/L (ref 94–109)
CO2 SERPL-SCNC: 28 MMOL/L (ref 20–32)
COLOR UR AUTO: YELLOW
CREAT BLD-MCNC: 1.1 MG/DL (ref 0.66–1.25)
CREAT SERPL-MCNC: 1.01 MG/DL (ref 0.66–1.25)
DIFFERENTIAL METHOD BLD: ABNORMAL
EOSINOPHIL # BLD AUTO: 0.2 10E9/L (ref 0–0.7)
EOSINOPHIL NFR BLD AUTO: 2.2 %
ERYTHROCYTE [DISTWIDTH] IN BLOOD BY AUTOMATED COUNT: 12.7 % (ref 10–15)
GFR SERPL CREATININE-BSD FRML MDRD: 61 ML/MIN/{1.73_M2}
GFR SERPL CREATININE-BSD FRML MDRD: 62 ML/MIN/{1.73_M2}
GLUCOSE BLDC GLUCOMTR-MCNC: 199 MG/DL (ref 70–99)
GLUCOSE SERPL-MCNC: 194 MG/DL (ref 70–99)
GLUCOSE UR STRIP-MCNC: NEGATIVE MG/DL
HCT VFR BLD AUTO: 41.4 % (ref 40–53)
HGB BLD-MCNC: 13.2 G/DL (ref 13.3–17.7)
HGB UR QL STRIP: NEGATIVE
IMM GRANULOCYTES # BLD: 0.1 10E9/L (ref 0–0.4)
IMM GRANULOCYTES NFR BLD: 0.6 %
INTERPRETATION ECG - MUSE: NORMAL
KETONES UR STRIP-MCNC: NEGATIVE MG/DL
LEUKOCYTE ESTERASE UR QL STRIP: NEGATIVE
LYMPHOCYTES # BLD AUTO: 2.1 10E9/L (ref 0.8–5.3)
LYMPHOCYTES NFR BLD AUTO: 19.7 %
MCH RBC QN AUTO: 32.7 PG (ref 26.5–33)
MCHC RBC AUTO-ENTMCNC: 31.9 G/DL (ref 31.5–36.5)
MCV RBC AUTO: 103 FL (ref 78–100)
MONOCYTES # BLD AUTO: 0.8 10E9/L (ref 0–1.3)
MONOCYTES NFR BLD AUTO: 7.6 %
NEUTROPHILS # BLD AUTO: 7.5 10E9/L (ref 1.6–8.3)
NEUTROPHILS NFR BLD AUTO: 69.3 %
NITRATE UR QL: NEGATIVE
NRBC # BLD AUTO: 0 10*3/UL
NRBC BLD AUTO-RTO: 0 /100
PH UR STRIP: 5 PH (ref 5–7)
PLATELET # BLD AUTO: 198 10E9/L (ref 150–450)
POTASSIUM SERPL-SCNC: 4.2 MMOL/L (ref 3.4–5.3)
PROT SERPL-MCNC: 7.3 G/DL (ref 6.8–8.8)
RBC # BLD AUTO: 4.04 10E12/L (ref 4.4–5.9)
SODIUM SERPL-SCNC: 140 MMOL/L (ref 133–144)
SOURCE: NORMAL
SP GR UR STRIP: 1.02 (ref 1–1.03)
TROPONIN I BLD-MCNC: 0.04 UG/L (ref 0–0.08)
UROBILINOGEN UR STRIP-MCNC: NORMAL MG/DL (ref 0–2)
WBC # BLD AUTO: 10.9 10E9/L (ref 4–11)

## 2019-11-10 PROCEDURE — 81003 URINALYSIS AUTO W/O SCOPE: CPT | Performed by: FAMILY MEDICINE

## 2019-11-10 PROCEDURE — 93010 ELECTROCARDIOGRAM REPORT: CPT | Mod: Z6 | Performed by: FAMILY MEDICINE

## 2019-11-10 PROCEDURE — 85610 PROTHROMBIN TIME: CPT

## 2019-11-10 PROCEDURE — 80076 HEPATIC FUNCTION PANEL: CPT | Performed by: FAMILY MEDICINE

## 2019-11-10 PROCEDURE — 99285 EMERGENCY DEPT VISIT HI MDM: CPT | Mod: 25

## 2019-11-10 PROCEDURE — 93005 ELECTROCARDIOGRAM TRACING: CPT

## 2019-11-10 PROCEDURE — 96360 HYDRATION IV INFUSION INIT: CPT

## 2019-11-10 PROCEDURE — 70551 MRI BRAIN STEM W/O DYE: CPT

## 2019-11-10 PROCEDURE — 70450 CT HEAD/BRAIN W/O DYE: CPT

## 2019-11-10 PROCEDURE — 85730 THROMBOPLASTIN TIME PARTIAL: CPT | Performed by: FAMILY MEDICINE

## 2019-11-10 PROCEDURE — 84484 ASSAY OF TROPONIN QUANT: CPT

## 2019-11-10 PROCEDURE — 82565 ASSAY OF CREATININE: CPT | Mod: 91

## 2019-11-10 PROCEDURE — 25800030 ZZH RX IP 258 OP 636: Performed by: FAMILY MEDICINE

## 2019-11-10 PROCEDURE — 99285 EMERGENCY DEPT VISIT HI MDM: CPT | Mod: 25 | Performed by: FAMILY MEDICINE

## 2019-11-10 PROCEDURE — 85025 COMPLETE CBC W/AUTO DIFF WBC: CPT | Performed by: FAMILY MEDICINE

## 2019-11-10 PROCEDURE — 71045 X-RAY EXAM CHEST 1 VIEW: CPT

## 2019-11-10 PROCEDURE — 00000146 ZZHCL STATISTIC GLUCOSE BY METER IP

## 2019-11-10 PROCEDURE — 80048 BASIC METABOLIC PNL TOTAL CA: CPT | Performed by: FAMILY MEDICINE

## 2019-11-10 RX ADMIN — SODIUM CHLORIDE 500 ML: 0.9 INJECTION, SOLUTION INTRAVENOUS at 10:27

## 2019-11-10 ASSESSMENT — ENCOUNTER SYMPTOMS
SHORTNESS OF BREATH: 0
FATIGUE: 1
EYE REDNESS: 0
VOICE CHANGE: 0
BRUISES/BLEEDS EASILY: 0
DYSPHORIC MOOD: 1
FEVER: 0
CONFUSION: 0
NAUSEA: 0
HEADACHES: 0
SEIZURES: 0
FACIAL SWELLING: 0
DYSURIA: 0
ACTIVITY CHANGE: 1
RHINORRHEA: 0
DIFFICULTY URINATING: 0
LIGHT-HEADEDNESS: 0
DIZZINESS: 0
COUGH: 0
WEAKNESS: 1
SPEECH DIFFICULTY: 1
CHILLS: 0
NECK STIFFNESS: 0
ABDOMINAL PAIN: 0
SINUS PAIN: 0
DECREASED CONCENTRATION: 1
COLOR CHANGE: 0
ARTHRALGIAS: 0
TROUBLE SWALLOWING: 0
BACK PAIN: 0

## 2019-11-10 ASSESSMENT — VISUAL ACUITY
OU: BASELINE
OU: BASELINE

## 2019-11-10 NOTE — PHARMACY
Pharmacy Stroke Code Response  Pharmacist responded as part of the Stroke Code Team activation to patient care area ED2.  The Stroke Team determined that the patient was not a candidate for IV alteplase therapy and the pharmacy team was dismissed at 0958.     Kelle Rios, PharmD  [place] [phone]

## 2019-11-10 NOTE — ED TRIAGE NOTES
Per pt daughter, pt started experiencing garbled speech and trouble formulating sentences at 8:55 am while at [employer]. Daughter also states pt was noticeably more weak.

## 2019-11-10 NOTE — CONSULTS
[facility], [place]    Stroke Consult Note    Reason for Consult: Aphasia     Chief Complaint: Slurred Speech    HPI  Krian Carcamo is a 89 year old male with PMH significant for HTN, HLD, DM type II, CKD stage II, CAD S/P stents and CABG 2002 who presents to ED with symptoms of generalized weakness and aphasia.  He is accompanied by his 2 daughters who help to supplement the history.  They report that this morning around 0745, he complained of generalized weakness and he had mild confusion.  He attended [employer] with his family and sat through mass without apparent difficulty.  When they returned home they noted that he was having trouble with his speech which sounds like aphasia versus dysarthria.  They then became concerned for possible neurologic etiology and came to the emergency department.  When interviewed Mr. Carcamo denies headache, dizziness, diplopia or hemibody weakness or sensory changes.  He endorses full body weakness.     His daughters add that last night he appeared fatigued and he missed dinner which is atypical for him.  Family reports 2-month history of increased secretions noted by one daughter more than the other.  He has history of pneumonia a year ago.  He lives alone in a home but has regular family involvement at his home.     In the ED he is somnolent but is able to wake and participate in examination.  His examination is nonfocal.  He does have increased secretions and poor management of secretions requiring suction by RN clear.  He complains of having a lump in his throat.     His ED blood pressure is in the -120 range and glucose is elevated at 190s.    TPA Treatment   Not given due to minor / isolated / quickly resolving stroke symptoms.    Endovascular Treatment  Not initiated due to absence of proximal vessel occlusion    Impression  Generalized fatigue and weakness   Symptoms appear to be subacute nature given that there were signs of fatigue  yesterday evening and there was no clear focal event.  Furthermore his examination is nonfocal.  He does have significant intracranial atherosclerosis on his head CTA from 10/5/2019 and we think it is reasonable to obtain stroke limited MRI to rule out stroke.  Recommend evaluation for alternative infectious-toxic or metabolic etiology.    Recommendations  - Stroke limited MRI   - We will evaluate the MRI when is completed  - If negative for stroke, no further stroke neurology work-up recommended.    Patient Follow-up    - final recommendation pending work-up    Thank you for this consult. We will continue to follow.     The patient was discussed with Stroke Fellow, Dr. Augustin.  The Stroke Staff is Dr. Girard.    Sultana Restrepo MD  Neurology Resident  Pager:  [phone]  ______________________________________________________    Past Medical History   Past Medical History:   Diagnosis Date     Bilateral hearing loss 11/1/2019     CKD (chronic kidney disease) stage 2, GFR 60-89 ml/min 7/6/2015     History of cataract surgery 11/1/2019     Syncope 12/12/2011     Type 2 diabetes, HbA1C goal < 8% (H) 2/23/2015     Past Surgical History   Past Surgical History:   Procedure Laterality Date     ------------OTHER-------------      skin cancer surgery     CHOLECYSTECTOMY      1990s     CORONARY ARTERY BYPASS  2005    6-vessel     EYE SURGERY Bilateral     cataract surgery     MANDIBLE SURGERY      broken jaw surgery in 1984     Medications   Home Meds  Prior to Admission medications    Medication Sig Start Date End Date Taking? Authorizing Provider   acetaminophen (TYLENOL) 325 MG tablet Take 3 tablets (975 mg) by mouth every 8 hours as needed for mild pain 5/9/17   Mary Varghese, APRN CNP   aspirin 81 MG EC tablet Take 81 mg by mouth At Bedtime.    Reported, Patient   blood glucose (ONETOUCH ULTRA) test strip USE TO TEST BLOOD SUGAR 2 TIMES DAILY OR AS DIRECTED. 6/20/19   Buddy Poe MD   emollient (VANICREAM)  external cream Apply topically daily After the shower on arms 7/30/19   Bienvenido Zazueta MD   isosorbide mononitrate (IMDUR) 60 MG 24 hr tablet TAKE 1 TABLET (60 MG) BY MOUTH DAILY. PLEASE GET REFILLS FROM PRIMARY CARE OR SCHEDULE WITH NEW CARDIOLOGIST. 8/8/19   Reported, Patient   ketoconazole (NIZORAL) 2 % external shampoo Apply topically daily  Patient taking differently: Apply topically daily as needed  7/30/19   Bienvenido Zazueta MD   metFORMIN (GLUCOPHAGE) 500 MG tablet Take 1 tablet (500 mg) by mouth daily (with breakfast) FOLLOW-UP WITH YOUR PRIMARY CARE DOCTOR GIVEN KIDNEY FUNCTION 10/6/19   Karen Bearden APRN CNP   Multiple Vitamin (MV-ONE PO) Take 1 tablet by mouth daily     Reported, Patient   nitroGLYcerin (NITROSTAT) 0.4 MG sublingual tablet PLACE 1 TABLET UNDER TONGUE EVERY 5 MINS AS NEEDED FOR CHEST PAIN-IF NO RELIEF IN 15 MINS, CALL 911. 1/15/19   Reported, Patient   rosuvastatin (CRESTOR) 10 MG tablet Take 1 tablet (10 mg) by mouth daily 1/4/19 1/4/20  Hope Medina MD   senna-docusate (SENOKOT-S/PERICOLACE) 8.6-50 MG tablet Take 1 tablet by mouth 2 times daily as needed 11/1/19   Reported, Patient     Scheduled Meds    lidocaine (PF)  4 mL       lidocaine (PF)  4 mL       triamcinolone  40 mg       triamcinolone  40 mg       Allergies   No Known Allergies  Family History   Family History   Problem Relation Age of Onset     Heart Disease Mother      Eye Disorder Father      Respiratory Brother      Heart Disease Sister      Cancer Brother      Brain Cancer Daughter         brain tumor     Other - See Comments Daughter         dianna grubbs     Tremor Daughter      Other - See Comments Daughter         st mckenna [place]     Tremor Daughter      Other - See Comments Daughter         mpls     Other - See Comments Son         [place] near [place]     Other - See Comments Daughter         nadia [place]     Other - See Comments Daughter         suman mendoza     Tremor  Paternal Uncle      Skin Cancer No family hx of      Melanoma No family hx of      Social History   Social History     Tobacco Use     Smoking status: Never Smoker     Smokeless tobacco: Never Used   Substance Use Topics     Alcohol use: No     Drug use: No       Review of Systems   The 10 point Review of Systems is negative other than noted in the HPI or here. Fatigue, poor secretion management.       PHYSICAL EXAMINATION  Temp:  [95.8  F (35.4  C)-97.5  F (36.4  C)] 97.5  F (36.4  C)  Pulse:  [62-71] 71  Heart Rate:  [60-81] 60  Resp:  [17-20] 20  BP: ()/(51-73) 119/73  SpO2:  [98 %-100 %] 99 %     General:  patient lying in bed without any acute distress, lethargic.     HEENT:  normocephalic/atraumatic, difficulty with secretions   Cardio:  RRR  Pulmonary:  no respiratory distress  Extremities:  no edema  Skin:  intact, warm/dry       Neurologic: Somnolent, oriented, attentive.  Naming intact, speech fluent although limited to a few words.  Follows all commands.  Cranial nerves: Visual fields intact, PERRL, keeps left eye partially closed able but able to open fully, facial sensation intact to light touch and pinprick, hearing reduced with bilateral hearing aids in place, tongue midline, no dysarthria although soft voice.   Motor: No drift in any extremity.  No abnormal movements.  Distal strength 5/5.   Sensory: Intact to light touch and pinprick  Reflexes: Toes flexor  Coordination: Some difficulty with left hand which seems to be vision dependent as moving her hand down helps some touch fingertip.  No dysmetria in bilateral heel-to-shin although performed with difficulty bilaterally  Gait: Not assessed  Dysphagia Screen  Per Nursing    Stroke Scales    NIHSS  Interval baseline (11/10/19 1242)   Interval Comments     1a. Level of Consciousness 1-->Not alert, but arousable by minor stimulation to obey, answer, or respond   1b. LOC Questions 0-->Answers both questions correctly   1c. LOC Commands  0-->Performs both tasks correctly   2.   Best Gaze 0-->Normal   3.   Visual 0-->No visual loss   4.   Facial Palsy 0-->Normal symmetrical movements   5a. Motor Arm, Left 0-->No drift, limb holds 90 (or 45) degrees for full 10 secs   5b. Motor Arm, Right 0-->No drift, limb holds 90 (or 45) degrees for full 10 secs   6a. Motor Leg, Left 0-->No drift, leg holds 30 degree position for full 5 secs   6b. Motor Leg, right 0-->No drift, leg holds 30 degree position for full 5 secs   7.   Limb Ataxia 0-->Absent   8.   Sensory 0-->Normal, no sensory loss   9.   Best Language 0-->No aphasia, normal   10. Dysarthria 0-->Normal   11. Extinction and Inattention  0-->No abnormality   Total 1 (11/10/19 1242)     Imaging  I personally reviewed all imaging; relevant findings per HPI.     Lab Results Data   CBC  Recent Labs   Lab 11/10/19  0953   WBC 10.9   RBC 4.04*   HGB 13.2*   HCT 41.4        Basic Metabolic Panel    Recent Labs   Lab 11/10/19  0953      POTASSIUM 4.2   CHLORIDE 107   CO2 28   BUN 29   CR 1.01   *   NADER 9.5     Liver Panel  Recent Labs   Lab Test 11/10/19  0953 10/05/19  1216 12/12/18  1548   PROTTOTAL 7.3 7.0 6.9   ALBUMIN 3.7 3.6 3.5   BILITOTAL 0.6 0.5 0.5   ALKPHOS 115 102 93   AST 43 13 12   ALT 42 26 23     INR  Recent Labs   Lab Test 05/08/17  1450   INR 1.13      Lipid Profile  Recent Labs   Lab Test 09/14/18  0930 09/08/17  0919 08/10/16  1050 11/21/14  1441 04/16/14  1030 04/10/13  1110   CHOL 97 123 124 109 118 117   HDL 44 44 41 47 36* 39*   LDL 38 54 64 40 55 63   TRIG 74 127 97 110 140 79   CHOLHDLRATIO  --   --   --  2.3 3.3 3.0     A1C  Recent Labs   Lab Test 10/06/19  0934 12/12/18  1548 09/14/18  0930   A1C 7.5* 7.4* 6.5*     Troponin Susanne results for input(s): TROPI in the last 168 hours.       Stroke Code / Stroke Consult Data Data   Stroke Code Data  (for stroke code without tele)  Stroke code activated 11/10/19   0950   First stroke provider response 11/10/19   0976   Last  known normal 11/09/19   1900   Time of discovery   (or onset of symptoms) 11/10/19   0900   Head CT read by me 11/10/19   1015   Was stroke code de-escalated? Yes 11/10/19 1022  symptoms not likely caused by stroke

## 2019-11-10 NOTE — PROGRESS NOTES
Stroke Code Nurse-Responder Note    Arrival Time to Stroke Code: 0953    Stroke Code Team interventions: CT. De-escalated at 1023    ED nurse providing handoff: Deborah ANDRES RN     Time left for CT: 1003    Time arrived to next location ED: 1010    ED Nurse given handoff: Deborah Oneil RN

## 2019-11-10 NOTE — ED PROVIDER NOTES
[place] EMERGENCY DEPARTMENT ([facility])  11/10/19   History     Chief Complaint   Patient presents with     Slurred Speech     The history is provided by the patient, a relative and medical records.     Kiran Carcamo is a 89 year old male who has a PMHx of HTN, DM 2, CKD stage II, CABG x5 in 2002, who presents to the Emergency Department for evaluation of speech difficulty. Patient was at [employer] service today with daughters. Daughters who contribute to history state that patient typically actively participates in [employer] but today sat through it and appeared generally weak to daughters. Patient went to bathroom and returned with garbled speech and per daughters had word finding difficulties and with some uncharacteristic vulgarities. This occurred around 8:45-9 AM. Patient did not report any symptoms at that time (repeatedly said he was fine) and did not appear confused to daughters. No facial droop noted or other focal neurological symptoms.  Here in ED, patient denies any discomfort including headache, chest pain or abdominal pain. Denies focal weakness, lightheadedness, or syncopal episodes.  Per family, patient was seen in the hospital a month ago due to hypotensive fainting spells which were thought to be related to one of his antihypertensives which were discontinued. Per family, he is taking Rosuvastatin, metformin for hypertension and diabetes. Also takes 81 mg ASA at night but no other antiplatelets and no anticoagulants. Daughters states he is supposed to be taking isosorbide but has not been adherent. Patient lives alone at home in [facility]. No recent change in diet, has been getting fluids per daughters and ate sausage for breakfast. Patient does wear hearing aids.  Patient speech seems somewhat improved currently at this point according to daughter.    I have reviewed the Medications, Allergies, Past Medical and Surgical History, and Social History in the Epic  system.    Past Medical History:   Diagnosis Date     Bilateral hearing loss 11/1/2019     CKD (chronic kidney disease) stage 2, GFR 60-89 ml/min 7/6/2015     History of cataract surgery 11/1/2019     Syncope 12/12/2011     Type 2 diabetes, HbA1C goal < 8% (H) 2/23/2015       Past Surgical History:   Procedure Laterality Date     ------------OTHER-------------      skin cancer surgery     CHOLECYSTECTOMY      1990s     CORONARY ARTERY BYPASS  2005    6-vessel     EYE SURGERY Bilateral     cataract surgery     MANDIBLE SURGERY      broken jaw surgery in 1984       Family History   Problem Relation Age of Onset     Heart Disease Mother      Eye Disorder Father      Respiratory Brother      Heart Disease Sister      Cancer Brother      Brain Cancer Daughter         brain tumor     Other - See Comments Daughter         dianna grubbs     Tremor Daughter      Other - See Comments Daughter         st clarisa nelson     Tremor Daughter      Other - See Comments Daughter         claudine     Other - See Comments Son         [place]     Other - See Comments Daughter         nadia nam     Other - See Comments Daughter         suman mendoza     Tremor Paternal Uncle      Skin Cancer No family hx of      Melanoma No family hx of        Social History     Tobacco Use     Smoking status: Never Smoker     Smokeless tobacco: Never Used   Substance Use Topics     Alcohol use: No       Current Facility-Administered Medications   Medication     lidocaine (PF) (XYLOCAINE) 1 % injection 4 mL     lidocaine (PF) (XYLOCAINE) 1 % injection 4 mL     triamcinolone (KENALOG-40) injection 40 mg     triamcinolone (KENALOG-40) injection 40 mg     Current Outpatient Medications   Medication     acetaminophen (TYLENOL) 325 MG tablet     aspirin 81 MG EC tablet     blood glucose (ONETOUCH ULTRA) test strip     emollient (VANICREAM) external cream     isosorbide mononitrate (IMDUR) 60 MG 24 hr tablet     ketoconazole  (NIZORAL) 2 % external shampoo     metFORMIN (GLUCOPHAGE) 500 MG tablet     Multiple Vitamin (MV-ONE PO)     nitroGLYcerin (NITROSTAT) 0.4 MG sublingual tablet     rosuvastatin (CRESTOR) 10 MG tablet     senna-docusate (SENOKOT-S/PERICOLACE) 8.6-50 MG tablet      No Known Allergies     Review of Systems   Constitutional: Positive for activity change and fatigue. Negative for chills and fever.        Some general weakness noted.   HENT: Negative for congestion, facial swelling, rhinorrhea, sinus pain, trouble swallowing and voice change.    Eyes: Negative for redness and visual disturbance.   Respiratory: Negative for cough and shortness of breath.    Cardiovascular: Negative for chest pain and leg swelling.   Gastrointestinal: Negative for abdominal pain and nausea.   Genitourinary: Negative for difficulty urinating and dysuria.   Musculoskeletal: Positive for gait problem. Negative for arthralgias, back pain and neck stiffness.   Skin: Negative for color change and rash.   Neurological: Positive for speech difficulty and weakness (general). Negative for dizziness, seizures, syncope, light-headedness and headaches.   Hematological: Does not bruise/bleed easily.   Psychiatric/Behavioral: Positive for decreased concentration and dysphoric mood. Negative for confusion.   All other systems reviewed and are negative.      Physical Exam   BP: 94/59  Pulse: 72  Heart Rate: 81  Temp: 95.8  F (35.4  C)  Resp: 18  Weight: 69.9 kg (154 lb 3.2 oz)  SpO2: 100 %   ED Triage Vitals   Enc Vitals Group      BP 11/10/19 0950 94/59      Pulse --       Resp 11/10/19 0950 18      Temp 11/10/19 0954 95.8  F (35.4  C)      Temp src 11/10/19 0950 Oral      SpO2 11/10/19 0950 100 %      Weight 11/10/19 0950 69.9 kg (154 lb 3.2 oz)         Physical Exam  Vitals signs and nursing note reviewed.   Constitutional:       General: He is in acute distress.      Appearance: He is well-developed. He is not diaphoretic.      Comments: Patient ER  coming by his 2 daughters who did give history primarily.  Patient has some dysarthric speech although slightly improved at this point.  Is oriented to place person and time currently at this point.   HENT:      Head: Normocephalic and atraumatic.      Mouth/Throat:      Mouth: Mucous membranes are moist.   Eyes:      General: No scleral icterus.     Extraocular Movements: Extraocular movements intact.      Conjunctiva/sclera: Conjunctivae normal.      Pupils: Pupils are equal, round, and reactive to light.   Neck:      Musculoskeletal: Normal range of motion and neck supple.   Cardiovascular:      Rate and Rhythm: Normal rate and regular rhythm.   Pulmonary:      Effort: No respiratory distress.      Breath sounds: No stridor.   Abdominal:      General: There is no distension.      Palpations: There is no mass.      Tenderness: There is no tenderness.   Musculoskeletal:         General: No swelling or tenderness.   Skin:     General: Skin is warm and dry.      Capillary Refill: Capillary refill takes less than 2 seconds.      Findings: No rash.   Neurological:      Mental Status: He is alert and oriented to person, place, and time.      Comments: Neurologic at this point.  Patient speech may be slightly dysarthric at this point time sometimes some difficulty understanding him but no true garbled speech word salad etc.  Neurologically no other focal findings identified in the ER.         ED Course   9:47 AM  The patient was seen and examined by Buddy Euceda MD in Room ED02.       Procedures         Patient evaluated here in the ER.  Quick assessment stroke code was called.  Information taken from daughters present also patient examined by neuro team also.  CT scan was done no acute findings.  Recommendations per neurology at this point is to get an MRI to rule out any sign of stroke.  Other labs done EKG did not show hyperacute changes.  Troponin within normal limits.  Urinalysis was also given after liter fluid  given here in the ER.  Blood sugar etc. stable other labs stable.  As noted troponin negative.  Chest x-ray does not show signs of aspiration.  Patient will return from CT felt there was some fullness in his throat he is able to swallow here he was cleared of some secretions no sign of aspiration.  Patient did eat here in the ER.  MRI scan as noted did not show acute findings.  Discussed with neurology stroke team I feel at this point patient can go home and follow-up if symptoms may be a TIA but at this point patient should just continue aspirin is currently doing and return if recurrent persistent symptoms.  Patient otherwise will be discharged home with daughters who feel this is appropriate patient be more comfortable at home we will continue to encourage fluids oral intake monitoring symptoms follow-up with MD return if any concerns at all.       EKG Interpretation:      Interpreted by Buddy Euceda MD  Time reviewed: 1023  Symptoms at time of EKG: speech difficulties   Rhythm: normal sinus   Rate: normal  Axis: normal  Ectopy: PSVC noted  Conduction: normal  ST Segments/ T Waves: Nonspecific ST-T wave changes  Q Waves: none  Comparison to prior: No old EKG available    Clinical Impression: nsr with nonspecific st changes with psvc's noted          Critical Care time:  none             Labs Ordered and Resulted from Time of ED Arrival Up to the Time of Departure from the ED   CBC WITH PLATELETS DIFFERENTIAL - Abnormal; Notable for the following components:       Result Value    RBC Count 4.04 (*)     Hemoglobin 13.2 (*)      (*)     All other components within normal limits   BASIC METABOLIC PANEL - Abnormal; Notable for the following components:    Glucose 194 (*)     All other components within normal limits   GLUCOSE BY METER - Abnormal; Notable for the following components:    Glucose 199 (*)     All other components within normal limits   CREATININE POCT   GLUCOSE MONITOR NURSING POCT   PARTIAL  THROMBOPLASTIN TIME   UA MACROSCOPIC WITH REFLEX TO MICRO AND CULTURE   HEPATIC PANEL   ACTIVITY   PULSE OXIMETRY NURSING   ASSESSMENT   NOTIFY   ISTAT INR NURSING POCT   ISTAT TROPONIN NURSING POCT   ISTAT INR POCT   TROPONIN POCT     Results for orders placed or performed during the hospital encounter of 11/10/19   Creatinine POCT     Status: None   Result Value Ref Range    Creatinine 1.1 0.66 - 1.25 mg/dL    GFR Estimate 62 >60 mL/min/[1.73_m2]    GFR Estimate If Black 75 >60 mL/min/[1.73_m2]   CBC with platelets differential     Status: Abnormal   Result Value Ref Range    WBC 10.9 4.0 - 11.0 10e9/L    RBC Count 4.04 (L) 4.4 - 5.9 10e12/L    Hemoglobin 13.2 (L) 13.3 - 17.7 g/dL    Hematocrit 41.4 40.0 - 53.0 %     (H) 78 - 100 fl    MCH 32.7 26.5 - 33.0 pg    MCHC 31.9 31.5 - 36.5 g/dL    RDW 12.7 10.0 - 15.0 %    Platelet Count 198 150 - 450 10e9/L    Diff Method Automated Method     % Neutrophils 69.3 %    % Lymphocytes 19.7 %    % Monocytes 7.6 %    % Eosinophils 2.2 %    % Basophils 0.6 %    % Immature Granulocytes 0.6 %    Nucleated RBCs 0 0 /100    Absolute Neutrophil 7.5 1.6 - 8.3 10e9/L    Absolute Lymphocytes 2.1 0.8 - 5.3 10e9/L    Absolute Monocytes 0.8 0.0 - 1.3 10e9/L    Absolute Eosinophils 0.2 0.0 - 0.7 10e9/L    Absolute Basophils 0.1 0.0 - 0.2 10e9/L    Abs Immature Granulocytes 0.1 0 - 0.4 10e9/L    Absolute Nucleated RBC 0.0    Basic metabolic panel     Status: Abnormal   Result Value Ref Range    Sodium 140 133 - 144 mmol/L    Potassium 4.2 3.4 - 5.3 mmol/L    Chloride 107 94 - 109 mmol/L    Carbon Dioxide 28 20 - 32 mmol/L    Anion Gap 4 3 - 14 mmol/L    Glucose 194 (H) 70 - 99 mg/dL    Urea Nitrogen 29 7 - 30 mg/dL    Creatinine 1.01 0.66 - 1.25 mg/dL    GFR Estimate 61 >60 mL/min/[1.73_m2]    GFR Estimate If Black 71 >60 mL/min/[1.73_m2]    Calcium 9.5 8.5 - 10.1 mg/dL   Partial thromboplastin time     Status: None   Result Value Ref Range    PTT 30 22 - 37 sec   Glucose by meter      Status: Abnormal   Result Value Ref Range    Glucose 199 (H) 70 - 99 mg/dL   UA reflex to Microscopic and Culture     Status: None   Result Value Ref Range    Color Urine Yellow     Appearance Urine Clear     Glucose Urine Negative NEG^Negative mg/dL    Bilirubin Urine Negative NEG^Negative    Ketones Urine Negative NEG^Negative mg/dL    Specific Gravity Urine 1.025 1.003 - 1.035    Blood Urine Negative NEG^Negative    pH Urine 5.0 5.0 - 7.0 pH    Protein Albumin Urine Negative NEG^Negative mg/dL    Urobilinogen mg/dL Normal 0.0 - 2.0 mg/dL    Nitrite Urine Negative NEG^Negative    Leukocyte Esterase Urine Negative NEG^Negative    Source Clean catch urine    Hepatic panel     Status: None   Result Value Ref Range    Bilirubin Direct 0.2 0.0 - 0.2 mg/dL    Bilirubin Total 0.6 0.2 - 1.3 mg/dL    Albumin 3.7 3.4 - 5.0 g/dL    Protein Total 7.3 6.8 - 8.8 g/dL    Alkaline Phosphatase 115 40 - 150 U/L    ALT 42 0 - 70 U/L    AST 43 0 - 45 U/L   EKG 12-lead, tracing only     Status: None   Result Value Ref Range    Interpretation ECG Click View Image link to view waveform and result    ISTAT INR POCT     Status: None   Result Value Ref Range    ISTAT INR <0.9 0.86 - 1.14   Troponin POCT     Status: None   Result Value Ref Range    Troponin I 0.04 0.00 - 0.08 ug/L     MR Brain for Stroke Limited   Final Result   Impression:   1. No acute infarct.   2. Moderate chronic small vessel ischemic disease and and   age-appropriate parenchymal volume loss.      I have personally reviewed the examination and initial interpretation   and I agree with the findings.      LIDA YANEZ MD      XR Chest Port 1 View   Final Result   IMPRESSION: Pulmonary vascular congestion. No focal airspace   opacities.      I have personally reviewed the examination and initial interpretation   and I agree with the findings.      DOUG RAMIREZ MD      CT Head w/o Contrast   Final Result   Impression:    1. No acute intracranial pathology.    2.  Moderate chronic small vessel ischemic disease.      I have personally reviewed the examination and initial interpretation   and I agree with the findings.      LIDA YANEZ MD          Consults  Neurology: Responded(Stroke) (11/10/19 1152)    Assessments & Plan (with Medical Decision Making)  89-year-old gentleman presents ER with sudden onset some speech difficulties.  Little bit of garbled speech along with dysarthric speech noted per daughter at [employer].  Symptoms are somewhat improving this stroke code was called the patient seen by neurology CT was done acute findings noted MRI of the brain was negative for any acute stroke.  Other work-up including metabolic etc. noninfectious did not reveal any acute findings.  Chest x-ray negative urinalysis negative patient given a liter fluid.  Cardiac work-up was negative otherwise.  Patient feeling generally weak but able to eat resting family feels comfortable taking him home neurology feel he can be discharged this may have been a TIA but at this point no other indications continue the aspirin monitor symptoms return if persistent symptoms follow-up with MD.  Family agrees with plan and discharge patient.         I have reviewed the nursing notes.    I have reviewed the findings, diagnosis, plan and need for follow up with the patient.    Discharge Medication List as of 11/10/2019  3:50 PM          Final diagnoses:   Generalized weakness   Difficulty with speech - resolved     I, Braulio Barros, am serving as a trained medical scribe to document services personally performed by Buddy Euceda MD, based on the provider's statements to me.   Kinsey ANGELES MD, was physically present and have reviewed and verified the accuracy of this note documented by Braulio Barros.     11/10/2019   [facility], EMERGENCY DEPARTMENT    This note was created at least in part by the use of dragon voice dictation system. Inadvertent typographical errors may still exist.  Buddy Euceda,  MD.         Kinsey, Buddy Varela MD  11/10/19 5168

## 2019-11-10 NOTE — ED AVS SNAPSHOT
[facility], [place], Emergency Department  [address] [place] [id number]  Phone:  [phone]                                    Kiran Carcamo   MRN: [id number]    Department:  [facility], Emergency Department   Date of Visit:  11/10/2019           After Visit Summary Signature Page    I have received my discharge instructions, and my questions have been answered. I have discussed any challenges I see with this plan with the nurse or doctor.    ..........................................................................................................................................  Patient/Patient Representative Signature      ..........................................................................................................................................  Patient Representative Print Name and Relationship to Patient    ..................................................               ................................................  Date                                   Time    ..........................................................................................................................................  Reviewed by Signature/Title    ...................................................              ..............................................  Date                                               Time          22EPIC Rev 08/18

## 2019-11-18 ENCOUNTER — HOSPITAL ENCOUNTER (OUTPATIENT)
Dept: SPEECH THERAPY | Facility: CLINIC | Age: 84
Setting detail: THERAPIES SERIES
End: 2019-11-18
Attending: PSYCHIATRY & NEUROLOGY
Payer: COMMERCIAL

## 2019-11-18 DIAGNOSIS — I25.118 CORONARY ARTERY DISEASE OF NATIVE ARTERY OF NATIVE HEART WITH STABLE ANGINA PECTORIS (H): ICD-10-CM

## 2019-11-18 PROCEDURE — 92507 TX SP LANG VOICE COMM INDIV: CPT | Mod: GN | Performed by: SPEECH-LANGUAGE PATHOLOGIST

## 2019-11-18 PROCEDURE — 92524 BEHAVRAL QUALIT ANALYS VOICE: CPT | Mod: GN | Performed by: SPEECH-LANGUAGE PATHOLOGIST

## 2019-11-18 NOTE — PROGRESS NOTES
"   Speech Pathology Voice Evlauation - 11/18/19 1300   General Information   Type Of Visit Initial   Start Of Care Date 11/18/19   Referring Physician Michael De Leon MD   Orders Evaluate And Treat   Medical Diagnosis Problems with Voice Production   Onset Of Illness/injury Or Date Of Surgery 11/01/19  (Referral date)   Hearing Wears bilateral aides   Surgical/Medical history reviewed Yes   Pertinent History Of Current Problem Per chart notes of Dr. De Leon, \"Voice is softer when his hearing aides are working. Memory - at times it is \"amazing. Some forgetfulness.\"      Per medial chart notes of recent ER visit, \"Kiran Carcamo is a 89 year old male who has a PMHx of HTN, DM 2, CKD stage II, CABG x5 in 2002, who presents to the Emergency Department for evaluation of speech difficulty. Patient was at [employer] service today with daughters. Daughters who contribute to history state that patient typically actively participates in [email or web address] but today sat through it and appeared generally weak to daughters. Patient went to bathroom and returned with garbled speech and per daughters had word finding difficulties and with some uncharacteristic vulgarities. This occurred around 8:45-9 AM. Patient did not report any symptoms at that time (repeatedly said he was fine) and did not appear confused to daughters. No facial droop noted or other focal neurological symptoms.\"   (See medical chart for further details.)    Daughter present during today's initial evaluation, \"My sister says he sometimes has trouble trying to speak, and he sometimes speaks really soft and I'm not sure if it's when he has his hearing aides.\"  \"He's doing really well. He finds words more quickly than I do.\"    Per patient, \"I've slowed down over the last year and a half, I want to be careful about what I say, I can say something 3 different ways and it takes a little decision-making about which way I want to say what I want to say.\"    Prior Level of Functioning " "Others noticed my problems.   Current Community Support  Family/friend caregiver   Patient Role/employment History Retired  ([employer])   Living environment House[facility]   General Observations Very pleasant   Patient/family Goals Patient indicates he is here to satisfy daughter's concern about speech.    Personal Rating / Voice Use Rating   Describe the amount of voice use required for your job Moderate   Describe the intensity of voice use required for your job Moderate  (Occasionally soft and quiet)   Describe the amount of typical non-work voice use Moderate   Describe the intensity of typical non-work voice use Moderate   Comments Patient notes, \"The hesitancy may be that I'm a little afraid the words won't come out the way I intend them to.\"   Voice Profile during conversation, 1 min monologue and paragraph reading   Voice Quality Scratchy;Breathy;Glottal darnell   Voice quality severity rating continuum (1=Severe, 7=WNL) Mild to Moderate Impairment   Breath Control comments Slightly reduced, characterized by occasional glottal darnell at the ends of sentences.    Breath control severity rating continuum (1=Severe, 7=WNL) Mild   Voice Use / Effort WNL   Pitch /Frequency Description Monotone  (Slightly, \"reduced variability\" )   Volume Too quiet  (Ocassionally to intermittently )   Volume comments Likely to be less audible in an environment that has compteting noise or noise in the background.    Volume severity rating continuum (1=Severe, 7=WNL) Mild to Moderate Impairment   Neuromuscular Control WNL   Resonance WNL   Vibratory Function of Vocal Folds   Prolonged 'ah' at mid pitch (sec) 11-12 sec  (breathy)   Prolonged 'ah' at high pitch (sec) 5-8 sec   Prolonged 'ah' at low pitch (sec) 6 sec   Vibratory Function of Vocal Folds Scale Males 20 - 80 yrs: 15 - 25 secs   Vibratory Function of Vocal Folds Comments Moderate Impairment   Unilateral Larynx Function   Alternating head turns to right and " "left Other  (No difference)   Function of Lengtheners / Shorteners (CT and TA Muscles)   Pitch glides Limited range   \"Neel joanne\" (high/low) Upper register present   Respiratory Function Screening   Longest prolonged \"S\" from S/Z ratio (#of sec) 7:8   General Therapy Interventions   Planned Therapy Interventions Voice   Voice Relaxed breath sequence techniques;Voice quality/pitch or volume tasks;Resonant voice techniques   Impressions and Recommendations   Communication Diagnosis 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    Recommendations Voice Therapy trial    Frequency and Duration 1 x/week for 1 month, will extend if indicated.   Prognosis  Good with intervention   Risks and Benefits of Treatment have been explained. Yes   Patient & /or Caregiver  in agreement with plan of care Yes   Patient Education Instructed in and practiced techniques to improve speech intelligiblity and volume (see Treatment Detail).    Caregiver Education Daughter present during evaluation/therapy.    Educational Assessment   Barriers to Learning No barriers   Preferred Learning Style Listening;Reading;Demonstration;Pictures / Video   Voice Goals   Voice Goals 1;2   Voice Goal 1   Goal Identifier Voice   Goal Description Per patient and family, patient will demonstrate 25% improved volume/intelligibility.   Target Date 12/18/19   Voice Goal 2   Goal Identifier Voice/Speech   Goal Description Per therapist, patient will demonstrate 75% accurate use of speech/vocal strategies including: slight increase in mouth opening while speaking, use of stronger final consonant sounds, awareness of pausing for a breath more often mid sentence, and improved articlatory accuracy for lingua-alveolar sounds, t,d,n,l.    Target Date 12/18/19   Total Session Time   Voice Minutes (57502) 45   Total Evaluation Time 45     "

## 2019-11-19 RX ORDER — ROSUVASTATIN CALCIUM 10 MG/1
10 TABLET, COATED ORAL DAILY
Qty: 90 TABLET | Refills: 0 | Status: SHIPPED | OUTPATIENT
Start: 2019-11-19 | End: 2020-02-05

## 2019-11-19 NOTE — TELEPHONE ENCOUNTER
Last Clinic Visit: 1/4/2019 [facility]  Future Visit: 1/7/2020    Lab(s) past due-LDL.  Sunitha refill 90 days and FYI to Cardiology.

## 2019-11-21 ENCOUNTER — HOSPITAL ENCOUNTER (OUTPATIENT)
Dept: OCCUPATIONAL THERAPY | Facility: CLINIC | Age: 84
Setting detail: THERAPIES SERIES
End: 2019-11-21
Attending: PSYCHIATRY & NEUROLOGY
Payer: COMMERCIAL

## 2019-11-21 PROCEDURE — 97166 OT EVAL MOD COMPLEX 45 MIN: CPT | Mod: GO | Performed by: OCCUPATIONAL THERAPIST

## 2019-11-21 PROCEDURE — 97535 SELF CARE MNGMENT TRAINING: CPT | Mod: GO | Performed by: OCCUPATIONAL THERAPIST

## 2019-11-21 NOTE — PROGRESS NOTES
11/21/19 1400   Quick Adds   Type of Visit Initial Outpatient Occupational Therapy Evaluation   General Information   Start Of Care Date 11/21/19   Referring Physician Michael De Leon   Orders Evaluate and treat as indicated   Other Orders Tremor mgmt and exercise   Orders Date 11/01/19   Medical Diagnosis Esstential tremor   Onset of Illness/Injury or Date of Surgery 11/01/19  (order)   Precautions/Limitations Fall precautions   Surgical/Medical History Reviewed Yes   Additional Occupational Profile Info/Pertinent History of Current Problem 89 year old male with recent increased confusion and garbled speech with ED visit but no CVA.  Referral for tremor mgmt strateiges. Pt attends appointment today with two daughter who are also helping to care for him by bringing food, driving. (Present daily)   Comments/Observations Pt [place] with HAs bilaterally.    Role/Living Environment   Current Community Support Family/friend caregiver;Emergency call system   Patient role/Employment history Retired;Other/comments   Community/Avocational Activities [place]- daily, planting, yardwork, mowing, shoveling, kayak and outdoors    Current Living Environment House   Number of Stairs to Enter Home 2 front entrance, 4 in back   Number of Stairs Within Home full flight up to second floor; full flight to basement; bilateral handrails present with sensor lights   Primary Bathroom Location/Comments Bathroom on same level as bedroom   Primary Bathroom Set Up/Equipment Tub/Shower combo;Tub grab bar;Toilet grab bar   Prior Level - Transfers Independent   Prior Level - Ambulation Independent   Prior Level - ADLS Independent   Prior Responsibilities - IADL Driving;Finances;Meal Preparation;Laundry;Housekeeping;Medication management;Shopping   Prior Level Comments Daughters assist with all IADLS   Current Assistive Devices - Mobility Standard cane;Front wheeled walker  (4ww on each level)   Role/Living Environment Comments Patient lives alone with  support from daughters Karla and Jes daily.   Patient/family Goals Statement Manage tremor and AE needs   Pain   Pain comments soreness, achy   Fall Risk Screen   Fall screen completed by OT   Have you fallen 2 or more times in the past year? No   Have you fallen and had an injury in the past year? No   Timed Up and Go score (seconds) defer to Pt eval coming up   Is patient a fall risk? Yes   Fall screen comments One fall is Sept. while traveling [place] with SEC.  No inury   Cognitive Status Examination   Level of Consciousness Alert   Follows Commands and Answers Questions 75% of the time   Personal Safety and Judgment Intact   Memory Impaired  (Repetitive)   Executive Function Planning ability impaired;Working memory impaired, decreased storage of information for performing tasks   Cognitive Comment MOCA  TBA.  Those scores can then be compared to last scores from 2017 and march 2019   Visual Perception   Visual Perception Wears glasses   Visual Perception Comments Eye exam in December and no changes noted.   Posture   Posture Protracted shoulders   Posture Comments severe kyhphosis   Hand Strength   Hand Dominance Ambidextrous;Right   Left Hand  (pounds) 57 pounds   Right Hand  (pounds) 60 pounds   Left Lateral Pinch (pounds) 8 pounds   Right Lateral Pinch (pounds) 16.5 pounds   Left Three Point Pinch (pounds) 7 pounds   Right Three Point Pinch (pounds) 10 pounds   Hand Strength Comments Bilateral  WNL; R key pinch WNL and L key pinch below norms (with L thumb arthritic and difficult to engage key pinch); 3 pt pinch below norms bilaterally but within 2 standard deviations of norm.  Minimal change from 2019   Coordination   Left Hand, Nine [place] Peg Test (seconds) 51.93   Right Hand, Nine [place] Peg Test (seconds) 58.63   Balance   Balance Comments Working with PT on balance, deficits noted today secondary to posture, now using four wheeled walker   Functional Mobility   Ambulation four wheeled  walker for all mobility   Wheelchair scooter in the stores   Bathing   Level of [place] - Bathing independent   Bathing Comments shower chair   Upper Body Dressing   Level of [place]: Dress Upper Body independent   Lower Body Dressing   Level of [place]: Dress Lower Body independent   Lower Body Dressing Comments increased challenges with buttons due to coordination limitations and    Toileting   Level of [place]: Toilet independent   Toileting Comments incontinence   Grooming   Level of [place]: Grooming independent   Grooming Comments assistance needed for thouroughness   Eating/Self-Feeding   Level of [place]: Eating independent   Eating/Self Feeding Comments more spilling and challenges with bringing utensil to mouth   Activity Tolerance   Activity Tolerance Has good days when he does a lot but then others that he is less motivated.   Instrumental Activities of Daily Living Assessment   IADL Quick Adds Meal Planning/Preparation;Home/Financial/Management;Communication/Computer Use;Community Mobility;Care of Others   Meal Planning/Preparation Daughters bring most meals over and he makes micro meals or other small things   Communication/Computer Use Has a grandpad- simple ipad type device- to communicate with family   Community Mobility Not currently driving for the last year   Planned Therapy Interventions   Planned Therapy Interventions ADL training;IADL training;Cognitive skills;Cognitive performance testing;ROM;Self care/Home management;Strengthening;Therapeutic activities    OT Goal 2   Goal Identifier HEP   Goal Description Pt will demonstrate independence with U/E strengthening & coordination home exercise program (HEP) for optimal ADL participation & independence.   Target Date 02/19/20    OT Goal 3   Goal Identifier AE   Goal Description Patient and family will report 2-5 new pieces of equipment and how to use them in order for optimal independence within his home while  dealing with tremor and arthritis.     Target Date 02/19/20   Clinical Impression   Criteria for Skilled Therapeutic Interventions Met Yes, treatment indicated   OT Diagnosis Decreased safety and IND with I/ADLs.   Influenced by the following impairments Weakness, fatigue, impaired cognition.   Assessment of Occupational Performance 3-5 Performance Deficits   Identified Performance Deficits Meal prep, driving, leisure activities, home mgmt, functional mobility   Clinical Decision Making (Complexity) Moderate complexity   Therapy Frequency 1x/week or less   Predicted Duration of Therapy Intervention (days/wks) 8 visits in 90 days   Risks and Benefits of Treatment have been explained. Yes   Patient, Family & other staff in agreement with plan of care Yes   Clinical Impression Comments Pt would benefit from skilled OT services to address current impairments in order to improve occupational performance and return to prior level of function.    Education Assessment   Barriers To Learning Hearing;Cognitive   Preferred Learning Style Listening;Demonstration;Pictures/video   Therapy Certification   Certification date from 11/21/19   Certification date to 02/19/20   Total Evaluation Time   OT Abdi Moderate Complexity Minutes (13650) 45

## 2019-12-02 ENCOUNTER — HOSPITAL ENCOUNTER (OUTPATIENT)
Dept: SPEECH THERAPY | Facility: CLINIC | Age: 84
Setting detail: THERAPIES SERIES
End: 2019-12-02
Attending: PSYCHIATRY & NEUROLOGY
Payer: COMMERCIAL

## 2019-12-02 PROCEDURE — 92507 TX SP LANG VOICE COMM INDIV: CPT | Mod: GN | Performed by: SPEECH-LANGUAGE PATHOLOGIST

## 2019-12-02 NOTE — PROGRESS NOTES
Speech Pathology Discharge Summary - 12/02/19 0900   Signing Clinician's Name / Credentials   Signing clinician's name /credentials KELLY Jasmine, SLP   Session Number   Session Number 3   Progress/Recertification   Progress note due 12/18/19   Subjective Report   Subjective Report Arrived on time.   Voice Goals   Voice Goals 1;2   Voice Goal 1   Goal Identifier Voice   Goal Description Per patient and family, patient will demonstrate 25% improved volume/intelligibility.   Target Date 12/18/19   Date Met 12/02/19   Voice Goal 2   Goal Identifier Voice/Speech   Goal Description Per therapist, patient will demonstrate 75% accurate use of speech/vocal strategies including: slight increase in mouth opening while speaking, use of stronger final consonant sounds, awareness of pausing for a breath more often mid sentence, and improved articlatory accuracy for lingua-alveolar sounds, t,d,n,l.    Target Date 12/18/19   Date Met 12/02/19   Treatment Interventions    Treatment Interventions Treatment Speech/Lang/Voice   Treatment Speech/Lang/Voice   Treatment of Speech, Language, Voice Communication&/or Auditory Processing (69668) 30 Minutes   Skilled Intervention Provided written and verbal information on.;Instructed/trained/monitored VCD tasks;Demonstrated voice exercises;Modeled compensatory strategies;Provided feedback on performance of tasks;Measured key voice production parameters;Facilitated respiratory, laryngeal, oral integration   Patient Response Verbalized and demonstrated understanding.    Treatment Detail Reviewed and practiced the following; increasing mouth opening a little, targeting alveolar ridge for t,d,n,l sounds and strong final consonants. Also did a little singing with increased mouth opening. Ready for d/c with ongoing practice with daughters and on his own. Dtr noticed she wasn't having difficulty understadig him, less need to request repetitions. Other daughter reports they did some of the  "homework, \"they were really good.\"    Progress Met treatment goals. Continued excellent intelligiility - 95% estimate for unkown listener, % for known listeners. Patient demostrating understanding and appropriate use of given voice/intelligibility strategies.    Education   Learner Patient;Family  (Two daughters present)   Readiness Eager   Method Booklet/handout;Explanation;Demonstration   Response Verbalizes understanding;Demonstrates understanding   Plan   Home program See Treatment Detail   Updates to plan of care Ready for Discharge with [facility] practice program.    Total Session Time   Total Treatment Time (sum of timed and untimed services) 30     "

## 2019-12-10 ENCOUNTER — HOSPITAL ENCOUNTER (OUTPATIENT)
Dept: PHYSICAL THERAPY | Facility: CLINIC | Age: 84
Setting detail: THERAPIES SERIES
End: 2019-12-10
Attending: PSYCHIATRY & NEUROLOGY
Payer: COMMERCIAL

## 2019-12-10 PROCEDURE — 97161 PT EVAL LOW COMPLEX 20 MIN: CPT | Mod: GP | Performed by: PHYSICAL THERAPIST

## 2019-12-10 PROCEDURE — 97110 THERAPEUTIC EXERCISES: CPT | Mod: GP | Performed by: PHYSICAL THERAPIST

## 2019-12-10 PROCEDURE — 97530 THERAPEUTIC ACTIVITIES: CPT | Mod: GP | Performed by: PHYSICAL THERAPIST

## 2019-12-30 ENCOUNTER — HOSPITAL ENCOUNTER (OUTPATIENT)
Dept: OCCUPATIONAL THERAPY | Facility: CLINIC | Age: 84
Setting detail: THERAPIES SERIES
End: 2019-12-30
Attending: PSYCHIATRY & NEUROLOGY
Payer: COMMERCIAL

## 2019-12-30 PROCEDURE — 97110 THERAPEUTIC EXERCISES: CPT | Mod: GO | Performed by: OCCUPATIONAL THERAPIST

## 2019-12-30 PROCEDURE — 97530 THERAPEUTIC ACTIVITIES: CPT | Mod: GO | Performed by: OCCUPATIONAL THERAPIST

## 2020-01-07 ENCOUNTER — OFFICE VISIT (OUTPATIENT)
Dept: ORTHOPEDICS | Facility: CLINIC | Age: 85
End: 2020-01-07
Payer: COMMERCIAL

## 2020-01-07 ENCOUNTER — OFFICE VISIT (OUTPATIENT)
Dept: CARDIOLOGY | Facility: CLINIC | Age: 85
End: 2020-01-07
Attending: INTERNAL MEDICINE
Payer: COMMERCIAL

## 2020-01-07 VITALS
HEIGHT: 66 IN | SYSTOLIC BLOOD PRESSURE: 110 MMHG | HEART RATE: 80 BPM | WEIGHT: 156.2 LBS | DIASTOLIC BLOOD PRESSURE: 74 MMHG | OXYGEN SATURATION: 97 % | BODY MASS INDEX: 25.1 KG/M2

## 2020-01-07 VITALS — HEIGHT: 66 IN | WEIGHT: 156 LBS | BODY MASS INDEX: 25.07 KG/M2

## 2020-01-07 DIAGNOSIS — M17.11 ARTHRITIS OF RIGHT KNEE: ICD-10-CM

## 2020-01-07 DIAGNOSIS — Z95.1 S/P CABG (CORONARY ARTERY BYPASS GRAFT): Primary | ICD-10-CM

## 2020-01-07 DIAGNOSIS — K59.00 CONSTIPATION, UNSPECIFIED CONSTIPATION TYPE: ICD-10-CM

## 2020-01-07 DIAGNOSIS — N18.2 CKD (CHRONIC KIDNEY DISEASE) STAGE 2, GFR 60-89 ML/MIN: ICD-10-CM

## 2020-01-07 DIAGNOSIS — I10 ESSENTIAL HYPERTENSION WITH GOAL BLOOD PRESSURE LESS THAN 140/90: ICD-10-CM

## 2020-01-07 DIAGNOSIS — I25.118 CORONARY ARTERY DISEASE OF NATIVE ARTERY OF NATIVE HEART WITH STABLE ANGINA PECTORIS (H): ICD-10-CM

## 2020-01-07 DIAGNOSIS — E78.5 HYPERLIPIDEMIA LDL GOAL <70: ICD-10-CM

## 2020-01-07 DIAGNOSIS — I25.2 HISTORY OF MI (MYOCARDIAL INFARCTION): ICD-10-CM

## 2020-01-07 DIAGNOSIS — M25.461 EFFUSION OF RIGHT KNEE: Primary | ICD-10-CM

## 2020-01-07 DIAGNOSIS — M25.461 EFFUSION OF RIGHT KNEE: ICD-10-CM

## 2020-01-07 PROBLEM — R55 SYNCOPE: Status: RESOLVED | Noted: 2019-10-05 | Resolved: 2020-01-07

## 2020-01-07 LAB
ANION GAP SERPL CALCULATED.3IONS-SCNC: 7 MMOL/L (ref 3–14)
APPEARANCE FLD: NORMAL
BUN SERPL-MCNC: 28 MG/DL (ref 7–30)
CALCIUM SERPL-MCNC: 9.3 MG/DL (ref 8.5–10.1)
CHLORIDE SERPL-SCNC: 110 MMOL/L (ref 94–109)
CO2 SERPL-SCNC: 25 MMOL/L (ref 20–32)
COLOR FLD: NORMAL
CREAT SERPL-MCNC: 0.94 MG/DL (ref 0.66–1.25)
CRYSTALS SNV MICRO: NORMAL
EOSINOPHIL NFR FLD MANUAL: 1 %
GFR SERPL CREATININE-BSD FRML MDRD: 67 ML/MIN/{1.73_M2}
GLUCOSE SERPL-MCNC: 186 MG/DL (ref 70–99)
GRAM STN SPEC: NORMAL
GRAM STN SPEC: NORMAL
LYMPHOCYTES NFR FLD MANUAL: 9 %
MONOS+MACROS NFR FLD MANUAL: 51 %
NEUTS BAND NFR FLD MANUAL: 39 %
POTASSIUM SERPL-SCNC: 4.4 MMOL/L (ref 3.4–5.3)
SODIUM SERPL-SCNC: 141 MMOL/L (ref 133–144)
SPECIMEN SOURCE FLD: NORMAL
SPECIMEN SOURCE SNV: NORMAL
SPECIMEN SOURCE: NORMAL
URATE SERPL-MCNC: 4.6 MG/DL (ref 3.5–7.2)
WBC # FLD AUTO: 239 /UL

## 2020-01-07 PROCEDURE — 84550 ASSAY OF BLOOD/URIC ACID: CPT | Performed by: INTERNAL MEDICINE

## 2020-01-07 PROCEDURE — 87205 SMEAR GRAM STAIN: CPT | Performed by: FAMILY MEDICINE

## 2020-01-07 PROCEDURE — 36415 COLL VENOUS BLD VENIPUNCTURE: CPT | Performed by: INTERNAL MEDICINE

## 2020-01-07 PROCEDURE — G0463 HOSPITAL OUTPT CLINIC VISIT: HCPCS | Mod: 25,ZF

## 2020-01-07 PROCEDURE — 89060 EXAM SYNOVIAL FLUID CRYSTALS: CPT | Performed by: FAMILY MEDICINE

## 2020-01-07 PROCEDURE — 99214 OFFICE O/P EST MOD 30 MIN: CPT | Mod: ZP | Performed by: INTERNAL MEDICINE

## 2020-01-07 PROCEDURE — 87075 CULTR BACTERIA EXCEPT BLOOD: CPT | Performed by: FAMILY MEDICINE

## 2020-01-07 PROCEDURE — 93005 ELECTROCARDIOGRAM TRACING: CPT | Mod: ZF

## 2020-01-07 PROCEDURE — 80048 BASIC METABOLIC PNL TOTAL CA: CPT | Performed by: INTERNAL MEDICINE

## 2020-01-07 PROCEDURE — 87070 CULTURE OTHR SPECIMN AEROBIC: CPT | Performed by: FAMILY MEDICINE

## 2020-01-07 RX ORDER — AMOXICILLIN 250 MG
1 CAPSULE ORAL 2 TIMES DAILY PRN
Qty: 90 TABLET | Refills: 1 | Status: SHIPPED | OUTPATIENT
Start: 2020-01-07 | End: 2020-09-22

## 2020-01-07 RX ORDER — LISINOPRIL 10 MG/1
10 TABLET ORAL DAILY
Qty: 90 TABLET | Refills: 3 | Status: SHIPPED | OUTPATIENT
Start: 2020-01-07 | End: 2020-01-15

## 2020-01-07 RX ADMIN — LIDOCAINE HYDROCHLORIDE 7 ML: 10 INJECTION, SOLUTION EPIDURAL; INFILTRATION; INTRACAUDAL; PERINEURAL at 13:44

## 2020-01-07 RX ADMIN — TRIAMCINOLONE ACETONIDE 40 MG: 40 INJECTION, SUSPENSION INTRA-ARTICULAR; INTRAMUSCULAR at 13:44

## 2020-01-07 ASSESSMENT — ENCOUNTER SYMPTOMS
SHORTNESS OF BREATH: 0
LIGHT-HEADEDNESS: 0
HEADACHES: 0
DECREASED APPETITE: 0
NECK MASS: 0
MUSCLE WEAKNESS: 1
DYSURIA: 0
SLEEP DISTURBANCES DUE TO BREATHING: 0
NECK PAIN: 0
PALPITATIONS: 0
SPEECH CHANGE: 0
DEPRESSION: 0
MEMORY LOSS: 1
HEARTBURN: 0
PANIC: 0
SINUS PAIN: 0
LOSS OF CONSCIOUSNESS: 0
DECREASED CONCENTRATION: 1
NAUSEA: 0
MUSCLE CRAMPS: 0
BLOATING: 0
CONSTIPATION: 1
BACK PAIN: 0
SPUTUM PRODUCTION: 1
TINGLING: 0
ORTHOPNEA: 0
MYALGIAS: 1
SEIZURES: 0
INSOMNIA: 0
POOR WOUND HEALING: 0
DISTURBANCES IN COORDINATION: 1
ABDOMINAL PAIN: 0
JOINT SWELLING: 1
HEMOPTYSIS: 0
HYPOTENSION: 0
BOWEL INCONTINENCE: 0
EXERCISE INTOLERANCE: 0
POLYDIPSIA: 0
WEAKNESS: 1
VOMITING: 0
HEMATURIA: 0
WEIGHT GAIN: 0
HYPERTENSION: 0
NIGHT SWEATS: 0
SNORES LOUDLY: 1
SYNCOPE: 0
TASTE DISTURBANCE: 0
LEG PAIN: 1
NUMBNESS: 0
NERVOUS/ANXIOUS: 0
BLOOD IN STOOL: 0
DIARRHEA: 0
SINUS CONGESTION: 1
POLYPHAGIA: 0
DYSPNEA ON EXERTION: 0
POSTURAL DYSPNEA: 0
PARALYSIS: 0
STIFFNESS: 1
DIZZINESS: 1
ARTHRALGIAS: 1
HALLUCINATIONS: 0
TREMORS: 1
WHEEZING: 0
JAUNDICE: 0
RECTAL PAIN: 0
FEVER: 0
FATIGUE: 1
INCREASED ENERGY: 1
COUGH DISTURBING SLEEP: 0
ALTERED TEMPERATURE REGULATION: 1
SORE THROAT: 0
SMELL DISTURBANCE: 1
TROUBLE SWALLOWING: 0
WEIGHT LOSS: 0
CHILLS: 1
HOARSE VOICE: 1

## 2020-01-07 ASSESSMENT — PAIN SCALES - GENERAL: PAINLEVEL: NO PAIN (0)

## 2020-01-07 ASSESSMENT — MIFFLIN-ST. JEOR
SCORE: 1270.36
SCORE: 1271.27

## 2020-01-07 NOTE — PATIENT INSTRUCTIONS
"You were seen today in the Cardiovascular Clinic at the [facility].     Cardiology Providers you saw during your visit: Zack Tsai MD     Diagnosis:   Encounter Diagnoses   Name Primary?     Coronary artery disease of native artery of native heart with stable angina pectoris (H)      S/P CABG (coronary artery bypass graft) Yes     Hyperlipidemia LDL goal <70      Essential hypertension with goal blood pressure less than 140/90      History of MI (myocardial infarction)      CKD (chronic kidney disease) stage 2, GFR 60-89 ml/min      Arthritis of right knee         Results: Discussed with you today EKG      Orders:   Orders Placed This Encounter   Procedures     Basic metabolic panel     Uric acid     EKG 12-lead, tracing only (Same Day)     Recommendations:   1. Continue Lisinopril 10 mg daily  2. Labs for BMP and Uric Acid  3.Recommend follow up in Sports Medicine/ Orthopedics for Gout assessment     Follow up with Provider - SUNDAR in 1 year Gregg Suresh or Tasia Zayas        Please feel free to call me with any questions or concerns.       Kenneth Jackson LPN, JOSHN     Questions: [phone].   First press #1 for the [facility] for \"Medical Questions\" to reach us Cardiology Nurses.     [phone].   First press #1 for the [facility] and then press #1     On Call Cardiologist for after hours or on weekends: [phone]   option #4 and ask to speak to the on-call Cardiologist.          If you need a medication refill please contact your pharmacy.  Please allow 3 business days for your refill to be completed.  ________________________________________________________________________________________________________________________________    [facility]  Orthopaedic Walk-In Clinic  [address]oor [address]  [address] [place] [address]  See Directions and Parking for this building.  Hours  Monday - Friday, 7 a.m. - 7 p.m.  Saturday - , 8 a.m. " - 12 p.m.      Phone Numbers  Information: [phone]

## 2020-01-07 NOTE — TELEPHONE ENCOUNTER
"Requested Prescriptions   Pending Prescriptions Disp Refills     senna-docusate (SENOKOT-S/PERICOLACE) 8.6-50 MG tablet       Sig: Take 1 tablet by mouth 2 times daily as needed  Last Written Prescription Date:  11/01/2019  Last Fill Quantity: ,  # refills:    Last office visit: 10/23/2019 with prescribing provider:  10/23/2019   Future Office Visit:         Laxatives Protocol Passed - 1/7/2020  3:21 PM        Passed - Patient is age 6 or older        Passed - Recent (12 mo) or future (30 days) visit within the authorizing provider's specialty     Patient has had an office visit with the authorizing provider or a provider within the authorizing providers department within the previous 12 mos or has a future within next 30 days. See \"Patient Info\" tab in inbasket, or \"Choose Columns\" in Meds & Orders section of the refill encounter.              Passed - Medication is active on med list        "

## 2020-01-07 NOTE — NURSING NOTE
Chief Complaint   Patient presents with     Follow Up     1 year follow up     Vitals were taken and medications reconciled.     Louis Sorto CMA  10:45 AM

## 2020-01-07 NOTE — LETTER
1/7/2020      RE: Kiran Carcamo  [address] [place] [address]       Dear Colleague,    Thank you for the opportunity to participate in the care of your patient, Kiran Carcamo, at the [facility] at [facility]. Please see a copy of my visit note below.    I am delighted to see Kiran Carcamo in consultation.The primary encounter diagnosis was S/P CABG (coronary artery bypass graft). Diagnoses of Coronary artery disease of native artery of native heart with stable angina pectoris (H), Hyperlipidemia LDL goal <70, Essential hypertension with goal blood pressure less than 140/90, History of MI (myocardial infarction), CKD (chronic kidney disease) stage 2, GFR 60-89 ml/min, and Arthritis of right knee were also pertinent to this visit.   As you know, the patient is a 89 year old  male. He   has a past medical history of Bilateral hearing loss (11/1/2019), CKD (chronic kidney disease) stage 2, GFR 60-89 ml/min (7/6/2015), Coronary artery disease, History of cataract surgery (11/1/2019), Hyperlipidemia, Hypertension, Myocardial infarction (H), Syncope (12/12/2011), and Type 2 diabetes, HbA1C goal < 8% (H) (2/23/2015)..    On this visit, the patient states that he has no cardiac complaints and good exercise tolerance.  The patient denies chest pressure/discomfort, palpitations, syncope, orthopnea, paroxysmal nocturnal dyspnea and lower extermity edema.    The patient's cardiovascular risk factors include known cardiac disease, prior MI, hypertension, high cholesterol, renal disease and diabetes mellitus.    The following portions of the patient's history were reviewed and updated as appropriate: allergies, current medications, past family history, past medical history, past social history, past surgical history, and the problem list.    PMH: The patient's past medical history includes:    Past Medical History:   Diagnosis Date     Bilateral hearing  loss 11/1/2019     CKD (chronic kidney disease) stage 2, GFR 60-89 ml/min 7/6/2015     Coronary artery disease      History of cataract surgery 11/1/2019     Hyperlipidemia      Hypertension      Myocardial infarction (H)      Syncope 12/12/2011     Type 2 diabetes, HbA1C goal < 8% (H) 2/23/2015      Past Surgical History:   Procedure Laterality Date     ------------OTHER-------------      skin cancer surgery     CHOLECYSTECTOMY      1990s     CORONARY ARTERY BYPASS  2005    6-vessel     EYE SURGERY Bilateral     cataract surgery     MANDIBLE SURGERY      broken jaw surgery in 1984       The patient's medications as of the current encounter are:     Current Outpatient Medications   Medication Sig Dispense Refill     acetaminophen (TYLENOL) 325 MG tablet Take 3 tablets (975 mg) by mouth every 8 hours as needed for mild pain 100 tablet 0     aspirin 81 MG EC tablet Take 81 mg by mouth At Bedtime.       blood glucose (ONETOUCH ULTRA) test strip USE TO TEST BLOOD SUGAR 2 TIMES DAILY OR AS DIRECTED. 100 strip 1     emollient (VANICREAM) external cream Apply topically daily After the shower on arms 453 g 3     isosorbide mononitrate (IMDUR) 60 MG 24 hr tablet TAKE 1 TABLET (60 MG) BY MOUTH DAILY. PLEASE GET REFILLS FROM PRIMARY CARE OR SCHEDULE WITH NEW CARDIOLOGIST.  1     ketoconazole (NIZORAL) 2 % external shampoo Apply topically daily (Patient taking differently: Apply topically daily as needed ) 120 mL 3     metFORMIN (GLUCOPHAGE) 500 MG tablet Take 1 tablet (500 mg) by mouth daily (with breakfast) FOLLOW-UP WITH YOUR PRIMARY CARE DOCTOR GIVEN KIDNEY FUNCTION 180 tablet 2     Multiple Vitamin (MV-ONE PO) Take 1 tablet by mouth daily        nitroGLYcerin (NITROSTAT) 0.4 MG sublingual tablet PLACE 1 TABLET UNDER TONGUE EVERY 5 MINS AS NEEDED FOR CHEST PAIN-IF NO RELIEF IN 15 MINS, CALL 911.  1     rosuvastatin (CRESTOR) 10 MG tablet Take 1 tablet (10 mg) by mouth daily 90 tablet 0     senna-docusate  (SENOKOT-S/PERICOLACE) 8.6-50 MG tablet Take 1 tablet by mouth 2 times daily as needed         Labs:     Admission on 11/10/2019, Discharged on 11/10/2019   Component Date Value Ref Range Status     Creatinine 11/10/2019 1.1  0.66 - 1.25 mg/dL Final     GFR Estimate 11/10/2019 62  >60 mL/min/[1.73_m2] Final     GFR Estimate If Black 11/10/2019 75  >60 mL/min/[1.73_m2] Final     ISTAT INR 11/10/2019 <0.9  0.86 - 1.14 Final    Comment: This test is intended for monitoring Coumadin therapy.  Results are not   accurate in patients with prolonged INR due to factor deficiency.       WBC 11/10/2019 10.9  4.0 - 11.0 10e9/L Final     RBC Count 11/10/2019 4.04* 4.4 - 5.9 10e12/L Final     Hemoglobin 11/10/2019 13.2* 13.3 - 17.7 g/dL Final     Hematocrit 11/10/2019 41.4  40.0 - 53.0 % Final     MCV 11/10/2019 103* 78 - 100 fl Final     MCH 11/10/2019 32.7  26.5 - 33.0 pg Final     MCHC 11/10/2019 31.9  31.5 - 36.5 g/dL Final     RDW 11/10/2019 12.7  10.0 - 15.0 % Final     Platelet Count 11/10/2019 198  150 - 450 10e9/L Final     Diff Method 11/10/2019 Automated Method   Final     % Neutrophils 11/10/2019 69.3  % Final     % Lymphocytes 11/10/2019 19.7  % Final     % Monocytes 11/10/2019 7.6  % Final     % Eosinophils 11/10/2019 2.2  % Final     % Basophils 11/10/2019 0.6  % Final     % Immature Granulocytes 11/10/2019 0.6  % Final     Nucleated RBCs 11/10/2019 0  0 /100 Final     Absolute Neutrophil 11/10/2019 7.5  1.6 - 8.3 10e9/L Final     Absolute Lymphocytes 11/10/2019 2.1  0.8 - 5.3 10e9/L Final     Absolute Monocytes 11/10/2019 0.8  0.0 - 1.3 10e9/L Final     Absolute Eosinophils 11/10/2019 0.2  0.0 - 0.7 10e9/L Final     Absolute Basophils 11/10/2019 0.1  0.0 - 0.2 10e9/L Final     Abs Immature Granulocytes 11/10/2019 0.1  0 - 0.4 10e9/L Final     Absolute Nucleated RBC 11/10/2019 0.0   Final     Sodium 11/10/2019 140  133 - 144 mmol/L Final     Potassium 11/10/2019 4.2  3.4 - 5.3 mmol/L Final     Chloride 11/10/2019  107  94 - 109 mmol/L Final     Carbon Dioxide 11/10/2019 28  20 - 32 mmol/L Final     Anion Gap 11/10/2019 4  3 - 14 mmol/L Final     Glucose 11/10/2019 194* 70 - 99 mg/dL Final     Urea Nitrogen 11/10/2019 29  7 - 30 mg/dL Final     Creatinine 11/10/2019 1.01  0.66 - 1.25 mg/dL Final     GFR Estimate 11/10/2019 61  >60 mL/min/[1.73_m2] Final    Comment: Non  GFR Calc  Starting 12/18/2018, serum creatinine based estimated GFR (eGFR) will be   calculated using the Chronic Kidney Disease Epidemiology Collaboration   (CKD-EPI) equation.       GFR Estimate If Black 11/10/2019 71  >60 mL/min/[1.73_m2] Final    Comment:  GFR Calc  Starting 12/18/2018, serum creatinine based estimated GFR (eGFR) will be   calculated using the Chronic Kidney Disease Epidemiology Collaboration   (CKD-EPI) equation.       Calcium 11/10/2019 9.5  8.5 - 10.1 mg/dL Final     PTT 11/10/2019 30  22 - 37 sec Final     Interpretation ECG 11/10/2019 Click View Image link to view waveform and result   Final     Glucose 11/10/2019 199* 70 - 99 mg/dL Final     Troponin I 11/10/2019 0.04  0.00 - 0.08 ug/L Final     Color Urine 11/10/2019 Yellow   Final     Appearance Urine 11/10/2019 Clear   Final     Glucose Urine 11/10/2019 Negative  NEG^Negative mg/dL Final     Bilirubin Urine 11/10/2019 Negative  NEG^Negative Final     Ketones Urine 11/10/2019 Negative  NEG^Negative mg/dL Final     Specific Gravity Urine 11/10/2019 1.025  1.003 - 1.035 Final     Blood Urine 11/10/2019 Negative  NEG^Negative Final     pH Urine 11/10/2019 5.0  5.0 - 7.0 pH Final     Protein Albumin Urine 11/10/2019 Negative  NEG^Negative mg/dL Final     Urobilinogen mg/dL 11/10/2019 Normal  0.0 - 2.0 mg/dL Final     Nitrite Urine 11/10/2019 Negative  NEG^Negative Final     Leukocyte Esterase Urine 11/10/2019 Negative  NEG^Negative Final     Source 11/10/2019 Clean catch urine   Final     Bilirubin Direct 11/10/2019 0.2  0.0 - 0.2 mg/dL Final      Bilirubin Total 11/10/2019 0.6  0.2 - 1.3 mg/dL Final     Albumin 11/10/2019 3.7  3.4 - 5.0 g/dL Final     Protein Total 11/10/2019 7.3  6.8 - 8.8 g/dL Final     Alkaline Phosphatase 11/10/2019 115  40 - 150 U/L Final     ALT 11/10/2019 42  0 - 70 U/L Final     AST 11/10/2019 43  0 - 45 U/L Final       Allergies:  No Known Allergies    Family History:   Family History   Problem Relation Age of Onset     Heart Disease Mother      Eye Disorder Father      Respiratory Brother      Heart Disease Sister      Cancer Brother      Brain Cancer Daughter         brain tumor     Other - See Comments Daughter         dianna grubbs     Tremor Daughter      Other - See Comments Daughter         st clarisa nelson     Tremor Daughter      Other - See Comments Daughter         claudine     Other - See Comments Son         [place]     Other - See Comments Daughter         nadia nam     Other - See Comments Daughter         suman mendoza     Tremor Paternal Uncle      Skin Cancer No family hx of      Melanoma No family hx of        Psychosocial history:  reports that he has never smoked. He has never used smokeless tobacco. He reports that he does not drink alcohol or use drugs.    Review of systems: negative for, exertional chest pain or pressure, paroxysmal nocturnal dyspnea, dyspnea on exertion, orthopnea, lower extremity edema, syncope or near-syncope and claudication    In addition,   General: No change in weight, sleep or appetite.  Normal energy.  No fever or chills  Eyes: Negative for vision changes or eye problems  ENT: No problems with ears, nose or throat.  No difficulty swallowing.  Resp: No coughing, wheezing or shortness of breath  GI: No nausea, vomiting,  heartburn, abdominal pain, diarrhea, constipation or change in bowel habits  : No urinary frequency or dysuria, bladder or kidney problems  Musculoskeletal: Pain right knee, XRays show osteoarthritis R>L  Neurologic: No headaches,  "numbness, tingling, weakness, problems with balance or coordination  Psychiatric: No problems with anxiety, depression or mental health  Heme/immune/allergy: No history of bleeding or clotting problems or anemia.  No immune system problems  Endocrine: No history of thyroid disease  Skin: No rashes,worrisome lesions or skin problems  Vascular:  No claudication, lifestyle limiting or otherwise; no ischemic rest pain; no non-healing ulcers. No weakness, No loss of sensation        Physical examination  Vitals: /74 (BP Location: Left arm, Patient Position: Chair, Cuff Size: Adult Regular)   Pulse 80   Ht 1.676 m (5' 6\")   Wt 70.9 kg (156 lb 3.2 oz)   SpO2 97%   BMI 25.21 kg/m     BMI= Body mass index is 25.21 kg/m .    In general, the patient is a pleasant male in no apparent distress.    HEENT: Normiocephalic and atraumatic.  PERRLA.  EOMI.  Sclerae white, not injected.  Nares clear.  Pharynx without erythema or exudate.  Dentition intact.    Neck: No adenopathy.  No thyromegaly. Carotids +2/2 bilaterally without bruits.  No jugular venous distension.   Heart:  The PMI is in the 5th ICS in the midclavicular line. There is no heave. Regular rate and rhythm. Normal S1, S2 splits physiologically. No murmur, rub, click, or gallop.    Lungs: Clear to asculation.  No ronchi, wheezes, rales.  No dullness to percussion.   Abdomen: Soft, nontender, nondistended. No organomegaly. No AAA.  No bruits.   Extremities: No clubbing, cyanosis, or edema. The pulses were intact bilaterally. Right knee warm with moderate effusion  Neurological: The neurological examination reveal a patient who was oriented to person, place, and time.  The remainder of the examination was nonfocal.    Cardiac tests include:    EF normal 10/2019  ECG shows normal sinus rhythm, freq PACs and sinus arrhythmia (no atrial fib), incomplete RBBB, IMI age undetermined, poor anterior R progression, normal axis     Assessment and Plan    1. CAD - continue " current meds  - patient taking lisinopril, will reorder  2. DM - on metformin  3. HTN - continue current meds  4. Hyperlipidemia - on statin  5. Gout - right knee  - NSAID  - check Cr and urate  - follow up ortho    The patient is to return  1 year. The patient understood the treatment plan as outlined above.  There were no barriers to learning. Pt accompanied by 2 daughters.      Jeff Tsai MD

## 2020-01-07 NOTE — TELEPHONE ENCOUNTER
Prescription approved per [facility] Refill Protocol.  Desiree Terrell RN on 1/7/2020 at 5:39 PM

## 2020-01-07 NOTE — PROGRESS NOTES
I am delighted to see Kiran Carcamo in consultation.The primary encounter diagnosis was S/P CABG (coronary artery bypass graft). Diagnoses of Coronary artery disease of native artery of native heart with stable angina pectoris (H), Hyperlipidemia LDL goal <70, Essential hypertension with goal blood pressure less than 140/90, History of MI (myocardial infarction), CKD (chronic kidney disease) stage 2, GFR 60-89 ml/min, and Arthritis of right knee were also pertinent to this visit.   As you know, the patient is a 89 year old  male. He   has a past medical history of Bilateral hearing loss (11/1/2019), CKD (chronic kidney disease) stage 2, GFR 60-89 ml/min (7/6/2015), Coronary artery disease, History of cataract surgery (11/1/2019), Hyperlipidemia, Hypertension, Myocardial infarction (H), Syncope (12/12/2011), and Type 2 diabetes, HbA1C goal < 8% (H) (2/23/2015)..    On this visit, the patient states that he has no cardiac complaints and good exercise tolerance.  The patient denies chest pressure/discomfort, palpitations, syncope, orthopnea, paroxysmal nocturnal dyspnea and lower extermity edema.    The patient's cardiovascular risk factors include known cardiac disease, prior MI, hypertension, high cholesterol, renal disease and diabetes mellitus.    The following portions of the patient's history were reviewed and updated as appropriate: allergies, current medications, past family history, past medical history, past social history, past surgical history, and the problem list.    PMH: The patient's past medical history includes:    Past Medical History:   Diagnosis Date     Bilateral hearing loss 11/1/2019     CKD (chronic kidney disease) stage 2, GFR 60-89 ml/min 7/6/2015     Coronary artery disease      History of cataract surgery 11/1/2019     Hyperlipidemia      Hypertension      Myocardial infarction (H)      Syncope 12/12/2011     Type 2 diabetes, HbA1C goal < 8% (H) 2/23/2015      Past Surgical  History:   Procedure Laterality Date     ------------OTHER-------------      skin cancer surgery     CHOLECYSTECTOMY      1990s     CORONARY ARTERY BYPASS  2005    6-vessel     EYE SURGERY Bilateral     cataract surgery     MANDIBLE SURGERY      broken jaw surgery in 1984       The patient's medications as of the current encounter are:     Current Outpatient Medications   Medication Sig Dispense Refill     acetaminophen (TYLENOL) 325 MG tablet Take 3 tablets (975 mg) by mouth every 8 hours as needed for mild pain 100 tablet 0     aspirin 81 MG EC tablet Take 81 mg by mouth At Bedtime.       blood glucose (ONETOUCH ULTRA) test strip USE TO TEST BLOOD SUGAR 2 TIMES DAILY OR AS DIRECTED. 100 strip 1     emollient (VANICREAM) external cream Apply topically daily After the shower on arms 453 g 3     isosorbide mononitrate (IMDUR) 60 MG 24 hr tablet TAKE 1 TABLET (60 MG) BY MOUTH DAILY. PLEASE GET REFILLS FROM PRIMARY CARE OR SCHEDULE WITH NEW CARDIOLOGIST.  1     ketoconazole (NIZORAL) 2 % external shampoo Apply topically daily (Patient taking differently: Apply topically daily as needed ) 120 mL 3     metFORMIN (GLUCOPHAGE) 500 MG tablet Take 1 tablet (500 mg) by mouth daily (with breakfast) FOLLOW-UP WITH YOUR PRIMARY CARE DOCTOR GIVEN KIDNEY FUNCTION 180 tablet 2     Multiple Vitamin (MV-ONE PO) Take 1 tablet by mouth daily        nitroGLYcerin (NITROSTAT) 0.4 MG sublingual tablet PLACE 1 TABLET UNDER TONGUE EVERY 5 MINS AS NEEDED FOR CHEST PAIN-IF NO RELIEF IN 15 MINS, CALL 911.  1     rosuvastatin (CRESTOR) 10 MG tablet Take 1 tablet (10 mg) by mouth daily 90 tablet 0     senna-docusate (SENOKOT-S/PERICOLACE) 8.6-50 MG tablet Take 1 tablet by mouth 2 times daily as needed         Labs:     Admission on 11/10/2019, Discharged on 11/10/2019   Component Date Value Ref Range Status     Creatinine 11/10/2019 1.1  0.66 - 1.25 mg/dL Final     GFR Estimate 11/10/2019 62  >60 mL/min/[1.73_m2] Final     GFR Estimate If Black  11/10/2019 75  >60 mL/min/[1.73_m2] Final     ISTAT INR 11/10/2019 <0.9  0.86 - 1.14 Final    Comment: This test is intended for monitoring Coumadin therapy.  Results are not   accurate in patients with prolonged INR due to factor deficiency.       WBC 11/10/2019 10.9  4.0 - 11.0 10e9/L Final     RBC Count 11/10/2019 4.04* 4.4 - 5.9 10e12/L Final     Hemoglobin 11/10/2019 13.2* 13.3 - 17.7 g/dL Final     Hematocrit 11/10/2019 41.4  40.0 - 53.0 % Final     MCV 11/10/2019 103* 78 - 100 fl Final     MCH 11/10/2019 32.7  26.5 - 33.0 pg Final     MCHC 11/10/2019 31.9  31.5 - 36.5 g/dL Final     RDW 11/10/2019 12.7  10.0 - 15.0 % Final     Platelet Count 11/10/2019 198  150 - 450 10e9/L Final     Diff Method 11/10/2019 Automated Method   Final     % Neutrophils 11/10/2019 69.3  % Final     % Lymphocytes 11/10/2019 19.7  % Final     % Monocytes 11/10/2019 7.6  % Final     % Eosinophils 11/10/2019 2.2  % Final     % Basophils 11/10/2019 0.6  % Final     % Immature Granulocytes 11/10/2019 0.6  % Final     Nucleated RBCs 11/10/2019 0  0 /100 Final     Absolute Neutrophil 11/10/2019 7.5  1.6 - 8.3 10e9/L Final     Absolute Lymphocytes 11/10/2019 2.1  0.8 - 5.3 10e9/L Final     Absolute Monocytes 11/10/2019 0.8  0.0 - 1.3 10e9/L Final     Absolute Eosinophils 11/10/2019 0.2  0.0 - 0.7 10e9/L Final     Absolute Basophils 11/10/2019 0.1  0.0 - 0.2 10e9/L Final     Abs Immature Granulocytes 11/10/2019 0.1  0 - 0.4 10e9/L Final     Absolute Nucleated RBC 11/10/2019 0.0   Final     Sodium 11/10/2019 140  133 - 144 mmol/L Final     Potassium 11/10/2019 4.2  3.4 - 5.3 mmol/L Final     Chloride 11/10/2019 107  94 - 109 mmol/L Final     Carbon Dioxide 11/10/2019 28  20 - 32 mmol/L Final     Anion Gap 11/10/2019 4  3 - 14 mmol/L Final     Glucose 11/10/2019 194* 70 - 99 mg/dL Final     Urea Nitrogen 11/10/2019 29  7 - 30 mg/dL Final     Creatinine 11/10/2019 1.01  0.66 - 1.25 mg/dL Final     GFR Estimate 11/10/2019 61  >60  mL/min/[1.73_m2] Final    Comment: Non  GFR Calc  Starting 12/18/2018, serum creatinine based estimated GFR (eGFR) will be   calculated using the Chronic Kidney Disease Epidemiology Collaboration   (CKD-EPI) equation.       GFR Estimate If Black 11/10/2019 71  >60 mL/min/[1.73_m2] Final    Comment:  GFR Calc  Starting 12/18/2018, serum creatinine based estimated GFR (eGFR) will be   calculated using the Chronic Kidney Disease Epidemiology Collaboration   (CKD-EPI) equation.       Calcium 11/10/2019 9.5  8.5 - 10.1 mg/dL Final     PTT 11/10/2019 30  22 - 37 sec Final     Interpretation ECG 11/10/2019 Click View Image link to view waveform and result   Final     Glucose 11/10/2019 199* 70 - 99 mg/dL Final     Troponin I 11/10/2019 0.04  0.00 - 0.08 ug/L Final     Color Urine 11/10/2019 Yellow   Final     Appearance Urine 11/10/2019 Clear   Final     Glucose Urine 11/10/2019 Negative  NEG^Negative mg/dL Final     Bilirubin Urine 11/10/2019 Negative  NEG^Negative Final     Ketones Urine 11/10/2019 Negative  NEG^Negative mg/dL Final     Specific Gravity Urine 11/10/2019 1.025  1.003 - 1.035 Final     Blood Urine 11/10/2019 Negative  NEG^Negative Final     pH Urine 11/10/2019 5.0  5.0 - 7.0 pH Final     Protein Albumin Urine 11/10/2019 Negative  NEG^Negative mg/dL Final     Urobilinogen mg/dL 11/10/2019 Normal  0.0 - 2.0 mg/dL Final     Nitrite Urine 11/10/2019 Negative  NEG^Negative Final     Leukocyte Esterase Urine 11/10/2019 Negative  NEG^Negative Final     Source 11/10/2019 Clean catch urine   Final     Bilirubin Direct 11/10/2019 0.2  0.0 - 0.2 mg/dL Final     Bilirubin Total 11/10/2019 0.6  0.2 - 1.3 mg/dL Final     Albumin 11/10/2019 3.7  3.4 - 5.0 g/dL Final     Protein Total 11/10/2019 7.3  6.8 - 8.8 g/dL Final     Alkaline Phosphatase 11/10/2019 115  40 - 150 U/L Final     ALT 11/10/2019 42  0 - 70 U/L Final     AST 11/10/2019 43  0 - 45 U/L Final       Allergies:  No Known  Allergies    Family History:   Family History   Problem Relation Age of Onset     Heart Disease Mother      Eye Disorder Father      Respiratory Brother      Heart Disease Sister      Cancer Brother      Brain Cancer Daughter         brain tumor     Other - See Comments Daughter         dianna grubbs     Tremor Daughter      Other - See Comments Daughter         st clarisa nelson     Tremor Daughter      Other - See Comments Daughter         claudine     Other - See Comments Son         [place]     Other - See Comments Daughter         nadia nam     Other - See Comments Daughter         suman mendoza     Tremor Paternal Uncle      Skin Cancer No family hx of      Melanoma No family hx of        Psychosocial history:  reports that he has never smoked. He has never used smokeless tobacco. He reports that he does not drink alcohol or use drugs.    Review of systems: negative for, exertional chest pain or pressure, paroxysmal nocturnal dyspnea, dyspnea on exertion, orthopnea, lower extremity edema, syncope or near-syncope and claudication    In addition,   General: No change in weight, sleep or appetite.  Normal energy.  No fever or chills  Eyes: Negative for vision changes or eye problems  ENT: No problems with ears, nose or throat.  No difficulty swallowing.  Resp: No coughing, wheezing or shortness of breath  GI: No nausea, vomiting,  heartburn, abdominal pain, diarrhea, constipation or change in bowel habits  : No urinary frequency or dysuria, bladder or kidney problems  Musculoskeletal: Pain right knee, XRays show osteoarthritis R>L  Neurologic: No headaches, numbness, tingling, weakness, problems with balance or coordination  Psychiatric: No problems with anxiety, depression or mental health  Heme/immune/allergy: No history of bleeding or clotting problems or anemia.  No immune system problems  Endocrine: No history of thyroid disease  Skin: No rashes,worrisome lesions or skin  "problems  Vascular:  No claudication, lifestyle limiting or otherwise; no ischemic rest pain; no non-healing ulcers. No weakness, No loss of sensation        Physical examination  Vitals: /74 (BP Location: Left arm, Patient Position: Chair, Cuff Size: Adult Regular)   Pulse 80   Ht 1.676 m (5' 6\")   Wt 70.9 kg (156 lb 3.2 oz)   SpO2 97%   BMI 25.21 kg/m    BMI= Body mass index is 25.21 kg/m .    In general, the patient is a pleasant male in no apparent distress.    HEENT: Normiocephalic and atraumatic.  PERRLA.  EOMI.  Sclerae white, not injected.  Nares clear.  Pharynx without erythema or exudate.  Dentition intact.    Neck: No adenopathy.  No thyromegaly. Carotids +2/2 bilaterally without bruits.  No jugular venous distension.   Heart:  The PMI is in the 5th ICS in the midclavicular line. There is no heave. Regular rate and rhythm. Normal S1, S2 splits physiologically. No murmur, rub, click, or gallop.    Lungs: Clear to asculation.  No ronchi, wheezes, rales.  No dullness to percussion.   Abdomen: Soft, nontender, nondistended. No organomegaly. No AAA.  No bruits.   Extremities: No clubbing, cyanosis, or edema. The pulses were intact bilaterally. Right knee warm with moderate effusion  Neurological: The neurological examination reveal a patient who was oriented to person, place, and time.  The remainder of the examination was nonfocal.    Cardiac tests include:    EF normal 10/2019  ECG shows normal sinus rhythm, freq PACs and sinus arrhythmia (no atrial fib), incomplete RBBB, IMI age undetermined, poor anterior R progression, normal axis     Assessment and Plan    1. CAD - continue current meds  - patient taking lisinopril, will reorder  2. DM - on metformin  3. HTN - continue current meds  4. Hyperlipidemia - on statin  5. Gout - right knee  - NSAID  - check Cr and urate  - follow up ortho    The patient is to return  1 year. The patient understood the treatment plan as outlined above.  There were no " barriers to learning. Pt accompanied by 2 daughters.      Jeff Tsai MD

## 2020-01-07 NOTE — NURSING NOTE
[address] [address]  Dept: [phone]  ______________________________________________________________________________    Patient: Kiran Carcamo   : 1921   MRN: [id number]   2020    INVASIVE PROCEDURE SAFETY CHECKLIST    Date: 2020   Procedure:Right knee Aspiration and CSI   Patient Name: Kiran Carcamo  MRN: [id number]  YOB: 1921    Action: Complete sections as appropriate. Any discrepancy results in a HARD COPY until resolved.     PRE PROCEDURE:  Patient ID verified with 2 identifiers (name and  or MRN): Yes  Procedure and site verified with patient/designee (when able): Yes  Accurate consent documentation in medical record: Yes  H&P (or appropriate assessment) documented in medical record: Yes  H&P must be up to 20 days prior to procedure and updates within 24 hours of procedure as applicable: NA  Relevant diagnostic and radiology test results appropriately labeled and displayed as applicable: Yes  Procedure site(s) marked with provider initials: NA    TIMEOUT:  Time-Out performed immediately prior to starting procedure, including verbal and active participation of all team members addressing the following:Yes  * Correct patient identify  * Confirmed that the correct side and site are marked  * An accurate procedure consent form  * Agreement on the procedure to be done  * Correct patient position  * Relevant images and results are properly labeled and appropriately displayed  * The need to administer antibiotics or fluids for irrigation purposes during the procedure as applicable   * Safety precautions based on patient history or medication use    DURING PROCEDURE: Verification of correct person, site, and procedures any time the responsibility for care of the patient is transferred to another member of the care team.       Prior to injection, verified patient identity using patient's name and  date of birth.  Due to injection administration, patient instructed to remain in clinic for 15 minutes  afterwards, and to report any adverse reaction to me immediately.    Joint injection was performed.      Drug Amount Wasted:  Yes: 3 mg/ml   Vial/Syringe: Single dose vial  Expiration Date:  1/1/23      Vanessa Nicole ATC  January 7, 2020

## 2020-01-07 NOTE — PROGRESS NOTES
SPORTS & ORTHOPEDIC WALK-IN FOLLOW-UP VISIT 1/7/2020    Interval History:     Follow up reason: Right knee    Date of injury: Chronic    Date last seen: 6/21/19    Following Therapeutic Plan: Yes     Pain: Unchanged    Function: Unchanged    Interval History: Previous knee injection 6/21/19, he now has swelling in the knee and it is warm to the touch. He noticed the swelling 2 weeks ago.    Medical History:    Any recent changes to your medical history? No    Any new medication prescribed since last visit? No    Review of Systems:    Do you have fever, chills, weight loss? No    Do you have any vision problems? No    Do you have any chest pain or edema? No    Do you have any shortness of breath or wheezing?  No    Do you have stomach problems? No    Do you have any numbness or focal weakness? Yes, weakness in legs and hands    Do you have diabetes? Yes, prediabetic     Do you have problems with bleeding or clotting? No    Do you have an rashes or other skin lesions? No         Large Joint Injection/Arthocentesis: R knee joint  Date/Time: 1/7/2020 1:44 PM  Performed by: Dedrick Muniz DO  Authorized by: Dedrick Muniz DO     Indications:  Pain and joint swelling  Needle Size:  18 G  Guidance: landmark guided    Approach:  Anterolateral  Location:  Knee      Medications:  40 mg triamcinolone 40 MG/ML; 7 mL lidocaine (PF) 1 %  Aspirate amount (mL):  19  Aspirate:  Blood-tinged  Aspirate analysis: sent for lab analysis    Outcome:  Tolerated well, no immediate complications  Procedure discussed: discussed risks, benefits, and alternatives    Consent Given by:  Patient  Timeout: timeout called immediately prior to procedure    Prep: patient was prepped and draped in usual sterile fashion

## 2020-01-07 NOTE — PROGRESS NOTES
[facility]  Orthopedics  Dedrick Muniz, DO  2020     Name: Kiran Carcamo  MRN: [id number]  Age: 89 year old  : 1921  Referring provider: Jeff Tsai     Chief Complaint: Right knee pain and swelling     History of Present Illness:   Kiran Carcamo is a 89 year old male who presents today for evaluation of right knee pain and swelling. The patient reports that he previously had a fall onto both of his knees, and two weeks ago he had significant worsening of his chronic right knee pain. Since then the pain has been constant, and he also developed swelling of the right knee. He saw his cardiologist today, Dr. Tsai, who was concerned for gout and thus referred the patient here. Upon evaluation the patient notes his legs also feel weaker. He denies history of gout.     Laboratory Workup performed 2020, ordered by Dr. Tsai (Cardiology):  Uric Acid: 4.6 WNL  BMP: Cl 110 (high),  (high), o/w WNL (Creat 0.94)    Review of Systems:   A 10-point review of systems was obtained and is negative except for as noted in the HPI.     Medications:   Aspirin 81 MG EC tablet, Take 81 mg by mouth At Bedtime., Disp: , Rfl:   Emollient (VANICREAM) external cream, Apply topically daily After the shower on arms, Disp: 453 g, Rfl: 3  Isosorbide mononitrate (IMDUR) 60 MG 24 hr tablet, TAKE 1 TABLET (60 MG) BY MOUTH DAILY. PLEASE GET REFILLS FROM PRIMARY CARE OR SCHEDULE WITH NEW CARDIOLOGIST., Disp: , Rfl: 1  Ketoconazole (NIZORAL) 2 % external shampoo, Apply topically daily (Patient taking differently: Apply topically daily as needed ), Disp: 120 mL, Rfl: 3  Lisinopril (PRINIVIL/ZESTRIL) 10 MG tablet, Take 1 tablet (10 mg) by mouth daily, Disp: 90 tablet, Rfl: 3  MetFORMIN (GLUCOPHAGE) 500 MG tablet, Take 1 tablet (500 mg) by mouth daily (with breakfast) FOLLOW-UP WITH YOUR PRIMARY CARE DOCTOR GIVEN KIDNEY FUNCTION, Disp: 180 tablet, Rfl: 2  NitroGLYcerin (NITROSTAT) 0.4 MG sublingual tablet, PLACE 1  "TABLET UNDER TONGUE EVERY 5 MINS AS NEEDED FOR CHEST PAIN-IF NO RELIEF IN 15 MINS, CALL 911., Disp: , Rfl: 1  Rosuvastatin (CRESTOR) 10 MG tablet, Take 1 tablet (10 mg) by mouth daily, Disp: 90 tablet, Rfl: 0  Senna-docusate (SENOKOT-S/PERICOLACE) 8.6-50 MG tablet, Take 1 tablet by mouth 2 times daily as needed, Disp: , Rfl:     Allergies:  Patient has no known allergies.     Past Medical History:  Bilateral hearing loss  CKD (chronic kidney disease)  CAD (coronary artery disease)  Hyperlipidemia   Hypertension  Myocardial infarction (MI)  Type II diabetes     Past Surgical History:  Skin cancer surgery  Cholecystectomy   Coronary artery bypass, 6 vessel - 2005  Cataract surgery  Mandible surgery for broken jaw - 1984    Social History:  Patient is a nonsmoker. No smokeless tobacco.   No alcohol use.  No drug use.     Family History:  Heart disease, eye disorder, cancer, brain tumor, tremor    Physical Examination:  Height 1.676 m (5' 6\"), weight 70.8 kg (156 lb).   General  - normal appearance, in no obvious distress  CV  - normal popliteal pulse  Pulm  - normal respiratory pattern, non-labored  Musculoskeletal - Right knee  - stance: normal gait without limp, normal single leg squat, no obvious leg length discrepancy  - inspection: Swelling of right knee present, no erythema, normal bone and joint alignment  - palpation: mild to moderate joint effusion of the right knee, medial and lateral joint line tenderness present, patella and patellar tendon non-tender  - ROM: 135 degrees flexion, -5 degrees extension, not painful, normal actively and passively compared to contralateral  - strength: 5/5 in flexion, 5/5 in extension  Neuro  - no sensory or motor deficit, grossly normal coordination, normal muscle tone  Skin  - no ecchymosis, erythema, warmth, or induration, no obvious rash  Psych  - interactive, appropriate, normal mood and affect    Assessment::  89 year old male presenting with acute right knee pain and " joint effusion. He does have preexisting osteoarthritis, however family members mention this is much worse than previous pain and concern for gout by cardiologist after evaluation today. After discussion of treatment options, we decided to proceed with joint aspiration for purpose of fluid analysis as well as a therapeutic corticosteroid injection.  Plan:   -Right knee aspiration; proceeded with CSI as fluid was not turbid or discolored  -Compression sleeve for right knee  -Labs for aspirate- see orders  -If labs are normal, we will consider physical therapy    It was a pleasure seeing Kiran today.    Dedrick Muniz DO, [facility]  Primary Care Sports Medicine     I, Dedrick Muniz DO, have reviewed the above note and agree with the scribe's notation as written.    Scribe Disclosure:  I, Josh Villanueva, am serving as a scribe to document services personally performed by Dedrick Muniz DO at this visit, based upon the provider's statements to me. All documentation has been reviewed by the aforementioned provider prior to being entered into the official medical record.

## 2020-01-07 NOTE — LETTER
2020       RE: Kiarn Carcamo  [address] [facility] [address]     Dear Colleague,    Thank you for referring your patient, Kiran Carcamo, to the [facility] SPORTS AND ORTHOPAEDIC WALK IN CLINIC at [facility]. Please see a copy of my visit note below.    [facility]  Orthopedics  AlenLeesa Muniz, DO  2020     Name: Kiran Carcamo  MRN: [id number]  Age: 89 year old  : 1921  Referring provider: Jeff Tsai     Chief Complaint: Right knee pain and swelling     History of Present Illness:   Kiran Carcamo is a 89 year old male who presents today for evaluation of right knee pain and swelling. The patient reports that he previously had a fall onto both of his knees, and two weeks ago he had significant worsening of his chronic right knee pain. Since then the pain has been constant, and he also developed swelling of the right knee. He saw his cardiologist today, Dr. Tsai, who was concerned for gout and thus referred the patient here. Upon evaluation the patient notes his legs also feel weaker. He denies history of gout.     Laboratory Workup performed 2020, ordered by Dr. Tsai (Cardiology):  Uric Acid: 4.6 WNL  BMP: Cl 110 (high),  (high), o/w WNL (Creat 0.94)    Review of Systems:   A 10-point review of systems was obtained and is negative except for as noted in the HPI.     Medications:   Aspirin 81 MG EC tablet, Take 81 mg by mouth At Bedtime., Disp: , Rfl:   Emollient (VANICREAM) external cream, Apply topically daily After the shower on arms, Disp: 453 g, Rfl: 3  Isosorbide mononitrate (IMDUR) 60 MG 24 hr tablet, TAKE 1 TABLET (60 MG) BY MOUTH DAILY. PLEASE GET REFILLS FROM PRIMARY CARE OR SCHEDULE WITH NEW CARDIOLOGIST., Disp: , Rfl: 1  Ketoconazole (NIZORAL) 2 % external shampoo, Apply topically daily (Patient taking differently: Apply topically daily as needed ), Disp: 120 mL, Rfl: 3  Lisinopril (PRINIVIL/ZESTRIL) 10 MG tablet,  "Take 1 tablet (10 mg) by mouth daily, Disp: 90 tablet, Rfl: 3  MetFORMIN (GLUCOPHAGE) 500 MG tablet, Take 1 tablet (500 mg) by mouth daily (with breakfast) FOLLOW-UP WITH YOUR PRIMARY CARE DOCTOR GIVEN KIDNEY FUNCTION, Disp: 180 tablet, Rfl: 2  NitroGLYcerin (NITROSTAT) 0.4 MG sublingual tablet, PLACE 1 TABLET UNDER TONGUE EVERY 5 MINS AS NEEDED FOR CHEST PAIN-IF NO RELIEF IN 15 MINS, CALL 911., Disp: , Rfl: 1  Rosuvastatin (CRESTOR) 10 MG tablet, Take 1 tablet (10 mg) by mouth daily, Disp: 90 tablet, Rfl: 0  Senna-docusate (SENOKOT-S/PERICOLACE) 8.6-50 MG tablet, Take 1 tablet by mouth 2 times daily as needed, Disp: , Rfl:     Allergies:  Patient has no known allergies.     Past Medical History:  Bilateral hearing loss  CKD (chronic kidney disease)  CAD (coronary artery disease)  Hyperlipidemia   Hypertension  Myocardial infarction (MI)  Type II diabetes     Past Surgical History:  Skin cancer surgery  Cholecystectomy   Coronary artery bypass, 6 vessel - 2005  Cataract surgery  Mandible surgery for broken jaw - 1984    Social History:  Patient is a nonsmoker. No smokeless tobacco.   No alcohol use.  No drug use.     Family History:  Heart disease, eye disorder, cancer, brain tumor, tremor    Physical Examination:  Height 1.676 m (5' 6\"), weight 70.8 kg (156 lb).   General  - normal appearance, in no obvious distress  CV  - normal popliteal pulse  Pulm  - normal respiratory pattern, non-labored  Musculoskeletal - Right knee  - stance: normal gait without limp, normal single leg squat, no obvious leg length discrepancy  - inspection: Swelling of right knee present, no erythema, normal bone and joint alignment  - palpation: mild to moderate joint effusion of the right knee, medial and lateral joint line tenderness present, patella and patellar tendon non-tender  - ROM: 135 degrees flexion, -5 degrees extension, not painful, normal actively and passively compared to contralateral  - strength: 5/5 in flexion, 5/5 in " extension  Neuro  - no sensory or motor deficit, grossly normal coordination, normal muscle tone  Skin  - no ecchymosis, erythema, warmth, or induration, no obvious rash  Psych  - interactive, appropriate, normal mood and affect    Assessment::  89 year old male presenting with acute right knee pain and joint effusion. He does have preexisting osteoarthritis, however family members mention this is much worse than previous pain and concern for gout by cardiologist after evaluation today. After discussion of treatment options, we decided to proceed with joint aspiration for purpose of fluid analysis as well as a therapeutic corticosteroid injection.  Plan:   -Right knee aspiration; proceeded with CSI as fluid was not turbid or discolored  -Compression sleeve for right knee  -Labs for aspirate- see orders  -If labs are normal, we will consider physical therapy    It was a pleasure seeing Kiran today.    Dedrick Muniz DO, [facility]  Primary Care Sports Medicine     IDedrick DO, have reviewed the above note and agree with the scribe's notation as written.    Scribe Disclosure:  IJosh, am serving as a scribe to document services personally performed by Dedrick Muniz DO at this visit, based upon the provider's statements to me. All documentation has been reviewed by the aforementioned provider prior to being entered into the official medical record.              SPORTS & ORTHOPEDIC WALK-IN FOLLOW-UP VISIT 1/7/2020    Interval History:     Follow up reason: Right knee    Date of injury: Chronic    Date last seen: 6/21/19    Following Therapeutic Plan: Yes     Pain: Unchanged    Function: Unchanged    Interval History: Previous knee injection 6/21/19, he now has swelling in the knee and it is warm to the touch. He noticed the swelling 2 weeks ago.    Medical History:    Any recent changes to your medical history? No    Any new medication prescribed since last visit? No    Review of Systems:    Do you have  fever, chills, weight loss? No    Do you have any vision problems? No    Do you have any chest pain or edema? No    Do you have any shortness of breath or wheezing?  No    Do you have stomach problems? No    Do you have any numbness or focal weakness? Yes, weakness in legs and hands    Do you have diabetes? Yes, prediabetic     Do you have problems with bleeding or clotting? No    Do you have an rashes or other skin lesions? No         Large Joint Injection/Arthocentesis: R knee joint  Date/Time: 1/7/2020 1:44 PM  Performed by: Dedrick Muniz DO  Authorized by: Dedrick Muniz DO     Indications:  Pain and joint swelling  Needle Size:  18 G  Guidance: landmark guided    Approach:  Anterolateral  Location:  Knee      Medications:  40 mg triamcinolone 40 MG/ML; 7 mL lidocaine (PF) 1 %  Aspirate amount (mL):  19  Aspirate:  Blood-tinged  Aspirate analysis: sent for lab analysis    Outcome:  Tolerated well, no immediate complications  Procedure discussed: discussed risks, benefits, and alternatives    Consent Given by:  Patient  Timeout: timeout called immediately prior to procedure    Prep: patient was prepped and draped in usual sterile fashion        Dedrick Muniz DO

## 2020-01-08 DIAGNOSIS — M17.11 PRIMARY OSTEOARTHRITIS OF RIGHT KNEE: ICD-10-CM

## 2020-01-08 DIAGNOSIS — R29.898 MUSCULAR DECONDITIONING: Primary | ICD-10-CM

## 2020-01-08 RX ORDER — TRIAMCINOLONE ACETONIDE 40 MG/ML
40 INJECTION, SUSPENSION INTRA-ARTICULAR; INTRAMUSCULAR
Status: SHIPPED | OUTPATIENT
Start: 2020-01-07

## 2020-01-08 RX ORDER — LIDOCAINE HYDROCHLORIDE 10 MG/ML
7 INJECTION, SOLUTION EPIDURAL; INFILTRATION; INTRACAUDAL; PERINEURAL
Status: SHIPPED | OUTPATIENT
Start: 2020-01-07

## 2020-01-12 LAB
BACTERIA SPEC CULT: NO GROWTH
SPECIMEN SOURCE: NORMAL

## 2020-01-15 ENCOUNTER — OFFICE VISIT (OUTPATIENT)
Dept: FAMILY MEDICINE | Facility: CLINIC | Age: 85
End: 2020-01-15
Payer: COMMERCIAL

## 2020-01-15 VITALS
SYSTOLIC BLOOD PRESSURE: 130 MMHG | TEMPERATURE: 97.4 F | OXYGEN SATURATION: 99 % | BODY MASS INDEX: 25.18 KG/M2 | WEIGHT: 156 LBS | HEART RATE: 58 BPM | DIASTOLIC BLOOD PRESSURE: 58 MMHG

## 2020-01-15 DIAGNOSIS — Z91.81 AT RISK FOR FALLING: ICD-10-CM

## 2020-01-15 DIAGNOSIS — R26.89 BALANCE PROBLEMS: ICD-10-CM

## 2020-01-15 DIAGNOSIS — M17.11 ARTHRITIS OF RIGHT KNEE: Primary | ICD-10-CM

## 2020-01-15 DIAGNOSIS — K59.00 CONSTIPATION, UNSPECIFIED CONSTIPATION TYPE: ICD-10-CM

## 2020-01-15 PROBLEM — R25.1 TREMOR: Status: ACTIVE | Noted: 2020-01-15

## 2020-01-15 PROCEDURE — 99215 OFFICE O/P EST HI 40 MIN: CPT | Performed by: FAMILY MEDICINE

## 2020-01-15 NOTE — PATIENT INSTRUCTIONS
Follow up in 6 months. Home services will be started for PT and OT. Continue taking Ceniplus once a day.

## 2020-01-15 NOTE — PROGRESS NOTES
Subjective     Kiran Carcamo is a 89 year old male who presents to clinic today for the following health issues:    HPI   Concern - Pain in right knee  Onset:     Description:   Patient reports pain in right knee, he had a cortisone shot and fluid drained on 01/07/2020 had some relief. But now reports more pain again.      Intensity: moderate    Progression of Symptoms:  same    Accompanying Signs & Symptoms:  When feels pain patient becomes weak.     Previous history of similar problem:   History of arthritis     Precipitating factors:   Worsened by: walking     Alleviating factors:   Improved by: ice, elevating of feet, compression socks    Therapies Tried and outcome:   Improved by: ice, elevating of feet, compression socks, cortisone shot    Pt reports pain in his right knee, which in turn makes him feel weak. He uses ice and a compression sock for relief, but did not wear the sock to the clinic today. He also elevates his feet for relief. Walking exacerbates the pain. He received a cortisone shot and a large amount of fluid was drained from his right knee on 01/01/2020. He currently lives independently.    Blood Pressure:  He has stopped taking his 10 mg lisinopril before today's visit. He and his daughters report that his blood pressure has been low and well managed without the medication.    Constipation:  Patient's daughters report they have been giving him Senna Plus to treat his constipation. They started by giving him 1 tablet every other day, then switched to 2 every day, and now back to once daily. This had made his bowel movements consistently once per day.     Patients daughters report they feel patient is more confused in the morning when wakes up also wants to discuss if he should continue taking lisinopril.  Patient Active Problem List   Diagnosis     Health Care Home     Actinic keratosis     SK (seborrheic keratosis)     Hyperplasia of prostate     Hyperlipidemia LDL goal <70     History of MI  (myocardial infarction)     Forgetfulness     CKD (chronic kidney disease) stage 2, GFR 60-89 ml/min     Essential hypertension with goal blood pressure less than 140/90     Eczema, unspecified type     Type 2 diabetes mellitus with diabetic nephropathy, without long-term current use of insulin (H)     Paget's bone disease     History of skin cancer     Dermatitis, seborrheic     Bilateral hearing loss     History of cataract surgery     Coronary artery disease of native artery of native heart with stable angina pectoris (H)     S/P CABG (coronary artery bypass graft)     Arthritis of right knee     Tremor     Balance problems     At risk for falling     Constipation, unspecified constipation type     Past Surgical History:   Procedure Laterality Date     ------------OTHER-------------      skin cancer surgery     CHOLECYSTECTOMY      1990s     CORONARY ARTERY BYPASS  2005    6-vessel     EYE SURGERY Bilateral     cataract surgery     MANDIBLE SURGERY      broken jaw surgery in 1984       Social History     Tobacco Use     Smoking status: Never Smoker     Smokeless tobacco: Never Used   Substance Use Topics     Alcohol use: No     Family History   Problem Relation Age of Onset     Heart Disease Mother      Eye Disorder Father      Respiratory Brother      Heart Disease Sister      Cancer Brother      Brain Cancer Daughter         brain tumor     Other - See Comments Daughter         dianna grubbs     Tremor Daughter      Other - See Comments Daughter         st mckenna Guston     Tremor Daughter      Other - See Comments Daughter         claudine     Other - See Comments Son         [place] near [place]     Other - See Comments Daughter         nadia [place]     Other - See Comments Daughter         suman mendoza     Tremor Paternal Uncle      Skin Cancer No family hx of      Melanoma No family hx of          Current Outpatient Medications   Medication Sig Dispense Refill     acetaminophen (TYLENOL) 325  MG tablet Take 3 tablets (975 mg) by mouth every 8 hours as needed for mild pain 100 tablet 0     aspirin 81 MG EC tablet Take 81 mg by mouth At Bedtime.       blood glucose (ONETOUCH ULTRA) test strip USE TO TEST BLOOD SUGAR 2 TIMES DAILY OR AS DIRECTED. 100 strip 1     emollient (VANICREAM) external cream Apply topically daily After the shower on arms 453 g 3     isosorbide mononitrate (IMDUR) 60 MG 24 hr tablet TAKE 1 TABLET (60 MG) BY MOUTH DAILY. PLEASE GET REFILLS FROM PRIMARY CARE OR SCHEDULE WITH NEW CARDIOLOGIST.  1     ketoconazole (NIZORAL) 2 % external shampoo Apply topically daily (Patient taking differently: Apply topically daily as needed ) 120 mL 3     metFORMIN (GLUCOPHAGE) 500 MG tablet Take 1 tablet (500 mg) by mouth daily (with breakfast) FOLLOW-UP WITH YOUR PRIMARY CARE DOCTOR GIVEN KIDNEY FUNCTION 180 tablet 2     Multiple Vitamin (MV-ONE PO) Take 1 tablet by mouth daily        nitroGLYcerin (NITROSTAT) 0.4 MG sublingual tablet PLACE 1 TABLET UNDER TONGUE EVERY 5 MINS AS NEEDED FOR CHEST PAIN-IF NO RELIEF IN 15 MINS, CALL 911.  1     rosuvastatin (CRESTOR) 10 MG tablet Take 1 tablet (10 mg) by mouth daily 90 tablet 0     senna-docusate (SENOKOT-S/PERICOLACE) 8.6-50 MG tablet Take 1 tablet by mouth 2 times daily as needed for constipation 90 tablet 1     No Known Allergies  Recent Labs   Lab Test 01/07/20  1208 11/10/19  0954 11/10/19  0953 10/06/19  0934 10/05/19  1216 12/12/18  1548  09/14/18  0930  09/08/17  0919  08/10/16  1050   A1C  --   --   --  7.5*  --  7.4*  --  6.5*   < > 8.4*   < > 7.4*   LDL  --   --   --   --   --   --   --  38  --  54  --  64   HDL  --   --   --   --   --   --   --  44  --  44  --  41   TRIG  --   --   --   --   --   --   --  74  --  127  --  97   ALT  --   --  42  --  26 23   < >  --   --   --    < >  --    CR 0.94  --  1.01 1.14 1.09 1.00   < >  --    < >  --    < >  --    GFRESTIMATED 67 62 61 53* 56* 69   < >  --    < >  --    < >  --    GFRESTBLACK 78 75 71  61 65 84   < >  --    < >  --    < >  --    POTASSIUM 4.4  --  4.2 3.8 4.1 4.3   < >  --   --   --    < >  --    TSH  --   --   --  1.58  --  1.58  --   --   --   --    < >  --     < > = values in this interval not displayed.      BP Readings from Last 3 Encounters:   01/15/20 130/58   01/07/20 110/74   11/10/19 98/61    Wt Readings from Last 3 Encounters:   01/15/20 70.8 kg (156 lb)   01/07/20 70.8 kg (156 lb)   01/07/20 70.9 kg (156 lb 3.2 oz)        Reviewed and updated as needed this visit by Provider  Problems       Review of Systems     Positive: Bowel issues, knee pain    Denies headache, insomnia, chest pain, shortness of breath, cough, heartburn, bladder issues, neck pain, back pain, hip pain, ankle pain, or foot pain. Remainder of ROS is negative unless otherwise noted above or in HPI.    This document serves as a record of the services and decisions personally performed and made by Buddy Poe MD. It was created on his behalf by Sandip Gonzalez, trained medical scribe. The creation of this document is based on the provider's statements to the medical scribe.  Sandip Gonzalez 10:23 AM January 15, 2020      Objective    /58   Pulse 58   Temp 97.4  F (36.3  C) (Oral)   Wt 70.8 kg (156 lb)   SpO2 99%   BMI 25.18 kg/m    Body mass index is 25.18 kg/m .  Physical Exam   GENERAL: healthy, alert and no distress  MS: fair strength in right hip flexor, fair strength in the right quad, fair strength in the dorsiflexion, small effusion in left knee, hypotrophic changes in the joint  SKIN: no suspicious lesions or rashes  NEURO: Normal strength and tone, mentation intact and speech normal  PSYCH: mentation appears normal, affect normal/bright    Diagnostic Test Results:  Labs reviewed in Epic  No results found for this or any previous visit (from the past 24 hour(s)).        Assessment & Plan    (M17.11) Arthritis of right knee  (primary encounter diagnosis)  Comment: Referral to home care nursing.  Plan: HOME CARE  "NURSING REFERRAL        Follow up with referral.    (R26.89) Balance problems  Comment: Referral to home care nursing.  Plan: HOME CARE NURSING REFERRAL        Follow up with referral.    (Z91.81) At risk for falling  Comment: Referral to home care nursing.   Plan: HOME CARE NURSING REFERRAL        Follow up with referral.    (K59.00) Constipation, unspecified constipation type  Comment: Patient has been able to make one bowel movement a day with medication  Plan: Continue taking senna-docusate 8.6-50 mg tablet as needed. Follow up as needed.    Patient Instructions   Follow up in 6 months. Home services will be started for PT and OT. Continue taking Ceniplus once a day.      40 minutes were spent by me face to face with the patient with more than 50% of time spent in counseling and coordination of care regarding above assessment and plan regarding Mr Carcamo's arthritis, balance, falling and constipation concerns.\"--New.     The information in this document, created by the medical scribe for me, accurately reflects the services I personally performed and the decisions made by me. I have reviewed and approved this document for accuracy prior to leaving the patient care area.  January 15, 2020 10:28 AM    Buddy Poe MD  [facility] [place]    "

## 2020-01-17 ENCOUNTER — DOCUMENTATION ONLY (OUTPATIENT)
Dept: CARE COORDINATION | Facility: CLINIC | Age: 85
End: 2020-01-17

## 2020-01-17 NOTE — PROGRESS NOTES
Dear Buddy Mercado  Medicare Home Health regulations requires [facility] to provide an initial assessment visit either within 48 hours of the patient's return home, or on the physician ordered Start of Care date.     There will be a delay in the Initial Assessment for Kiran Carcamo; MRN [id number].   We anticipate the Start of care will be 01/20/2020 date.      Sincerely [facility]  TESSA LICEA RN  [phone]

## 2020-01-20 ENCOUNTER — MEDICAL CORRESPONDENCE (OUTPATIENT)
Dept: HEALTH INFORMATION MANAGEMENT | Facility: CLINIC | Age: 85
End: 2020-01-20

## 2020-01-21 ENCOUNTER — DOCUMENTATION ONLY (OUTPATIENT)
Dept: FAMILY MEDICINE | Facility: CLINIC | Age: 85
End: 2020-01-21

## 2020-01-21 LAB
BACTERIA SPEC CULT: NORMAL
Lab: NORMAL
SPECIMEN SOURCE: NORMAL

## 2020-01-21 NOTE — PROGRESS NOTES
[facility] now requests orders and shares plan of care/discharge summaries for some patients through [employer].  Please REPLY TO THIS MESSAGE OR ROUTE BACK TO THE AUTHOR in order to give authorization for orders when needed.  This is considered a verbal order, you will still receive a faxed copy of orders for signature.  Thank you for your assistance in improving collaboration for our patients.    ORDER/MD SUMMARY/PLAN OF CARE    Mr. Carcamo was seen for his first home care visit with PT.  Requesting to continue with PT 1x/wk x 1 then 2x/wk x 5 for therex/HEP, mobility training and falls prevention.  Also requesting OT eval for ADLs and home health aide 1x/wk x 6 wks for personal cares.    FLAG FOR MEDICATION DUPLICATION: Our [employer] software flagged med duplication in the nitrates class, as he takes nitroglycerin and isosorbite mononitrate.  **Please approve this med duplication via Epic reply or notify of change via Epic and also to patient.    Thank you,  Laisha Macias, PT

## 2020-01-24 ENCOUNTER — NURSE TRIAGE (OUTPATIENT)
Dept: NURSING | Facility: CLINIC | Age: 85
End: 2020-01-24

## 2020-01-24 NOTE — TELEPHONE ENCOUNTER
Sunday was a major blow out. All week long he hasn't had a bowel movement. He's eating well. Today he's hyper focused on it. Talked with home health care manager. Would someone come and give an enema? She suggested taking senna plus this morning. Suggested Fleet suppository or laxative. Would it be safe with his medications?  I told her it would be safe with his medications.  Liudmila Marrero RN-[facility]

## 2020-02-04 DIAGNOSIS — I25.118 CORONARY ARTERY DISEASE OF NATIVE ARTERY OF NATIVE HEART WITH STABLE ANGINA PECTORIS (H): ICD-10-CM

## 2020-02-05 RX ORDER — ROSUVASTATIN CALCIUM 10 MG/1
10 TABLET, COATED ORAL DAILY
Qty: 90 TABLET | Refills: 0 | Status: SHIPPED | OUTPATIENT
Start: 2020-02-05 | End: 2020-05-06

## 2020-02-12 ENCOUNTER — MYC MEDICAL ADVICE (OUTPATIENT)
Dept: FAMILY MEDICINE | Facility: CLINIC | Age: 85
End: 2020-02-12

## 2020-02-12 DIAGNOSIS — M17.11 ARTHRITIS OF RIGHT KNEE: Primary | ICD-10-CM

## 2020-02-12 NOTE — TELEPHONE ENCOUNTER
Dr Poe    See pt Mychart message  Pt is seeing PT for knee pain and deconditioning    Referral cued    Marilyn Johnston RN   [facility]

## 2020-02-22 DIAGNOSIS — Z53.9 DIAGNOSIS NOT YET DEFINED: Primary | ICD-10-CM

## 2020-02-22 PROCEDURE — G0180 MD CERTIFICATION HHA PATIENT: HCPCS | Performed by: FAMILY MEDICINE

## 2020-03-04 NOTE — TELEPHONE ENCOUNTER
Patient's daughter called and stated that the patient needs as referral for Physical Therapy, apther would like a call back or message sent to [facility] as soon as possible.

## 2020-03-09 DIAGNOSIS — I10 ESSENTIAL HYPERTENSION WITH GOAL BLOOD PRESSURE LESS THAN 140/90: Primary | ICD-10-CM

## 2020-03-09 RX ORDER — ISOSORBIDE MONONITRATE 60 MG/1
TABLET, EXTENDED RELEASE ORAL
Qty: 90 TABLET | Refills: 1 | OUTPATIENT
Start: 2020-03-09

## 2020-03-09 NOTE — TELEPHONE ENCOUNTER
"Requested Prescriptions   Pending Prescriptions Disp Refills     isosorbide mononitrate (IMDUR) 60 MG 24 hr tablet  1     Sig: TAKE 1 TABLET (60 MG) BY MOUTH DAILY. PLEASE GET REFILLS FROM PRIMARY CARE OR SCHEDULE WITH NEW CARDIOLOGIST.  Last Written Prescription Date:  08/08/2019  Last Fill Quantity: ,  # refills:  1  Last office visit: 1/15/2020 with prescribing provider:  01/15/2020   Future Office Visit:         Nitrates Passed - 3/9/2020  4:26 PM        Passed - Blood pressure under 140/90 in past 12 months     BP Readings from Last 3 Encounters:   01/15/20 130/58   01/07/20 110/74   11/10/19 98/61                 Passed - Pt is not on erectile dysfunction medications        Passed - Recent (12 mo) or future (30 days) visit within the authorizing provider's specialty     Patient has had an office visit with the authorizing provider or a provider within the authorizing providers department within the previous 12 mos or has a future within next 30 days. See \"Patient Info\" tab in inbasket, or \"Choose Columns\" in Meds & Orders section of the refill encounter.              Passed - Medication is active on med list        Passed - Patient is age 18 or older           "

## 2020-03-11 ENCOUNTER — TELEPHONE (OUTPATIENT)
Dept: FAMILY MEDICINE | Facility: CLINIC | Age: 85
End: 2020-03-11

## 2020-03-11 RX ORDER — ISOSORBIDE MONONITRATE 60 MG/1
60 TABLET, EXTENDED RELEASE ORAL DAILY
Qty: 90 TABLET | Refills: 0 | Status: SHIPPED | OUTPATIENT
Start: 2020-03-11 | End: 2020-07-17

## 2020-03-11 NOTE — TELEPHONE ENCOUNTER
Reason for call:  Other   Patient called regarding (reason for call): referral  Additional comments: Pt's wife is requesting a referral so that pt can go back to the orthopedic on [address] in Dr. Muniz's office. Please call pt when order is placed    Phone number to reach patient:  Home number on file There is no home phone number on file.    Best Time:  n/a    Can we leave a detailed message on this number?  YES    Travel screening: Not Applicable

## 2020-03-11 NOTE — TELEPHONE ENCOUNTER
Spoke with daughter there is a referral in  Place she will call if any further questions/concerns    Marilyn Johnston RN   [facility]

## 2020-04-13 ENCOUNTER — TELEPHONE (OUTPATIENT)
Dept: PHYSICAL THERAPY | Facility: CLINIC | Age: 85
End: 2020-04-13

## 2020-05-03 DIAGNOSIS — I25.118 CORONARY ARTERY DISEASE OF NATIVE ARTERY OF NATIVE HEART WITH STABLE ANGINA PECTORIS (H): ICD-10-CM

## 2020-05-06 RX ORDER — ROSUVASTATIN CALCIUM 10 MG/1
10 TABLET, COATED ORAL DAILY
Qty: 90 TABLET | Refills: 0 | Status: SHIPPED | OUTPATIENT
Start: 2020-05-06 | End: 2020-08-05

## 2020-05-06 NOTE — TELEPHONE ENCOUNTER
rosuvastatin (CRESTOR) 10 MG tablet  Last Written Prescription Date: 2/5/20  Last Fill Quantity: 90,   # refills: 0  Last Office Visit : 1/7/20  NV: none      Routing refill request to provider for review/approval because: LDL

## 2020-06-15 ENCOUNTER — TELEPHONE (OUTPATIENT)
Dept: FAMILY MEDICINE | Facility: CLINIC | Age: 85
End: 2020-06-15

## 2020-06-15 DIAGNOSIS — R26.89 BALANCE PROBLEMS: ICD-10-CM

## 2020-06-15 DIAGNOSIS — Z91.81 AT RISK FOR FALLING: ICD-10-CM

## 2020-06-15 DIAGNOSIS — M17.11 ARTHRITIS OF RIGHT KNEE: Primary | ICD-10-CM

## 2020-06-15 NOTE — TELEPHONE ENCOUNTER
Pt's daughter, Aimee called.  She states that she have send out a [employer] message to clinic provider.  Want to check with Dr. Poe to see if he have seem pt request message.    Pt request for a transport wheelchair/ home health physical therapy.

## 2020-06-16 NOTE — TELEPHONE ENCOUNTER
Dr. Poe,    Please see message below. Spoke with patients daughter and he was doing outpatient for PT but due to Covid-19 he needs to get home care PT, not sure how to get this ordered. Please advise.    Thanks  Aby Velez RN   [facility] [place]

## 2020-06-17 NOTE — TELEPHONE ENCOUNTER
Spoke with patients daughter to let her know home care referral is done and they will be calling her.    Aby Velez RN   [facility]

## 2020-06-18 ENCOUNTER — TELEPHONE (OUTPATIENT)
Dept: CARE COORDINATION | Facility: CLINIC | Age: 85
End: 2020-06-18

## 2020-06-18 NOTE — TELEPHONE ENCOUNTER
Dear Dr. Poe,  Medicare Home Health regulations requires [facility] to provide an initial assessment visit either within 48 hours of the patient's return home, or on the physician ordered Start of Care date per family request.  Also they would like HA for bathing.    There will be a delay in the Initial Assessment for Kiran Carcamo; MRN [id number].  We anticipate the Start of care will be by 6/24 date.[facility] now request orders for some patients through [employer].     Please REPLY TO THIS MESSAGE in order to give authorization for orders.  This is considered a verbal order.  Thank you for your timely assistance.    Order for Kiran Carcamo; MRN [id number]  Sincerely [facility]  Jessica Khoury RN  [phone]

## 2020-06-24 ENCOUNTER — VIRTUAL VISIT (OUTPATIENT)
Dept: FAMILY MEDICINE | Facility: CLINIC | Age: 85
End: 2020-06-24
Payer: COMMERCIAL

## 2020-06-24 ENCOUNTER — TELEPHONE (OUTPATIENT)
Dept: FAMILY MEDICINE | Facility: CLINIC | Age: 85
End: 2020-06-24

## 2020-06-24 DIAGNOSIS — M17.11 ARTHRITIS OF RIGHT KNEE: Primary | ICD-10-CM

## 2020-06-24 PROCEDURE — 99213 OFFICE O/P EST LOW 20 MIN: CPT | Mod: GT | Performed by: FAMILY MEDICINE

## 2020-06-24 NOTE — TELEPHONE ENCOUNTER
Reason for call:  Other   Patient called regarding (reason for call): orders  Additional comments: [facility] called for verbal orders. They need an order for PT once a week for 4 weeks, starting next week. And and order for Home Health Aid once this week and then twice a week for three weeks. Please call [phone] with verbal orders, oktolm    Phone number to reach patient:  Home number on file [phone] (home)    Best Time:  n/a    Can we leave a detailed message on this number?  YES    Travel screening: Not Applicable

## 2020-06-24 NOTE — PROGRESS NOTES
"Kiran Carcamo is a 89 year old male who is being evaluated via a billable video visit.      The patient has been notified of following:     \"This video visit will be conducted via a call between you and your physician/provider. We have found that certain health care needs can be provided without the need for an in-person physical exam.  This service lets us provide the care you need with a video conversation.  If a prescription is necessary we can send it directly to your pharmacy.  If lab work is needed we can place an order for that and you can then stop by our lab to have the test done at a later time.    Video visits are billed at different rates depending on your insurance coverage.  Please reach out to your insurance provider with any questions.    If during the course of the call the physician/provider feels a video visit is not appropriate, you will not be charged for this service.\"    Patient has given verbal consent for Video visit? Yes    Will anyone else be joining your video visit? No    Subjective     Kiran Carcamo is a 89 year old male who presents today via video visit for the following health issues:    HPI  Referral       Duration: n/a    Description (location/character/radiation): pt needs referral for home health care, and possibly DME order for wheelchair-manual   -help with showering, and changing        Video Start Time: 10:47 AM    Joint or Musculoskeletal Pain  Duration of complaint: years   Description:   Location: right knee  Character: Sharp  Intensity: moderate  Progression of Symptoms: intermittent  Accompanying Signs & Symptoms: Other symptoms: weakness  History: Previous similar pain: YES    Precipitating factors: Trauma or overuse: no  Alleviating factors: Improved by: rest/inactivity, ice and Ibuprofen  Therapies Tried and outcome: steroid injection     I have reviewed and updated the patient's Past Medical History, Social History, Family History and Medication " List    Reviewed and updated as needed this visit by Provider         Review of Systems   Constitutional, HEENT, cardiovascular, pulmonary, gi and gu systems are negative, except as otherwise noted.      Objective         Physical Exam     GENERAL: alert, no distress, elderly and fatigued  EYES: Eyes grossly normal to inspection.  No discharge or erythema, or obvious scleral/conjunctival abnormalities.  HENT: poor hearing  RESP: No audible wheeze, cough, or visible cyanosis.  No visible retractions or increased work of breathing.    SKIN: Visible skin clear. No significant rash, abnormal pigmentation or lesions.  NEURO: Cranial nerves grossly intact.  Mentation and speech appropriate for age.  PSYCH: Mentation appears normal, affect normal/bright, judgement and insight intact, normal speech and appearance well-groomed.      Diagnostic Test Results:  Labs reviewed in Epic        Assessment & Plan     1. Arthritis of right knee  worse  - Wheelchair Order for DME - ONLY FOR DME    Patient Instructions   Appointment with Ortho for steroid injection    Buddy Poe MD  [facility] [place]      Video-Visit Details    Type of service:  Video Visit    Video End Time:11:05 AM    Originating Location (pt. Location): Home    Distant Location (provider location):  [facility] [place]     Platform used for Video Visit: Arnoldo Poe MD

## 2020-06-25 NOTE — TELEPHONE ENCOUNTER
Left vm with verbal orders with Cristian PT:  PT once a week for 4 weeks, starting next week. And and order for Home Health Aid once this week and then twice a week for three weeks.     Aby Velez RN   [facility] [place]

## 2020-06-29 ENCOUNTER — DOCUMENTATION ONLY (OUTPATIENT)
Dept: FAMILY MEDICINE | Facility: CLINIC | Age: 85
End: 2020-06-29

## 2020-06-30 NOTE — PROGRESS NOTES
Dear Dr. Buddy Poe  Medicare Home Health regulations requires [facility] to notify the Physician when the plan for visits has been altered.  We have provided fewer visits than ordered.  We are notifying you of a Missed Visit.  Kiran Carcamo; MRN [id number]  Missed Visits Are   Dates of missed services  06/30/2020 and 07/02/2020   Reason: Patient requested HA visits for this week cancelled.  Sincerely [facility]  Michael Barros LPN  After Hours Oncall Patient , casual staff  [address] [place] [address] [id number] [phone]

## 2020-07-01 ENCOUNTER — OFFICE VISIT (OUTPATIENT)
Dept: ORTHOPEDICS | Facility: CLINIC | Age: 85
End: 2020-07-01
Payer: COMMERCIAL

## 2020-07-01 VITALS — BODY MASS INDEX: 24.11 KG/M2 | HEIGHT: 66 IN | WEIGHT: 150 LBS

## 2020-07-01 DIAGNOSIS — M17.11 PRIMARY OSTEOARTHRITIS OF RIGHT KNEE: Primary | ICD-10-CM

## 2020-07-01 RX ADMIN — TRIAMCINOLONE ACETONIDE 40 MG: 40 INJECTION, SUSPENSION INTRA-ARTICULAR; INTRAMUSCULAR at 10:51

## 2020-07-01 RX ADMIN — LIDOCAINE HYDROCHLORIDE 4 ML: 10 INJECTION, SOLUTION EPIDURAL; INFILTRATION; INTRACAUDAL; PERINEURAL at 10:51

## 2020-07-01 ASSESSMENT — MIFFLIN-ST. JEOR: SCORE: 1238.15

## 2020-07-01 NOTE — PROGRESS NOTES
SPORTS & ORTHOPEDIC WALK-IN FOLLOW-UP VISIT 7/1/2020    Continued R knee pain and weakness. Daughter is concerned for long term treatment plan: what are the options.  Unsure if they need another aspiration and CSI.  Phong mentions that his R knee only bothers him with WB. It feel OK when resting.    Interval History:     Follow up reason: continued R knee pain    Date of injury: NA    Date last seen: 1/7/20    Following Therapeutic Plan: Yes     Pain: Unchanged    Function: Unchanged      Medical History:    Any recent changes to your medical history? No    Any new medication prescribed since last visit? No    Review of Systems:    Do you have fever, chills, weight loss? No    Do you have any vision problems? No    Do you have any chest pain or edema? No    Do you have any shortness of breath or wheezing?  No    Do you have stomach problems? No    Do you have any numbness or focal weakness? Yes, weakness in legs and hands    Do you have diabetes? Yes, prediabetic     Do you have problems with bleeding or clotting? No    Do you have an rashes or other skin lesions? No     Large Joint Injection/Arthocentesis: R knee joint    Date/Time: 7/1/2020 10:51 AM  Performed by: Dedrick Muniz DO  Authorized by: Dedrick Muniz DO     Indications:  Pain and osteoarthritis  Needle Size:  22 G  Approach:  Anterolateral  Location:  Knee      Medications:  40 mg triamcinolone 40 MG/ML; 4 mL lidocaine (PF) 1 %  Outcome:  Tolerated well, no immediate complications  Procedure discussed: discussed risks, benefits, and alternatives    Consent Given by:  Patient  Timeout: timeout called immediately prior to procedure    Prep: patient was prepped and draped in usual sterile fashion     Scribed by Bradley Thurman ATC, ATC for Dr. Muniz on 7/1/20 at 10:45AM, based on the provider's statements to me.

## 2020-07-01 NOTE — LETTER
7/1/2020     RE: Kiran Carcamo  [address] [facility] [address]    Dear Colleague,    Thank you for referring your patient, Kiran Carcamo, to the [facility] SPORTS AND ORTHOPAEDIC WALK IN CLINIC. Please see a copy of my visit note below.          SPORTS & ORTHOPEDIC WALK-IN FOLLOW-UP VISIT 7/1/2020    Continued R knee pain and weakness. Daughter is concerned for long term treatment plan: what are the options.  Unsure if they need another aspiration and CSI.  Phong mentions that his R knee only bothers him with WB. It feel OK when resting.    Interval History:     Follow up reason: continued R knee pain    Date of injury: NA    Date last seen: 1/7/20    Following Therapeutic Plan: Yes     Pain: Unchanged    Function: Unchanged      Medical History:    Any recent changes to your medical history? No    Any new medication prescribed since last visit? No    Review of Systems:    Do you have fever, chills, weight loss? No    Do you have any vision problems? No    Do you have any chest pain or edema? No    Do you have any shortness of breath or wheezing?  No    Do you have stomach problems? No    Do you have any numbness or focal weakness? Yes, weakness in legs and hands    Do you have diabetes? Yes, prediabetic     Do you have problems with bleeding or clotting? No    Do you have an rashes or other skin lesions? No     Large Joint Injection/Arthocentesis: R knee joint    Date/Time: 7/1/2020 10:51 AM  Performed by: Dedrick Muniz DO  Authorized by: Dedrick Muniz DO     Indications:  Pain and osteoarthritis  Needle Size:  22 G  Approach:  Anterolateral  Location:  Knee      Medications:  40 mg triamcinolone 40 MG/ML; 4 mL lidocaine (PF) 1 %  Outcome:  Tolerated well, no immediate complications  Procedure discussed: discussed risks, benefits, and alternatives    Consent Given by:  Patient  Timeout: timeout called immediately prior to procedure    Prep: patient was prepped and draped in usual sterile fashion   "   Scribed by Bradley Thurman, ATC, ATC for Dr. Muniz on 7/1/20 at 10:45AM, based on the provider's statements to me.                ESTABLISHED PATIENT FOLLOW-UP:  Pain of the Right Knee       HISTORY OF PRESENT ILLNESS  Mr. Carcamo is a pleasant 89 year old year old male who presents to clinic today for follow-up of chronic right knee pain and advanced knee osteoarthritis, primarily medial compartment and patellofemoral.  No new falls but just began to experience increased aching pain, diffuse and swelling recurring.    Good results from previous corticosteroid injections.  [place] and wish to maximize his mobility.  Has walker that he uses.  No brace, but interested.  Also in home PT now- knee strengthening.    He is accompanied by his daughter today.    Additional medical/Social/Surgical histories reviewed in [facility] and updated as appropriate.    REVIEW OF SYSTEMS (7/1/2020)  CONSTITUTIONAL: Denies fever and weight loss  GASTROINTESTINAL: Denies abdominal pain, nausea, vomiting  MUSCULOSKELETAL: See HPI  SKIN: Denies any recent rash or lesion  NEUROLOGICAL: Denies numbness or focal weakness     PHYSICAL EXAM  Ht 1.676 m (5' 6\")   Wt 68 kg (150 lb)   BMI 24.21 kg/m      General  - normal appearance, in no obvious distress  Musculoskeletal - Right knee  - stance: Antalgic gait, shortened stride length  - inspection: Swelling of right knee present, no erythema, normal bone and joint alignment  - palpation: mild to moderate joint effusion of the right knee, medial and lateral joint line tenderness present, patella and patellar tendon non-tender  - ROM: 135 degrees flexion, -5 degrees extension, not painful, normal actively and passively compared to contralateral  - strength: 5/5 in flexion, 5/5 in extension  Neuro  - no sensory or motor deficit, grossly normal coordination, normal muscle tone  Skin  - no ecchymosis, erythema, warmth, or induration, no obvious " rash  Psych  - interactive, appropriate, normal mood and affect     ASSESSMENT & PLAN  Mr. Carcamo is a 89 year old year old male who presents to clinic today with exacerbation of primary osteoarthritis of right knee.    -Corticosteroid injection discussed  -Continue Physical therapy for quad/knee strengthening and ROM, functional tasks  -Extra strength tylenol PRN  -Hinged knee brace; start front closure  -Referral to orthotist; re; medial  brace  -Follow up 3 months    PROCEDURE    Right Knee Injection - Intraarticular  The patient was informed of the risks and the benefits of the procedure and a written consent was signed.  The patient s right knee was prepped with chlorhexidine in sterile fashion.   40 mg of triamcinolone suspension was drawn up into a 5 mL syringe with 4 mL of 1% lidocaine.  Injection was performed using substerile technique.  A 1.5-inch 22-gauge needle was used to enter the anterolateral aspect of the right knee.  Injection performed successfully without difficulty.  There were no complications. The patient tolerated the procedure well. There was negligible bleeding.   The patient was instructed to ice the knee upon leaving clinic and refrain from overuse over the next 3 days.   The patient was instructed to call or go to the emergency room with any unusual pain, swelling, redness, or if otherwise concerned.  A follow up appointment will be scheduled to evaluate response to the injection, and to assess range of motion and pain.    It was a pleasure seeing Burke Muinz DO, FANI  Primary Care Sports Medicine

## 2020-07-01 NOTE — PROGRESS NOTES
"ESTABLISHED PATIENT FOLLOW-UP:  Pain of the Right Knee       HISTORY OF PRESENT ILLNESS  Mr. Carcamo is a pleasant 89 year old year old male who presents to clinic today for follow-up of chronic right knee pain and advanced knee osteoarthritis, primarily medial compartment and patellofemoral.  No new falls but just began to experience increased aching pain, diffuse and swelling recurring.    Good results from previous corticosteroid injections.  [place] and wish to maximize his mobility.  Has walker that he uses.  No brace, but interested.  Also in home PT now- knee strengthening.    He is accompanied by his daughter today.    Additional medical/Social/Surgical histories reviewed in [facility] and updated as appropriate.    REVIEW OF SYSTEMS (7/1/2020)  CONSTITUTIONAL: Denies fever and weight loss  GASTROINTESTINAL: Denies abdominal pain, nausea, vomiting  MUSCULOSKELETAL: See HPI  SKIN: Denies any recent rash or lesion  NEUROLOGICAL: Denies numbness or focal weakness     PHYSICAL EXAM  Ht 1.676 m (5' 6\")   Wt 68 kg (150 lb)   BMI 24.21 kg/m      General  - normal appearance, in no obvious distress  Musculoskeletal - Right knee  - stance: Antalgic gait, shortened stride length  - inspection: Swelling of right knee present, no erythema, normal bone and joint alignment  - palpation: mild to moderate joint effusion of the right knee, medial and lateral joint line tenderness present, patella and patellar tendon non-tender  - ROM: 135 degrees flexion, -5 degrees extension, not painful, normal actively and passively compared to contralateral  - strength: 5/5 in flexion, 5/5 in extension  Neuro  - no sensory or motor deficit, grossly normal coordination, normal muscle tone  Skin  - no ecchymosis, erythema, warmth, or induration, no obvious rash  Psych  - interactive, appropriate, normal mood and affect     ASSESSMENT & PLAN  Mr. Carcamo is a 89 year old year old male who presents " to clinic today with exacerbation of primary osteoarthritis of right knee.    -Corticosteroid injection discussed  -Continue Physical therapy for quad/knee strengthening and ROM, functional tasks  -Extra strength tylenol PRN  -Hinged knee brace; start front closure  -Referral to orthotist; re; medial  brace  -Follow up 3 months    PROCEDURE    Right Knee Injection - Intraarticular  The patient was informed of the risks and the benefits of the procedure and a written consent was signed.  The patient s right knee was prepped with chlorhexidine in sterile fashion.   40 mg of triamcinolone suspension was drawn up into a 5 mL syringe with 4 mL of 1% lidocaine.  Injection was performed using substerile technique.  A 1.5-inch 22-gauge needle was used to enter the anterolateral aspect of the right knee.  Injection performed successfully without difficulty.  There were no complications. The patient tolerated the procedure well. There was negligible bleeding.   The patient was instructed to ice the knee upon leaving clinic and refrain from overuse over the next 3 days.   The patient was instructed to call or go to the emergency room with any unusual pain, swelling, redness, or if otherwise concerned.  A follow up appointment will be scheduled to evaluate response to the injection, and to assess range of motion and pain.      It was a pleasure seeing Burke Muniz DO, CASANA  Primary Care Sports Medicine

## 2020-07-01 NOTE — NURSING NOTE
[address] [address]  Dept: [phone]  ______________________________________________________________________________    Patient: Kiran Carcamo   : 1921   MRN: [id number]   2020    INVASIVE PROCEDURE SAFETY CHECKLIST    Date: 20   Procedure:R knee CSI with kenalog  Patient Name: Kiran Carcamo  MRN: [id number]  YOB: 1921    Action: Complete sections as appropriate. Any discrepancy results in a HARD COPY until resolved.     PRE PROCEDURE:  Patient ID verified with 2 identifiers (name and  or MRN): Yes  Procedure and site verified with patient/designee (when able): Yes  Accurate consent documentation in medical record: Yes  H&P (or appropriate assessment) documented in medical record: Yes  H&P must be up to 20 days prior to procedure and updates within 24 hours of procedure as applicable: NA  Relevant diagnostic and radiology test results appropriately labeled and displayed as applicable: Yes  Procedure site(s) marked with provider initials: NA    TIMEOUT:  Time-Out performed immediately prior to starting procedure, including verbal and active participation of all team members addressing the following:Yes  * Correct patient identify  * Confirmed that the correct side and site are marked  * An accurate procedure consent form  * Agreement on the procedure to be done  * Correct patient position  * Relevant images and results are properly labeled and appropriately displayed  * The need to administer antibiotics or fluids for irrigation purposes during the procedure as applicable   * Safety precautions based on patient history or medication use    DURING PROCEDURE: Verification of correct person, site, and procedures any time the responsibility for care of the patient is transferred to another member of the care team.       Prior to injection, verified patient identity using patient's name and date of birth.  Due  to injection administration, patient instructed to remain in clinic for 15 minutes  afterwards, and to report any adverse reaction to me immediately.    Joint injection was performed.      Drug Amount Wasted:  Yes: 1 mg/ml   Vial/Syringe: Single dose vial  Expiration Date:  11/23      Bradley Olman, ATC  July 1, 2020

## 2020-07-02 RX ORDER — LIDOCAINE HYDROCHLORIDE 10 MG/ML
4 INJECTION, SOLUTION EPIDURAL; INFILTRATION; INTRACAUDAL; PERINEURAL
Status: SHIPPED | OUTPATIENT
Start: 2020-07-01

## 2020-07-02 RX ORDER — TRIAMCINOLONE ACETONIDE 40 MG/ML
40 INJECTION, SUSPENSION INTRA-ARTICULAR; INTRAMUSCULAR
Status: SHIPPED | OUTPATIENT
Start: 2020-07-01

## 2020-07-02 NOTE — TELEPHONE ENCOUNTER
DIAGNOSIS: Severe right knee pain - leg weakness per pt's daughter. Request cortisone injection. No hx of knee surgery. Recent XR 1+ yrs ago by Dr. Buddy Poe   APPOINTMENT DATE: 7/13   NOTES STATUS DETAILS   OFFICE NOTE from referring provider N/A    OFFICE NOTE from other specialist Internal    DISCHARGE SUMMARY from hospital N/A    DISCHARGE REPORT from the ER N/A    OPERATIVE REPORT N/A    MEDICATION LIST Internal    MRI N/A    CT SCAN N/A    XRAYS (IMAGES & REPORTS) Internal

## 2020-07-03 ENCOUNTER — TELEPHONE (OUTPATIENT)
Dept: ORTHOPEDICS | Facility: CLINIC | Age: 85
End: 2020-07-03

## 2020-07-03 NOTE — TELEPHONE ENCOUNTER
Called to confirm upcoming appt with Dr. Walker. Jes actually would like to cancel that appointment.  Phong was recently seen in the walk in clinic and received a CSI.    I informed Jes that it would still be a good idea to have a future appt with our sports medicine team so that they can make a long term plan for Phong.    She thanked me for the call.  They are currently up at their cabin. Jes is slightly concerned for Phong's blood sugar levels. I let them know that especially with this hot and humid weather, it may be a good idea to have some sports drink nearby just in case they are worried for low blood sugar levels. I suggested they can water it down and provide it to Phong in case he is not feeling the best.    She thanked me for the advice.     - Bradley LOPEZ ATC

## 2020-07-13 ENCOUNTER — PRE VISIT (OUTPATIENT)
Dept: ORTHOPEDICS | Facility: CLINIC | Age: 85
End: 2020-07-13

## 2020-07-17 DIAGNOSIS — I10 ESSENTIAL HYPERTENSION WITH GOAL BLOOD PRESSURE LESS THAN 140/90: ICD-10-CM

## 2020-07-17 RX ORDER — ISOSORBIDE MONONITRATE 60 MG/1
60 TABLET, EXTENDED RELEASE ORAL DAILY
Qty: 90 TABLET | Refills: 2 | Status: SHIPPED | OUTPATIENT
Start: 2020-07-17 | End: 2020-09-23

## 2020-07-22 DIAGNOSIS — Z53.9 DIAGNOSIS NOT YET DEFINED: Primary | ICD-10-CM

## 2020-07-22 PROCEDURE — G0180 MD CERTIFICATION HHA PATIENT: HCPCS | Performed by: FAMILY MEDICINE

## 2020-08-01 DIAGNOSIS — I25.118 CORONARY ARTERY DISEASE OF NATIVE ARTERY OF NATIVE HEART WITH STABLE ANGINA PECTORIS (H): ICD-10-CM

## 2020-08-01 DIAGNOSIS — E11.21 TYPE 2 DIABETES MELLITUS WITH DIABETIC NEPHROPATHY, WITHOUT LONG-TERM CURRENT USE OF INSULIN (H): ICD-10-CM

## 2020-08-05 RX ORDER — ROSUVASTATIN CALCIUM 10 MG/1
10 TABLET, COATED ORAL DAILY
Qty: 90 TABLET | Refills: 3 | Status: SHIPPED | OUTPATIENT
Start: 2020-08-05 | End: 2021-07-21

## 2020-08-05 NOTE — TELEPHONE ENCOUNTER
rosuvastatin (CRESTOR) 10 MG  Last Written Prescription Date:  5/6/20  Last Fill Quantity: 90,   # refills: 0  Last Office Visit : 1/7/20  Future Office visit:  NONE    Routing refill request to provider for review/approval because:  OVER DUE LDL  9/14/18    30 DAY RF PENDING

## 2020-08-06 ENCOUNTER — NURSE TRIAGE (OUTPATIENT)
Dept: NURSING | Facility: CLINIC | Age: 85
End: 2020-08-06

## 2020-08-06 DIAGNOSIS — E11.21 TYPE 2 DIABETES MELLITUS WITH DIABETIC NEPHROPATHY, WITHOUT LONG-TERM CURRENT USE OF INSULIN (H): ICD-10-CM

## 2020-08-06 DIAGNOSIS — J10.1 INFLUENZA A: ICD-10-CM

## 2020-08-06 NOTE — TELEPHONE ENCOUNTER
"Routing refill request to provider for review/approval because:  Labs not current:  A1C      Requested Prescriptions   Pending Prescriptions Disp Refills     metFORMIN (GLUCOPHAGE) 500 MG tablet [Pharmacy Med Name: METFORMIN 500 MG TABLET 500 TAB] 180 tablet 2     Sig: TAKE 1 TABLET (500 MG) BY MOUTH 2 TIMES DAILY (WITH MEALS)       Biguanide Agents Failed - 8/6/2020  9:31 AM        Failed - Patient has documented A1c within the specified period of time.     If HgbA1C is 8 or greater, it needs to be on file within the past 3 months.  If less than 8, must be on file within the past 6 months.     Recent Labs   Lab Test 10/06/19  0934   A1C 7.5*             Passed - Patient is age 10 or older        Passed - Patient's CR is NOT>1.4 OR Patient's EGFR is NOT<45 within past 12 mos.     Recent Labs   Lab Test 01/07/20  1208   GFRESTIMATED 67   GFRESTBLACK 78       Recent Labs   Lab Test 01/07/20  1208   CR 0.94             Passed - Patient does NOT have a diagnosis of CHF.        Passed - Medication is active on med list        Passed - Recent (6 mo) or future (30 days) visit within the authorizing provider's specialty     Patient had office visit in the last 6 months or has a visit in the next 30 days with authorizing provider or within the authorizing provider's specialty.  See \"Patient Info\" tab in inbasket, or \"Choose Columns\" in Meds & Orders section of the refill encounter.                   Trinidad Mccracken RN, BSN, PHN    "

## 2020-08-06 NOTE — TELEPHONE ENCOUNTER
Daughter, Jes, calls for patient. Consent to communicate on file.   She states her sister had the pharmacy request a refill for Metformin 500 mg on 8/1/20 and they have not heard back. She could not find an option in [facility] to request a refill as the regular spot is unavailable.     Per chart review, informed a message was sent to patient's PCP for review since patient is overdue for blood work.     She would like a call back and let her know if it will be filled or not.  She would like it sent to [facility] in [place].   She can be reached at: [phone], ok to leave message.     SARA Zamora RN      Additional Information    Caller requesting a NON-URGENT new prescription or refill and triager unable to refill per department policy    Protocols used: MEDICATION QUESTION CALL-A-[place]

## 2020-08-07 NOTE — TELEPHONE ENCOUNTER
Daughter deisy notified Rx was sent. Deleted [facility] pharmacy     Jacek Stark RN   [facility]

## 2020-08-07 NOTE — TELEPHONE ENCOUNTER
Recommend scheduling in person appointment with fasting labs, if daughter is able to help bring him in. Order signed, please notify, thanks Buddy

## 2020-08-10 NOTE — TELEPHONE ENCOUNTER
Dr Poe    Do you want the pt to have labs done before an appointment?    Labs cued, are there others you wish?    Marilyn Johnston RN   [facility]

## 2020-08-11 NOTE — TELEPHONE ENCOUNTER
RD TC Medication is being filled for 1 time refill only due to:  Patient needs to be seen because due for follow up appointment and labs - see provider recommendation below.

## 2020-08-18 DIAGNOSIS — L85.3 XEROSIS OF SKIN: ICD-10-CM

## 2020-08-21 RX ORDER — EMOLLIENT BASE
CREAM (GRAM) TOPICAL DAILY
Qty: 453 G | Refills: 3 | OUTPATIENT
Start: 2020-08-21

## 2020-08-25 ENCOUNTER — MYC REFILL (OUTPATIENT)
Dept: DERMATOLOGY | Facility: CLINIC | Age: 85
End: 2020-08-25

## 2020-08-25 DIAGNOSIS — L85.3 XEROSIS OF SKIN: ICD-10-CM

## 2020-08-25 RX ORDER — EMOLLIENT BASE
CREAM (GRAM) TOPICAL DAILY
Qty: 453 G | Refills: 3 | Status: CANCELLED | OUTPATIENT
Start: 2020-08-25

## 2020-08-27 RX ORDER — EMOLLIENT BASE
CREAM (GRAM) TOPICAL DAILY
Qty: 453 G | Refills: 3 | OUTPATIENT
Start: 2020-08-27

## 2020-09-22 ENCOUNTER — MYC REFILL (OUTPATIENT)
Dept: FAMILY MEDICINE | Facility: CLINIC | Age: 85
End: 2020-09-22

## 2020-09-22 DIAGNOSIS — K59.00 CONSTIPATION, UNSPECIFIED CONSTIPATION TYPE: ICD-10-CM

## 2020-09-22 RX ORDER — AMOXICILLIN 250 MG
1 CAPSULE ORAL 2 TIMES DAILY PRN
Qty: 90 TABLET | Refills: 1 | Status: SHIPPED | OUTPATIENT
Start: 2020-09-22 | End: 2020-09-23

## 2020-09-22 NOTE — PROGRESS NOTES
Subjective     Kiran Carcamo is a 89 year old male who presents to clinic today for the following health issues:    HPI     Diabetes Follow-up    How often are you checking your blood sugar? A few times a month  What time of day are you checking your blood sugars (select all that apply)?  in the morning   Have you had any blood sugars above 200?  No  Have you had any blood sugars below 70?  No    What symptoms do you notice when your blood sugar is low?  None    What concerns do you have today about your diabetes? None     Do you have any of these symptoms? (Select all that apply)  Burning in feet    Have you had a diabetic eye exam in the last 12 months? No        BP Readings from Last 2 Encounters:   09/23/20 100/60   01/15/20 130/58     Hemoglobin A1C (%)   Date Value   09/23/2020 6.5 (H)   10/06/2019 7.5 (H)     LDL Cholesterol Calculated (mg/dL)   Date Value   09/14/2018 38   09/08/2017 54                 How many servings of fruits and vegetables do you eat daily?  2-3    On average, how many sweetened beverages do you drink each day (Examples: soda, juice, sweet tea, etc.  Do NOT count diet or artificially sweetened beverages)?   Maybe one a day    How many days per week do you exercise enough to make your heart beat faster? 3 or less    How many minutes a day do you exercise enough to make your heart beat faster? 9 or less    How many days per week do you miss taking your medication? 0    Musculoskeletal problem/pain      Duration: on going     Description  Location: severe arthritis on right knee and moderate arthritis on left. Would like to discuss other treatments besides cortisone injection.     Intensity:  moderate    Accompanying signs and symptoms: pain also has back pain and weakness    History  Previous similar problem: YES  Previous evaluation:  x-ray    Precipitating or alleviating factors:  Trauma or overuse: no   Aggravating factors include: walking and cold or damp weather    Therapies tried  and outcome: rest/inactivity, tylenol, and cortisone injection     Musculoskeletal problem/pain  Onset/Duration: 12 months   Description  Location: knee - right  Joint Swelling: YES  Redness: no  Pain: YES  Warmth: no  Intensity:  moderate  Progression of Symptoms:  worsening and waxing and waning  Accompanying signs and symptoms:   Fevers: no  Numbness/tingling/weakness: YES  History  Trauma to the area: no  Recent illness:  no  Previous similar problem: YES  Previous evaluation:  YES  Precipitating or alleviating factors:  Aggravating factors include: exercise  Therapies tried and outcome: rest/inactivity, acetaminophen, physical therapy and unloading brace right    Rash  Onset/Duration: several years   Description  Location: posterior scalp  Character: raised, flakey  Itching: moderate  Intensity:  moderate  Progression of Symptoms:  same and waxing and waning  Accompanying signs and symptoms:   Fever: no  Body aches or joint pain: no  Sore throat symptoms: no  Recent cold symptoms: no  History:           Previous episodes of similar rash: yes  New exposures:  None  Recent travel: no  Exposure to similar rash: YES- daughter  Precipitating or alleviating factors: none  Therapies tried and outcome: topical steroid - daughter's      Review of Systems   Constitutional, HEENT, cardiovascular, pulmonary, gi and gu systems are negative, except as otherwise noted.      Objective    /60   Pulse 60   Temp 97  F (36.1  C) (Temporal)   Wt 68 kg (150 lb)   SpO2 96%   BMI 24.21 kg/m    Body mass index is 24.21 kg/m .  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

## 2020-09-23 ENCOUNTER — OFFICE VISIT (OUTPATIENT)
Dept: FAMILY MEDICINE | Facility: CLINIC | Age: 85
End: 2020-09-23
Payer: COMMERCIAL

## 2020-09-23 VITALS
SYSTOLIC BLOOD PRESSURE: 100 MMHG | DIASTOLIC BLOOD PRESSURE: 60 MMHG | BODY MASS INDEX: 24.21 KG/M2 | HEART RATE: 60 BPM | TEMPERATURE: 97 F | OXYGEN SATURATION: 96 % | WEIGHT: 150 LBS

## 2020-09-23 DIAGNOSIS — M17.11 ARTHRITIS OF RIGHT KNEE: ICD-10-CM

## 2020-09-23 DIAGNOSIS — L21.9 SEBORRHEIC DERMATITIS: ICD-10-CM

## 2020-09-23 DIAGNOSIS — E11.21 TYPE 2 DIABETES MELLITUS WITH DIABETIC NEPHROPATHY, WITHOUT LONG-TERM CURRENT USE OF INSULIN (H): Primary | ICD-10-CM

## 2020-09-23 LAB — HBA1C MFR BLD: 6.5 % (ref 0–5.6)

## 2020-09-23 PROCEDURE — G0008 ADMIN INFLUENZA VIRUS VAC: HCPCS | Mod: 59 | Performed by: FAMILY MEDICINE

## 2020-09-23 PROCEDURE — 36415 COLL VENOUS BLD VENIPUNCTURE: CPT | Performed by: FAMILY MEDICINE

## 2020-09-23 PROCEDURE — 83036 HEMOGLOBIN GLYCOSYLATED A1C: CPT | Performed by: FAMILY MEDICINE

## 2020-09-23 PROCEDURE — 90472 IMMUNIZATION ADMIN EACH ADD: CPT | Performed by: FAMILY MEDICINE

## 2020-09-23 PROCEDURE — 90662 IIV NO PRSV INCREASED AG IM: CPT | Performed by: FAMILY MEDICINE

## 2020-09-23 PROCEDURE — 90750 HZV VACC RECOMBINANT IM: CPT | Performed by: FAMILY MEDICINE

## 2020-09-23 PROCEDURE — 99214 OFFICE O/P EST MOD 30 MIN: CPT | Mod: 25 | Performed by: FAMILY MEDICINE

## 2020-09-23 RX ORDER — ACETAMINOPHEN 325 MG/1
975 TABLET ORAL EVERY 8 HOURS PRN
Qty: 100 TABLET | Refills: 3 | Status: SHIPPED | OUTPATIENT
Start: 2020-09-23

## 2020-09-23 RX ORDER — FLUOCINOLONE ACETONIDE 0.1 MG/G
CREAM TOPICAL 2 TIMES DAILY
Qty: 15 G | Refills: 3 | Status: SHIPPED | OUTPATIENT
Start: 2020-09-23 | End: 2022-01-01

## 2020-09-23 RX ORDER — EMOLLIENT BASE
CREAM (GRAM) TOPICAL DAILY
Qty: 453 G | Refills: 3 | Status: SHIPPED | OUTPATIENT
Start: 2020-09-23

## 2020-09-23 RX ORDER — ISOSORBIDE MONONITRATE 60 MG/1
60 TABLET, EXTENDED RELEASE ORAL DAILY
Qty: 90 TABLET | Refills: 3 | Status: SHIPPED | OUTPATIENT
Start: 2020-09-23 | End: 2021-06-04 | Stop reason: DRUGHIGH

## 2020-09-23 RX ORDER — AMOXICILLIN 250 MG
1 CAPSULE ORAL 2 TIMES DAILY PRN
Qty: 180 TABLET | Refills: 3 | Status: SHIPPED | OUTPATIENT
Start: 2020-09-23 | End: 2021-11-17

## 2020-09-23 RX ORDER — NITROGLYCERIN 0.4 MG/1
TABLET SUBLINGUAL
Qty: 25 TABLET | Refills: 1 | Status: SHIPPED | OUTPATIENT
Start: 2020-09-23

## 2020-09-23 RX ORDER — KETOCONAZOLE 20 MG/ML
SHAMPOO TOPICAL DAILY PRN
Qty: 120 ML | Refills: 3 | Status: SHIPPED | OUTPATIENT
Start: 2020-09-23 | End: 2021-11-17

## 2020-11-23 ENCOUNTER — TELEPHONE (OUTPATIENT)
Dept: FAMILY MEDICINE | Facility: CLINIC | Age: 85
End: 2020-11-23

## 2020-11-23 NOTE — TELEPHONE ENCOUNTER
[facility] now requests orders and shares plan of care/discharge summaries for some patients through [employer].  Please REPLY TO THIS MESSAGE OR ROUTE BACK TO THE AUTHOR in order to give authorization for orders when needed.  This is considered a verbal order, you will still receive a faxed copy of orders for signature.  Thank you for your assistance in improving collaboration for our patients.    ORDER    12 as needed over 12 months foot care visits for nail care. Payer is [facility] program/patient    Charissa Hernandez RN Case mgr [phone]

## 2021-01-10 ENCOUNTER — HEALTH MAINTENANCE LETTER (OUTPATIENT)
Age: 86
End: 2021-01-10

## 2021-01-20 ENCOUNTER — OFFICE VISIT (OUTPATIENT)
Dept: FAMILY MEDICINE | Facility: CLINIC | Age: 86
End: 2021-01-20
Payer: COMMERCIAL

## 2021-01-20 VITALS
OXYGEN SATURATION: 98 % | WEIGHT: 153 LBS | TEMPERATURE: 97.5 F | HEART RATE: 54 BPM | SYSTOLIC BLOOD PRESSURE: 120 MMHG | BODY MASS INDEX: 24.69 KG/M2 | DIASTOLIC BLOOD PRESSURE: 60 MMHG

## 2021-01-20 DIAGNOSIS — I25.2 HISTORY OF MI (MYOCARDIAL INFARCTION): ICD-10-CM

## 2021-01-20 DIAGNOSIS — E11.21 TYPE 2 DIABETES MELLITUS WITH DIABETIC NEPHROPATHY, WITHOUT LONG-TERM CURRENT USE OF INSULIN (H): ICD-10-CM

## 2021-01-20 DIAGNOSIS — Z00.00 ENCOUNTER FOR MEDICARE ANNUAL WELLNESS EXAM: Primary | ICD-10-CM

## 2021-01-20 DIAGNOSIS — I10 ESSENTIAL HYPERTENSION WITH GOAL BLOOD PRESSURE LESS THAN 140/90: ICD-10-CM

## 2021-01-20 DIAGNOSIS — E78.5 HYPERLIPIDEMIA LDL GOAL <70: ICD-10-CM

## 2021-01-20 DIAGNOSIS — R68.89 FORGETFULNESS: ICD-10-CM

## 2021-01-20 PROCEDURE — 99397 PER PM REEVAL EST PAT 65+ YR: CPT | Performed by: FAMILY MEDICINE

## 2021-01-20 NOTE — PATIENT INSTRUCTIONS
Followup in 3-6 months if helpful for assessing dementia progression, discussing anticipated needs.    Patient Education   Personalized Prevention Plan  You are due for the preventive services outlined below.  Your care team is available to assist you in scheduling these services.  If you have already completed any of these items, please share that information with your care team to update in your medical record.  Health Maintenance Due   Topic Date Due     Eye Exam  02/01/2019     Cholesterol Lab  09/14/2019     Kidney Microalbumin Urine Test  09/14/2019     Diabetic Foot Exam  09/14/2019     FALL RISK ASSESSMENT  07/30/2020     PHQ-2  01/01/2021     Basic Metabolic Panel  01/07/2021     Zoster (Shingles) Vaccine (2 of 2) 11/18/2020

## 2021-01-20 NOTE — PROGRESS NOTES
"  SUBJECTIVE:   Kiran Carcamo is a 89 year old male who presents for Preventive Visit.    Patient has been advised of split billing requirements and indicates understanding: Yes  Are you in the first 12 months of your Medicare Part B coverage?  No    Physical Health:    In general, how would you rate your overall physical health? good    Outside of work, how many days during the week do you exercise? 2-3 days/week    Outside of work, approximately how many minutes a day do you exercise?30-45 minutes    If you drink alcohol do you typically have >3 drinks per day or >7 drinks per week? No    Do you usually eat at least 4 servings of fruit and vegetables a day, include whole grains & fiber and avoid regularly eating high fat or \"junk\" foods? Yes    Do you have any problems taking medications regularly?  YES-daughters want to know if can take stool softener twice daily    Do you have any side effects from medications? none    Needs assistance for the following daily activities: no assistance needed    Which of the following safety concerns are present in your home?  none identified     Hearing impairment: Yes    In the past 6 months, have you been bothered by leaking of urine? yes    Mental Health:    In general, how would you rate your overall mental or emotional health? excellent  PHQ-2 Score:      Do you feel safe in your environment? Yes    Have you ever done Advance Care Planning? (For example, a Health Directive, POLST, or a discussion with a medical provider or your loved ones about your wishes): Yes, advance care planning is on file.    Additional concerns to address?  Yes- dry throat at night, excessive phelm  Wants to discuss COVID shot, discuss watery eyes, sometimes patient will be up a lot at night, vivid dreams, leg weakness    Fall risk:  Fallen 2 or more times in the past year?: No  Any fall with injury in the past year?: No  click delete button to remove this line now  Cognitive Screenin) " Repeat 3 items (Leader, Season, Table)    2) Clock draw: did not do clock hard to write   3) 3 item recall: Recalls 3 objects  Results: recalled three words but did not do clock due to hard to write.     Mini-CogTM Copyright LIBIA Handy. Licensed by the author for use in [employer]; reprinted with permission ([email or web address]). All rights reserved.      Do you have sleep apnea, excessive snoring or daytime drowsiness?: no        Concern - forgetful  Onset: several years     Description:   Increasing loss of short term, long term less    Intensity: moderate    Progression of Symptoms:  worsening and waxing and waning    Accompanying Signs & Symptoms:  Appetite, wt dn, no choking. handwriting worse, infrequent falls, not gotten lost    Previous history of similar problem:   yes    Precipitating factors:   Worsened by: none    Alleviating factors:  Improved by: socializing helps?    Therapies Tried and outcome: daughters engage intellectual, physical, social. No medications, neuro. Brain MR negative for acute, positive small vessel    Diabetes Follow-up    How often are you checking your blood sugar? Two times daily  Blood sugar testing frequency justification:  Patient modifying lifestyle changes (diet, exercise) with blood sugars  What time of day are you checking your blood sugars (select all that apply)?  Before and after meals  Have you had any blood sugars above 200?  No  Have you had any blood sugars below 70?  No    What symptoms do you notice when your blood sugar is low?  None    What concerns do you have today about your diabetes? None     Do you have any of these symptoms? (Select all that apply)  No numbness or tingling in feet.  No redness, sores or blisters on feet.  No complaints of excessive thirst.  No reports of blurry vision.  No significant changes to weight.    Have you had a diabetic eye exam in the last 12 months?           Hyperlipidemia Follow-Up      Are you regularly taking any  medication or supplement to lower your cholesterol?   Yes- atorvastatin    Are you having muscle aches or other side effects that you think could be caused by your cholesterol lowering medication?  No    Hypertension Follow-up      Do you check your blood pressure regularly outside of the clinic? Yes     Are you following a low salt diet? Yes    Are your blood pressures ever more than 140 on the top number (systolic) OR more   than 90 on the bottom number (diastolic), for example 140/90? No    BP Readings from Last 2 Encounters:   01/20/21 120/60   09/23/20 100/60     Hemoglobin A1C (%)   Date Value   09/23/2020 6.5 (H)   10/06/2019 7.5 (H)     LDL Cholesterol Calculated (mg/dL)   Date Value   09/14/2018 38   09/08/2017 54       Vascular Disease Follow-up      How often do you take nitroglycerin? Never    Do you take an aspirin every day? Yes      Reviewed and updated as needed this visit by clinical staff  Tobacco  Allergies  Meds   Med Hx  Surg Hx  Fam Hx  Soc Hx        Reviewed and updated as needed this visit by Provider                Social History     Tobacco Use     Smoking status: Never Smoker     Smokeless tobacco: Never Used   Substance Use Topics     Alcohol use: No                           Current providers sharing in care for this patient include:   Patient Care Team:  Buddy Poe MD as PCP - General (Family Practice)  Michael Arevalo MD as MD (Dermatology)  Hope Medina MD as MD (Cardiology)  Michael De Leon MD as MD (Neurology)  Buddy Poe MD as Assigned PCP  Dedrick Muniz DO as Assigned Musculoskeletal Provider  Michael De Leon MD as Assigned Neuroscience Provider  Jeff Tsai MD as Assigned Heart and Vascular Provider    The following health maintenance items are reviewed in Epic and correct as of today:  Health Maintenance   Topic Date Due     EYE EXAM  02/01/2019     LIPID  09/14/2019     MICROALBUMIN  09/14/2019     DIABETIC FOOT EXAM   "09/14/2019     PHQ-2  01/01/2021     BMP  01/07/2021     ZOSTER IMMUNIZATION (2 of 2) 11/18/2020     A1C  03/23/2021     DTAP/TDAP/TD IMMUNIZATION (3 - Td) 11/09/2021     MEDICARE ANNUAL WELLNESS VISIT  01/20/2022     FALL RISK ASSESSMENT  01/20/2022     ADVANCE CARE PLANNING  01/20/2026     INFLUENZA VACCINE  Completed     Pneumococcal Vaccine: Pediatrics (0 to 5 Years) and At-Risk Patients (6 to 64 Years)  Completed     Pneumococcal Vaccine: 65+ Years  Completed     COVID-19 Vaccine  Completed     IPV IMMUNIZATION  Aged Out     MENINGITIS IMMUNIZATION  Aged Out     Labs reviewed in EPIC    ROS:  Constitutional, HEENT, cardiovascular, pulmonary, gi and gu systems are negative, except as otherwise noted.    OBJECTIVE:   /60   Pulse 54   Temp 97.5  F (36.4  C) (Temporal)   Wt 69.4 kg (153 lb)   SpO2 98%   BMI 24.69 kg/m   Estimated body mass index is 24.69 kg/m  as calculated from the following:    Height as of 7/1/20: 1.676 m (5' 6\").    Weight as of this encounter: 69.4 kg (153 lb).  EXAM:   GENERAL: healthy, alert and no distress  EYES: Eyes grossly normal to inspection, PERRL and conjunctivae and sclerae normal  HENT: ear canals and TM's normal, nose and mouth without ulcers or lesions  NECK: no adenopathy, no asymmetry, masses, or scars and thyroid normal to palpation  RESP: lungs clear to auscultation - no rales, rhonchi or wheezes  CV: regular rate and rhythm, normal S1 S2, no S3 or S4, no murmur, click or rub, no peripheral edema and peripheral pulses strong  ABDOMEN: soft, nontender, no hepatosplenomegaly, no masses and bowel sounds normal   (male): deferred, no complaints  RECTAL: deferred  MS: no gross musculoskeletal defects noted, no edema  SKIN: no suspicious lesions or rashes  NEURO: Normal strength and tone, mentation intact and speech normal  PSYCH: mentation appears normal, affect normal/bright    Diagnostic Test Results:  Labs reviewed in Epic    ASSESSMENT / PLAN:       ICD-10-CM  " "  1. Encounter for Medicare annual wellness exam  Z00.00    2. Forgetfulness  R68.89    3. Type 2 diabetes mellitus with diabetic nephropathy, without long-term current use of insulin (H)  E11.21    4. Essential hypertension with goal blood pressure less than 140/90  I10    5. Hyperlipidemia LDL goal <70  E78.5    6. History of MI (myocardial infarction)  I25.2        Patient has been advised of split billing requirements and indicates understanding: Yes    COUNSELING:  Reviewed preventive health counseling, as reflected in patient instructions       Regular exercise       Healthy diet/nutrition       Vision screening       Fall risk prevention    Estimated body mass index is 24.69 kg/m  as calculated from the following:    Height as of 7/1/20: 1.676 m (5' 6\").    Weight as of this encounter: 69.4 kg (153 lb).        He reports that he has never smoked. He has never used smokeless tobacco.    Appropriate preventive services were discussed with this patient, including applicable screening as appropriate for cardiovascular disease, diabetes, osteopenia/osteoporosis, and glaucoma.  As appropriate for age/gender, discussed screening for colorectal cancer, prostate cancer, breast cancer, and cervical cancer. Checklist reviewing preventive services available has been given to the patient.    Reviewed patients plan of care and provided an AVS. The Complex Care Plan (for patients with higher acuity and needing more deliberate coordination of services) for Kiran meets the Care Plan requirement. This Care Plan has been established and reviewed with the Patient and daughter.    Followup in 3-6 months if helpful for assessing dementia progression, discussing anticipated needs.    Buddy Poe MD  [facility]  "

## 2021-02-12 ENCOUNTER — IMMUNIZATION (OUTPATIENT)
Dept: NURSING | Facility: CLINIC | Age: 86
End: 2021-02-12
Payer: COMMERCIAL

## 2021-02-12 PROCEDURE — 91300 PR COVID VAC PFIZER DIL RECON 30 MCG/0.3 ML IM: CPT

## 2021-02-12 PROCEDURE — 0001A PR COVID VAC PFIZER DIL RECON 30 MCG/0.3 ML IM: CPT

## 2021-03-05 ENCOUNTER — IMMUNIZATION (OUTPATIENT)
Dept: NURSING | Facility: CLINIC | Age: 86
End: 2021-03-05
Attending: FAMILY MEDICINE
Payer: COMMERCIAL

## 2021-03-05 PROCEDURE — 0002A PR COVID VAC PFIZER DIL RECON 30 MCG/0.3 ML IM: CPT

## 2021-03-05 PROCEDURE — 91300 PR COVID VAC PFIZER DIL RECON 30 MCG/0.3 ML IM: CPT

## 2021-05-08 ENCOUNTER — HEALTH MAINTENANCE LETTER (OUTPATIENT)
Age: 86
End: 2021-05-08

## 2021-05-12 ENCOUNTER — NURSE TRIAGE (OUTPATIENT)
Dept: NURSING | Facility: CLINIC | Age: 86
End: 2021-05-12

## 2021-06-04 ENCOUNTER — OFFICE VISIT (OUTPATIENT)
Dept: FAMILY MEDICINE | Facility: CLINIC | Age: 86
End: 2021-06-04
Payer: COMMERCIAL

## 2021-06-04 VITALS
BODY MASS INDEX: 24.21 KG/M2 | TEMPERATURE: 98.2 F | DIASTOLIC BLOOD PRESSURE: 68 MMHG | WEIGHT: 150 LBS | OXYGEN SATURATION: 98 % | HEART RATE: 60 BPM | SYSTOLIC BLOOD PRESSURE: 120 MMHG

## 2021-06-04 DIAGNOSIS — F02.80 LATE ONSET ALZHEIMER'S DISEASE WITHOUT BEHAVIORAL DISTURBANCE (H): Primary | ICD-10-CM

## 2021-06-04 DIAGNOSIS — G30.1 LATE ONSET ALZHEIMER'S DISEASE WITHOUT BEHAVIORAL DISTURBANCE (H): Primary | ICD-10-CM

## 2021-06-04 DIAGNOSIS — E11.21 TYPE 2 DIABETES MELLITUS WITH DIABETIC NEPHROPATHY, WITHOUT LONG-TERM CURRENT USE OF INSULIN (H): ICD-10-CM

## 2021-06-04 DIAGNOSIS — L82.1 SEBORRHEIC KERATOSIS: ICD-10-CM

## 2021-06-04 DIAGNOSIS — I10 ESSENTIAL HYPERTENSION WITH GOAL BLOOD PRESSURE LESS THAN 140/90: ICD-10-CM

## 2021-06-04 LAB — HBA1C MFR BLD: 6.3 % (ref 0–5.6)

## 2021-06-04 PROCEDURE — 17110 DESTRUCTION B9 LES UP TO 14: CPT | Performed by: FAMILY MEDICINE

## 2021-06-04 PROCEDURE — 36415 COLL VENOUS BLD VENIPUNCTURE: CPT | Performed by: FAMILY MEDICINE

## 2021-06-04 PROCEDURE — 99214 OFFICE O/P EST MOD 30 MIN: CPT | Mod: 25 | Performed by: FAMILY MEDICINE

## 2021-06-04 PROCEDURE — 80048 BASIC METABOLIC PNL TOTAL CA: CPT | Performed by: FAMILY MEDICINE

## 2021-06-04 PROCEDURE — 83036 HEMOGLOBIN GLYCOSYLATED A1C: CPT | Performed by: FAMILY MEDICINE

## 2021-06-04 RX ORDER — ISOSORBIDE MONONITRATE 30 MG/1
30 TABLET, EXTENDED RELEASE ORAL DAILY
Qty: 90 TABLET | Refills: 3 | Status: SHIPPED | OUTPATIENT
Start: 2021-06-04 | End: 2022-01-01

## 2021-06-04 NOTE — PROGRESS NOTES
Assessment & Plan     Late onset Alzheimer's disease without behavioral disturbance (H)  stable    Type 2 diabetes mellitus with diabetic nephropathy, without long-term current use of insulin (H)  At goal, dropping A1c with one metformin  mg daily  - **A1C FUTURE anytime; Future    Seborrheic keratosis  Benign, treated with liquid N.    Prescription drug management  40 minutes spent on the date of the encounter doing chart review, history and exam, documentation and further activities per the note     Patient Instructions   Cut imdur from 60 mg to 30 mg daily;     Cover cryo wound on cheek with bandaide, change daily      No follow-ups on file.    Buddy Poe MD  [facility] is a 89 year old who presents for the following health issues  accompanied by his 2 daughters:    HPI   3 month follow up     Diabetes Follow-up    How often are you checking your blood sugar? A few times a week  What time of day are you checking your blood sugars (select all that apply)?  Before and after meals  Have you had any blood sugars above 200?  No  Have you had any blood sugars below 70?  No    What symptoms do you notice when your blood sugar is low?  Has not occurred    What concerns do you have today about your diabetes? None     Do you have any of these symptoms? (Select all that apply)  No numbness or tingling in feet.  No redness, sores or blisters on feet.  No complaints of excessive thirst.  No reports of blurry vision.  No significant changes to weight.    Have you had a diabetic eye exam in the last 12 months? No        BP Readings from Last 2 Encounters:   06/04/21 120/68   01/20/21 120/60     Hemoglobin A1C (%)   Date Value   06/04/2021 6.3 (H)   09/23/2020 6.5 (H)     LDL Cholesterol Calculated (mg/dL)   Date Value   09/14/2018 38   09/08/2017 54     A spot on his left cheek is grown, daughters have asked that it be removed because patient reports it bugs him and  he scratches it    Hypertension Follow-up      Do you check your blood pressure regularly outside of the clinic? Yes     Are you following a low salt diet? Yes    Are your blood pressures ever more than 140 on the top number (systolic) OR more   than 90 on the bottom number (diastolic), for example 140/90? No    How many days per week do you miss taking your medication? 0    Dementia Followup    How are you doing with your dementia since your last visit? No change    Are you having other symptoms that might be associated with dementia? Yes:  Talks about death more often asks if he is not actually dead now    Have you had a significant life event?  Grief or Loss     Are you feeling anxious or having panic attacks?   No    Do you have any concerns with your use of alcohol or other drugs? No    Social History     Tobacco Use     Smoking status: Never Smoker     Smokeless tobacco: Never Used   Substance Use Topics     Alcohol use: No     Drug use: No     Review of Systems   Constitutional, HEENT, cardiovascular, pulmonary, gi and gu systems are negative, except as otherwise noted.      Objective    /68   Pulse 60   Temp 98.2  F (36.8  C) (Temporal)   Wt 68 kg (150 lb)   SpO2 98%   BMI 24.21 kg/m    Body mass index is 24.21 kg/m .  Physical Exam   GENERAL: alert, no distress and sitting in wheelchair although family reports able to use walker.  Voice very soft, somewhat hard to hear  RESP: lungs clear to auscultation - no rales, rhonchi or wheezes  CV: regular rate and rhythm, normal S1 S2, no S3 or S4, no murmur, click or rub, no peripheral edema and peripheral pulses strong  MS: no gross musculoskeletal defects noted, no edema  SKIN:  papule - face 4x6 mm left cheek. Flat gray-brown, not red or tender.    Procedure: Cryocautery  Single lesion, likely seborrheic keratosis over left cheek.  Frozen x3 with liquid nitrogen family will watch patient for bleeding or infection.  I warned about the possibility of  incomplete removal, scarring, no tissue sent to pathology, discussed with family.  Follow-up at next routine visit, sooner if problems

## 2021-06-05 LAB
ANION GAP SERPL CALCULATED.3IONS-SCNC: 7 MMOL/L (ref 3–14)
BUN SERPL-MCNC: 28 MG/DL (ref 7–30)
CALCIUM SERPL-MCNC: 9.2 MG/DL (ref 8.5–10.1)
CHLORIDE SERPL-SCNC: 110 MMOL/L (ref 94–109)
CO2 SERPL-SCNC: 24 MMOL/L (ref 20–32)
CREAT SERPL-MCNC: 0.96 MG/DL (ref 0.66–1.25)
GFR SERPL CREATININE-BSD FRML MDRD: 65 ML/MIN/{1.73_M2}
GLUCOSE SERPL-MCNC: 134 MG/DL (ref 70–99)
POTASSIUM SERPL-SCNC: 4.1 MMOL/L (ref 3.4–5.3)
SODIUM SERPL-SCNC: 141 MMOL/L (ref 133–144)

## 2021-06-06 NOTE — RESULT ENCOUNTER NOTE
Praveen Andre: Phong's labs show similar results to a year ago, the exception being his diabetes has continued to improve slightly.  Recommend rechecking urine 4 to 6 months before the weather turns.  It was great to see your dad in clinic.  I am sure you will treasure the time you got with him all the more.    Buddy

## 2021-06-28 LAB — INTERPRETATION ECG - MUSE: NORMAL

## 2021-07-16 ENCOUNTER — TELEPHONE (OUTPATIENT)
Dept: FAMILY MEDICINE | Facility: CLINIC | Age: 86
End: 2021-07-16

## 2021-07-16 NOTE — TELEPHONE ENCOUNTER
Forms completed and emailed back to his daughter Elaine Bailey @ [email or web address]. Placed into [employer]st[employer] to scan into patients chart.   Alex Shaw MA

## 2021-07-21 ENCOUNTER — TELEPHONE (OUTPATIENT)
Dept: CARDIOLOGY | Facility: CLINIC | Age: 86
End: 2021-07-21

## 2021-07-21 DIAGNOSIS — I25.118 CORONARY ARTERY DISEASE OF NATIVE ARTERY OF NATIVE HEART WITH STABLE ANGINA PECTORIS (H): ICD-10-CM

## 2021-07-21 RX ORDER — ROSUVASTATIN CALCIUM 10 MG/1
10 TABLET, COATED ORAL DAILY
Qty: 90 TABLET | Refills: 3 | Status: SHIPPED | OUTPATIENT
Start: 2021-07-21 | End: 2022-01-01

## 2021-07-21 NOTE — TELEPHONE ENCOUNTER
[facility] Call Center    Phone Message    May a detailed message be left on voicemail: yes     Reason for Call: Medication Refill Request    Has the patient contacted the pharmacy for the refill? Yes   Name of medication being requested: rosuvastatin (CRESTOR) 10 MG tablet [[id number]]  Provider who prescribed the medication: Dr. Tsai  Pharmacy: [facility] - [place], [place] - [address]  Date medication is needed: 7/21/21         Action Taken: Message routed to:  [facility] ([facility] Cardiology    Travel Screening: Not Applicable

## 2021-10-23 ENCOUNTER — HEALTH MAINTENANCE LETTER (OUTPATIENT)
Age: 86
End: 2021-10-23

## 2021-10-25 DIAGNOSIS — E11.21 TYPE 2 DIABETES MELLITUS WITH DIABETIC NEPHROPATHY, WITHOUT LONG-TERM CURRENT USE OF INSULIN (H): ICD-10-CM

## 2021-10-26 NOTE — TELEPHONE ENCOUNTER
"Requested Prescriptions   Signed Prescriptions Disp Refills    metFORMIN (GLUCOPHAGE) 500 MG tablet 90 tablet 3     Sig: TAKE 1 TABLET (500 MG) BY MOUTH DAILY (WITH BREAKFAST) FOLLOW-UP WITH YOUR PRIMARY CARE DOCTOR GIVEN KIDNEY FUNCTION       Biguanide Agents Passed - 10/25/2021 10:37 AM        Passed - Patient is age 10 or older        Passed - Patient has documented A1c within the specified period of time.     If HgbA1C is 8 or greater, it needs to be on file within the past 3 months.  If less than 8, must be on file within the past 6 months.     Recent Labs   Lab Test 06/04/21  1545   A1C 6.3*             Passed - Patient's CR is NOT>1.4 OR Patient's EGFR is NOT<45 within past 12 mos.     Recent Labs   Lab Test 06/04/21  1545   GFRESTIMATED 65   GFRESTBLACK 75       Recent Labs   Lab Test 06/04/21  1545   CR 0.96             Passed - Patient does NOT have a diagnosis of CHF.        Passed - Medication is active on med list        Passed - Recent (6 mo) or future (30 days) visit within the authorizing provider's specialty     Patient had office visit in the last 6 months or has a visit in the next 30 days with authorizing provider or within the authorizing provider's specialty.  See \"Patient Info\" tab in inbasket, or \"Choose Columns\" in Meds & Orders section of the refill encounter.               Thanks,  GLEN Smith  [facility]     "

## 2021-11-17 ENCOUNTER — OFFICE VISIT (OUTPATIENT)
Dept: FAMILY MEDICINE | Facility: CLINIC | Age: 86
End: 2021-11-17
Payer: COMMERCIAL

## 2021-11-17 VITALS
BODY MASS INDEX: 24.11 KG/M2 | DIASTOLIC BLOOD PRESSURE: 71 MMHG | HEART RATE: 68 BPM | SYSTOLIC BLOOD PRESSURE: 131 MMHG | TEMPERATURE: 98.2 F | WEIGHT: 150 LBS | OXYGEN SATURATION: 99 % | HEIGHT: 66 IN

## 2021-11-17 DIAGNOSIS — L21.9 SEBORRHEIC DERMATITIS: ICD-10-CM

## 2021-11-17 DIAGNOSIS — K59.01 SLOW TRANSIT CONSTIPATION: ICD-10-CM

## 2021-11-17 DIAGNOSIS — L82.1 SEBORRHEIC KERATOSIS: ICD-10-CM

## 2021-11-17 DIAGNOSIS — Z23 NEED FOR PROPHYLACTIC VACCINATION AND INOCULATION AGAINST INFLUENZA: ICD-10-CM

## 2021-11-17 DIAGNOSIS — I73.9 CLAUDICATION OF CALF MUSCLES (H): ICD-10-CM

## 2021-11-17 DIAGNOSIS — Z23 NEED FOR COVID-19 VACCINE: ICD-10-CM

## 2021-11-17 DIAGNOSIS — G25.2 TREMOR, COARSE: ICD-10-CM

## 2021-11-17 DIAGNOSIS — M17.11 ARTHRITIS OF RIGHT KNEE: Primary | ICD-10-CM

## 2021-11-17 DIAGNOSIS — R68.89 DIFFICULTY TRANSFERRING: ICD-10-CM

## 2021-11-17 DIAGNOSIS — I25.118 CORONARY ARTERY DISEASE OF NATIVE ARTERY OF NATIVE HEART WITH STABLE ANGINA PECTORIS (H): ICD-10-CM

## 2021-11-17 PROCEDURE — 91306 COVID-19,PF,MODERNA (18+ YRS BOOSTER .25ML): CPT | Performed by: FAMILY MEDICINE

## 2021-11-17 PROCEDURE — 90662 IIV NO PRSV INCREASED AG IM: CPT | Performed by: FAMILY MEDICINE

## 2021-11-17 PROCEDURE — 0064A COVID-19,PF,MODERNA (18+ YRS BOOSTER .25ML): CPT | Performed by: FAMILY MEDICINE

## 2021-11-17 PROCEDURE — 99214 OFFICE O/P EST MOD 30 MIN: CPT | Mod: 25 | Performed by: FAMILY MEDICINE

## 2021-11-17 PROCEDURE — G0008 ADMIN INFLUENZA VIRUS VAC: HCPCS | Performed by: FAMILY MEDICINE

## 2021-11-17 RX ORDER — AMOXICILLIN 250 MG
1 CAPSULE ORAL 2 TIMES DAILY PRN
Qty: 180 TABLET | Refills: 3 | Status: SHIPPED | OUTPATIENT
Start: 2021-11-17

## 2021-11-17 RX ORDER — KETOCONAZOLE 20 MG/ML
SHAMPOO TOPICAL DAILY PRN
Qty: 120 ML | Refills: 3 | Status: SHIPPED | OUTPATIENT
Start: 2021-11-17

## 2021-11-17 RX ORDER — KETOCONAZOLE 20 MG/G
CREAM TOPICAL DAILY
Qty: 30 G | Refills: 3 | Status: SHIPPED | OUTPATIENT
Start: 2021-11-17

## 2021-11-17 ASSESSMENT — MIFFLIN-ST. JEOR: SCORE: 1233.15

## 2021-11-17 NOTE — PROGRESS NOTES
"Assessment & Plan     Arthritis of right knee  Osteoarthritis with Pagets. May benefit from steroid injection  - Orthopedic  Referral; Future    Tremor, coarse  Need adaptive assistance feeding self  - HOME CARE NURSING REFERRAL    Difficulty transferring  Daughters need assistance/help for patient   - HOME CARE NURSING REFERRAL    Coronary artery disease of native artery of native heart with stable angina pectoris (H)  At goal     Claudication of calf muscles (H)  Paces self    Slow transit constipation  chronic  - senna-docusate (SENOKOT-S/PERICOLACE) 8.6-50 MG tablet; Take 1 tablet by mouth 2 times daily as needed for constipation    Seborrheic keratosis  No worrisome     Seborrheic dermatitis  Responses to medications   - ketoconazole (NIZORAL) 2 % external shampoo; Apply topically daily as needed for itching  - ketoconazole (NIZORAL) 2 % external cream; Apply topically daily    Need for prophylactic vaccination and inoculation against influenza  - FLU VACCINE, INCREASED ANTIGEN, PRESV FREE, AGE 65+ [73066]    Need for COVID-19 vaccine  - COVID-19,PF,MODERNA (18+ YRS BOOSTER .25ML)    Review of external notes as documented elsewhere in note  Ordering of each unique test  Prescription drug management  30 minutes spent on the date of the encounter doing chart review, history and exam, documentation and further activities per the note     Patient Instructions   Traditional medicinals \"Smooth Move\"    OT, PT, homecare          No follow-ups on file.    Buddy Poe MD  [facility] is [facility] 89 year [facility] who presents for the following health issues  accompanied by his daughters.    HPI     Concern - right knee pain  Onset: years  Description: limits walking, transfers  Intensity: moderate, severe  Progression of Symptoms:  worsening and waxing and waning  Accompanying Signs & Symptoms: weakness  Previous history of similar problem: yes   Precipitating factors:    " "    Worsened by: inactivity/activity  Alleviating factors:        Improved by: rest/use  Therapies tried and outcome: steroid injection helped in past    Vascular Disease Follow-up      How often do you take nitroglycerin? Never    Do you take an aspirin every day? Yes    Dementia Follow-up      Patient history: moderate dementia, cerebral vasc vs Parkinson-like    Residual symptoms: Difficulty for daughters to help transfer, Difficulty feeding due intention tremor and poor balance    Worsened or new symptoms since last visit:  YES     Daily aspirin use: Yes    Hypertension controlled: Yes      How many days per week do you miss taking your medication? 0    Review of Systems   Constitutional, HEENT, cardiovascular, pulmonary, gi and gu systems are negative, except as otherwise noted. Seborrheic dermatitis- needs Rx refills cream, shampoo      Objective    /71   Pulse 68   Temp 98.2  F (36.8  C) (Temporal)   Ht 1.676 m (5' 6\")   Wt 68 kg (150 lb)   SpO2 99%   BMI 24.21 kg/m    Body mass index is 24.21 kg/m .  Physical Exam   GENERAL: no distress, frail, elderly and fatigued  RESP: lungs clear to auscultation - no rales, rhonchi or wheezes  CV: regular rate and rhythm, normal S1 S2, no S3 or S4, no murmur, click or rub, no peripheral edema and peripheral pulses strong  ABDOMEN: soft, nontender, bowel sounds normal and stool, soft LLQ  MS: no gross musculoskeletal defects noted, no edema  SKIN: keratoses - seborrheic and maculopapular eruption - face  NEURO: weakness generalized, tremor coarse, intention, abnormal mental status - pleasantly forgetful and gait abnormal: using transport chair  PSYCH: affect flat, judgement and insight impaired and appearance well groomed        "

## 2021-11-18 ENCOUNTER — TELEPHONE (OUTPATIENT)
Dept: FAMILY MEDICINE | Facility: CLINIC | Age: 86
End: 2021-11-18
Payer: COMMERCIAL

## 2021-11-18 NOTE — TELEPHONE ENCOUNTER
Orthopedic  rec'd an online appt req for pt to see  for a cortisone injection.  I called and lm with our phone number on it to schedule

## 2021-11-19 ENCOUNTER — MEDICAL CORRESPONDENCE (OUTPATIENT)
Dept: HEALTH INFORMATION MANAGEMENT | Facility: CLINIC | Age: 86
End: 2021-11-19
Payer: COMMERCIAL

## 2021-11-22 NOTE — TELEPHONE ENCOUNTER
DIAGNOSIS: return R knee injection   APPOINTMENT DATE: 12.3.21   NOTES STATUS DETAILS   OFFICE NOTE from referring provider N/A    OFFICE NOTE from other specialist Internal 7.1.20 Dr eDdrick Muniz, [facility]  1.7.20 6.21.19 Dr Kiran Melchor, [facility]   6.7.19   DISCHARGE SUMMARY from hospital N/A    DISCHARGE REPORT from the ER N/A    OPERATIVE REPORT N/A    EMG report N/A    MEDICATION LIST Internal    MRI N/A    DEXA (osteoporosis/bone health) N/A    CT SCAN N/A    XRAYS (IMAGES & REPORTS) Internal 5.21.19 B knee standing

## 2021-11-23 ENCOUNTER — TELEPHONE (OUTPATIENT)
Dept: FAMILY MEDICINE | Facility: CLINIC | Age: 86
End: 2021-11-23
Payer: COMMERCIAL

## 2021-11-23 NOTE — TELEPHONE ENCOUNTER
RNs,  Cristian from [facility] requests the following verbal orders from Dr. Poe:    PT for strengthening, balance, and gait training  OT eval and treat     Cristian's best call back # [phone]  Ok to leave detailed vm  Confidential vm    Thanks!  Deepika TURNER

## 2021-11-23 NOTE — TELEPHONE ENCOUNTER
Attempted to call aria Foster DVM with verbal ok for orders below.     Thanks,  GLEN Smith  [facility]

## 2021-11-30 ENCOUNTER — TELEPHONE (OUTPATIENT)
Dept: FAMILY MEDICINE | Facility: CLINIC | Age: 86
End: 2021-11-30
Payer: COMMERCIAL

## 2021-11-30 PROBLEM — R68.89 DIFFICULTY TRANSFERRING: Status: ACTIVE | Noted: 2021-11-30

## 2021-11-30 PROBLEM — K59.01 SLOW TRANSIT CONSTIPATION: Status: ACTIVE | Noted: 2020-01-15

## 2021-11-30 PROBLEM — G25.2 TREMOR, COARSE: Status: ACTIVE | Noted: 2020-01-15

## 2021-11-30 NOTE — TELEPHONE ENCOUNTER
Dr. Poe,    [facility] RN calling with a request:    Pt saw you for visit on 11/17/21, and they note that the visit notes are not complete/signed. Face to face visit with a physician needs to be completed as a requirement for the home care referral that you placed - they need these visit notes to be completed and signed ASAP or they will have to discharge pt from home care services. Deadline for this is 12/17/21.    Oumou Arora RN  [facility]

## 2021-12-02 ENCOUNTER — TELEPHONE (OUTPATIENT)
Dept: FAMILY MEDICINE | Facility: CLINIC | Age: 86
End: 2021-12-02
Payer: COMMERCIAL

## 2021-12-02 NOTE — TELEPHONE ENCOUNTER
Reason for Call:  Form, our goal is to have forms completed with 72 hours, however, some forms may require a visit or additional information.    Type of letter, form or note:  Home Health Certification-HOME HEALTH CERTIFICATION AND PLAN OF CARE 11/19-01/17/22    Who is the form from?: Home care    Where did the form come from: form was faxed in    What clinic location was the form placed at?: [facility]    Where the form was placed:  Box/Folder    What number is listed as a contact on the form?:  PHONE [phone]  [phone]        Additional comments: PLEASE sign date and return back to fax number listed above    Call taken on 12/2/2021 at 1:01 PM by Clara Healy

## 2021-12-02 NOTE — TELEPHONE ENCOUNTER
Reason for Call: Request for an order or referral:    Order or referral being requested: ot therapy upper coordination / strength 1 x a week for 3 weeks    Date needed: as soon as possible    Has the patient been seen by the PCP for this problem? YES    Additional comments:     Phone number Patient can be reached at:  Other phone number:    [phone]  Best Time:      Can we leave a detailed message on this number?  YES    Call taken on 12/2/2021 at 3:39 PM by Chanell Zazueta

## 2021-12-03 ENCOUNTER — MEDICAL CORRESPONDENCE (OUTPATIENT)
Dept: HEALTH INFORMATION MANAGEMENT | Facility: CLINIC | Age: 86
End: 2021-12-03

## 2021-12-03 ENCOUNTER — PRE VISIT (OUTPATIENT)
Dept: ORTHOPEDICS | Facility: CLINIC | Age: 86
End: 2021-12-03
Payer: COMMERCIAL

## 2021-12-03 DIAGNOSIS — Z53.9 DIAGNOSIS NOT YET DEFINED: Primary | ICD-10-CM

## 2021-12-03 PROCEDURE — 99207 PR MD CERTIFICATION HHA PATIENT: CPT | Performed by: FAMILY MEDICINE

## 2021-12-03 NOTE — TELEPHONE ENCOUNTER
Attempted to call home care PT, left dvm with verbal ok for orders.     Thanks,  GLEN Smith  [facility]

## 2021-12-16 ENCOUNTER — TELEPHONE (OUTPATIENT)
Dept: FAMILY MEDICINE | Facility: CLINIC | Age: 86
End: 2021-12-16
Payer: COMMERCIAL

## 2021-12-16 NOTE — TELEPHONE ENCOUNTER
Attempted to call home care RN, left DVM with verbal ok for below.     Thanks,  GLEN Smith  [facility]

## 2021-12-16 NOTE — TELEPHONE ENCOUNTER
Reason for Call:  Home Health Care    [facility] called regarding: Request Change of appointment.   12/23 change to the week of 12/26    Orders are needed for this patient. PT    PT: Stretching exercises with balance and weight training      OT: N/A    Skilled Nursing: N/A    Pt Provider: Buddy Poe MD    Phone Number Homecare Nurse can be reached at: [phone]    Can we leave a detailed message on this number? YES    Phone number patient can be reached at: Home number on file [phone] (home)    Best Time: Anytime    Call taken on 12/16/2021 at 4:06 PM by Roseann Simmons MA

## 2021-12-18 ENCOUNTER — HEALTH MAINTENANCE LETTER (OUTPATIENT)
Age: 86
End: 2021-12-18

## 2021-12-21 ENCOUNTER — MEDICAL CORRESPONDENCE (OUTPATIENT)
Dept: HEALTH INFORMATION MANAGEMENT | Facility: CLINIC | Age: 86
End: 2021-12-21
Payer: COMMERCIAL

## 2022-01-01 ENCOUNTER — MYC MEDICAL ADVICE (OUTPATIENT)
Dept: FAMILY MEDICINE | Facility: CLINIC | Age: 87
End: 2022-01-01

## 2022-01-01 ENCOUNTER — MEDICAL CORRESPONDENCE (OUTPATIENT)
Dept: HEALTH INFORMATION MANAGEMENT | Facility: CLINIC | Age: 87
End: 2022-01-01

## 2022-01-01 ENCOUNTER — MEDICAL CORRESPONDENCE (OUTPATIENT)
Dept: FAMILY MEDICINE | Facility: CLINIC | Age: 87
End: 2022-01-01

## 2022-01-01 ENCOUNTER — TELEPHONE (OUTPATIENT)
Dept: FAMILY MEDICINE | Facility: CLINIC | Age: 87
End: 2022-01-01

## 2022-01-01 ENCOUNTER — MYC MEDICAL ADVICE (OUTPATIENT)
Dept: FAMILY MEDICINE | Facility: CLINIC | Age: 87
End: 2022-01-01
Payer: COMMERCIAL

## 2022-01-01 ENCOUNTER — ANCILLARY PROCEDURE (OUTPATIENT)
Dept: GENERAL RADIOLOGY | Facility: CLINIC | Age: 87
End: 2022-01-01
Attending: FAMILY MEDICINE
Payer: COMMERCIAL

## 2022-01-01 ENCOUNTER — TELEPHONE (OUTPATIENT)
Dept: FAMILY MEDICINE | Facility: CLINIC | Age: 87
End: 2022-01-01
Payer: COMMERCIAL

## 2022-01-01 ENCOUNTER — PATIENT OUTREACH (OUTPATIENT)
Dept: CARE COORDINATION | Facility: CLINIC | Age: 87
End: 2022-01-01
Payer: COMMERCIAL

## 2022-01-01 ENCOUNTER — HEALTH MAINTENANCE LETTER (OUTPATIENT)
Age: 87
End: 2022-01-01

## 2022-01-01 ENCOUNTER — MEDICAL CORRESPONDENCE (OUTPATIENT)
Dept: HEALTH INFORMATION MANAGEMENT | Facility: CLINIC | Age: 87
End: 2022-01-01
Payer: COMMERCIAL

## 2022-01-01 ENCOUNTER — PATIENT OUTREACH (OUTPATIENT)
Dept: CARE COORDINATION | Facility: CLINIC | Age: 87
End: 2022-01-01

## 2022-01-01 ENCOUNTER — DOCUMENTATION ONLY (OUTPATIENT)
Dept: OTHER | Facility: CLINIC | Age: 87
End: 2022-01-01

## 2022-01-01 ENCOUNTER — OFFICE VISIT (OUTPATIENT)
Dept: FAMILY MEDICINE | Facility: CLINIC | Age: 87
End: 2022-01-01
Payer: COMMERCIAL

## 2022-01-01 VITALS
TEMPERATURE: 97.1 F | HEART RATE: 68 BPM | DIASTOLIC BLOOD PRESSURE: 63 MMHG | RESPIRATION RATE: 18 BRPM | WEIGHT: 134.5 LBS | OXYGEN SATURATION: 96 % | BODY MASS INDEX: 21.71 KG/M2 | SYSTOLIC BLOOD PRESSURE: 112 MMHG

## 2022-01-01 DIAGNOSIS — E11.21 TYPE 2 DIABETES MELLITUS WITH DIABETIC NEPHROPATHY, WITHOUT LONG-TERM CURRENT USE OF INSULIN (H): ICD-10-CM

## 2022-01-01 DIAGNOSIS — G30.1 LATE ONSET ALZHEIMER'S DISEASE WITHOUT BEHAVIORAL DISTURBANCE (H): Primary | ICD-10-CM

## 2022-01-01 DIAGNOSIS — I25.118 CORONARY ARTERY DISEASE OF NATIVE ARTERY OF NATIVE HEART WITH STABLE ANGINA PECTORIS (H): ICD-10-CM

## 2022-01-01 DIAGNOSIS — M54.50 ACUTE RIGHT-SIDED LOW BACK PAIN WITHOUT SCIATICA: ICD-10-CM

## 2022-01-01 DIAGNOSIS — F02.80 LATE ONSET ALZHEIMER'S DISEASE WITHOUT BEHAVIORAL DISTURBANCE (H): Primary | ICD-10-CM

## 2022-01-01 DIAGNOSIS — M54.50 ACUTE RIGHT-SIDED LOW BACK PAIN WITHOUT SCIATICA: Primary | ICD-10-CM

## 2022-01-01 DIAGNOSIS — F03.918 SENILE DEMENTIA, WITH BEHAVIORAL DISTURBANCE (H): Primary | ICD-10-CM

## 2022-01-01 DIAGNOSIS — Z13.220 SCREENING FOR HYPERLIPIDEMIA: ICD-10-CM

## 2022-01-01 DIAGNOSIS — L21.9 SEBORRHEIC DERMATITIS: ICD-10-CM

## 2022-01-01 DIAGNOSIS — I25.118 CORONARY ARTERY DISEASE OF NATIVE ARTERY OF NATIVE HEART WITH STABLE ANGINA PECTORIS (H): Primary | ICD-10-CM

## 2022-01-01 PROCEDURE — 72100 X-RAY EXAM L-S SPINE 2/3 VWS: CPT | Performed by: RADIOLOGY

## 2022-01-01 PROCEDURE — 99214 OFFICE O/P EST MOD 30 MIN: CPT | Performed by: FAMILY MEDICINE

## 2022-01-01 RX ORDER — ROSUVASTATIN CALCIUM 10 MG/1
10 TABLET, COATED ORAL DAILY
Qty: 90 TABLET | OUTPATIENT
Start: 2022-01-01

## 2022-01-01 RX ORDER — ISOSORBIDE MONONITRATE 30 MG/1
30 TABLET, EXTENDED RELEASE ORAL DAILY
Qty: 90 TABLET | Refills: 3 | Status: SHIPPED | OUTPATIENT
Start: 2022-01-01

## 2022-01-01 RX ORDER — ROSUVASTATIN CALCIUM 10 MG/1
10 TABLET, COATED ORAL DAILY
Qty: 90 TABLET | Refills: 3 | Status: SHIPPED | OUTPATIENT
Start: 2022-01-01

## 2022-01-01 RX ORDER — ISOSORBIDE MONONITRATE 30 MG/1
30 TABLET, EXTENDED RELEASE ORAL DAILY
Qty: 90 TABLET | Refills: 3 | OUTPATIENT
Start: 2022-01-01

## 2022-01-01 RX ORDER — FLUOCINOLONE ACETONIDE 0.1 MG/G
CREAM TOPICAL 2 TIMES DAILY
Qty: 15 G | Refills: 3 | Status: SHIPPED | OUTPATIENT
Start: 2022-01-01

## 2022-01-01 ASSESSMENT — PAIN SCALES - GENERAL: PAINLEVEL: NO PAIN (0)

## 2022-01-01 ASSESSMENT — PATIENT HEALTH QUESTIONNAIRE - PHQ9
10. IF YOU CHECKED OFF ANY PROBLEMS, HOW DIFFICULT HAVE THESE PROBLEMS MADE IT FOR YOU TO DO YOUR WORK, TAKE CARE OF THINGS AT HOME, OR GET ALONG WITH OTHER PEOPLE: VERY DIFFICULT
SUM OF ALL RESPONSES TO PHQ QUESTIONS 1-9: 14
SUM OF ALL RESPONSES TO PHQ QUESTIONS 1-9: 14

## 2022-02-18 ENCOUNTER — MYC MEDICAL ADVICE (OUTPATIENT)
Dept: FAMILY MEDICINE | Facility: CLINIC | Age: 87
End: 2022-02-18
Payer: COMMERCIAL

## 2022-02-22 NOTE — TELEPHONE ENCOUNTER
Spoke with Jes and helped add pt to schedule for 2/25 at 3pm. I asked Jes to please arrive at least 10 minutes early. Agreeable and verbalized understanding.     Thanks,  GLEN Smith  [facility]

## 2022-02-22 NOTE — TELEPHONE ENCOUNTER
Spoke with daughter, she understands provider is not in clinic today and will advise when he returns tomorrow    Marilyn Johnston RN   [facility]

## 2022-02-22 NOTE — TELEPHONE ENCOUNTER
My 3:00 will not take more than 15 minutes or so, if she is available.  Please ask that Mr. Carcamo come in at 3 giving them some time to get checked into her room.  I should be able to check him out and make a plan for labs in about 20 minutes before my 340  Please notify, thanks Buddy

## 2022-02-22 NOTE — TELEPHONE ENCOUNTER
Encourage fluids, OK to dbl book appointment in clinic in a virtual spot 3 pm this Fri 2/25.    [facility] if worse in meantime.     Please notify daughter, thanks Buddy

## 2022-02-22 NOTE — TELEPHONE ENCOUNTER
Daughter calling requesting appt for labwork, see [employer] message. She would like a reply back to schedule as soon as possible.    Nicolasa Torres RN

## 2022-02-22 NOTE — TELEPHONE ENCOUNTER
Dr. Poe,    Your 3 pm has already been taken for 3/25... I could put patient end of day at 4 pm?    Thanks,  GLEN Smith  [facility]

## 2022-02-25 ENCOUNTER — LAB (OUTPATIENT)
Dept: LAB | Facility: CLINIC | Age: 87
End: 2022-02-25

## 2022-02-25 ENCOUNTER — OFFICE VISIT (OUTPATIENT)
Dept: FAMILY MEDICINE | Facility: CLINIC | Age: 87
End: 2022-02-25
Payer: COMMERCIAL

## 2022-02-25 VITALS
RESPIRATION RATE: 16 BRPM | SYSTOLIC BLOOD PRESSURE: 128 MMHG | HEART RATE: 62 BPM | DIASTOLIC BLOOD PRESSURE: 72 MMHG | OXYGEN SATURATION: 99 % | TEMPERATURE: 98 F

## 2022-02-25 DIAGNOSIS — R41.0 CONFUSION: ICD-10-CM

## 2022-02-25 DIAGNOSIS — Z13.220 SCREENING FOR HYPERLIPIDEMIA: ICD-10-CM

## 2022-02-25 DIAGNOSIS — F02.80 LATE ONSET ALZHEIMER'S DISEASE WITHOUT BEHAVIORAL DISTURBANCE (H): Primary | ICD-10-CM

## 2022-02-25 DIAGNOSIS — G30.1 LATE ONSET ALZHEIMER'S DISEASE WITHOUT BEHAVIORAL DISTURBANCE (H): Primary | ICD-10-CM

## 2022-02-25 DIAGNOSIS — E11.21 TYPE 2 DIABETES MELLITUS WITH DIABETIC NEPHROPATHY, WITHOUT LONG-TERM CURRENT USE OF INSULIN (H): ICD-10-CM

## 2022-02-25 DIAGNOSIS — H61.21 IMPACTED CERUMEN OF RIGHT EAR: ICD-10-CM

## 2022-02-25 PROBLEM — I73.9 CLAUDICATION OF CALF MUSCLES (H): Status: RESOLVED | Noted: 2021-11-17 | Resolved: 2022-02-25

## 2022-02-25 LAB
ALBUMIN UR-MCNC: ABNORMAL MG/DL
APPEARANCE UR: CLEAR
BACTERIA #/AREA URNS HPF: ABNORMAL /HPF
BILIRUB UR QL STRIP: NEGATIVE
COLOR UR AUTO: YELLOW
ERYTHROCYTE [DISTWIDTH] IN BLOOD BY AUTOMATED COUNT: 13 % (ref 10–15)
GLUCOSE UR STRIP-MCNC: NEGATIVE MG/DL
HBA1C MFR BLD: 6.4 % (ref 0–5.6)
HCT VFR BLD AUTO: 40.3 % (ref 40–53)
HGB BLD-MCNC: 13 G/DL (ref 13.3–17.7)
HGB UR QL STRIP: NEGATIVE
HYALINE CASTS #/AREA URNS LPF: ABNORMAL /LPF
KETONES UR STRIP-MCNC: ABNORMAL MG/DL
LEUKOCYTE ESTERASE UR QL STRIP: NEGATIVE
MCH RBC QN AUTO: 33.8 PG (ref 26.5–33)
MCHC RBC AUTO-ENTMCNC: 32.3 G/DL (ref 31.5–36.5)
MCV RBC AUTO: 105 FL (ref 78–100)
MUCOUS THREADS #/AREA URNS LPF: PRESENT /LPF
NITRATE UR QL: NEGATIVE
PH UR STRIP: 5 [PH] (ref 5–7)
PLATELET # BLD AUTO: 199 10E3/UL (ref 150–450)
RBC # BLD AUTO: 3.85 10E6/UL (ref 4.4–5.9)
RBC #/AREA URNS AUTO: ABNORMAL /HPF
SP GR UR STRIP: >=1.03 (ref 1–1.03)
SQUAMOUS #/AREA URNS AUTO: ABNORMAL /LPF
UROBILINOGEN UR STRIP-ACNC: 0.2 E.U./DL
WBC # BLD AUTO: 12.1 10E3/UL (ref 4–11)
WBC #/AREA URNS AUTO: ABNORMAL /HPF

## 2022-02-25 PROCEDURE — G0268 REMOVAL OF IMPACTED WAX MD: HCPCS | Mod: 59 | Performed by: FAMILY MEDICINE

## 2022-02-25 PROCEDURE — 99214 OFFICE O/P EST MOD 30 MIN: CPT | Mod: 25 | Performed by: FAMILY MEDICINE

## 2022-02-25 PROCEDURE — 85027 COMPLETE CBC AUTOMATED: CPT

## 2022-02-25 PROCEDURE — 83036 HEMOGLOBIN GLYCOSYLATED A1C: CPT

## 2022-02-25 PROCEDURE — 81001 URINALYSIS AUTO W/SCOPE: CPT

## 2022-02-25 PROCEDURE — 36415 COLL VENOUS BLD VENIPUNCTURE: CPT

## 2022-02-26 NOTE — PATIENT INSTRUCTIONS
Deal lightly with emotional lability    Leave briefly if need be    Distract, use TV    May need home care PT to assist with transfers/osteoarthritic spine

## 2022-02-28 NOTE — TELEPHONE ENCOUNTER
Called about elevated WBC: likely stress demargination  Followup in 6-8 weeks in clinic  Buddy Poe MD

## 2022-03-08 DIAGNOSIS — E11.21 TYPE 2 DIABETES MELLITUS WITH DIABETIC NEPHROPATHY, WITHOUT LONG-TERM CURRENT USE OF INSULIN (H): ICD-10-CM

## 2022-03-08 RX ORDER — BLOOD SUGAR DIAGNOSTIC
STRIP MISCELLANEOUS
Qty: 100 STRIP | Refills: 3 | Status: SHIPPED | OUTPATIENT
Start: 2022-03-08

## 2022-03-08 NOTE — TELEPHONE ENCOUNTER
Signed Prescriptions:                        Disp   Refills    ONETOUCH ULTRA test strip                  100 st*3        Sig: USE TO TEST BLOOD SUGAR 2 TIMES DAILY OR AS DIRECTED.  Authorizing Provider: GERARDO PALMER  Ordering User: IMELDA SMITH    Prescription approved per [facility] Refill Protocol.

## 2022-04-11 NOTE — TELEPHONE ENCOUNTER
Dr Poe    See pt message  Do you want to do an xray?  sore lower right side back    Marilyn Johnston RN   [facility]

## 2022-04-11 NOTE — TELEPHONE ENCOUNTER
KK and RNs,   Patient still complaining of pain   Daughter calling to f/u on this   Says it's hard to get an idea on the pain - he doesn't say much about it - does have stairs in his home that he can usually manage fine - seems to be taking them much slower now   Would like to proceed with fidel GARCIA, RN

## 2022-04-12 NOTE — TELEPHONE ENCOUNTER
Yes, please help arrange. Portable, or will they bring him in?    Order signed, please notify, thanks Buddy

## 2022-04-12 NOTE — TELEPHONE ENCOUNTER
Spoke with daughter, she reported he did walk up and down the stairs butr would still like to have the xray, gave number to her to get it scheduled    Marilyn Johnston RN   [facility]    '

## 2022-04-13 NOTE — RESULT ENCOUNTER NOTE
OK to use ibuprofen 200 mg 3 x daily with food as needed  Contact if worse or not gradually improving over the next month  Corset/abdominal binder only if worse    Buddy

## 2022-05-05 NOTE — TELEPHONE ENCOUNTER
Yes, fluticasone is worth a try; and fewer side effects than oral cold pills. If need be, try children's copy of labs/congestion syrup, using tiny doses like 1/2 tsp 2.5 ml. Please notify, thanks Buddy

## 2022-05-12 NOTE — TELEPHONE ENCOUNTER
Dr. Poe,     Please see below. Family interested in home based PT/OT. Do you need a visit or ok to enter orders?    Thanks,  GLEN Smith  [facility]

## 2022-05-12 NOTE — TELEPHONE ENCOUNTER
"  Reason for Call:  Same Day Appointment, Requested Provider:  PCP    PCP: Buddy Poe    Reason for visit: \"Phong is weaker as a result of his fall (cracked vertibrae) and bad cold.  He is sleeping more and not walking much.  I would like to consult with Dr. Poe about options for home based PT, OT or hospice.  We consulted with a [employer] but Phong isn't thrilled about that cost.  However, he isn't strong enough to do stairs yet.  Also, getting more difficult for him to stand up from bed.  It would be helpful to get expert advice on dealing with his more limited mobility and strength\"    Duration of symptoms: n/a    Have you been treated for this in the past? Yes    Additional comments: The above message came from the [facility] Appointment requests     Can we leave a detailed message on this number? YES    Phone number patient can be reached at: Home number on file [phone] (home)    Best Time: Anytime    Call taken on 5/12/2022 at 1:03 AM by Stephanie Mcneil    "

## 2022-05-13 NOTE — TELEPHONE ENCOUNTER
Dr Poe    See pt messages,  Referral cued    Marilyn Johnston, RN   [facility]

## 2022-05-16 NOTE — TELEPHONE ENCOUNTER
Dr Poe    Talked with daughter Jes, they would like a referral for hospice,   [facility] so I had faxed the referral for PT/OT to [facility] as they did have ability to do the intake, so hospice to them would make sense    Referral cued    Marilyn Johnston RN   [facility]

## 2022-05-16 NOTE — TELEPHONE ENCOUNTER
I signed order referencing last FACE TO FACE 2-25-22 for dementia, addended that he may need home PT to help transfer due to dementia.  Check with homecare to see if that suffices. Please suggest to daughters that it may be time to enroll in hospice who could provide such help and more.   Please contact patient's daughters.   Thanks Buddy

## 2022-05-16 NOTE — TELEPHONE ENCOUNTER
[facility] for PT/OT referral faxed to them     Marilyn Johnston RN   [facility]

## 2022-05-17 NOTE — TELEPHONE ENCOUNTER
Referral for hospice faxed to [facility], [phone]    Marilyn Johnston RN   [facility]

## 2022-05-17 NOTE — TELEPHONE ENCOUNTER
Needs referral clarified. 1 sent for Hospice and another for home care. Cannot both be opened at the same time.   Please call Nadege to follow up.  [phone]  Option 1 (Ask for Nadege) okay to leave detailed message

## 2022-05-17 NOTE — TELEPHONE ENCOUNTER
Left detailed message for Nadege that order is for hospice.  Asked her to call back if questions.    Kay Smalls RN

## 2022-05-24 NOTE — TELEPHONE ENCOUNTER
Call from daughter, they have not heard from hospice yet, refaxed the referral to [facility] with message attached regarding    Marilyn Johnston RN   [facility]

## 2022-05-31 NOTE — TELEPHONE ENCOUNTER
[facility] calling. They recently saw Phong. He does not have a terminal diagnosis. He is too functional for an Alzheimer terminal diagnosis. He will proceed with PT/OT and SW with skilled home care nursing division.      Daughters were hoping to get help caring for him. He is still living at home. The home care division will be contacting Dr. Poe. Eventually he will be eligible for hospice.       Shakira Galeano RN

## 2022-06-01 NOTE — TELEPHONE ENCOUNTER
POD  Dr Deepika Tavarez to [facility], PT/OT eval and tx    Marilyn Johnston, RN   [facility]

## 2022-06-01 NOTE — TELEPHONE ENCOUNTER
Left detailed message on home care secure     Marilyn Johnston, RN   [facility]

## 2022-06-02 NOTE — TELEPHONE ENCOUNTER
Daughter calling as they are not happy with the [facility] team.     Wondering if we could check with [facility] again to see if they have the availability for PT/OT      Also wondering if PCP has any recommendations for hospice care?

## 2022-06-13 NOTE — TELEPHONE ENCOUNTER
[facility]   [facility] to [facility] as they are at capacity.     Deepika TURNER

## 2022-06-14 NOTE — TELEPHONE ENCOUNTER
"Requested Prescriptions   Pending Prescriptions Disp Refills     isosorbide mononitrate (IMDUR) 30 MG 24 hr tablet [Pharmacy Med Name: ISOSORBIDE MN ER 30 MG TAB 30 Tablet] 90 tablet 3     Sig: TAKE 1 TABLET (30 MG) BY MOUTH DAILY       Nitrates Passed - 6/11/2022  3:04 PM        Passed - Blood pressure under 140/90 in past 12 months     BP Readings from Last 3 Encounters:   02/25/22 128/72   11/17/21 131/71   06/04/21 120/68                 Passed - Pt is not on erectile dysfunction medications        Passed - Recent (12 mo) or future (30 days) visit within the authorizing provider's specialty     Patient has had an office visit with the authorizing provider or a provider within the authorizing providers department within the previous 12 mos or has a future within next 30 days. See \"Patient Info\" tab in inbasket, or \"Choose Columns\" in Meds & Orders section of the refill encounter.              Passed - Medication is active on med list        Passed - Patient is age 18 or older           Prescription approved per [employer] Refill Protocol.    Nicolasa Torres RN    "

## 2022-06-15 NOTE — PROGRESS NOTES
Clinic Care Coordination Contact    Situation: Patient chart reviewed by care coordinator.    Background: Referral received for care coordination for purpose of finding home care for patient.     Assessment: Per chart review, patient and family are not happy with [facility] and needing new agency for PT and OT.    Plan/Recommendations: Writer will transfer referral to [facility] for finding home care agency.     Indira Ortiz, RN Care Coordinator     [facility]  [facility]  [email or web address]    Office: [phone]

## 2022-06-18 NOTE — LETTER
To Whom It May Concern:    My name is Taylor Porter, an RN Care Coordinator with [facility].  Please review attached home care referral for SOC services.    For any questions/concerns, and/or to update on the status of this referral, please call me directly at [phone].      Thank you,    Taylor Porter, RN  [facility]  [facility] - Ambulatory Care Management                                                                 PATIENT DEMOGRAPHICS:   Name: Kiran Carcamo MRN: [id number]   Address: [address] [place] [address] [facility]   City/St/Zip: [place] [address] : 1921   Home Phone: [phone] Work Phone:     [facility]: [place] Cell Phone: [phone]       EMERGENCY CONTACT:  Contact Name: Jes Carcamo Relationship: Daughter   Home Phone: [phone] Work Phone:         Cell Phone: [phone]      GUARANTOR INFORMATION: Relationship to Patient: Self  Name: Kiran Carcamo       Address: [address] [place], [place] [address]   Account Type: Personal/family   City/St/[place] [address] Employer:     Home Phone: [phone] Work Phone:          COVERAGE INFORMATION:  Primary Payor: [employer] Plan: [employer]*   Subscriber: Kiran Carcamo Group #:    Subscriber Sex: Male       Subscriber : 1921 Patient Rel'ship: Self   Subscriber Effective Date:   Member Effective Date: 2021    Subscriber ID [id number] Member ID: [id number]      Secondary Payor:   Plan:     Subscriber:   Group #:     Subscriber Sex:         Subscriber :   Patient Rel'ship:     Subscriber Effective Date:   Member Effective Date:     Subscriber ID   Member ID:        PROVIDER INFORMATION:  Referring Physician:   Referring Address:     Referring Phone: N/A Referring Fax:     Primary Physician: Buddy Poe Primary Address: [address] [address]   Primary Phone: [phone] Primary Fax: [phone]            Documentation of Face to Face and Certification for Home Health Services     I attest that I saw or will see Kiran Carcamo on this date:  2/25/2022     This encounter with the patient was in whole, or in part, for medical condition, which is the primary reason for Home Health Care:       Patient Active Problem List   Diagnosis     Health Care Home     Actinic keratosis     Seborrheic keratosis     Hyperplasia of prostate     Hyperlipidemia LDL goal <70     History of MI (myocardial infarction)     Late onset Alzheimer's disease without behavioral disturbance (H)     CKD (chronic kidney disease) stage 2, GFR 60-89 ml/min     Essential hypertension with goal blood pressure less than 140/90     Eczema, unspecified type     Type 2 diabetes mellitus with diabetic nephropathy, without long-term current use of insulin (H)     Paget's bone disease     History of skin cancer     Seborrheic dermatitis     Bilateral hearing loss     History of cataract surgery     Coronary artery disease of native artery of native heart with stable angina pectoris (H)     S/P CABG (coronary artery bypass graft)     Arthritis of right knee     Tremor, coarse     Balance problems     At risk for falling     Slow transit constipation     Difficulty transferring      I certify that, based on my findings, the following services are medically necessary Home Health Services: Physical Therapy   Gait Training  Therapeutic Exercise  Transfer Training     Additional services needed: Occupational Therapy ADLs  Home Safety      Further, I certify that my clinical findings support that this patient is homebound (i.e. absences from home require considerable and taxing effort and are for medical reasons or Hinduism services or infrequently or of short duration when for other reasons) related to:Patient has difficulty ambulating >100 ft  Patient gets lost or wanders due to cognitive impairments  Requires assistance of another person or  specialized equipment is needed     Based on the above findings, I certify that this patient is confined to the home and needs intermittent skilled nursing care, physical therapy and/or speech therapy. The patient is under my care, and I have initiated the establishment of the plan of care. This patient will be followed by a physician who will periodically review the plan of care.        Physician/Provider to provide follow up care: Provider to follow patient: GERARDO PALMER [[id number]]        Please be aware that coverage of these services is subject to the terms and limitations of your health insurance plan.  Call member services at your health plan with any benefit or coverage questions.  _______________________________________________________________________  Authorized by:  Gearrdo Palmer MD       PLEASE EVALUATE AND TREAT (Evaluation timeline is 24 - 48 hrs. Please call if there is need for a variance to this timeline).     Medications:         Current Outpatient Medications   Medication Sig Dispense Refill     ACE/ARB/ARNI NOT PRESCRIBED (INTENTIONAL) Please choose reason not prescribed from choices below.         acetaminophen (TYLENOL) 325 MG tablet Take 3 tablets (975 mg) by mouth every 8 hours as needed for mild pain 100 tablet 3     aspirin 81 MG EC tablet Take 81 mg by mouth At Bedtime.         emollient (VANICREAM) external cream Apply topically daily After the shower on arms 453 g 3     fluocinolone (SYNALAR) 0.01 % external cream Apply topically 2 times daily 15 g 3     isosorbide mononitrate (IMDUR) 30 MG 24 hr tablet Take 1 tablet (30 mg) by mouth daily 90 tablet 3     ketoconazole (NIZORAL) 2 % external cream Apply topically daily 30 g 3     ketoconazole (NIZORAL) 2 % external shampoo Apply topically daily as needed for itching 120 mL 3     metFORMIN (GLUCOPHAGE) 500 MG tablet TAKE 1 TABLET (500 MG) BY MOUTH DAILY (WITH BREAKFAST) FOLLOW-UP WITH YOUR PRIMARY CARE DOCTOR GIVEN KIDNEY  FUNCTION 90 tablet 3     Multiple Vitamin (MV-ONE PO) Take 1 tablet by mouth daily          nitroGLYcerin (NITROSTAT) 0.4 MG sublingual tablet PLACE 1 TABLET UNDER TONGUE EVERY 5 MINS AS NEEDED FOR CHEST PAIN-IF NO RELIEF IN 15 MINS, CALL 911. 25 tablet 1     ONETOUCH ULTRA test strip USE TO TEST BLOOD SUGAR 2 TIMES DAILY OR AS DIRECTED. 100 strip 3     rosuvastatin (CRESTOR) 10 MG tablet Take 1 tablet (10 mg) by mouth daily 90 tablet 3     senna-docusate (SENOKOT-S/PERICOLACE) 8.6-50 MG tablet Take 1 tablet by mouth 2 times daily as needed for constipation 180 tablet 3      Problems:      Patient Active Problem List   Diagnosis     Health Care Home     Actinic keratosis     Seborrheic keratosis     Hyperplasia of prostate     Hyperlipidemia LDL goal <70     History of MI (myocardial infarction)     Late onset Alzheimer's disease without behavioral disturbance (H)     CKD (chronic kidney disease) stage 2, GFR 60-89 ml/min     Essential hypertension with goal blood pressure less than 140/90     Eczema, unspecified type     Type 2 diabetes mellitus with diabetic nephropathy, without long-term current use of insulin (H)     Paget's bone disease     History of skin cancer     Seborrheic dermatitis     Bilateral hearing loss     History of cataract surgery     Coronary artery disease of native artery of native heart with stable angina pectoris (H)     S/P CABG (coronary artery bypass graft)     Arthritis of right knee     Tremor, coarse     Balance problems     At risk for falling     Slow transit constipation     Difficulty transferring      Diet:  None        Code Status:    Code Status: Prior     Allergies:  Patient has no known allergies.             Order  Home Care Referral [9046.001] (Order [id number])    Referral Details    Referred By  Referred To   Buddy Poe MD   [address] [place] [address]   Phone: [phone]   Fax: [phone]    Diagnoses: Late onset Alzheimer's disease  without behavioral disturbance (H)   Order: Home Care Referral       Patient Name   Kiran Carcamo MRN   [id number] Legal Sex   Male    1921       Patient Demographics    Address   [address] [place] [address] Phone   [phone] (Home) *Preferred*   [phone] (Mobile) E-mail Address   [email or web address]     Base CPT Code (Reference Only)    Code CPT Chargeables   9046.001 9046      Existing Charges    Charge Line Charge Code Status Charge Trigger Charge Type   None          Order Information    Order Date/Time Release Date/Time Start Date/Time End Date/Time   22 05:34 PM None 2022 None     Order Details    Frequency Duration Priority Order Class   None None Routine: Next available opening  Home Care     Order Providers    Authorizing Provider Encounter Provider   Buddy Poe MD Kelly, Kevin Michael, MD     ABN Associated with this Order    There is no ABN associated with this order.                  Ordering Provider's NPI: [id number]  Buddy Poe      Home Care Referral [[id number]]    Electronically signed by: Buddy Poe MD on 22 Status: Active   Ordering user: Buddy Poe MD 22     Order History  Outpatient  Date/Time Action Taken User Additional Information   22 Sign Buddy Poe MD      Order Questions    Question Answer   Reason for Referral: Physical Therapy   Note: Must select at least one of Skilled Nursing, Physical Therapy, and/or Speech Therapy in addition to any other services.   Physical Therapy Eval and Treat for: Gait Training    Therapeutic Exercise    Transfer Training   Additional Services Needed: Occupational Therapy   Occupational Therapy Eval and Treat for: ADLs    Home Safety   Is the patient homebound? Yes   Homebound Status (describe the functional limitations that support this patient is confined to his/her home. Medicaid recipients are not required to be  homebound.): Patient has difficulty ambulating >100 ft    Patient gets lost or wanders due to cognitive impairments    Requires assistance of another person or specialized equipment is needed   I attest that I saw or will see the patient on this date: 2/25/2022   Provider to follow patient GERARDO PALMER            Reference Links               Associated Diagnoses    Late onset Alzheimer's disease without behavioral disturbance (H) [G30.1, F02.80]  - Primary         Source Order Set    Order Set Name Order ID    [id number]     Collection Information        Encounter    View Encounter            Order Report    View Order Information     Lab and Collection    Home Care Referral (Order: [id number]) - 6/7/2022  External System: External ID:   HE EAP [id number]       Order Requisition    Home Care Referral (Order #[id number]) on 6/7/22     Reprint Order Requisition    Home Care Referral (Order #[id number]) on 6/7/22     Tracking Links    Cosign Tracking Order Transmittal Tracking                 Office Visit    2/25/2022  [facility]   Gerardo Palmer MD      Family Medicine  Late onset Alzheimer's disease without behavioral disturbance (H) +3 more      Dx  COGNITIVE ASSESSMENT      Reason for Visit       Progress Notes  Gerardo Palmer MD (Physician)     Family Medicine  Expand AllCollapse All          Assessment & Plan         Late onset Alzheimer's disease without behavioral disturbance (H)  Slightly worse, unsteady transfers even with help     Impacted cerumen of right ear  - REMOVAL OF IMPACTED WAX MD     Type 2 diabetes mellitus with diabetic nephropathy, without long-term current use of insulin (H)  At goal   - HEMOGLOBIN A1C; Future  - ACE/ARB/ARNI NOT PRESCRIBED (INTENTIONAL); Please choose reason not prescribed from choices below.     Screening for hyperlipidemia  At goal      Review of external notes as documented elsewhere in note  30 minutes spent on the date of the  encounter doing chart review, history and exam, documentation and further activities per the note     Patient Instructions   Deal lightly with emotional lability     Leave briefly if need be     Distract, use TV     May need home care PT to assist with transfers/osteoarthritic spine         Return in about 4 months (around 6/25/2022) for Routine Visit.     Buddy Poe MD  [facility] LARRY Dar[place] is a 89 year old who presents for the following health issues  accompanied by his Daughters.     History of Present Illness      Reason for visit:  Confusion-delusions- left ear sore  Symptom onset:  1-2 weeks ago  Symptoms include:  Paranoia- confusion  Symptom intensity:  Moderate  Symptom progression:  Worsening  Had these symptoms before:  Yes  Has tried/received treatment for these symptoms:  Yes  Previous treatment was successful:  Yes  Prior treatment description:  Pneumonia and delusions -antibiotics  What makes it worse:  Weakness and upon waking  What makes it better:  Staying calm.     He eats 2-3 servings of fruits and vegetables daily.He consumes 0 sweetened beverage(s) daily.He exercises with enough effort to increase his heart rate 20 to 29 minutes per day.  He exercises with enough effort to increase his heart rate 7 days per week.   He is taking medications regularly.     Cognitive Screening   Onset: Since 2/8/2022  Description: Confusion and delusions; paranoia.   Intensity: moderate  Progression of Symptoms:  Worsening; seems more so in the AM when he first wakes up and then in the evening.  Therapies tried and outcome: None     Ear Pain   Onset: 2 months now   Description: Left ear pain only when take off hearing aid.    Intensity: N/A   Progression of Symptoms:  intermittent  Accompanying Signs & Symptoms: There is a lot of wax.   Therapies tried and outcome: Self ear cleaning at home.         Review of Systems   Constitutional, HEENT, cardiovascular,  pulmonary, gi and gu systems are negative, except as otherwise noted.           Objective       /72   Pulse 62   Temp 98  F (36.7  C) (Temporal)   Resp 16   SpO2 99%   There is no height or weight on file to calculate BMI.  Physical Exam   GENERAL: no distress, elderly and fatigued  NECK: no adenopathy, no asymmetry, masses, or scars and thyroid normal to palpation  RESP: lungs clear to auscultation - no rales, rhonchi or wheezes  CV: regular rate and rhythm, normal S1 S2, no S3 or S4, no murmur, click or rub, no peripheral edema and peripheral pulses strong  MS: unable to walk, weakness, poor coord  PSYCH: confused, disoriented, anxious, fatigued, judgement and insight impaired and appearance disheveled                     Other Notes     All notes        Instructions         Return in about 4 months (around 6/25/2022) for Routine Visit.    Deal lightly with emotional lability     Leave briefly if need be     Distract, use TV     May need home care PT to assist with transfers/osteoarthritic spine                After Visit Summary (Automatic SnapShot taken 5/16/2022)      Additional Documentation    Vitals:  /72     Pulse 62     Temp 98  F (36.7  C) (Temporal)     Resp 16     SpO2 99%            More Vitals     Flowsheets:  Patient-Reported Data,     Ambulatory Assessments,     Vitals Reassessment        Encounter Info:  Billing Info,     History,     Allergies,     Detailed Report          Fv Hpi General Questionnaire    Question 2/25/2022  2:46 PM CDT - Filed by Patient   I understand that completing this form is intended to provide my doctor and/or care team with helpful information for my upcoming clinic visit. It is not to notify my doctor and/or care team of medical matters requiring urgent attention. If I have an urgent medical matter, I should call 911 or my doctor's office. Acknowledge   For what are you coming to see the doctor today? Other   How many servings of fruits and vegetables do  you eat daily? 2-3   On average, how many sweetened beverages do you drink each day (Examples: soda, juice, sweet tea, etc.  Do NOT count diet or artificially sweetened beverages)? 0   How many minutes a day do you exercise enough to make your heart beat faster? 20 to 29   How many days a week do you exercise enough to make your heart beat faster? 7   How many days per week do you miss taking your medication? 0   Over the past 2 weeks, how often have you been bothered by any of the following problems?    Q1: Little interest or pleasure in doing things Several days   Q2: Feeling down, depressed or hopeless Several days   PHQ-2 Score (range: 0 - 6) 2   What is the reason for your visit today? Confusion-delusions- left ear sore   When did your symptoms begin? 1-2 weeks ago   What are your symptoms? Paranoia- confusion   How would you describe these symptoms? Moderate   Are your symptoms: Worsening   Have you had these symptoms before? Yes   Have you tried or received treatment for these symptoms before? Yes   Did that treatment work?  Yes   Please describe the treatment you had: Pneumonia and delusions -antibiotics   Is there anything that makes you feel worse? Weakness and upon waking   Is there anything that makes you feel better? Staying calm.     AVS Reports    Date/Time Report Action User   5/16/2022  8:25 AM After Visit Summary Automatically Generated Buddy Poe MD   2/25/2022  6:18 PM After Visit Summary Automatically Generated Buddy Poe MD     Encounter Information    Encounter Information    Provider Department Encounter # [place]   2/25/2022 3:00 PM Buddy Poe MD  PRIMARY CARE [id number] [facility]     Reviewed this Encounter     Medications Problems Allergies History   Idalmis Shaw   Reviewed ADL, Alcohol, Drug Use, Family, Medical, Pediatric, Sexual Activity, Surgical, Tobacco     Communicable/Travel screen    Communicable/Travel Screening 5/12/2021 6/4/2021 11/17/2021 2/25/2022  4/12/2022   In the last month, have you been in contact with someone who was confirmed or suspected to have Coronavirus / COVID-19? No / Unsure No / Unsure No / Unsure No / Unsure -   Do you have any of the following symptoms? None of these None of these None of these None of these None of these   View Complete Flowsheet ...More Data Exists         Orders Placed         CBC with platelets         HEMOGLOBIN A1C         UA with Microscopic reflex to Culture - lab collect         REMOVAL OF IMPACTED WAX MD       All Encounter Results     Medication Changes         ACE/ARB/ARNI NOT PRESCRIBED (INTENTIONAL) Please choose reason not prescribed from choices below.       Medication List     Visit Diagnoses         Late onset Alzheimer's disease without behavioral disturbance (H)         Impacted cerumen of right ear         Type 2 diabetes mellitus with diabetic nephropathy, without long-term current use of insulin (H)         Screening for hyperlipidemia       Problem List     Encounters with Related Results     Lab (2/25/2022)

## 2022-06-18 NOTE — PROGRESS NOTES
[facility] team member received notification from [facility] team requesting assistance from centralized [facility] team, in finding a home care agency to accept referral for skilled home care due to [facility] declining referral.     Home Care Referral placed for the following services:      Physical Therapy   Gait Training  Therapeutic Exercise  Transfer Training     Additional services needed: Occupational Therapy ADLs  Home Safety         RN faxed home care referral from 6/7/22 along with Facesheet and OV notes from 2/25/2022 to the following home care agencies, through [employer]:       [facility]  Blank from HARDEEP LOPEZ Sent on 6/18/2022 4:07 PM    [facility] ([facility]       [facility]               RN will await response(s) from agencies with update on referral.          Hardeep Lopez RN  Connected Care Resource Center  [facility] - Ambulatory Care Management

## 2022-06-20 NOTE — TELEPHONE ENCOUNTER
Dr. Poe, [facility],       [facility].  States she has called before, left message they are waiting for  documentation to support hospice .    Needs latest OV notes faxed to [phone]    Thank you,  Rosario Mercado RN

## 2022-06-20 NOTE — PROGRESS NOTES
RN received return voicemail message from [facility] with [facility] intake ([phone]) on 6/20/22 at 8:58am, informing they are unable to accept referral as they do not take patient's insurance.  Please call [phone] with any questions.      RN still awaiting return calls from [facility] and [facility] home care agencies on status of referral.      Taylor Porter RN  Connected Care Resource [facility] - Ambulatory Care Management

## 2022-06-20 NOTE — TELEPHONE ENCOUNTER
Routed to TC    Please see message and fax last OV notes    Marilyn Johnston RN   [facility]

## 2022-06-22 NOTE — TELEPHONE ENCOUNTER
Please contact daughter to schedule in clinic appointment - OK to use virtual, prior approval slots. Thanks Buddy

## 2022-06-22 NOTE — TELEPHONE ENCOUNTER
TC/Reception - please see Dr. Hollins msg below to schedule appt for pt.    Thank you,   Chrissie Hidalgo RN  [facility]

## 2022-06-29 NOTE — PROGRESS NOTES
Assessment & Plan     Late onset Alzheimer's disease without behavioral disturbance (H)  Slightly worse with absence of gag reflex this exam    Coronary artery disease of native artery of native heart with stable angina pectoris (H)  No angina    Screening for hyperlipidemia  Check next time blood drawn    Review of external notes as documented elsewhere in note  40 minutes spent on the date of the encounter doing chart review, history and exam, documentation and further activities per the note     Patient Instructions   Reconsider hospice, as patient has an absent gag reflex.  Rather than try thickened liquids, try to have someone there during mealtime to help him eat and use verbal cues regarding swallowing.  Careful around thin liquids.    Follow-up in the fall before the weather gets bad to recheck dementia    Let me know if you need an alternative hospice referral and will check with Dr. Ashley Mejia in about 4 months (around 10/29/2022) for Routine Visit, immunization.    Buddy Poe MD  [facility]    Gladys Darling is a 89 year old accompanied by his daughter, presenting for the following health issues:  RECHECK      History of Present Illness       Reason for visit:  Review for home health care or hospice    He eats 2-3 servings of fruits and vegetables daily.He consumes 0 sweetened beverage(s) daily.He exercises with enough effort to increase his heart rate 10 to 19 minutes per day.  He exercises with enough effort to increase his heart rate 3 or less days per week.   He is taking medications regularly.    Today's PHQ-9         PHQ-9 Total Score: 14    PHQ-9 Q9 Thoughts of better off dead/self-harm past 2 weeks :   More than half the days  Thoughts of suicide or self harm: (P) No  Self-harm Plan:     Self-harm Action:       Safety concerns for self or others: (P) No    How difficult have these problems made it for you to do your work, take care of things at  "home, or get along with other people: Very difficult     Per pt daughter \"His dementia is getting worse. He is forgetting about his wife's passing.\"     Cerebrovascular Follow-up      Patient history: Cerebrovascular dementia    Residual symptoms: Difficult walking, Confusion, Difficulty speaking, but no choking    Worsened or new symptoms since last visit:  YES forgetfulness is worse    Daily aspirin use: Yes    Hypertension controlled: Yes    Mood-mostly okay.  Some irritability but cannot hear well.    How many days per week do you miss taking your medication? 0 given by family    Review of Systems   Constitutional, HEENT, cardiovascular, pulmonary, GI, , musculoskeletal, neuro, skin, endocrine and psych systems are negative, except as otherwise noted.      Objective    /63   Pulse 68   Temp 97.1  F (36.2  C)   Resp 18   Wt 61 kg (134 lb 8 oz)   SpO2 96%   BMI 21.71 kg/m    Body mass index is 21.71 kg/m .  Physical Exam   GENERAL: no distress, frail, elderly and fatigued  NECK: no adenopathy, no asymmetry, masses, or scars and thyroid normal to palpation  RESP: lungs clear to auscultation - no rales, rhonchi or wheezes  CV: regular rate and rhythm, normal S1 S2, no S3 or S4, no murmur, click or rub, no peripheral edema and peripheral pulses strong  ABDOMEN: soft, nontender, no hepatosplenomegaly, no masses and bowel sounds normal  MS: no gross musculoskeletal defects noted, no edema  SKIN: no suspicious lesions or rashes  NEURO: weakness- generalized, speech abnormal and absent gag reflex, weak swallow            .  ..  "

## 2022-07-08 NOTE — TELEPHONE ENCOUNTER
"Requested Prescriptions   Signed Prescriptions Disp Refills    isosorbide mononitrate (IMDUR) 30 MG 24 hr tablet 90 tablet 3     Sig: Take 1 tablet (30 mg) by mouth daily       Nitrates Passed - 7/8/2022  6:52 AM        Passed - Blood pressure under 140/90 in past 12 months     BP Readings from Last 3 Encounters:   06/29/22 112/63   02/25/22 128/72   11/17/21 131/71                 Passed - Pt is not on erectile dysfunction medications        Passed - Recent (12 mo) or future (30 days) visit within the authorizing provider's specialty     Patient has had an office visit with the authorizing provider or a provider within the authorizing providers department within the previous 12 mos or has a future within next 30 days. See \"Patient Info\" tab in inbasket, or \"Choose Columns\" in Meds & Orders section of the refill encounter.              Passed - Medication is active on med list        Passed - Patient is age 18 or older           Cari RAWLS RN    "

## 2022-07-09 NOTE — PATIENT INSTRUCTIONS
Reconsider hospice, as patient has an absent gag reflex.  Rather than try thickened liquids, try to have someone there during mealtime to help him eat and use verbal cues regarding swallowing.  Careful around thin liquids.    Follow-up in the fall before the weather gets bad to recheck dementia    Let me know if you need an alternative hospice referral and will check with Dr. Ashley Yates

## 2022-08-03 NOTE — TELEPHONE ENCOUNTER
"Requested Prescriptions   Pending Prescriptions Disp Refills     fluocinolone (SYNALAR) 0.01 % external cream [Pharmacy Med Name: FLUOCINOLONE 0.01% CREAM 0.01 Cream] 15 g 3     Sig: APPLY TOPICALLY 2 TIMES DAILY       Topical Steroids and Nonsteroidals Protocol Passed - 8/2/2022  6:54 PM        Passed - Patient is age 6 or older        Passed - Authorizing prescriber's most recent note related to this medication read.     If refill request is for ophthalmic use, please forward request to provider for approval.          Passed - High potency steroid not ordered        Passed - Recent (12 mo) or future (30 days) visit within the authorizing provider's specialty     Patient has had an office visit with the authorizing provider or a provider within the authorizing providers department within the previous 12 mos or has a future within next 30 days. See \"Patient Info\" tab in inbasket, or \"Choose Columns\" in Meds & Orders section of the refill encounter.              Passed - Medication is active on med list           Refilled per protocol.  Cari RAWLS RN    "

## 2022-10-04 NOTE — TELEPHONE ENCOUNTER
"Requested Prescriptions   Pending Prescriptions Disp Refills     metFORMIN (GLUCOPHAGE) 500 MG tablet [Pharmacy Med Name: METFORMIN  MG TABS 500 Tablet] 90 tablet 3     Sig: TAKE 1 TABLET (500 MG) BY MOUTH DAILY (WITH BREAKFAST) FOLLOW-UP WITH YOUR PRIMARY CARE DOCTOR GIVEN KIDNEY FUNCTION       Biguanide Agents Failed - 10/3/2022  2:06 PM        Failed - Patient has documented A1c within the specified period of time.     If HgbA1C is 8 or greater, it needs to be on file within the past 3 months.  If less than 8, must be on file within the past 6 months.     Recent Labs   Lab Test 02/25/22  1603   A1C 6.4*             Failed - Patient's CR is NOT>1.4 OR Patient's EGFR is NOT<45 within past 12 mos.     Recent Labs   Lab Test 06/04/21  1545   GFRESTIMATED 65   GFRESTBLACK 75       Recent Labs   Lab Test 06/04/21  1545   CR 0.96             Passed - Patient is age 10 or older        Passed - Patient does NOT have a diagnosis of CHF.        Passed - Medication is active on med list        Passed - Recent (6 mo) or future (30 days) visit within the authorizing provider's specialty     Patient had office visit in the last 6 months or has a visit in the next 30 days with authorizing provider or within the authorizing provider's specialty.  See \"Patient Info\" tab in inbasket, or \"Choose Columns\" in Meds & Orders section of the refill encounter.               Unable to refill per protocol.  Cari RAWLS RN    "

## 2022-10-07 NOTE — TELEPHONE ENCOUNTER
ROSUVASTATIN 10 MG TAB 10 Tablet      Last Written Prescription Date:  7-21-21  Last Fill Quantity: 90,   # refills: 3  Last Office Visit : 1-7-20 ( RTC 1 y)  Future Office visit:  none    Routing refill request to provider for review/approval because:  Last appt > 18 m  FYI overdue LDL

## 2022-10-10 NOTE — TELEPHONE ENCOUNTER
Per clinic :Hello,     This pt has not been seen by Dr. Tsai since 2020. Refill request should be sent to pt's PCP.     Barrington Wiseman, RN   Cardiology Nurse Coordinator      RF refused: Send to PCP,                                    Dr. Buddy Poe [facility]

## 2022-10-10 NOTE — TELEPHONE ENCOUNTER
"Requested Prescriptions   Pending Prescriptions Disp Refills     rosuvastatin (CRESTOR) 10 MG tablet 90 tablet 3     Sig: Take 1 tablet (10 mg) by mouth daily       Statins Protocol Failed - 10/10/2022  2:28 PM        Failed - LDL on file in past 12 months     Recent Labs   Lab Test 09/14/18  0930   LDL 38             Passed - No abnormal creatine kinase in past 12 months     No lab results found.             Passed - Recent (12 mo) or future (30 days) visit within the authorizing provider's specialty     Patient has had an office visit with the authorizing provider or a provider within the authorizing providers department within the previous 12 mos or has a future within next 30 days. See \"Patient Info\" tab in inbasket, or \"Choose Columns\" in Meds & Orders section of the refill encounter.              Passed - Medication is active on med list        Passed - Patient is age 18 or older           Routing refill request to provider for review/approval because:  Labs out of range  Refill request for Crestor.   Pt was seen in clinic on 06/29/2022.    Leanna Akhtar RN  [facility]        "

## 2022-10-17 NOTE — TELEPHONE ENCOUNTER
Daughter calling to schedule annual wellness exam    Next avail is a provider approval 10/31 at 9:40am - ok to use?    Please advise (sent [employer] msg with appt time)    Chrissie Hidalgo RN  [facility]

## 2022-11-07 NOTE — TELEPHONE ENCOUNTER
Order signed, please notify, thanks Buddy    Zygomaticofacial Flap Text: Given the location of the defect, shape of the defect and the proximity to free margins a zygomaticofacial flap was deemed most appropriate for repair.  Using a sterile surgical marker, the appropriate flap was drawn incorporating the defect and placing the expected incisions within the relaxed skin tension lines where possible. The area thus outlined was incised deep to adipose tissue with a #15 scalpel blade with preservation of a vascular pedicle.  The skin margins were undermined to an appropriate distance in all directions utilizing iris scissors.  The flap was then placed into the defect and anchored with interrupted buried subcutaneous sutures.

## 2022-12-08 NOTE — LETTER
December 8, 2022      Kiran Carcamo  [address] [facility] [id number]                  Sincerely,        Buddy Poe MD

## 2022-12-08 NOTE — TELEPHONE ENCOUNTER
Spoke with Wanda from [facility], needed a med list signed by today however since  is out she will take an unsigned one and will also need  to sign one and send back to [phone] by tomorrow.

## 2022-12-08 NOTE — TELEPHONE ENCOUNTER
He is moving into assisted living on Monday to [facility].    They will be managing and administering his medications.     Facility needs a signed med list to be sent.     Fax to [phone]      Leanna Akhtar RN  [facility]

## 2023-01-01 ENCOUNTER — MEDICAL CORRESPONDENCE (OUTPATIENT)
Dept: HEALTH INFORMATION MANAGEMENT | Facility: CLINIC | Age: 88
End: 2023-01-01
Payer: COMMERCIAL

## 2023-01-01 ENCOUNTER — TELEPHONE (OUTPATIENT)
Dept: FAMILY MEDICINE | Facility: CLINIC | Age: 88
End: 2023-01-01

## 2023-01-01 ENCOUNTER — TELEPHONE (OUTPATIENT)
Dept: FAMILY MEDICINE | Facility: CLINIC | Age: 88
End: 2023-01-01
Payer: COMMERCIAL

## 2023-01-01 ENCOUNTER — MEDICAL CORRESPONDENCE (OUTPATIENT)
Dept: HEALTH INFORMATION MANAGEMENT | Facility: CLINIC | Age: 88
End: 2023-01-01

## 2023-01-12 NOTE — PLAN OF CARE
Problem: Discharge Planning  Goal: Discharge Planning (Adult, OB, Behavioral, Peds)  Goals to be met before discharge home:   - No dyspnea and oxygen saturations greater than 94% on room air or prior home oxygen levels: yes. Sats 97% on room air.  - Tolerates oral antibiotics or antivirals or has plans for home infusion set up: Yes, on PO ABX  - Vital signs normal or at patient baseline: YES   - Infection is improving: Unknown   - Return to baseline functional status: No  - Safe disposition plan has been identified: YES              1. Encourage PO intake and honor food preferences as able.  2. Continue to document PO in RN flow sheets and monitor weekly weights.   3. F/u with diet education PRN.

## 2023-01-24 NOTE — TELEPHONE ENCOUNTER
Patient Quality Outreach    Patient is due for the following:   Diabetes -  A1C, Eye Exam and Microalbumin  Physical Annual Wellness Visit      Topic Date Due     Zoster (Shingles) Vaccine (2 of 2) 11/18/2020     Diptheria Tetanus Pertussis (DTAP/TDAP/TD) Vaccine (3 - Td or Tdap) 11/09/2021     COVID-19 Vaccine (4 - Booster for Pfizer series) 01/12/2022     Flu Vaccine (1) 09/01/2022       Next Steps:   Schedule a Annual Wellness Visit    Type of outreach:    Sent [employer] message.    Next Steps:  Reach out within 90 days via Letter.    Max number of attempts reached: No. Will try again in 90 days if patient still on fail list.    Questions for provider review:    None     Taylor Mcconnell [facility]  Chart routed to Care Team.

## 2023-02-28 NOTE — TELEPHONE ENCOUNTER
Reason for Call:  Form, our goal is to have forms completed with 72 hours, however, some forms may require a visit or additional information.     Type of letter, form or note:  Form (Include title/ description) Physicians Orders/ Plan of care from 2/22/23-4/22/23    Who is the form from?: [facility]      Where did the form come from: Faxed over     What clinic location was the form placed at?: [facility]     Where the form was placed: Provider Folder      What number is listed as a contact on the form or fax?:   [phone]       Additional comments:

## 2023-03-03 NOTE — TELEPHONE ENCOUNTER
Forms completed by provider  Physicians Orders/ Plan of care from 2/22/23-4/22/23 and faxed back and Placed into [employer] to scan into patients chart. Fax number used: [phone]

## 2023-05-23 NOTE — PROGRESS NOTES
"RN received return voicemail message from See with [facility] intake ([phone]) on 6/20/22 at 12:08pm, informing \"typically we can accept this patient although he is needing a new F2F visit to support the HC orders placed on 6/2/22; if able to get pt in for a new F2F visit, they can take a look at it then.\"  Please call [phone] with any questions.      RN reviewed home care referral order and noted order was signed by provider on 6/7/22 and patient's last F2F visit with provider was on 2/25/2022.        RN returned call to See with [facility] intake.  See informed RN patient needs a new F2F visit for home care orders as patient's last F2F visit on 2/25/22 does not meet the insurance requirement for a F2F visit within 90 days of the SOC date.  RN will send message to provider informing pt needs a new F2F visit for home care.      Dr. Poe:  [facility] RN is unable to secure home care services for patient at this time as patient needs a new F2F visit within 90 days prior to the SOC date to meet insurance requirements, per [facility].  Please have care team assist pt in scheduling a F2F visit for home care orders.  Thank you!      Taylor Porter, RN  Connected Care Resource [facility]   " monthly or less

## 2023-12-30 NOTE — TELEPHONE ENCOUNTER
"Requested Prescriptions   Pending Prescriptions Disp Refills     senna-docusate (SENOKOT-S/PERICOLACE) 8.6-50 MG tablet 90 tablet 1     Sig: Take 1 tablet by mouth 2 times daily as needed for constipation       Laxatives Protocol Passed - 9/22/2020 10:41 AM        Passed - Patient is age 6 or older        Passed - Recent (12 mo) or future (30 days) visit within the authorizing provider's specialty     Patient has had an office visit with the authorizing provider or a provider within the authorizing providers department within the previous 12 mos or has a future within next 30 days. See \"Patient Info\" tab in inbasket, or \"Choose Columns\" in Meds & Orders section of the refill encounter.              Passed - Medication is active on med list           Signed Prescriptions:                        Disp   Refills    senna-docusate (SENOKOT-S/PERICOLACE) 8.6-*90 tab*1        Sig: Take 1 tablet by mouth 2 times daily as needed for           constipation  Authorizing Provider: GERARDO PALMER  Ordering User: PAUL FISH      " Ultrasound used to visualize R pleural effusion

## 2024-10-23 NOTE — TELEPHONE ENCOUNTER
Order for hospice eval and treat   
Verbal orders given.  Cari RAWLS RN    
Yes, to hospice order. OK to eval and treat. Please notify, thanks Buddy   
no

## 2025-03-25 NOTE — NURSING NOTE
Screening Questionnaire for Adult Immunization    Are you sick today?   No   Do you have allergies to medications, food, a vaccine component or latex?   No   Have you ever had a serious reaction after receiving a vaccination?   No   Do you have a long-term health problem with heart disease, lung disease, asthma, kidney disease, metabolic disease (e.g. diabetes), anemia, or other blood disorder?   No   Do you have cancer, leukemia, HIV/AIDS, or any other immune system problem?   No   In the past 3 months, have you taken medications that affect  your immune system, such as prednisone, other steroids, or anticancer drugs; drugs for the treatment of rheumatoid arthritis, Crohn s disease, or psoriasis; or have you had radiation treatments?   No   Have you had a seizure, or a brain or other nervous system problem?   No   During the past year, have you received a transfusion of blood or blood     products, or been given immune (gamma) globulin or antiviral drug?   No   For women: Are you pregnant or is there a chance you could become        pregnant during the next month?   No   Have you received any vaccinations in the past 4 weeks?   No     Immunization questionnaire answers were all negative.        Per orders of Dr. Poe, injection of PCV 13  given by Miranda Deluna. Patient instructed to remain in clinic for 15 minutes afterwards, and to report any adverse reaction to me immediately.       Screening performed by Miranda Deluna on 11/9/2018 at 2:19 PM.  
2022

## (undated) RX ORDER — TRIAMCINOLONE ACETONIDE 40 MG/ML
INJECTION, SUSPENSION INTRA-ARTICULAR; INTRAMUSCULAR
Status: DISPENSED
Start: 2020-07-01

## (undated) RX ORDER — LIDOCAINE HYDROCHLORIDE 10 MG/ML
INJECTION, SOLUTION EPIDURAL; INFILTRATION; INTRACAUDAL; PERINEURAL
Status: DISPENSED
Start: 2020-01-07

## (undated) RX ORDER — LIDOCAINE HYDROCHLORIDE 10 MG/ML
INJECTION, SOLUTION EPIDURAL; INFILTRATION; INTRACAUDAL; PERINEURAL
Status: DISPENSED
Start: 2019-06-07

## (undated) RX ORDER — LIDOCAINE HYDROCHLORIDE 10 MG/ML
INJECTION, SOLUTION EPIDURAL; INFILTRATION; INTRACAUDAL; PERINEURAL
Status: DISPENSED
Start: 2019-06-21

## (undated) RX ORDER — TRIAMCINOLONE ACETONIDE 40 MG/ML
INJECTION, SUSPENSION INTRA-ARTICULAR; INTRAMUSCULAR
Status: DISPENSED
Start: 2020-01-07

## (undated) RX ORDER — TRIAMCINOLONE ACETONIDE 40 MG/ML
INJECTION, SUSPENSION INTRA-ARTICULAR; INTRAMUSCULAR
Status: DISPENSED
Start: 2019-06-21

## (undated) RX ORDER — TRIAMCINOLONE ACETONIDE 40 MG/ML
INJECTION, SUSPENSION INTRA-ARTICULAR; INTRAMUSCULAR
Status: DISPENSED
Start: 2019-06-07

## (undated) RX ORDER — LIDOCAINE HYDROCHLORIDE 10 MG/ML
INJECTION, SOLUTION EPIDURAL; INFILTRATION; INTRACAUDAL; PERINEURAL
Status: DISPENSED
Start: 2020-07-01